# Patient Record
Sex: FEMALE | Race: OTHER | HISPANIC OR LATINO
[De-identification: names, ages, dates, MRNs, and addresses within clinical notes are randomized per-mention and may not be internally consistent; named-entity substitution may affect disease eponyms.]

---

## 2017-01-05 ENCOUNTER — FORM ENCOUNTER (OUTPATIENT)
Age: 32
End: 2017-01-05

## 2017-01-06 ENCOUNTER — OUTPATIENT (OUTPATIENT)
Dept: OUTPATIENT SERVICES | Facility: HOSPITAL | Age: 32
LOS: 1 days | End: 2017-01-06
Payer: MEDICAID

## 2017-01-06 DIAGNOSIS — Z98.89 OTHER SPECIFIED POSTPROCEDURAL STATES: Chronic | ICD-10-CM

## 2017-01-06 DIAGNOSIS — R10.31 RIGHT LOWER QUADRANT PAIN: ICD-10-CM

## 2017-01-06 PROCEDURE — 76770 US EXAM ABDO BACK WALL COMP: CPT | Mod: 26

## 2017-01-06 PROCEDURE — 76770 US EXAM ABDO BACK WALL COMP: CPT

## 2017-01-13 ENCOUNTER — OUTPATIENT (OUTPATIENT)
Dept: OUTPATIENT SERVICES | Facility: HOSPITAL | Age: 32
LOS: 1 days | End: 2017-01-13
Payer: MEDICAID

## 2017-01-13 ENCOUNTER — APPOINTMENT (OUTPATIENT)
Dept: FAMILY MEDICINE | Facility: HOSPITAL | Age: 32
End: 2017-01-13

## 2017-01-13 VITALS
DIASTOLIC BLOOD PRESSURE: 73 MMHG | HEART RATE: 87 BPM | OXYGEN SATURATION: 98 % | SYSTOLIC BLOOD PRESSURE: 102 MMHG | RESPIRATION RATE: 16 BRPM | WEIGHT: 210 LBS | BODY MASS INDEX: 38.41 KG/M2 | TEMPERATURE: 98.3 F

## 2017-01-13 DIAGNOSIS — E28.2 POLYCYSTIC OVARIAN SYNDROME: ICD-10-CM

## 2017-01-13 DIAGNOSIS — Z00.00 ENCOUNTER FOR GENERAL ADULT MEDICAL EXAMINATION WITHOUT ABNORMAL FINDINGS: ICD-10-CM

## 2017-01-13 DIAGNOSIS — Z98.89 OTHER SPECIFIED POSTPROCEDURAL STATES: Chronic | ICD-10-CM

## 2017-01-13 PROCEDURE — 80053 COMPREHEN METABOLIC PANEL: CPT

## 2017-01-13 PROCEDURE — 36415 COLL VENOUS BLD VENIPUNCTURE: CPT

## 2017-01-13 PROCEDURE — 85025 COMPLETE CBC W/AUTO DIFF WBC: CPT

## 2017-01-13 PROCEDURE — 83036 HEMOGLOBIN GLYCOSYLATED A1C: CPT

## 2017-01-13 PROCEDURE — 80061 LIPID PANEL: CPT

## 2017-01-13 PROCEDURE — G0463: CPT

## 2017-03-06 ENCOUNTER — EMERGENCY (EMERGENCY)
Facility: HOSPITAL | Age: 32
LOS: 1 days | Discharge: HOME | End: 2017-03-06
Admitting: EMERGENCY MEDICINE
Payer: MEDICAID

## 2017-03-06 DIAGNOSIS — Z98.89 OTHER SPECIFIED POSTPROCEDURAL STATES: Chronic | ICD-10-CM

## 2017-03-06 DIAGNOSIS — R51 HEADACHE: ICD-10-CM

## 2017-03-06 DIAGNOSIS — E11.9 TYPE 2 DIABETES MELLITUS WITHOUT COMPLICATIONS: ICD-10-CM

## 2017-03-06 DIAGNOSIS — Z79.84 LONG TERM (CURRENT) USE OF ORAL HYPOGLYCEMIC DRUGS: ICD-10-CM

## 2017-03-06 DIAGNOSIS — Z79.899 OTHER LONG TERM (CURRENT) DRUG THERAPY: ICD-10-CM

## 2017-03-06 PROCEDURE — 70450 CT HEAD/BRAIN W/O DYE: CPT | Mod: 26

## 2017-03-06 PROCEDURE — 99284 EMERGENCY DEPT VISIT MOD MDM: CPT

## 2017-03-07 PROCEDURE — 85027 COMPLETE CBC AUTOMATED: CPT

## 2017-03-07 PROCEDURE — 99284 EMERGENCY DEPT VISIT MOD MDM: CPT | Mod: 25

## 2017-03-07 PROCEDURE — 70450 CT HEAD/BRAIN W/O DYE: CPT

## 2017-03-07 PROCEDURE — 96361 HYDRATE IV INFUSION ADD-ON: CPT

## 2017-03-07 PROCEDURE — 80048 BASIC METABOLIC PNL TOTAL CA: CPT

## 2017-03-07 PROCEDURE — 81025 URINE PREGNANCY TEST: CPT

## 2017-03-07 PROCEDURE — 96374 THER/PROPH/DIAG INJ IV PUSH: CPT

## 2017-03-07 PROCEDURE — 96375 TX/PRO/DX INJ NEW DRUG ADDON: CPT

## 2017-03-13 ENCOUNTER — OUTPATIENT (OUTPATIENT)
Dept: OUTPATIENT SERVICES | Facility: HOSPITAL | Age: 32
LOS: 1 days | End: 2017-03-13
Payer: MEDICAID

## 2017-03-13 ENCOUNTER — APPOINTMENT (OUTPATIENT)
Dept: FAMILY MEDICINE | Facility: HOSPITAL | Age: 32
End: 2017-03-13

## 2017-03-13 VITALS
WEIGHT: 210 LBS | SYSTOLIC BLOOD PRESSURE: 102 MMHG | TEMPERATURE: 98.9 F | RESPIRATION RATE: 16 BRPM | BODY MASS INDEX: 38.41 KG/M2 | DIASTOLIC BLOOD PRESSURE: 69 MMHG | OXYGEN SATURATION: 99 % | HEART RATE: 72 BPM

## 2017-03-13 DIAGNOSIS — Z98.89 OTHER SPECIFIED POSTPROCEDURAL STATES: Chronic | ICD-10-CM

## 2017-03-13 DIAGNOSIS — R51 HEADACHE: ICD-10-CM

## 2017-03-13 PROCEDURE — G0463: CPT

## 2017-03-15 ENCOUNTER — EMERGENCY (EMERGENCY)
Facility: HOSPITAL | Age: 32
LOS: 1 days | Discharge: HOME | End: 2017-03-15
Admitting: EMERGENCY MEDICINE
Payer: MEDICAID

## 2017-03-15 DIAGNOSIS — R11.0 NAUSEA: ICD-10-CM

## 2017-03-15 DIAGNOSIS — E11.9 TYPE 2 DIABETES MELLITUS WITHOUT COMPLICATIONS: ICD-10-CM

## 2017-03-15 DIAGNOSIS — Z98.89 OTHER SPECIFIED POSTPROCEDURAL STATES: Chronic | ICD-10-CM

## 2017-03-15 DIAGNOSIS — R51 HEADACHE: ICD-10-CM

## 2017-03-15 DIAGNOSIS — R19.7 DIARRHEA, UNSPECIFIED: ICD-10-CM

## 2017-03-15 DIAGNOSIS — Z98.51 TUBAL LIGATION STATUS: ICD-10-CM

## 2017-03-15 DIAGNOSIS — Z79.84 LONG TERM (CURRENT) USE OF ORAL HYPOGLYCEMIC DRUGS: ICD-10-CM

## 2017-03-15 PROCEDURE — 99284 EMERGENCY DEPT VISIT MOD MDM: CPT

## 2017-03-16 PROCEDURE — 96361 HYDRATE IV INFUSION ADD-ON: CPT

## 2017-03-16 PROCEDURE — 99284 EMERGENCY DEPT VISIT MOD MDM: CPT | Mod: 25

## 2017-03-16 PROCEDURE — 96376 TX/PRO/DX INJ SAME DRUG ADON: CPT

## 2017-03-16 PROCEDURE — 82962 GLUCOSE BLOOD TEST: CPT

## 2017-03-16 PROCEDURE — 85027 COMPLETE CBC AUTOMATED: CPT

## 2017-03-16 PROCEDURE — G0463: CPT

## 2017-03-16 PROCEDURE — 96374 THER/PROPH/DIAG INJ IV PUSH: CPT

## 2017-03-16 PROCEDURE — 80048 BASIC METABOLIC PNL TOTAL CA: CPT

## 2017-03-16 PROCEDURE — 81025 URINE PREGNANCY TEST: CPT

## 2017-03-16 PROCEDURE — 96375 TX/PRO/DX INJ NEW DRUG ADDON: CPT

## 2017-03-23 ENCOUNTER — OUTPATIENT (OUTPATIENT)
Dept: OUTPATIENT SERVICES | Facility: HOSPITAL | Age: 32
LOS: 1 days | End: 2017-03-23
Payer: MEDICAID

## 2017-03-23 ENCOUNTER — APPOINTMENT (OUTPATIENT)
Dept: FAMILY MEDICINE | Facility: HOSPITAL | Age: 32
End: 2017-03-23

## 2017-03-23 VITALS
BODY MASS INDEX: 38.23 KG/M2 | SYSTOLIC BLOOD PRESSURE: 112 MMHG | HEART RATE: 76 BPM | TEMPERATURE: 99.2 F | RESPIRATION RATE: 14 BRPM | WEIGHT: 209 LBS | DIASTOLIC BLOOD PRESSURE: 78 MMHG | OXYGEN SATURATION: 98 %

## 2017-03-23 DIAGNOSIS — Z98.89 OTHER SPECIFIED POSTPROCEDURAL STATES: Chronic | ICD-10-CM

## 2017-03-23 DIAGNOSIS — E66.9 OBESITY, UNSPECIFIED: ICD-10-CM

## 2017-03-23 DIAGNOSIS — Z00.00 ENCOUNTER FOR GENERAL ADULT MEDICAL EXAMINATION WITHOUT ABNORMAL FINDINGS: ICD-10-CM

## 2017-03-23 PROCEDURE — 84439 ASSAY OF FREE THYROXINE: CPT

## 2017-03-23 PROCEDURE — 84443 ASSAY THYROID STIM HORMONE: CPT

## 2017-03-23 PROCEDURE — G0463: CPT

## 2017-03-31 ENCOUNTER — APPOINTMENT (OUTPATIENT)
Dept: MRI IMAGING | Facility: HOSPITAL | Age: 32
End: 2017-03-31

## 2017-03-31 ENCOUNTER — OUTPATIENT (OUTPATIENT)
Dept: OUTPATIENT SERVICES | Facility: HOSPITAL | Age: 32
LOS: 1 days | End: 2017-03-31
Payer: MEDICAID

## 2017-03-31 DIAGNOSIS — Z98.89 OTHER SPECIFIED POSTPROCEDURAL STATES: Chronic | ICD-10-CM

## 2017-03-31 DIAGNOSIS — R42 DIZZINESS AND GIDDINESS: ICD-10-CM

## 2017-03-31 DIAGNOSIS — R51 HEADACHE: ICD-10-CM

## 2017-03-31 PROCEDURE — 70551 MRI BRAIN STEM W/O DYE: CPT

## 2017-05-02 ENCOUNTER — RX RENEWAL (OUTPATIENT)
Age: 32
End: 2017-05-02

## 2017-07-21 ENCOUNTER — EMERGENCY (EMERGENCY)
Facility: HOSPITAL | Age: 32
LOS: 1 days | Discharge: HOME | End: 2017-07-21
Admitting: EMERGENCY MEDICINE
Payer: COMMERCIAL

## 2017-07-21 DIAGNOSIS — Z98.89 OTHER SPECIFIED POSTPROCEDURAL STATES: Chronic | ICD-10-CM

## 2017-07-21 DIAGNOSIS — R10.9 UNSPECIFIED ABDOMINAL PAIN: ICD-10-CM

## 2017-07-21 DIAGNOSIS — Z79.84 LONG TERM (CURRENT) USE OF ORAL HYPOGLYCEMIC DRUGS: ICD-10-CM

## 2017-07-21 DIAGNOSIS — Z98.890 OTHER SPECIFIED POSTPROCEDURAL STATES: ICD-10-CM

## 2017-07-21 DIAGNOSIS — Z98.51 TUBAL LIGATION STATUS: ICD-10-CM

## 2017-07-21 DIAGNOSIS — Z79.2 LONG TERM (CURRENT) USE OF ANTIBIOTICS: ICD-10-CM

## 2017-07-21 DIAGNOSIS — E11.9 TYPE 2 DIABETES MELLITUS WITHOUT COMPLICATIONS: ICD-10-CM

## 2017-07-21 DIAGNOSIS — Z90.49 ACQUIRED ABSENCE OF OTHER SPECIFIED PARTS OF DIGESTIVE TRACT: ICD-10-CM

## 2017-07-21 DIAGNOSIS — R10.11 RIGHT UPPER QUADRANT PAIN: ICD-10-CM

## 2017-07-21 DIAGNOSIS — Z79.899 OTHER LONG TERM (CURRENT) DRUG THERAPY: ICD-10-CM

## 2017-07-21 PROCEDURE — 76700 US EXAM ABDOM COMPLETE: CPT | Mod: 26

## 2017-07-21 PROCEDURE — 99285 EMERGENCY DEPT VISIT HI MDM: CPT

## 2017-07-22 PROCEDURE — 82150 ASSAY OF AMYLASE: CPT

## 2017-07-22 PROCEDURE — 81003 URINALYSIS AUTO W/O SCOPE: CPT

## 2017-07-22 PROCEDURE — 83690 ASSAY OF LIPASE: CPT

## 2017-07-22 PROCEDURE — 80048 BASIC METABOLIC PNL TOTAL CA: CPT

## 2017-07-22 PROCEDURE — 96361 HYDRATE IV INFUSION ADD-ON: CPT

## 2017-07-22 PROCEDURE — 87086 URINE CULTURE/COLONY COUNT: CPT

## 2017-07-22 PROCEDURE — 85027 COMPLETE CBC AUTOMATED: CPT

## 2017-07-22 PROCEDURE — 76700 US EXAM ABDOM COMPLETE: CPT

## 2017-07-22 PROCEDURE — 99284 EMERGENCY DEPT VISIT MOD MDM: CPT | Mod: 25

## 2017-07-22 PROCEDURE — 81025 URINE PREGNANCY TEST: CPT

## 2017-07-22 PROCEDURE — 80076 HEPATIC FUNCTION PANEL: CPT

## 2017-07-22 PROCEDURE — 96374 THER/PROPH/DIAG INJ IV PUSH: CPT

## 2017-10-11 ENCOUNTER — APPOINTMENT (OUTPATIENT)
Dept: FAMILY MEDICINE | Facility: HOSPITAL | Age: 32
End: 2017-10-11

## 2017-10-11 ENCOUNTER — OUTPATIENT (OUTPATIENT)
Dept: OUTPATIENT SERVICES | Facility: HOSPITAL | Age: 32
LOS: 1 days | End: 2017-10-11
Payer: MEDICAID

## 2017-10-11 VITALS
RESPIRATION RATE: 16 BRPM | BODY MASS INDEX: 38.78 KG/M2 | DIASTOLIC BLOOD PRESSURE: 71 MMHG | SYSTOLIC BLOOD PRESSURE: 103 MMHG | HEART RATE: 61 BPM | OXYGEN SATURATION: 99 % | WEIGHT: 212 LBS | TEMPERATURE: 98.7 F

## 2017-10-11 DIAGNOSIS — Z98.89 OTHER SPECIFIED POSTPROCEDURAL STATES: Chronic | ICD-10-CM

## 2017-10-11 DIAGNOSIS — R10.31 RIGHT LOWER QUADRANT PAIN: ICD-10-CM

## 2017-10-11 PROCEDURE — G0463: CPT

## 2017-10-11 RX ORDER — ACETAMINOPHEN, ASPIRIN, AND CAFFEINE 250; 250; 65 MG/1; MG/1; MG/1
250-250-65 TABLET, FILM COATED ORAL
Qty: 90 | Refills: 0 | Status: DISCONTINUED | COMMUNITY
Start: 2017-03-13 | End: 2017-10-11

## 2017-10-25 ENCOUNTER — APPOINTMENT (OUTPATIENT)
Dept: FAMILY MEDICINE | Facility: HOSPITAL | Age: 32
End: 2017-10-25

## 2017-10-25 ENCOUNTER — OUTPATIENT (OUTPATIENT)
Dept: OUTPATIENT SERVICES | Facility: HOSPITAL | Age: 32
LOS: 1 days | End: 2017-10-25
Payer: MEDICAID

## 2017-10-25 VITALS
TEMPERATURE: 98.5 F | SYSTOLIC BLOOD PRESSURE: 113 MMHG | DIASTOLIC BLOOD PRESSURE: 75 MMHG | HEIGHT: 62 IN | HEART RATE: 79 BPM | OXYGEN SATURATION: 99 % | WEIGHT: 211 LBS | BODY MASS INDEX: 38.83 KG/M2 | RESPIRATION RATE: 14 BRPM

## 2017-10-25 DIAGNOSIS — E55.9 VITAMIN D DEFICIENCY, UNSPECIFIED: ICD-10-CM

## 2017-10-25 DIAGNOSIS — Z98.89 OTHER SPECIFIED POSTPROCEDURAL STATES: Chronic | ICD-10-CM

## 2017-10-25 PROCEDURE — G0463: CPT

## 2017-10-25 RX ORDER — SIMVASTATIN 40 MG/1
40 TABLET, FILM COATED ORAL
Qty: 30 | Refills: 3 | Status: DISCONTINUED | COMMUNITY
Start: 2017-03-13 | End: 2017-10-25

## 2017-11-22 ENCOUNTER — OUTPATIENT (OUTPATIENT)
Dept: OUTPATIENT SERVICES | Facility: HOSPITAL | Age: 32
LOS: 1 days | End: 2017-11-22
Payer: COMMERCIAL

## 2017-11-22 VITALS
HEIGHT: 64 IN | DIASTOLIC BLOOD PRESSURE: 74 MMHG | SYSTOLIC BLOOD PRESSURE: 114 MMHG | HEART RATE: 72 BPM | WEIGHT: 205.91 LBS | TEMPERATURE: 98 F | RESPIRATION RATE: 16 BRPM

## 2017-11-22 DIAGNOSIS — Z98.89 OTHER SPECIFIED POSTPROCEDURAL STATES: Chronic | ICD-10-CM

## 2017-11-22 DIAGNOSIS — R10.2 PELVIC AND PERINEAL PAIN: ICD-10-CM

## 2017-11-22 DIAGNOSIS — Z01.818 ENCOUNTER FOR OTHER PREPROCEDURAL EXAMINATION: ICD-10-CM

## 2017-11-22 DIAGNOSIS — N80.9 ENDOMETRIOSIS, UNSPECIFIED: ICD-10-CM

## 2017-11-22 LAB
HCG SERPL-ACNC: <1 MIU/ML — SIGNIFICANT CHANGE UP
HCT VFR BLD CALC: 43 % — SIGNIFICANT CHANGE UP (ref 34.5–45)
HGB BLD-MCNC: 14.1 G/DL — SIGNIFICANT CHANGE UP (ref 11.5–15.5)
MCHC RBC-ENTMCNC: 29.9 PG — SIGNIFICANT CHANGE UP (ref 27–34)
MCHC RBC-ENTMCNC: 32.8 GM/DL — SIGNIFICANT CHANGE UP (ref 32–36)
MCV RBC AUTO: 91 FL — SIGNIFICANT CHANGE UP (ref 80–100)
PLATELET # BLD AUTO: 198 K/UL — SIGNIFICANT CHANGE UP (ref 150–400)
RBC # BLD: 4.73 M/UL — SIGNIFICANT CHANGE UP (ref 3.8–5.2)
RBC # FLD: 11.7 % — SIGNIFICANT CHANGE UP (ref 10.3–14.5)
WBC # BLD: 9 K/UL — SIGNIFICANT CHANGE UP (ref 3.8–10.5)
WBC # FLD AUTO: 9 K/UL — SIGNIFICANT CHANGE UP (ref 3.8–10.5)

## 2017-11-22 PROCEDURE — G0463: CPT

## 2017-11-22 PROCEDURE — 86900 BLOOD TYPING SEROLOGIC ABO: CPT

## 2017-11-22 PROCEDURE — 84702 CHORIONIC GONADOTROPIN TEST: CPT

## 2017-11-22 PROCEDURE — 86901 BLOOD TYPING SEROLOGIC RH(D): CPT

## 2017-11-22 PROCEDURE — 85027 COMPLETE CBC AUTOMATED: CPT

## 2017-11-22 PROCEDURE — 86850 RBC ANTIBODY SCREEN: CPT

## 2017-11-22 NOTE — H&P PST ADULT - ASSESSMENT
33 yo F w abdominal pain during menses  for diagnostic laparoscopy   lysis of adhesions   fulguration  of endometriosis

## 2017-11-22 NOTE — H&P PST ADULT - NSANTHOSAYNRD_GEN_A_CORE
No. ESTEBAN screening performed.  STOP BANG Legend: 0-2 = LOW Risk; 3-4 = INTERMEDIATE Risk; 5-8 = HIGH Risk

## 2017-11-22 NOTE — H&P PST ADULT - HISTORY OF PRESENT ILLNESS
31 yo F for diagnostic laparoscopy - lysis of adhesions- fulguration of endometriosis   Pt reports abdominal pain w her menses and heavy bleeding   LMP  11/4/17  G 4 P 3

## 2017-11-26 ENCOUNTER — TRANSCRIPTION ENCOUNTER (OUTPATIENT)
Age: 32
End: 2017-11-26

## 2017-11-27 ENCOUNTER — APPOINTMENT (OUTPATIENT)
Dept: FAMILY MEDICINE | Facility: HOSPITAL | Age: 32
End: 2017-11-27

## 2017-11-27 ENCOUNTER — RESULT CHARGE (OUTPATIENT)
Age: 32
End: 2017-11-27

## 2017-11-27 ENCOUNTER — OUTPATIENT (OUTPATIENT)
Dept: OUTPATIENT SERVICES | Facility: HOSPITAL | Age: 32
LOS: 1 days | End: 2017-11-27
Payer: MEDICAID

## 2017-11-27 VITALS
DIASTOLIC BLOOD PRESSURE: 79 MMHG | WEIGHT: 212 LBS | BODY MASS INDEX: 38.78 KG/M2 | HEART RATE: 81 BPM | TEMPERATURE: 99.2 F | OXYGEN SATURATION: 99 % | SYSTOLIC BLOOD PRESSURE: 119 MMHG | RESPIRATION RATE: 14 BRPM

## 2017-11-27 DIAGNOSIS — Z98.89 OTHER SPECIFIED POSTPROCEDURAL STATES: Chronic | ICD-10-CM

## 2017-11-27 LAB
HCG UR QL: NEGATIVE
QUALITY CONTROL: YES

## 2017-11-27 PROCEDURE — G0463: CPT

## 2017-11-29 DIAGNOSIS — Z00.00 ENCOUNTER FOR GENERAL ADULT MEDICAL EXAMINATION WITHOUT ABNORMAL FINDINGS: ICD-10-CM

## 2017-11-29 DIAGNOSIS — R10.9 UNSPECIFIED ABDOMINAL PAIN: ICD-10-CM

## 2017-11-29 DIAGNOSIS — R10.2 PELVIC AND PERINEAL PAIN: ICD-10-CM

## 2017-11-30 RX ORDER — OXYCODONE HYDROCHLORIDE 5 MG/1
10 TABLET ORAL ONCE
Qty: 0 | Refills: 0 | Status: DISCONTINUED | OUTPATIENT
Start: 2017-12-01 | End: 2017-12-06

## 2017-11-30 RX ORDER — SODIUM CHLORIDE 9 MG/ML
1000 INJECTION, SOLUTION INTRAVENOUS
Qty: 0 | Refills: 0 | Status: DISCONTINUED | OUTPATIENT
Start: 2017-12-01 | End: 2017-12-16

## 2017-11-30 RX ORDER — OXYCODONE HYDROCHLORIDE 5 MG/1
5 TABLET ORAL ONCE
Qty: 0 | Refills: 0 | Status: DISCONTINUED | OUTPATIENT
Start: 2017-12-01 | End: 2017-12-06

## 2017-11-30 RX ORDER — HYDROMORPHONE HYDROCHLORIDE 2 MG/ML
0.5 INJECTION INTRAMUSCULAR; INTRAVENOUS; SUBCUTANEOUS
Qty: 0 | Refills: 0 | Status: DISCONTINUED | OUTPATIENT
Start: 2017-12-01 | End: 2017-12-06

## 2017-11-30 RX ORDER — SODIUM CHLORIDE 9 MG/ML
1000 INJECTION, SOLUTION INTRAVENOUS
Qty: 0 | Refills: 0 | Status: DISCONTINUED | OUTPATIENT
Start: 2017-12-01 | End: 2017-12-01

## 2017-12-01 ENCOUNTER — TRANSCRIPTION ENCOUNTER (OUTPATIENT)
Age: 32
End: 2017-12-01

## 2017-12-01 ENCOUNTER — OUTPATIENT (OUTPATIENT)
Dept: OUTPATIENT SERVICES | Facility: HOSPITAL | Age: 32
LOS: 1 days | End: 2017-12-01
Payer: COMMERCIAL

## 2017-12-01 VITALS
RESPIRATION RATE: 14 BRPM | TEMPERATURE: 99 F | WEIGHT: 205.91 LBS | SYSTOLIC BLOOD PRESSURE: 114 MMHG | DIASTOLIC BLOOD PRESSURE: 67 MMHG | OXYGEN SATURATION: 98 % | HEART RATE: 76 BPM | HEIGHT: 64 IN

## 2017-12-01 VITALS
SYSTOLIC BLOOD PRESSURE: 119 MMHG | RESPIRATION RATE: 13 BRPM | HEART RATE: 73 BPM | TEMPERATURE: 98 F | DIASTOLIC BLOOD PRESSURE: 63 MMHG | OXYGEN SATURATION: 96 %

## 2017-12-01 DIAGNOSIS — Z01.818 ENCOUNTER FOR OTHER PREPROCEDURAL EXAMINATION: ICD-10-CM

## 2017-12-01 DIAGNOSIS — R10.2 PELVIC AND PERINEAL PAIN: ICD-10-CM

## 2017-12-01 DIAGNOSIS — Z98.89 OTHER SPECIFIED POSTPROCEDURAL STATES: Chronic | ICD-10-CM

## 2017-12-01 DIAGNOSIS — N80.9 ENDOMETRIOSIS, UNSPECIFIED: ICD-10-CM

## 2017-12-01 LAB — HCG UR QL: NEGATIVE — SIGNIFICANT CHANGE UP

## 2017-12-01 PROCEDURE — 58660 LAPAROSCOPY LYSIS: CPT

## 2017-12-01 PROCEDURE — 81025 URINE PREGNANCY TEST: CPT

## 2017-12-01 RX ORDER — ACETAMINOPHEN 500 MG
1000 TABLET ORAL ONCE
Qty: 0 | Refills: 0 | Status: COMPLETED | OUTPATIENT
Start: 2017-12-01 | End: 2017-12-01

## 2017-12-01 RX ADMIN — SODIUM CHLORIDE 75 MILLILITER(S): 9 INJECTION, SOLUTION INTRAVENOUS at 09:26

## 2017-12-01 RX ADMIN — HYDROMORPHONE HYDROCHLORIDE 0.5 MILLIGRAM(S): 2 INJECTION INTRAMUSCULAR; INTRAVENOUS; SUBCUTANEOUS at 13:52

## 2017-12-01 RX ADMIN — HYDROMORPHONE HYDROCHLORIDE 0.5 MILLIGRAM(S): 2 INJECTION INTRAMUSCULAR; INTRAVENOUS; SUBCUTANEOUS at 14:20

## 2017-12-01 RX ADMIN — SODIUM CHLORIDE 75 MILLILITER(S): 9 INJECTION, SOLUTION INTRAVENOUS at 13:52

## 2017-12-01 RX ADMIN — Medication 200 MILLIGRAM(S): at 14:33

## 2017-12-01 RX ADMIN — HYDROMORPHONE HYDROCHLORIDE 0.5 MILLIGRAM(S): 2 INJECTION INTRAMUSCULAR; INTRAVENOUS; SUBCUTANEOUS at 14:35

## 2017-12-01 RX ADMIN — HYDROMORPHONE HYDROCHLORIDE 0.5 MILLIGRAM(S): 2 INJECTION INTRAMUSCULAR; INTRAVENOUS; SUBCUTANEOUS at 14:05

## 2017-12-01 NOTE — ASU DISCHARGE PLAN (ADULT/PEDIATRIC). - NOTIFY
Unable to Urinate/Bleeding that does not stop/Fever greater than 101/GYN Fever>100.4/Excessive Diarrhea/Persistent Nausea and Vomiting/Pain not relieved by Medications

## 2017-12-01 NOTE — BRIEF OPERATIVE NOTE - PROCEDURE
<<-----Click on this checkbox to enter Procedure Laparoscopic lysis of adhesions  12/01/2017    Active  TSANTIAGOE

## 2017-12-01 NOTE — ASU DISCHARGE PLAN (ADULT/PEDIATRIC). - MEDICATION SUMMARY - MEDICATIONS TO TAKE
I will START or STAY ON the medications listed below when I get home from the hospital:    Motrin 600 mg oral tablet  -- 1 tab(s) by mouth every 8 hours x 3 days   -- Indication: For Pain med    Percocet 5/325 oral tablet  -- 1 tab(s) by mouth every 6 hours, As Needed MDD:4   -- Caution federal law prohibits the transfer of this drug to any person other  than the person for whom it was prescribed.  May cause drowsiness.  Alcohol may intensify this effect.  Use care when operating dangerous machinery.  This prescription cannot be refilled.  This product contains acetaminophen.  Do not use  with any other product containing acetaminophen to prevent possible liver damage.  Using more of this medication than prescribed may cause serious breathing problems.    -- Indication: For Pain med

## 2017-12-01 NOTE — ASU DISCHARGE PLAN (ADULT/PEDIATRIC). - ACTIVITY LEVEL
no tampons/nothing per vagina/no douching/no exercise/no heavy lifting/no tub baths/no intercourse/no sports/gym

## 2017-12-01 NOTE — BRIEF OPERATIVE NOTE - POST-OP DX
Pelvic adhesions  12/01/2017    Active  Marlena Mora  Pelvic pain in female  12/01/2017    Active  Marlena Mora

## 2017-12-01 NOTE — BRIEF OPERATIVE NOTE - OPERATION/FINDINGS
Normal sized anteverted uterus. Normal sized anteverted uterus. Normal appearing uterus, ovaries and liver. Tubes with evidence of ligation. Appendectomy site intact. Adhesions from omentum to anterior abdominal wall and anterior-left lateral uterus. Adhesions from bladder to uterine lower uterine segment. Bladder retrograde-filled, and noted to be intact.

## 2017-12-19 ENCOUNTER — OUTPATIENT (OUTPATIENT)
Dept: OUTPATIENT SERVICES | Facility: HOSPITAL | Age: 32
LOS: 1 days | End: 2017-12-19
Payer: MEDICAID

## 2017-12-19 ENCOUNTER — APPOINTMENT (OUTPATIENT)
Dept: FAMILY MEDICINE | Facility: HOSPITAL | Age: 32
End: 2017-12-19

## 2017-12-19 VITALS
HEART RATE: 82 BPM | WEIGHT: 205 LBS | BODY MASS INDEX: 37.5 KG/M2 | DIASTOLIC BLOOD PRESSURE: 71 MMHG | TEMPERATURE: 98.5 F | RESPIRATION RATE: 14 BRPM | OXYGEN SATURATION: 97 % | SYSTOLIC BLOOD PRESSURE: 109 MMHG

## 2017-12-19 DIAGNOSIS — Z00.00 ENCOUNTER FOR GENERAL ADULT MEDICAL EXAMINATION WITHOUT ABNORMAL FINDINGS: ICD-10-CM

## 2017-12-19 DIAGNOSIS — Z98.89 OTHER SPECIFIED POSTPROCEDURAL STATES: Chronic | ICD-10-CM

## 2017-12-19 PROCEDURE — G0463: CPT

## 2017-12-26 DIAGNOSIS — R10.9 UNSPECIFIED ABDOMINAL PAIN: ICD-10-CM

## 2018-01-17 ENCOUNTER — CHART COPY (OUTPATIENT)
Age: 33
End: 2018-01-17

## 2018-05-22 ENCOUNTER — EMERGENCY (EMERGENCY)
Facility: HOSPITAL | Age: 33
LOS: 1 days | Discharge: HOME | End: 2018-05-22
Admitting: EMERGENCY MEDICINE
Payer: MEDICAID

## 2018-05-22 DIAGNOSIS — Z98.89 OTHER SPECIFIED POSTPROCEDURAL STATES: Chronic | ICD-10-CM

## 2018-05-22 PROCEDURE — 76700 US EXAM ABDOM COMPLETE: CPT

## 2018-05-22 PROCEDURE — 82150 ASSAY OF AMYLASE: CPT

## 2018-05-22 PROCEDURE — 76700 US EXAM ABDOM COMPLETE: CPT | Mod: 26

## 2018-05-22 PROCEDURE — 99284 EMERGENCY DEPT VISIT MOD MDM: CPT

## 2018-05-22 PROCEDURE — 99284 EMERGENCY DEPT VISIT MOD MDM: CPT | Mod: 25

## 2018-05-22 PROCEDURE — 80076 HEPATIC FUNCTION PANEL: CPT

## 2018-05-22 PROCEDURE — 96374 THER/PROPH/DIAG INJ IV PUSH: CPT

## 2018-05-22 PROCEDURE — 81025 URINE PREGNANCY TEST: CPT

## 2018-05-22 PROCEDURE — 96361 HYDRATE IV INFUSION ADD-ON: CPT

## 2018-05-22 PROCEDURE — 83690 ASSAY OF LIPASE: CPT

## 2018-05-22 PROCEDURE — 85027 COMPLETE CBC AUTOMATED: CPT

## 2018-05-22 PROCEDURE — 80048 BASIC METABOLIC PNL TOTAL CA: CPT

## 2018-05-22 PROCEDURE — 81002 URINALYSIS NONAUTO W/O SCOPE: CPT

## 2018-05-29 ENCOUNTER — OUTPATIENT (OUTPATIENT)
Dept: OUTPATIENT SERVICES | Facility: HOSPITAL | Age: 33
LOS: 1 days | End: 2018-05-29
Payer: MEDICAID

## 2018-05-29 ENCOUNTER — APPOINTMENT (OUTPATIENT)
Age: 33
End: 2018-05-29

## 2018-05-29 DIAGNOSIS — Z98.89 OTHER SPECIFIED POSTPROCEDURAL STATES: Chronic | ICD-10-CM

## 2018-05-29 DIAGNOSIS — Z00.8 ENCOUNTER FOR OTHER GENERAL EXAMINATION: ICD-10-CM

## 2018-05-29 DIAGNOSIS — R07.81 PLEURODYNIA: ICD-10-CM

## 2018-05-29 PROCEDURE — 71101 X-RAY EXAM UNILAT RIBS/CHEST: CPT

## 2018-05-29 PROCEDURE — 71101 X-RAY EXAM UNILAT RIBS/CHEST: CPT | Mod: 26,RT

## 2018-06-11 ENCOUNTER — MEDICATION RENEWAL (OUTPATIENT)
Age: 33
End: 2018-06-11

## 2018-06-13 ENCOUNTER — APPOINTMENT (OUTPATIENT)
Dept: FAMILY MEDICINE | Facility: HOSPITAL | Age: 33
End: 2018-06-13

## 2018-06-13 ENCOUNTER — OUTPATIENT (OUTPATIENT)
Dept: OUTPATIENT SERVICES | Facility: HOSPITAL | Age: 33
LOS: 1 days | End: 2018-06-13
Payer: MEDICAID

## 2018-06-13 VITALS
DIASTOLIC BLOOD PRESSURE: 68 MMHG | HEART RATE: 76 BPM | HEIGHT: 62 IN | BODY MASS INDEX: 39.56 KG/M2 | WEIGHT: 215 LBS | SYSTOLIC BLOOD PRESSURE: 96 MMHG | OXYGEN SATURATION: 98 % | RESPIRATION RATE: 16 BRPM | TEMPERATURE: 98.1 F

## 2018-06-13 DIAGNOSIS — Z00.00 ENCOUNTER FOR GENERAL ADULT MEDICAL EXAMINATION WITHOUT ABNORMAL FINDINGS: ICD-10-CM

## 2018-06-13 DIAGNOSIS — Z98.89 OTHER SPECIFIED POSTPROCEDURAL STATES: Chronic | ICD-10-CM

## 2018-06-13 NOTE — CURRENT MEDS
[Cost] : cost [Lack of understanding] : lack of understanding [Takes medication as prescribed] : does not take

## 2018-06-13 NOTE — COUNSELING
[Weight management counseling provided] : Weight management [Healthy eating counseling provided] : healthy eating [Activity counseling provided] : activity [Target Wt Loss Goal ___] : Target weight loss goal [unfilled] lbs [Low Fat Diet] : Low fat diet [___ min/wk activity recommended] : [unfilled] min/wk activity recommended [Sleep ___ hours/day] : Sleep [unfilled] hours/day [Engage in a relaxing activity] : Engage in a relaxing activity [Patient Non-adherent to care plan] : Patient non-adherent to care plan [Financial issues] : Financial issues [Needs reinforcement, provided] : Patient needs reinforcement on understanding lifestyle changes and  the steps needed to achieve self management goals and reinforcement was provided [ - Behavioral Counseling for Obesity (Face-to-Face for 15 Minutes)] : Behavioral Counseling for Obesity (Face-to-Face for 15 Minutes)

## 2018-06-13 NOTE — HEALTH RISK ASSESSMENT
[No falls in past year] : Patient reported no falls in the past year [0] : 2) Feeling down, depressed, or hopeless: Not at all (0) [] : No [[id number]] : 0

## 2018-06-13 NOTE — PLAN
[FreeTextEntry1] : \par 34yo F w/hx of PCOS, prediabetes, and obesity here for prediabetes f/u.\par \par # Prediabetes\par - Last A1c in 10/2017 was 5.6\par - CBC, CMP, A1c, Lipid panel - understands labs should be drawn fasting\par - no ophtho and podiatry referral for now.\par \par # Vitamin D deficiency\par - s/p 10846 Vitamin D weekly for 6 wks\par - Start 1000 vitamin D daily\par \par #Abnormal lab work Hyperlipidemia, A1c\par - lab work discussed extensively\par - Implications of hyperlipidemia and diabetes discussed. Counseled about diet, exercise, portion control. Understanding assessed via teach back. All questions answered.\par \par # Health Maintenance\par - Last pap in 2015: no HPV testing, pap wnl. Needs pap soon\par - Tdap in 2014\par - RTC in 2-3 weeks for papsmear\par \par \par Discussed w/Dr. Ford \par

## 2018-06-13 NOTE — PHYSICAL EXAM
[No Acute Distress] : no acute distress [Well Nourished] : well nourished [Well Developed] : well developed [Well-Appearing] : well-appearing [Supple] : supple [No Lymphadenopathy] : no lymphadenopathy [No Respiratory Distress] : no respiratory distress  [Clear to Auscultation] : lungs were clear to auscultation bilaterally [No Accessory Muscle Use] : no accessory muscle use [Normal Rate] : normal rate  [Regular Rhythm] : with a regular rhythm [Normal S1, S2] : normal S1 and S2 [Soft] : abdomen soft [Non Tender] : non-tender [Non-distended] : non-distended [No HSM] : no HSM [Normal Bowel Sounds] : normal bowel sounds [Normal Posterior Cervical Nodes] : no posterior cervical lymphadenopathy [Normal Anterior Cervical Nodes] : no anterior cervical lymphadenopathy [No Joint Swelling] : no joint swelling [Normal Gait] : normal gait [Coordination Grossly Intact] : coordination grossly intact [No Focal Deficits] : no focal deficits [Normal Affect] : the affect was normal [Normal Mood] : the mood was normal [Normal Insight/Judgement] : insight and judgment were intact

## 2018-06-13 NOTE — REVIEW OF SYSTEMS
[Fever] : no fever [Chills] : no chills [Fatigue] : no fatigue [Hot Flashes] : no hot flashes [Night Sweats] : no night sweats [Chest Pain] : no chest pain [Palpitations] : no palpitations [Leg Claudication] : no leg claudication [Lower Ext Edema] : no lower extremity edema [Orthopnea] : no orthopnea [Paroysmal Nocturnal Dyspnea] : no paroysmal nocturnal dyspnea [Shortness Of Breath] : no shortness of breath [Wheezing] : no wheezing [Cough] : no cough [Dyspnea on Exertion] : no dyspnea on exertion [Nausea] : no nausea [Constipation] : no constipation [Diarrhea] : diarrhea [Vomiting] : no vomiting [Melena] : no melena [Dysuria] : no dysuria [Incontinence] : no incontinence [Nocturia] : no nocturia [Hematuria] : no hematuria [Frequency] : no frequency [Joint Pain] : no joint pain [Headache] : no headache [Dizziness] : no dizziness [Fainting] : no fainting [Confusion] : no confusion [Suicidal] : not suicidal [Insomnia] : no insomnia [Anxiety] : no anxiety [Depression] : no depression

## 2018-06-13 NOTE — HISTORY OF PRESENT ILLNESS
[FreeTextEntry1] : Prediabetes follow up [de-identified] : Pt declined use of . Implications of declining  on health care encounter extensively discussed. Understanding assessed via teach back method. All other questions answered. \par \par 34yo F w/hx of PCOS, prediabetes, and obesity here for prediabetes f/u.\par Per pt stopped taking all medication because she does not want her body to get used to medication. Extensive discussion on the implications of not adhering to treatment. Understands cardiac, renal, and other micro/macrovascular compromise 2/2 diabetes. Pt is agreeable to restarting metformin, healthy eating habits and exercising. States will  medication from pharmacy today. Eating habits in increasing walking discussed. Pt is motivated because she wants to be a good role model for her daughter.

## 2018-06-13 NOTE — END OF VISIT
[] : Resident [>50% of Time Spent on Counseling for ____] : Greater than 50% of the encounter time was spent on counseling for [unfilled] [Time Spent: ___ minutes] : I have spent [unfilled] minutes of face to face time with the patient

## 2018-06-15 DIAGNOSIS — E66.9 OBESITY, UNSPECIFIED: ICD-10-CM

## 2018-06-16 LAB
ALBUMIN SERPL ELPH-MCNC: 3.8 G/DL — SIGNIFICANT CHANGE UP (ref 3.3–5)
ALP SERPL-CCNC: 93 U/L — SIGNIFICANT CHANGE UP (ref 40–120)
ALT FLD-CCNC: 32 U/L DA — SIGNIFICANT CHANGE UP (ref 10–45)
ANION GAP SERPL CALC-SCNC: 9 MMOL/L — SIGNIFICANT CHANGE UP (ref 5–17)
AST SERPL-CCNC: 22 U/L — SIGNIFICANT CHANGE UP (ref 10–40)
BASOPHILS # BLD AUTO: 0.1 K/UL — SIGNIFICANT CHANGE UP (ref 0–0.2)
BASOPHILS NFR BLD AUTO: 1.1 % — SIGNIFICANT CHANGE UP (ref 0–2)
BILIRUB SERPL-MCNC: 0.4 MG/DL — SIGNIFICANT CHANGE UP (ref 0.2–1.2)
BUN SERPL-MCNC: 12 MG/DL — SIGNIFICANT CHANGE UP (ref 7–23)
CALCIUM SERPL-MCNC: 9 MG/DL — SIGNIFICANT CHANGE UP (ref 8.4–10.5)
CHLORIDE SERPL-SCNC: 106 MMOL/L — SIGNIFICANT CHANGE UP (ref 96–108)
CHOLEST SERPL-MCNC: 168 MG/DL — SIGNIFICANT CHANGE UP (ref 10–199)
CO2 SERPL-SCNC: 24 MMOL/L — SIGNIFICANT CHANGE UP (ref 22–31)
CREAT SERPL-MCNC: 0.58 MG/DL — SIGNIFICANT CHANGE UP (ref 0.5–1.3)
EOSINOPHIL # BLD AUTO: 0.5 K/UL — SIGNIFICANT CHANGE UP (ref 0–0.5)
EOSINOPHIL NFR BLD AUTO: 5.2 % — SIGNIFICANT CHANGE UP (ref 0–6)
GLUCOSE SERPL-MCNC: 107 MG/DL — HIGH (ref 70–99)
HBA1C BLD-MCNC: 5.8 % — HIGH (ref 4–5.6)
HCT VFR BLD CALC: 43.2 % — SIGNIFICANT CHANGE UP (ref 34.5–45)
HDLC SERPL-MCNC: 39 MG/DL — LOW (ref 40–125)
HGB BLD-MCNC: 14.6 G/DL — SIGNIFICANT CHANGE UP (ref 11.5–15.5)
LIPID PNL WITH DIRECT LDL SERPL: 109 MG/DL — SIGNIFICANT CHANGE UP
LYMPHOCYTES # BLD AUTO: 3.6 K/UL — HIGH (ref 1–3.3)
LYMPHOCYTES # BLD AUTO: 39.5 % — SIGNIFICANT CHANGE UP (ref 13–44)
MCHC RBC-ENTMCNC: 30.2 PG — SIGNIFICANT CHANGE UP (ref 27–34)
MCHC RBC-ENTMCNC: 33.8 GM/DL — SIGNIFICANT CHANGE UP (ref 32–36)
MCV RBC AUTO: 89.4 FL — SIGNIFICANT CHANGE UP (ref 80–100)
MONOCYTES # BLD AUTO: 0.6 K/UL — SIGNIFICANT CHANGE UP (ref 0–0.9)
MONOCYTES NFR BLD AUTO: 6.6 % — SIGNIFICANT CHANGE UP (ref 2–14)
NEUTROPHILS # BLD AUTO: 4.4 K/UL — SIGNIFICANT CHANGE UP (ref 1.8–7.4)
NEUTROPHILS NFR BLD AUTO: 47.6 % — SIGNIFICANT CHANGE UP (ref 43–77)
PLATELET # BLD AUTO: 225 K/UL — SIGNIFICANT CHANGE UP (ref 150–400)
POTASSIUM SERPL-MCNC: 3.7 MMOL/L — SIGNIFICANT CHANGE UP (ref 3.5–5.3)
POTASSIUM SERPL-SCNC: 3.7 MMOL/L — SIGNIFICANT CHANGE UP (ref 3.5–5.3)
PROT SERPL-MCNC: 7.9 G/DL — SIGNIFICANT CHANGE UP (ref 6–8.3)
RBC # BLD: 4.83 M/UL — SIGNIFICANT CHANGE UP (ref 3.8–5.2)
RBC # FLD: 12.3 % — SIGNIFICANT CHANGE UP (ref 10.3–14.5)
SODIUM SERPL-SCNC: 139 MMOL/L — SIGNIFICANT CHANGE UP (ref 135–145)
TOTAL CHOLESTEROL/HDL RATIO MEASUREMENT: 4.3 RATIO — SIGNIFICANT CHANGE UP (ref 3.3–7.1)
TRIGL SERPL-MCNC: 102 MG/DL — SIGNIFICANT CHANGE UP (ref 10–149)
WBC # BLD: 9.2 K/UL — SIGNIFICANT CHANGE UP (ref 3.8–10.5)
WBC # FLD AUTO: 9.2 K/UL — SIGNIFICANT CHANGE UP (ref 3.8–10.5)

## 2018-06-16 PROCEDURE — 80053 COMPREHEN METABOLIC PANEL: CPT

## 2018-06-16 PROCEDURE — 83036 HEMOGLOBIN GLYCOSYLATED A1C: CPT

## 2018-06-16 PROCEDURE — 85027 COMPLETE CBC AUTOMATED: CPT

## 2018-06-16 PROCEDURE — 80061 LIPID PANEL: CPT

## 2018-06-16 PROCEDURE — G0463: CPT

## 2019-02-08 ENCOUNTER — APPOINTMENT (OUTPATIENT)
Dept: ULTRASOUND IMAGING | Facility: HOSPITAL | Age: 34
End: 2019-02-08
Payer: COMMERCIAL

## 2019-02-08 ENCOUNTER — OUTPATIENT (OUTPATIENT)
Dept: OUTPATIENT SERVICES | Facility: HOSPITAL | Age: 34
LOS: 1 days | End: 2019-02-08
Payer: MEDICAID

## 2019-02-08 DIAGNOSIS — E04.2 NONTOXIC MULTINODULAR GOITER: ICD-10-CM

## 2019-02-08 DIAGNOSIS — Z98.89 OTHER SPECIFIED POSTPROCEDURAL STATES: Chronic | ICD-10-CM

## 2019-02-08 PROCEDURE — 76536 US EXAM OF HEAD AND NECK: CPT | Mod: 26

## 2019-02-08 PROCEDURE — 76536 US EXAM OF HEAD AND NECK: CPT

## 2019-02-13 ENCOUNTER — EMERGENCY (EMERGENCY)
Facility: HOSPITAL | Age: 34
LOS: 1 days | Discharge: HOME | End: 2019-02-13
Attending: EMERGENCY MEDICINE | Admitting: EMERGENCY MEDICINE
Payer: MEDICAID

## 2019-02-13 VITALS
HEIGHT: 62 IN | HEART RATE: 73 BPM | TEMPERATURE: 98 F | RESPIRATION RATE: 18 BRPM | WEIGHT: 210.1 LBS | DIASTOLIC BLOOD PRESSURE: 85 MMHG | SYSTOLIC BLOOD PRESSURE: 128 MMHG

## 2019-02-13 VITALS
OXYGEN SATURATION: 99 % | HEART RATE: 76 BPM | DIASTOLIC BLOOD PRESSURE: 89 MMHG | SYSTOLIC BLOOD PRESSURE: 122 MMHG | RESPIRATION RATE: 18 BRPM

## 2019-02-13 DIAGNOSIS — Z98.89 OTHER SPECIFIED POSTPROCEDURAL STATES: Chronic | ICD-10-CM

## 2019-02-13 DIAGNOSIS — R10.9 UNSPECIFIED ABDOMINAL PAIN: ICD-10-CM

## 2019-02-13 LAB
ALBUMIN SERPL ELPH-MCNC: 3.8 G/DL — SIGNIFICANT CHANGE UP (ref 3.3–5)
ALP SERPL-CCNC: 86 U/L — SIGNIFICANT CHANGE UP (ref 40–120)
ALT FLD-CCNC: 34 U/L DA — SIGNIFICANT CHANGE UP (ref 10–45)
ANION GAP SERPL CALC-SCNC: 9 MMOL/L — SIGNIFICANT CHANGE UP (ref 5–17)
APPEARANCE UR: CLEAR — SIGNIFICANT CHANGE UP
AST SERPL-CCNC: 24 U/L — SIGNIFICANT CHANGE UP (ref 10–40)
BASOPHILS # BLD AUTO: 0.1 K/UL — SIGNIFICANT CHANGE UP (ref 0–0.2)
BASOPHILS NFR BLD AUTO: 0.9 % — SIGNIFICANT CHANGE UP (ref 0–2)
BILIRUB SERPL-MCNC: 0.2 MG/DL — SIGNIFICANT CHANGE UP (ref 0.2–1.2)
BILIRUB UR-MCNC: NEGATIVE — SIGNIFICANT CHANGE UP
BUN SERPL-MCNC: 12 MG/DL — SIGNIFICANT CHANGE UP (ref 7–23)
CALCIUM SERPL-MCNC: 9.5 MG/DL — SIGNIFICANT CHANGE UP (ref 8.4–10.5)
CHLORIDE SERPL-SCNC: 104 MMOL/L — SIGNIFICANT CHANGE UP (ref 96–108)
CO2 SERPL-SCNC: 26 MMOL/L — SIGNIFICANT CHANGE UP (ref 22–31)
COLOR SPEC: YELLOW — SIGNIFICANT CHANGE UP
CREAT SERPL-MCNC: 0.61 MG/DL — SIGNIFICANT CHANGE UP (ref 0.5–1.3)
DIFF PNL FLD: NEGATIVE — SIGNIFICANT CHANGE UP
EOSINOPHIL # BLD AUTO: 0.5 K/UL — SIGNIFICANT CHANGE UP (ref 0–0.5)
EOSINOPHIL NFR BLD AUTO: 4.7 % — SIGNIFICANT CHANGE UP (ref 0–6)
GLUCOSE SERPL-MCNC: 107 MG/DL — HIGH (ref 70–99)
GLUCOSE UR QL: NEGATIVE — SIGNIFICANT CHANGE UP
HCT VFR BLD CALC: 41.7 % — SIGNIFICANT CHANGE UP (ref 34.5–45)
HGB BLD-MCNC: 14.2 G/DL — SIGNIFICANT CHANGE UP (ref 11.5–15.5)
KETONES UR-MCNC: NEGATIVE — SIGNIFICANT CHANGE UP
LEUKOCYTE ESTERASE UR-ACNC: NEGATIVE — SIGNIFICANT CHANGE UP
LYMPHOCYTES # BLD AUTO: 29 % — SIGNIFICANT CHANGE UP (ref 13–44)
LYMPHOCYTES # BLD AUTO: 3.3 K/UL — SIGNIFICANT CHANGE UP (ref 1–3.3)
MCHC RBC-ENTMCNC: 30.6 PG — SIGNIFICANT CHANGE UP (ref 27–34)
MCHC RBC-ENTMCNC: 34 GM/DL — SIGNIFICANT CHANGE UP (ref 32–36)
MCV RBC AUTO: 89.9 FL — SIGNIFICANT CHANGE UP (ref 80–100)
MONOCYTES # BLD AUTO: 0.8 K/UL — SIGNIFICANT CHANGE UP (ref 0–0.9)
MONOCYTES NFR BLD AUTO: 6.9 % — SIGNIFICANT CHANGE UP (ref 2–14)
NEUTROPHILS # BLD AUTO: 6.7 K/UL — SIGNIFICANT CHANGE UP (ref 1.8–7.4)
NEUTROPHILS NFR BLD AUTO: 58.4 % — SIGNIFICANT CHANGE UP (ref 43–77)
NITRITE UR-MCNC: NEGATIVE — SIGNIFICANT CHANGE UP
PH UR: 6 — SIGNIFICANT CHANGE UP (ref 5–8)
PLATELET # BLD AUTO: 217 K/UL — SIGNIFICANT CHANGE UP (ref 150–400)
POTASSIUM SERPL-MCNC: 3.7 MMOL/L — SIGNIFICANT CHANGE UP (ref 3.5–5.3)
POTASSIUM SERPL-SCNC: 3.7 MMOL/L — SIGNIFICANT CHANGE UP (ref 3.5–5.3)
PROT SERPL-MCNC: 7.7 G/DL — SIGNIFICANT CHANGE UP (ref 6–8.3)
PROT UR-MCNC: NEGATIVE — SIGNIFICANT CHANGE UP
RBC # BLD: 4.64 M/UL — SIGNIFICANT CHANGE UP (ref 3.8–5.2)
RBC # FLD: 12 % — SIGNIFICANT CHANGE UP (ref 10.3–14.5)
SODIUM SERPL-SCNC: 139 MMOL/L — SIGNIFICANT CHANGE UP (ref 135–145)
SP GR SPEC: 1.01 — SIGNIFICANT CHANGE UP (ref 1.01–1.02)
UROBILINOGEN FLD QL: NEGATIVE — SIGNIFICANT CHANGE UP
WBC # BLD: 11.4 K/UL — HIGH (ref 3.8–10.5)
WBC # FLD AUTO: 11.4 K/UL — HIGH (ref 3.8–10.5)

## 2019-02-13 PROCEDURE — 81025 URINE PREGNANCY TEST: CPT

## 2019-02-13 PROCEDURE — 74177 CT ABD & PELVIS W/CONTRAST: CPT | Mod: 26

## 2019-02-13 PROCEDURE — 76830 TRANSVAGINAL US NON-OB: CPT | Mod: 26

## 2019-02-13 PROCEDURE — 85027 COMPLETE CBC AUTOMATED: CPT

## 2019-02-13 PROCEDURE — 96375 TX/PRO/DX INJ NEW DRUG ADDON: CPT

## 2019-02-13 PROCEDURE — 76830 TRANSVAGINAL US NON-OB: CPT

## 2019-02-13 PROCEDURE — 36415 COLL VENOUS BLD VENIPUNCTURE: CPT

## 2019-02-13 PROCEDURE — 99284 EMERGENCY DEPT VISIT MOD MDM: CPT | Mod: 25

## 2019-02-13 PROCEDURE — 96374 THER/PROPH/DIAG INJ IV PUSH: CPT | Mod: XU

## 2019-02-13 PROCEDURE — 99285 EMERGENCY DEPT VISIT HI MDM: CPT

## 2019-02-13 PROCEDURE — 80053 COMPREHEN METABOLIC PANEL: CPT

## 2019-02-13 PROCEDURE — 74177 CT ABD & PELVIS W/CONTRAST: CPT

## 2019-02-13 RX ORDER — MORPHINE SULFATE 50 MG/1
4 CAPSULE, EXTENDED RELEASE ORAL ONCE
Qty: 0 | Refills: 0 | Status: DISCONTINUED | OUTPATIENT
Start: 2019-02-13 | End: 2019-02-13

## 2019-02-13 RX ORDER — SODIUM CHLORIDE 9 MG/ML
1000 INJECTION INTRAMUSCULAR; INTRAVENOUS; SUBCUTANEOUS ONCE
Qty: 0 | Refills: 0 | Status: COMPLETED | OUTPATIENT
Start: 2019-02-13 | End: 2019-02-13

## 2019-02-13 RX ORDER — SODIUM CHLORIDE 9 MG/ML
3 INJECTION INTRAMUSCULAR; INTRAVENOUS; SUBCUTANEOUS ONCE
Qty: 0 | Refills: 0 | Status: COMPLETED | OUTPATIENT
Start: 2019-02-13 | End: 2019-02-13

## 2019-02-13 RX ORDER — ONDANSETRON 8 MG/1
4 TABLET, FILM COATED ORAL ONCE
Qty: 0 | Refills: 0 | Status: COMPLETED | OUTPATIENT
Start: 2019-02-13 | End: 2019-02-13

## 2019-02-13 RX ORDER — TRAMADOL HYDROCHLORIDE 50 MG/1
1 TABLET ORAL
Qty: 20 | Refills: 0
Start: 2019-02-13 | End: 2019-02-17

## 2019-02-13 RX ORDER — KETOROLAC TROMETHAMINE 30 MG/ML
30 SYRINGE (ML) INJECTION ONCE
Qty: 0 | Refills: 0 | Status: DISCONTINUED | OUTPATIENT
Start: 2019-02-13 | End: 2019-02-13

## 2019-02-13 RX ORDER — IBUPROFEN 200 MG
1 TABLET ORAL
Qty: 20 | Refills: 0
Start: 2019-02-13 | End: 2019-02-17

## 2019-02-13 RX ADMIN — ONDANSETRON 4 MILLIGRAM(S): 8 TABLET, FILM COATED ORAL at 12:47

## 2019-02-13 RX ADMIN — SODIUM CHLORIDE 3 MILLILITER(S): 9 INJECTION INTRAMUSCULAR; INTRAVENOUS; SUBCUTANEOUS at 12:47

## 2019-02-13 RX ADMIN — SODIUM CHLORIDE 1000 MILLILITER(S): 9 INJECTION INTRAMUSCULAR; INTRAVENOUS; SUBCUTANEOUS at 12:48

## 2019-02-13 RX ADMIN — Medication 30 MILLIGRAM(S): at 13:55

## 2019-02-13 RX ADMIN — MORPHINE SULFATE 4 MILLIGRAM(S): 50 CAPSULE, EXTENDED RELEASE ORAL at 15:15

## 2019-02-13 RX ADMIN — Medication 30 MILLIGRAM(S): at 13:41

## 2019-02-13 RX ADMIN — MORPHINE SULFATE 4 MILLIGRAM(S): 50 CAPSULE, EXTENDED RELEASE ORAL at 15:30

## 2019-02-13 NOTE — ED PROVIDER NOTE - NSFOLLOWUPINSTRUCTIONS_ED_ALL_ED_FT
Follow up with your primary care doctor or OBGYN. Take Ibuprofen 600mg every 6 hours as needed for pain. Take with food.

## 2019-02-13 NOTE — ED ADULT NURSE NOTE - OBJECTIVE STATEMENT
Pt reports lower right quadrant abdominal pain that began last night. Reports pain radiates to right flank area. Pt also complains of nausea. Denies chest pain or shortness of breath.

## 2019-02-13 NOTE — ED ADULT NURSE NOTE - NSIMPLEMENTINTERV_GEN_ALL_ED
Implemented All Universal Safety Interventions:  [place] to call system. Call bell, personal items and telephone within reach. Instruct patient to call for assistance. Room bathroom lighting operational. Non-slip footwear when patient is off stretcher. Physically safe environment: no spills, clutter or unnecessary equipment. Stretcher in lowest position, wheels locked, appropriate side rails in place.

## 2019-02-13 NOTE — ED PROVIDER NOTE - OBJECTIVE STATEMENT
Pt c/o abd pain that started last night. She has h/o endometriosis. LMP irregular but had some spotting 1 week ago and last normal menstrual period Dec 28. Patient with nausea, no vomiting. No fever. No dysuria.  Took Tylenol at home without relief. Also has h/o cysts on the ovaries. H/O tubal ligation.

## 2019-02-13 NOTE — ED PROVIDER NOTE - CLINICAL SUMMARY MEDICAL DECISION MAKING FREE TEXT BOX
Patient with Lower abdominal pain. During history taking, she did not endorse removal of her appendix. nonetheless, pt with rlq tenderness on exam. H/O ovarian cysts. Likely the reason for her pain although she has endometriosis as well. Will assess via U/S and CT. Pain control and hydration. Reassess.

## 2019-02-28 ENCOUNTER — TRANSCRIPTION ENCOUNTER (OUTPATIENT)
Age: 34
End: 2019-02-28

## 2019-03-24 ENCOUNTER — TRANSCRIPTION ENCOUNTER (OUTPATIENT)
Age: 34
End: 2019-03-24

## 2019-05-03 ENCOUNTER — EMERGENCY (EMERGENCY)
Facility: HOSPITAL | Age: 34
LOS: 1 days | Discharge: HOME | End: 2019-05-03
Attending: EMERGENCY MEDICINE | Admitting: EMERGENCY MEDICINE
Payer: MEDICAID

## 2019-05-03 VITALS
OXYGEN SATURATION: 100 % | RESPIRATION RATE: 20 BRPM | DIASTOLIC BLOOD PRESSURE: 87 MMHG | HEART RATE: 95 BPM | SYSTOLIC BLOOD PRESSURE: 127 MMHG | TEMPERATURE: 99 F | WEIGHT: 199.96 LBS

## 2019-05-03 VITALS
OXYGEN SATURATION: 98 % | SYSTOLIC BLOOD PRESSURE: 108 MMHG | HEART RATE: 65 BPM | RESPIRATION RATE: 16 BRPM | DIASTOLIC BLOOD PRESSURE: 67 MMHG | TEMPERATURE: 98 F

## 2019-05-03 DIAGNOSIS — Z98.89 OTHER SPECIFIED POSTPROCEDURAL STATES: Chronic | ICD-10-CM

## 2019-05-03 LAB
ALBUMIN SERPL ELPH-MCNC: 3.8 G/DL — SIGNIFICANT CHANGE UP (ref 3.3–5)
ALP SERPL-CCNC: 83 U/L — SIGNIFICANT CHANGE UP (ref 40–120)
ALT FLD-CCNC: 35 U/L DA — SIGNIFICANT CHANGE UP (ref 10–45)
ANION GAP SERPL CALC-SCNC: 10 MMOL/L — SIGNIFICANT CHANGE UP (ref 5–17)
APPEARANCE UR: CLEAR — SIGNIFICANT CHANGE UP
AST SERPL-CCNC: 21 U/L — SIGNIFICANT CHANGE UP (ref 10–40)
BACTERIA # UR AUTO: NEGATIVE /HPF — SIGNIFICANT CHANGE UP
BASOPHILS # BLD AUTO: 0.1 K/UL — SIGNIFICANT CHANGE UP (ref 0–0.2)
BASOPHILS NFR BLD AUTO: 1.1 % — SIGNIFICANT CHANGE UP (ref 0–2)
BILIRUB SERPL-MCNC: 0.3 MG/DL — SIGNIFICANT CHANGE UP (ref 0.2–1.2)
BILIRUB UR-MCNC: NEGATIVE — SIGNIFICANT CHANGE UP
BUN SERPL-MCNC: 11 MG/DL — SIGNIFICANT CHANGE UP (ref 7–23)
CALCIUM SERPL-MCNC: 9.2 MG/DL — SIGNIFICANT CHANGE UP (ref 8.4–10.5)
CHLORIDE SERPL-SCNC: 103 MMOL/L — SIGNIFICANT CHANGE UP (ref 96–108)
CO2 SERPL-SCNC: 27 MMOL/L — SIGNIFICANT CHANGE UP (ref 22–31)
COLOR SPEC: YELLOW — SIGNIFICANT CHANGE UP
CREAT SERPL-MCNC: 0.56 MG/DL — SIGNIFICANT CHANGE UP (ref 0.5–1.3)
DIFF PNL FLD: ABNORMAL
EOSINOPHIL # BLD AUTO: 0.4 K/UL — SIGNIFICANT CHANGE UP (ref 0–0.5)
EOSINOPHIL NFR BLD AUTO: 5.8 % — SIGNIFICANT CHANGE UP (ref 0–6)
EPI CELLS # UR: SIGNIFICANT CHANGE UP
GLUCOSE SERPL-MCNC: 112 MG/DL — HIGH (ref 70–99)
GLUCOSE UR QL: NEGATIVE — SIGNIFICANT CHANGE UP
HCT VFR BLD CALC: 41.7 % — SIGNIFICANT CHANGE UP (ref 34.5–45)
HGB BLD-MCNC: 13.9 G/DL — SIGNIFICANT CHANGE UP (ref 11.5–15.5)
KETONES UR-MCNC: NEGATIVE — SIGNIFICANT CHANGE UP
LEUKOCYTE ESTERASE UR-ACNC: NEGATIVE — SIGNIFICANT CHANGE UP
LIDOCAIN IGE QN: 121 U/L — SIGNIFICANT CHANGE UP (ref 73–393)
LYMPHOCYTES # BLD AUTO: 2.3 K/UL — SIGNIFICANT CHANGE UP (ref 1–3.3)
LYMPHOCYTES # BLD AUTO: 32.2 % — SIGNIFICANT CHANGE UP (ref 13–44)
MCHC RBC-ENTMCNC: 30.2 PG — SIGNIFICANT CHANGE UP (ref 27–34)
MCHC RBC-ENTMCNC: 33.3 GM/DL — SIGNIFICANT CHANGE UP (ref 32–36)
MCV RBC AUTO: 90.9 FL — SIGNIFICANT CHANGE UP (ref 80–100)
MONOCYTES # BLD AUTO: 0.5 K/UL — SIGNIFICANT CHANGE UP (ref 0–0.9)
MONOCYTES NFR BLD AUTO: 7.3 % — SIGNIFICANT CHANGE UP (ref 1–9)
NEUTROPHILS # BLD AUTO: 3.9 K/UL — SIGNIFICANT CHANGE UP (ref 1.8–7.4)
NEUTROPHILS NFR BLD AUTO: 53.5 % — SIGNIFICANT CHANGE UP (ref 43–77)
NITRITE UR-MCNC: NEGATIVE — SIGNIFICANT CHANGE UP
PH UR: 6 — SIGNIFICANT CHANGE UP (ref 5–8)
PLATELET # BLD AUTO: 204 K/UL — SIGNIFICANT CHANGE UP (ref 150–400)
POTASSIUM SERPL-MCNC: 3.7 MMOL/L — SIGNIFICANT CHANGE UP (ref 3.5–5.3)
POTASSIUM SERPL-SCNC: 3.7 MMOL/L — SIGNIFICANT CHANGE UP (ref 3.5–5.3)
PROT SERPL-MCNC: 7.8 G/DL — SIGNIFICANT CHANGE UP (ref 6–8.3)
PROT UR-MCNC: NEGATIVE — SIGNIFICANT CHANGE UP
RBC # BLD: 4.59 M/UL — SIGNIFICANT CHANGE UP (ref 3.8–5.2)
RBC # FLD: 12.2 % — SIGNIFICANT CHANGE UP (ref 10.3–14.5)
RBC CASTS # UR COMP ASSIST: ABNORMAL /HPF (ref 0–4)
SODIUM SERPL-SCNC: 140 MMOL/L — SIGNIFICANT CHANGE UP (ref 135–145)
SP GR SPEC: 1 — LOW (ref 1.01–1.02)
UROBILINOGEN FLD QL: NEGATIVE — SIGNIFICANT CHANGE UP
WBC # BLD: 7.3 K/UL — SIGNIFICANT CHANGE UP (ref 3.8–10.5)
WBC # FLD AUTO: 7.3 K/UL — SIGNIFICANT CHANGE UP (ref 3.8–10.5)
WBC UR QL: NEGATIVE /HPF — SIGNIFICANT CHANGE UP (ref 0–5)

## 2019-05-03 PROCEDURE — 85027 COMPLETE CBC AUTOMATED: CPT

## 2019-05-03 PROCEDURE — 96375 TX/PRO/DX INJ NEW DRUG ADDON: CPT

## 2019-05-03 PROCEDURE — 81025 URINE PREGNANCY TEST: CPT

## 2019-05-03 PROCEDURE — 81001 URINALYSIS AUTO W/SCOPE: CPT

## 2019-05-03 PROCEDURE — 74177 CT ABD & PELVIS W/CONTRAST: CPT | Mod: 26

## 2019-05-03 PROCEDURE — 87086 URINE CULTURE/COLONY COUNT: CPT

## 2019-05-03 PROCEDURE — 80053 COMPREHEN METABOLIC PANEL: CPT

## 2019-05-03 PROCEDURE — 83690 ASSAY OF LIPASE: CPT

## 2019-05-03 PROCEDURE — 36415 COLL VENOUS BLD VENIPUNCTURE: CPT

## 2019-05-03 PROCEDURE — 99284 EMERGENCY DEPT VISIT MOD MDM: CPT | Mod: 25

## 2019-05-03 PROCEDURE — 99285 EMERGENCY DEPT VISIT HI MDM: CPT

## 2019-05-03 PROCEDURE — 96365 THER/PROPH/DIAG IV INF INIT: CPT | Mod: XU

## 2019-05-03 PROCEDURE — 74177 CT ABD & PELVIS W/CONTRAST: CPT

## 2019-05-03 RX ORDER — ONDANSETRON 8 MG/1
1 TABLET, FILM COATED ORAL
Qty: 9 | Refills: 0
Start: 2019-05-03 | End: 2019-05-05

## 2019-05-03 RX ORDER — MORPHINE SULFATE 50 MG/1
4 CAPSULE, EXTENDED RELEASE ORAL ONCE
Qty: 0 | Refills: 0 | Status: DISCONTINUED | OUTPATIENT
Start: 2019-05-03 | End: 2019-05-03

## 2019-05-03 RX ORDER — SODIUM CHLORIDE 9 MG/ML
1000 INJECTION INTRAMUSCULAR; INTRAVENOUS; SUBCUTANEOUS ONCE
Qty: 0 | Refills: 0 | Status: COMPLETED | OUTPATIENT
Start: 2019-05-03 | End: 2019-05-03

## 2019-05-03 RX ORDER — KETOROLAC TROMETHAMINE 30 MG/ML
15 SYRINGE (ML) INJECTION ONCE
Qty: 0 | Refills: 0 | Status: DISCONTINUED | OUTPATIENT
Start: 2019-05-03 | End: 2019-05-03

## 2019-05-03 RX ORDER — FAMOTIDINE 10 MG/ML
20 INJECTION INTRAVENOUS ONCE
Qty: 0 | Refills: 0 | Status: DISCONTINUED | OUTPATIENT
Start: 2019-05-03 | End: 2019-05-03

## 2019-05-03 RX ORDER — ONDANSETRON 8 MG/1
4 TABLET, FILM COATED ORAL ONCE
Qty: 0 | Refills: 0 | Status: COMPLETED | OUTPATIENT
Start: 2019-05-03 | End: 2019-05-03

## 2019-05-03 RX ORDER — FAMOTIDINE 10 MG/ML
20 INJECTION INTRAVENOUS ONCE
Qty: 0 | Refills: 0 | Status: COMPLETED | OUTPATIENT
Start: 2019-05-03 | End: 2019-05-03

## 2019-05-03 RX ADMIN — MORPHINE SULFATE 4 MILLIGRAM(S): 50 CAPSULE, EXTENDED RELEASE ORAL at 15:02

## 2019-05-03 RX ADMIN — Medication 30 MILLILITER(S): at 15:02

## 2019-05-03 RX ADMIN — SODIUM CHLORIDE 1000 MILLILITER(S): 9 INJECTION INTRAMUSCULAR; INTRAVENOUS; SUBCUTANEOUS at 15:02

## 2019-05-03 RX ADMIN — MORPHINE SULFATE 4 MILLIGRAM(S): 50 CAPSULE, EXTENDED RELEASE ORAL at 18:18

## 2019-05-03 RX ADMIN — Medication 15 MILLIGRAM(S): at 17:58

## 2019-05-03 RX ADMIN — SODIUM CHLORIDE 1000 MILLILITER(S): 9 INJECTION INTRAMUSCULAR; INTRAVENOUS; SUBCUTANEOUS at 16:02

## 2019-05-03 RX ADMIN — ONDANSETRON 4 MILLIGRAM(S): 8 TABLET, FILM COATED ORAL at 15:02

## 2019-05-03 RX ADMIN — FAMOTIDINE 20 MILLIGRAM(S): 10 INJECTION INTRAVENOUS at 15:32

## 2019-05-03 RX ADMIN — FAMOTIDINE 100 MILLIGRAM(S): 10 INJECTION INTRAVENOUS at 15:02

## 2019-05-03 NOTE — ED ADULT NURSE NOTE - NSIMPLEMENTINTERV_GEN_ALL_ED
Implemented All Universal Safety Interventions:  [place] to call system. Call bell, personal items and telephone within reach. Instruct patient to call for assistance. Room bathroom lighting operational. Non-slip footwear when patient is off stretcher. Physically safe environment: no spills, clutter or unnecessary equipment. Stretcher in lowest position, wheels locked, appropriate side rails in place.

## 2019-05-03 NOTE — ED ADULT TRIAGE NOTE - CHIEF COMPLAINT QUOTE
pt presents to ED with c/o right upper quadrant pain that began 1 week ago. The pt is also c/o associated nausea

## 2019-05-03 NOTE — ED ADULT NURSE NOTE - OBJECTIVE STATEMENT
Pt came to ED with c/o RUQ abdominal pain for the past week. Pain is 8/10 and intermittent. Pain is noted to be tender upon palpation. C/o nausea without any episodes of vomiting. Pt denies any fever, cp, sob, chills, changes in bowel habits Pt came to ED with c/o RUQ abdominal pain for the past week. Pain is 8/10 and intermittent. Pain is noted to be tender upon palpation. C/o nausea without any episodes of vomiting. Pt denies any fever, cp, sob, chills, changes in bowel habits or urinary changes.

## 2019-05-03 NOTE — ED PROVIDER NOTE - CLINICAL SUMMARY MEDICAL DECISION MAKING FREE TEXT BOX
ruq pain, recent normal ruq sono per pt  -cbc, cmp, lipase, ua/ucx, pain control, antiemetics, ivf; reassess

## 2019-05-03 NOTE — ED PROVIDER NOTE - OBJECTIVE STATEMENT
33 y/o M 33 y/o M no PMH c/o intermittent ruq pain and nausea x 1 week. Pt states PO intake significantly worsens pain, at times pain radiates to right mid back. States she had ruq sono yesterday and was told it was unremarkable. Denies fever, chills, CP, SOB, cough, vomiting, diarrhea, dysuria, hematuria, frequency, abnormal vaginal discharge.

## 2019-05-03 NOTE — ED PROVIDER NOTE - ATTENDING CONTRIBUTION TO CARE
Pt seen and examined, chart reviewed history taken. Pt with normal abdominal sono yesterday per pt and presents with persistent upper abdominal pain after eating with nausea. Pt is awake and alert in nad, lungs cba, cor s1 and s2, abdomen epigastric tenderness with guarding. Pt states her pain is severe. Pt's abdominal ct is non acute. I agree with acp's plan and iagnosis.

## 2019-05-04 LAB
CULTURE RESULTS: SIGNIFICANT CHANGE UP
SPECIMEN SOURCE: SIGNIFICANT CHANGE UP

## 2019-05-06 ENCOUNTER — APPOINTMENT (OUTPATIENT)
Age: 34
End: 2019-05-06

## 2019-05-06 ENCOUNTER — OUTPATIENT (OUTPATIENT)
Dept: OUTPATIENT SERVICES | Facility: HOSPITAL | Age: 34
LOS: 1 days | End: 2019-05-06
Payer: MEDICAID

## 2019-05-06 VITALS
OXYGEN SATURATION: 99 % | DIASTOLIC BLOOD PRESSURE: 67 MMHG | HEART RATE: 71 BPM | SYSTOLIC BLOOD PRESSURE: 105 MMHG | BODY MASS INDEX: 39.32 KG/M2 | TEMPERATURE: 98.4 F | RESPIRATION RATE: 16 BRPM | WEIGHT: 215 LBS

## 2019-05-06 DIAGNOSIS — Z98.89 OTHER SPECIFIED POSTPROCEDURAL STATES: Chronic | ICD-10-CM

## 2019-05-06 DIAGNOSIS — Z00.00 ENCOUNTER FOR GENERAL ADULT MEDICAL EXAMINATION WITHOUT ABNORMAL FINDINGS: ICD-10-CM

## 2019-05-06 PROCEDURE — G0463: CPT

## 2019-05-06 RX ORDER — ONDANSETRON 4 MG/1
4 TABLET, ORALLY DISINTEGRATING ORAL
Qty: 10 | Refills: 0 | Status: DISCONTINUED | COMMUNITY
Start: 2019-03-24

## 2019-05-06 RX ORDER — NABUMETONE 500 MG/1
500 TABLET, FILM COATED ORAL
Qty: 30 | Refills: 0 | Status: DISCONTINUED | COMMUNITY
Start: 2019-01-25

## 2019-05-06 RX ORDER — NYSTATIN AND TRIAMCINOLONE ACETONIDE 100000; 1 [USP'U]/G; MG/G
100000-0.1 OINTMENT TOPICAL
Qty: 15 | Refills: 0 | Status: DISCONTINUED | COMMUNITY
Start: 2019-03-13

## 2019-05-06 RX ORDER — RABEPRAZOLE SODIUM 20 MG/1
20 TABLET, DELAYED RELEASE ORAL
Qty: 30 | Refills: 0 | Status: DISCONTINUED | COMMUNITY
Start: 2019-01-25

## 2019-05-06 RX ORDER — MECLIZINE HYDROCHLORIDE 25 MG/1
25 TABLET ORAL 3 TIMES DAILY
Qty: 90 | Refills: 0 | Status: DISCONTINUED | COMMUNITY
Start: 2017-03-23 | End: 2019-05-06

## 2019-05-06 RX ORDER — DEXAMETHASONE 1 MG/1
1 TABLET ORAL
Qty: 1 | Refills: 0 | Status: DISCONTINUED | COMMUNITY
Start: 2019-01-31

## 2019-05-06 RX ORDER — FLUCONAZOLE 150 MG/1
150 TABLET ORAL
Qty: 1 | Refills: 0 | Status: DISCONTINUED | COMMUNITY
Start: 2019-03-18

## 2019-05-06 RX ORDER — ERGOCALCIFEROL 1.25 MG/1
1.25 MG CAPSULE, LIQUID FILLED ORAL
Qty: 12 | Refills: 0 | Status: DISCONTINUED | COMMUNITY
Start: 2019-01-29

## 2019-05-06 RX ORDER — METHOCARBAMOL 750 MG/1
750 TABLET, FILM COATED ORAL
Qty: 20 | Refills: 0 | Status: DISCONTINUED | COMMUNITY
Start: 2019-01-25

## 2019-05-06 RX ORDER — CHOLECALCIFEROL (VITAMIN D3) 1250 MCG
1.25 MG CAPSULE ORAL
Qty: 6 | Refills: 0 | Status: DISCONTINUED | COMMUNITY
Start: 2017-10-25 | End: 2019-05-06

## 2019-05-06 RX ORDER — CLINDAMYCIN HYDROCHLORIDE 300 MG/1
300 CAPSULE ORAL
Qty: 14 | Refills: 0 | Status: DISCONTINUED | COMMUNITY
Start: 2019-02-26

## 2019-05-06 NOTE — PLAN
[FreeTextEntry1] : #RUQ abdominal pain \par - gallbladder dysmotility vs functional abdominal pain vs GERD \par - recent ED visit w/ hepatic steatosis and s/p appendectomy otherwise WNL \par - no infectious signs or symptoms \par - trial of PPI \par - reglen for nausea as zofran did not work \par - pt reports she has GI appt soon, perhaps CCK scan will be done \par \par RTC in 1 month for f/u abdominal pain. If fever, go to ED. \par \par d/w Dr. Ford

## 2019-05-06 NOTE — PHYSICAL EXAM
[No Acute Distress] : no acute distress [Well Developed] : well developed [Well Nourished] : well nourished [Well-Appearing] : well-appearing [Normal Sclera/Conjunctiva] : normal sclera/conjunctiva [EOMI] : extraocular movements intact [Normal Outer Ear/Nose] : the outer ears and nose were normal in appearance [Normal Oropharynx] : the oropharynx was normal [No JVD] : no jugular venous distention [No Respiratory Distress] : no respiratory distress  [No Accessory Muscle Use] : no accessory muscle use [Normal Rate] : normal rate  [Clear to Auscultation] : lungs were clear to auscultation bilaterally [Regular Rhythm] : with a regular rhythm [No Murmur] : no murmur heard [Normal S1, S2] : normal S1 and S2 [No Carotid Bruits] : no carotid bruits [No Abdominal Bruit] : a ~M bruit was not heard ~T in the abdomen [No Varicosities] : no varicosities [Pedal Pulses Present] : the pedal pulses are present [No Edema] : there was no peripheral edema [No Extremity Clubbing/Cyanosis] : no extremity clubbing/cyanosis [Soft] : abdomen soft [No Palpable Aorta] : no palpable aorta [Non-distended] : non-distended [No HSM] : no HSM [No Masses] : no abdominal mass palpated [Normal Bowel Sounds] : normal bowel sounds [No Hernias] : no hernias [No CVA Tenderness] : no CVA  tenderness [No Joint Swelling] : no joint swelling [No Spinal Tenderness] : no spinal tenderness [No Rash] : no rash [Grossly Normal Strength/Tone] : grossly normal strength/tone [Normal Gait] : normal gait [Coordination Grossly Intact] : coordination grossly intact [No Focal Deficits] : no focal deficits [Deep Tendon Reflexes (DTR)] : deep tendon reflexes were 2+ and symmetric [Normal Affect] : the affect was normal [Normal Insight/Judgement] : insight and judgment were intact [de-identified] : moderate to palpation in RUQ

## 2019-05-06 NOTE — REVIEW OF SYSTEMS
[Nausea] : nausea [Abdominal Pain] : abdominal pain [Negative] : Heme/Lymph [Constipation] : no constipation [Diarrhea] : diarrhea [Vomiting] : no vomiting [Heartburn] : no heartburn [Melena] : no melena

## 2019-05-06 NOTE — ASSESSMENT
[FreeTextEntry1] : Pt is a 34F w/ PMHx of PCOS, prediabetes, s/p appendectomy presenting with RUQ abdominal pain x 2 weeks s/p ED discharge.

## 2019-05-06 NOTE — HISTORY OF PRESENT ILLNESS
[de-identified] : Pt is a 34F w/ PMHx of PCOS, prediabetes, s/p appendectomy presenting with RUQ abdominal pain x 2 weeks. Pain is constant, but worsens after eating. At max is 8/10. Recently, pain has gotten worse, so pt went to ED 3 days ago. At that time, her labs and CT abdomen were normal. Pt's pain has persisted, but has not worsened. Pt reports feeling of something moving when she bends. Pt also reports sour taste in her mouth as well as nausea. Pt denies fever, chills, vomiting, constipation, diarrhea, melena. No changes in menses.

## 2019-05-07 DIAGNOSIS — R10.9 UNSPECIFIED ABDOMINAL PAIN: ICD-10-CM

## 2019-05-16 ENCOUNTER — OUTPATIENT (OUTPATIENT)
Dept: OUTPATIENT SERVICES | Facility: HOSPITAL | Age: 34
LOS: 1 days | End: 2019-05-16
Payer: MEDICAID

## 2019-05-16 DIAGNOSIS — Z98.89 OTHER SPECIFIED POSTPROCEDURAL STATES: Chronic | ICD-10-CM

## 2019-05-16 DIAGNOSIS — R10.11 RIGHT UPPER QUADRANT PAIN: ICD-10-CM

## 2019-05-16 PROCEDURE — 78227 HEPATOBIL SYST IMAGE W/DRUG: CPT

## 2019-05-16 PROCEDURE — 78227 HEPATOBIL SYST IMAGE W/DRUG: CPT | Mod: 26

## 2019-05-16 PROCEDURE — 78226 HEPATOBILIARY SYSTEM IMAGING: CPT

## 2019-05-16 PROCEDURE — A9537: CPT

## 2019-05-16 RX ORDER — SINCALIDE 5 UG/5ML
1.8 INJECTION, POWDER, LYOPHILIZED, FOR SOLUTION INTRAVENOUS ONCE
Refills: 0 | Status: DISCONTINUED | OUTPATIENT
Start: 2019-05-16 | End: 2019-05-31

## 2019-05-18 ENCOUNTER — EMERGENCY (EMERGENCY)
Facility: HOSPITAL | Age: 34
LOS: 1 days | Discharge: HOME | End: 2019-05-18
Attending: EMERGENCY MEDICINE
Payer: MEDICAID

## 2019-05-18 VITALS
HEART RATE: 89 BPM | TEMPERATURE: 98 F | WEIGHT: 199.96 LBS | HEIGHT: 62 IN | OXYGEN SATURATION: 97 % | DIASTOLIC BLOOD PRESSURE: 78 MMHG | SYSTOLIC BLOOD PRESSURE: 116 MMHG | RESPIRATION RATE: 18 BRPM

## 2019-05-18 DIAGNOSIS — Z98.89 OTHER SPECIFIED POSTPROCEDURAL STATES: Chronic | ICD-10-CM

## 2019-05-18 LAB
ALBUMIN SERPL ELPH-MCNC: 4.2 G/DL — SIGNIFICANT CHANGE UP (ref 3.3–5)
ALP SERPL-CCNC: 85 U/L — SIGNIFICANT CHANGE UP (ref 40–120)
ALT FLD-CCNC: 21 U/L — SIGNIFICANT CHANGE UP (ref 10–45)
ANION GAP SERPL CALC-SCNC: 13 MMOL/L — SIGNIFICANT CHANGE UP (ref 5–17)
APPEARANCE UR: ABNORMAL
AST SERPL-CCNC: 14 U/L — SIGNIFICANT CHANGE UP (ref 10–40)
BASOPHILS # BLD AUTO: 0.1 K/UL — SIGNIFICANT CHANGE UP (ref 0–0.2)
BASOPHILS NFR BLD AUTO: 0.5 % — SIGNIFICANT CHANGE UP (ref 0–2)
BILIRUB SERPL-MCNC: 0.1 MG/DL — LOW (ref 0.2–1.2)
BILIRUB UR-MCNC: NEGATIVE — SIGNIFICANT CHANGE UP
BUN SERPL-MCNC: 12 MG/DL — SIGNIFICANT CHANGE UP (ref 7–23)
CALCIUM SERPL-MCNC: 9.6 MG/DL — SIGNIFICANT CHANGE UP (ref 8.4–10.5)
CHLORIDE SERPL-SCNC: 105 MMOL/L — SIGNIFICANT CHANGE UP (ref 96–108)
CO2 SERPL-SCNC: 23 MMOL/L — SIGNIFICANT CHANGE UP (ref 22–31)
COLOR SPEC: SIGNIFICANT CHANGE UP
CREAT SERPL-MCNC: 0.49 MG/DL — LOW (ref 0.5–1.3)
DIFF PNL FLD: ABNORMAL
EOSINOPHIL # BLD AUTO: 0.7 K/UL — HIGH (ref 0–0.5)
EOSINOPHIL NFR BLD AUTO: 6 % — SIGNIFICANT CHANGE UP (ref 0–6)
GLUCOSE SERPL-MCNC: 112 MG/DL — HIGH (ref 70–99)
GLUCOSE UR QL: NEGATIVE — SIGNIFICANT CHANGE UP
HCG UR QL: NEGATIVE — SIGNIFICANT CHANGE UP
HCT VFR BLD CALC: 39.6 % — SIGNIFICANT CHANGE UP (ref 34.5–45)
HGB BLD-MCNC: 13.8 G/DL — SIGNIFICANT CHANGE UP (ref 11.5–15.5)
KETONES UR-MCNC: NEGATIVE — SIGNIFICANT CHANGE UP
LEUKOCYTE ESTERASE UR-ACNC: ABNORMAL
LIDOCAIN IGE QN: 25 U/L — SIGNIFICANT CHANGE UP (ref 7–60)
LYMPHOCYTES # BLD AUTO: 3.5 K/UL — HIGH (ref 1–3.3)
LYMPHOCYTES # BLD AUTO: 31.1 % — SIGNIFICANT CHANGE UP (ref 13–44)
MCHC RBC-ENTMCNC: 31.3 PG — SIGNIFICANT CHANGE UP (ref 27–34)
MCHC RBC-ENTMCNC: 34.8 GM/DL — SIGNIFICANT CHANGE UP (ref 32–36)
MCV RBC AUTO: 89.9 FL — SIGNIFICANT CHANGE UP (ref 80–100)
MONOCYTES # BLD AUTO: 0.8 K/UL — SIGNIFICANT CHANGE UP (ref 0–0.9)
MONOCYTES NFR BLD AUTO: 7.1 % — SIGNIFICANT CHANGE UP (ref 2–14)
NEUTROPHILS # BLD AUTO: 6.2 K/UL — SIGNIFICANT CHANGE UP (ref 1.8–7.4)
NEUTROPHILS NFR BLD AUTO: 55.3 % — SIGNIFICANT CHANGE UP (ref 43–77)
NITRITE UR-MCNC: NEGATIVE — SIGNIFICANT CHANGE UP
PH UR: 7.5 — SIGNIFICANT CHANGE UP (ref 5–8)
PLATELET # BLD AUTO: 199 K/UL — SIGNIFICANT CHANGE UP (ref 150–400)
POTASSIUM SERPL-MCNC: 3.6 MMOL/L — SIGNIFICANT CHANGE UP (ref 3.5–5.3)
POTASSIUM SERPL-SCNC: 3.6 MMOL/L — SIGNIFICANT CHANGE UP (ref 3.5–5.3)
PROT SERPL-MCNC: 7.3 G/DL — SIGNIFICANT CHANGE UP (ref 6–8.3)
PROT UR-MCNC: ABNORMAL
RBC # BLD: 4.41 M/UL — SIGNIFICANT CHANGE UP (ref 3.8–5.2)
RBC # FLD: 12 % — SIGNIFICANT CHANGE UP (ref 10.3–14.5)
SODIUM SERPL-SCNC: 141 MMOL/L — SIGNIFICANT CHANGE UP (ref 135–145)
SP GR SPEC: 1.01 — SIGNIFICANT CHANGE UP (ref 1.01–1.02)
UROBILINOGEN FLD QL: NEGATIVE — SIGNIFICANT CHANGE UP
WBC # BLD: 11.3 K/UL — HIGH (ref 3.8–10.5)
WBC # FLD AUTO: 11.3 K/UL — HIGH (ref 3.8–10.5)

## 2019-05-18 PROCEDURE — 99284 EMERGENCY DEPT VISIT MOD MDM: CPT | Mod: 25

## 2019-05-18 PROCEDURE — 96375 TX/PRO/DX INJ NEW DRUG ADDON: CPT

## 2019-05-18 PROCEDURE — 96361 HYDRATE IV INFUSION ADD-ON: CPT

## 2019-05-18 PROCEDURE — 76705 ECHO EXAM OF ABDOMEN: CPT | Mod: 26,RT

## 2019-05-18 PROCEDURE — 76705 ECHO EXAM OF ABDOMEN: CPT

## 2019-05-18 PROCEDURE — 87086 URINE CULTURE/COLONY COUNT: CPT

## 2019-05-18 PROCEDURE — 83690 ASSAY OF LIPASE: CPT

## 2019-05-18 PROCEDURE — 80053 COMPREHEN METABOLIC PANEL: CPT

## 2019-05-18 PROCEDURE — 85027 COMPLETE CBC AUTOMATED: CPT

## 2019-05-18 PROCEDURE — 81025 URINE PREGNANCY TEST: CPT

## 2019-05-18 PROCEDURE — 81001 URINALYSIS AUTO W/SCOPE: CPT

## 2019-05-18 PROCEDURE — 96374 THER/PROPH/DIAG INJ IV PUSH: CPT

## 2019-05-18 PROCEDURE — 99284 EMERGENCY DEPT VISIT MOD MDM: CPT

## 2019-05-18 RX ORDER — KETOROLAC TROMETHAMINE 30 MG/ML
15 SYRINGE (ML) INJECTION ONCE
Refills: 0 | Status: DISCONTINUED | OUTPATIENT
Start: 2019-05-18 | End: 2019-05-18

## 2019-05-18 RX ORDER — HYDROMORPHONE HYDROCHLORIDE 2 MG/ML
0.5 INJECTION INTRAMUSCULAR; INTRAVENOUS; SUBCUTANEOUS ONCE
Refills: 0 | Status: DISCONTINUED | OUTPATIENT
Start: 2019-05-18 | End: 2019-05-18

## 2019-05-18 RX ORDER — LIDOCAINE 4 G/100G
20 CREAM TOPICAL ONCE
Refills: 0 | Status: COMPLETED | OUTPATIENT
Start: 2019-05-18 | End: 2019-05-18

## 2019-05-18 RX ORDER — SODIUM CHLORIDE 9 MG/ML
1000 INJECTION, SOLUTION INTRAVENOUS ONCE
Refills: 0 | Status: COMPLETED | OUTPATIENT
Start: 2019-05-18 | End: 2019-05-18

## 2019-05-18 RX ORDER — FAMOTIDINE 10 MG/ML
20 INJECTION INTRAVENOUS DAILY
Refills: 0 | Status: DISCONTINUED | OUTPATIENT
Start: 2019-05-18 | End: 2019-05-22

## 2019-05-18 RX ORDER — METOCLOPRAMIDE HCL 10 MG
10 TABLET ORAL ONCE
Refills: 0 | Status: COMPLETED | OUTPATIENT
Start: 2019-05-18 | End: 2019-05-18

## 2019-05-18 RX ADMIN — Medication 30 MILLILITER(S): at 20:30

## 2019-05-18 RX ADMIN — LIDOCAINE 15 MILLILITER(S): 4 CREAM TOPICAL at 20:29

## 2019-05-18 RX ADMIN — FAMOTIDINE 20 MILLIGRAM(S): 10 INJECTION INTRAVENOUS at 20:30

## 2019-05-18 RX ADMIN — Medication 10 MILLIGRAM(S): at 20:53

## 2019-05-18 RX ADMIN — SODIUM CHLORIDE 1000 MILLILITER(S): 9 INJECTION, SOLUTION INTRAVENOUS at 22:18

## 2019-05-18 RX ADMIN — SODIUM CHLORIDE 1000 MILLILITER(S): 9 INJECTION, SOLUTION INTRAVENOUS at 23:20

## 2019-05-18 RX ADMIN — HYDROMORPHONE HYDROCHLORIDE 0.5 MILLIGRAM(S): 2 INJECTION INTRAMUSCULAR; INTRAVENOUS; SUBCUTANEOUS at 23:20

## 2019-05-18 NOTE — ED PROVIDER NOTE - NS ED ROS FT
CONSTITUTIONAL: No fevers, no chills  Eyes: no visual changes  Mouth/Throat: no sore throat  Cardiovascular: No Chest pain  Respiratory: No SOB  Gastrointestinal refer to HPI  Genitourinary: no dysuria, no hematuria  SKIN: no rashes.  NEURO: no headache

## 2019-05-18 NOTE — ED PROVIDER NOTE - NSFOLLOWUPINSTRUCTIONS_ED_ALL_ED_FT
(1) Follow up with your primary care physician and a Gastroenterologist either at the number below or your own as discussed  (2) Immediately seek care at your nearest emergency room if your worsen, persist, or do not resolve   (3) Continue to take the prescribed medication as instructed, in addition to :  -Macrobid twice a day for 5 days

## 2019-05-18 NOTE — ED PROVIDER NOTE - NSFOLLOWUPCLINICS_GEN_ALL_ED_FT
[facility]  Gastroenterology  [address] [place] [id number]  Phone: [phone]  Fax:   Follow Up Time: Routine

## 2019-05-18 NOTE — ED PROVIDER NOTE - ATTENDING CONTRIBUTION TO CARE
33yo F no pmhx pw cc of epigastric pain under the care of a gi doctor with hida scan, ct nl on record in the past few weeks now with ruq tend noted, no r/g, gi cocktail, labs, egd last yr with adarsh.

## 2019-05-18 NOTE — ED ADULT NURSE NOTE - OBJECTIVE STATEMENT
1940 pt 34y aox4 Mongolian spkg, presented w/ abd pain x 4wks, seen by dr munoz since last wk and tests were completed w normal results per her doctor for the past 2wks pt states inc pain, given zofran for her nauseaousness w/o relief/pending eval/gcruz

## 2019-05-18 NOTE — ED PROVIDER NOTE - OBJECTIVE STATEMENT
35yo F no pmhx pw cc of epigastric pain     Epigastric pain started 4 weeks ago, comes and goes. When she eats or drinks something it comes, lasts .5 hrs and can come back after dissipating, sometimes radiates to the back. Feels like "intense stabbing pain" Pain worse at night associated with large amount of burping. No hx of gallstones.   Had ultrasound of RUQ last week and showed nothing.   Sometimes has spit up no vomit, Stools not dark no diarrhea.   Surgeries: Hx of appendeicitis, surgery for endometriosis   Meds: Zofran (took last at 12:00pm), no other meds 35yo F no pmhx pw cc of epigastric pain     Epigastric pain started 4 weeks ago, comes and goes. When she eats or drinks something it comes, lasts .5 hrs and can come back after dissipating, sometimes radiates to the back. Feels like "intense stabbing pain" Pain worse at night associated with large amount of burping. No hx of gallstones.   Had endoscopy last year found to have h.pylori and ulcers, given abx and sx improved at that time. States this pain is different.  Had ultrasound of RUQ last week and showed nothing.   Sometimes has spit up no vomit, Stools not dark no diarrhea.   Surgeries: Hx of appendeicitis, surgery for endometriosis   Meds: Zofran (took last at 12:00pm), no other meds, Rabeprazole 20mg  GI physician: Dr. Effie Henning 33yo F no pmhx pw cc of epigastric pain     Epigastric pain started 4 weeks ago, comes and goes. When she eats or drinks something it comes, lasts .5 hrs and can come back after dissipating, sometimes radiates to the back. Feels like "intense stabbing pain" Pain worse at night associated with large amount of burping. No hx of gallstones.   Had endoscopy last year found to have h.pylori and ulcers, given abx and sx improved at that time. States this pain is different.  Had ultrasound of RUQ last week and showed nothing.   Sometimes has spit up no vomit, Stools not dark no diarrhea.   Surgeries: Hx of appendeicitis, surgery for endometriosis   Meds: Zofran (took last at 12:00pm), no other meds, Rabeprazole 20mg  GI physician: Dr. Jason Henning

## 2019-05-18 NOTE — ED PROVIDER NOTE - PHYSICAL EXAMINATION
General: well appearing, interactive, well nourished, NAD  HEENT: pupils equal and reactive, normal mucosa, normal oropharynx, no lesions on the lips or on oral mucosa, normal external ears bilaterally   Resp: lungs clear to auscultation bilaterally, symmetric chest wall rise  Abd: soft, TTP RUQ, nondistended, normoactive bowel sounds  : no CVA tenderness  Neuro: Moving all extremities  Skin:  normal color for race

## 2019-05-18 NOTE — ED ADULT NURSE NOTE - NSIMPLEMENTINTERV_GEN_ALL_ED
Implemented All Universal Safety Interventions:  [place] to call system. Call bell, personal items and telephone within reach. Instruct patient to call for assistance. Room bathroom lighting operational. Non-slip footwear when patient is off stretcher. Physically safe environment: no spills, clutter or unnecessary equipment. Stretcher in lowest position, wheels locked, appropriate side rails in place.

## 2019-05-19 VITALS
TEMPERATURE: 98 F | HEART RATE: 72 BPM | DIASTOLIC BLOOD PRESSURE: 80 MMHG | SYSTOLIC BLOOD PRESSURE: 119 MMHG | OXYGEN SATURATION: 98 % | RESPIRATION RATE: 16 BRPM

## 2019-05-19 LAB
CULTURE RESULTS: SIGNIFICANT CHANGE UP
SPECIMEN SOURCE: SIGNIFICANT CHANGE UP

## 2019-05-19 RX ORDER — NITROFURANTOIN MACROCRYSTAL 50 MG
1 CAPSULE ORAL
Qty: 10 | Refills: 0
Start: 2019-05-19 | End: 2019-05-23

## 2019-05-20 NOTE — ED POST DISCHARGE NOTE - DETAILS
spoke to pt and informed to follow up with PMD and GI as scheduled and repeat urine cx. pt understands. Prefers to finish the abx.

## 2019-06-13 ENCOUNTER — APPOINTMENT (OUTPATIENT)
Dept: FAMILY MEDICINE | Facility: HOSPITAL | Age: 34
End: 2019-06-13

## 2019-06-19 ENCOUNTER — RX RENEWAL (OUTPATIENT)
Age: 34
End: 2019-06-19

## 2019-06-21 ENCOUNTER — RX RENEWAL (OUTPATIENT)
Age: 34
End: 2019-06-21

## 2019-07-11 ENCOUNTER — OUTPATIENT (OUTPATIENT)
Dept: OUTPATIENT SERVICES | Facility: HOSPITAL | Age: 34
LOS: 1 days | End: 2019-07-11
Payer: COMMERCIAL

## 2019-07-11 ENCOUNTER — APPOINTMENT (OUTPATIENT)
Dept: FAMILY MEDICINE | Facility: HOSPITAL | Age: 34
End: 2019-07-11

## 2019-07-11 ENCOUNTER — RESULT CHARGE (OUTPATIENT)
Age: 34
End: 2019-07-11

## 2019-07-11 ENCOUNTER — EMERGENCY (EMERGENCY)
Facility: HOSPITAL | Age: 34
LOS: 1 days | Discharge: HOME | End: 2019-07-11
Attending: EMERGENCY MEDICINE | Admitting: EMERGENCY MEDICINE
Payer: COMMERCIAL

## 2019-07-11 VITALS
RESPIRATION RATE: 17 BRPM | DIASTOLIC BLOOD PRESSURE: 67 MMHG | HEART RATE: 71 BPM | TEMPERATURE: 99 F | WEIGHT: 199.96 LBS | HEIGHT: 62 IN | OXYGEN SATURATION: 97 % | SYSTOLIC BLOOD PRESSURE: 98 MMHG

## 2019-07-11 VITALS
OXYGEN SATURATION: 100 % | HEART RATE: 69 BPM | SYSTOLIC BLOOD PRESSURE: 106 MMHG | DIASTOLIC BLOOD PRESSURE: 69 MMHG | RESPIRATION RATE: 16 BRPM | TEMPERATURE: 98 F

## 2019-07-11 DIAGNOSIS — Z00.00 ENCOUNTER FOR GENERAL ADULT MEDICAL EXAMINATION WITHOUT ABNORMAL FINDINGS: ICD-10-CM

## 2019-07-11 DIAGNOSIS — Z98.89 OTHER SPECIFIED POSTPROCEDURAL STATES: Chronic | ICD-10-CM

## 2019-07-11 LAB
ALBUMIN SERPL ELPH-MCNC: 3.7 G/DL — SIGNIFICANT CHANGE UP (ref 3.3–5)
ALP SERPL-CCNC: 91 U/L — SIGNIFICANT CHANGE UP (ref 40–120)
ALT FLD-CCNC: 33 U/L DA — SIGNIFICANT CHANGE UP (ref 10–45)
ANION GAP SERPL CALC-SCNC: 9 MMOL/L — SIGNIFICANT CHANGE UP (ref 5–17)
APPEARANCE UR: CLEAR — SIGNIFICANT CHANGE UP
AST SERPL-CCNC: 41 U/L — HIGH (ref 10–40)
BACTERIA # UR AUTO: NEGATIVE /HPF — SIGNIFICANT CHANGE UP
BASOPHILS # BLD AUTO: 0.04 K/UL — SIGNIFICANT CHANGE UP (ref 0–0.2)
BASOPHILS NFR BLD AUTO: 0.4 % — SIGNIFICANT CHANGE UP (ref 0–2)
BILIRUB SERPL-MCNC: 0.3 MG/DL — SIGNIFICANT CHANGE UP (ref 0.2–1.2)
BILIRUB UR QL STRIP: NEGATIVE
BILIRUB UR-MCNC: NEGATIVE — SIGNIFICANT CHANGE UP
BUN SERPL-MCNC: 9 MG/DL — SIGNIFICANT CHANGE UP (ref 7–23)
CALCIUM SERPL-MCNC: 9 MG/DL — SIGNIFICANT CHANGE UP (ref 8.4–10.5)
CHLORIDE SERPL-SCNC: 106 MMOL/L — SIGNIFICANT CHANGE UP (ref 96–108)
CLARITY UR: CLEAR
CO2 SERPL-SCNC: 24 MMOL/L — SIGNIFICANT CHANGE UP (ref 22–31)
COLLECTION METHOD: NORMAL
COLOR SPEC: YELLOW — SIGNIFICANT CHANGE UP
CREAT SERPL-MCNC: 0.48 MG/DL — LOW (ref 0.5–1.3)
DIFF PNL FLD: NEGATIVE — SIGNIFICANT CHANGE UP
EOSINOPHIL # BLD AUTO: 0.67 K/UL — HIGH (ref 0–0.5)
EOSINOPHIL NFR BLD AUTO: 6 % — SIGNIFICANT CHANGE UP (ref 0–6)
EPI CELLS # UR: SIGNIFICANT CHANGE UP
GLUCOSE SERPL-MCNC: 94 MG/DL — SIGNIFICANT CHANGE UP (ref 70–99)
GLUCOSE UR QL: NEGATIVE — SIGNIFICANT CHANGE UP
GLUCOSE UR-MCNC: NEGATIVE
HCG UR QL: 0.2 EU/DL
HCG UR QL: NEGATIVE
HCT VFR BLD CALC: 39.3 % — SIGNIFICANT CHANGE UP (ref 34.5–45)
HGB BLD-MCNC: 13 G/DL — SIGNIFICANT CHANGE UP (ref 11.5–15.5)
HGB UR QL STRIP.AUTO: NORMAL
IMM GRANULOCYTES NFR BLD AUTO: 0.4 % — SIGNIFICANT CHANGE UP (ref 0–1.5)
KETONES UR-MCNC: NEGATIVE
KETONES UR-MCNC: NEGATIVE — SIGNIFICANT CHANGE UP
LEUKOCYTE ESTERASE UR QL STRIP: NORMAL
LEUKOCYTE ESTERASE UR-ACNC: ABNORMAL
LIDOCAIN IGE QN: 110 U/L — SIGNIFICANT CHANGE UP (ref 73–393)
LYMPHOCYTES # BLD AUTO: 27 % — SIGNIFICANT CHANGE UP (ref 13–44)
LYMPHOCYTES # BLD AUTO: 3.04 K/UL — SIGNIFICANT CHANGE UP (ref 1–3.3)
MCHC RBC-ENTMCNC: 29.7 PG — SIGNIFICANT CHANGE UP (ref 27–34)
MCHC RBC-ENTMCNC: 33.1 GM/DL — SIGNIFICANT CHANGE UP (ref 32–36)
MCV RBC AUTO: 89.7 FL — SIGNIFICANT CHANGE UP (ref 80–100)
MONOCYTES # BLD AUTO: 0.75 K/UL — SIGNIFICANT CHANGE UP (ref 0–0.9)
MONOCYTES NFR BLD AUTO: 6.7 % — SIGNIFICANT CHANGE UP (ref 2–14)
NEUTROPHILS # BLD AUTO: 6.69 K/UL — SIGNIFICANT CHANGE UP (ref 1.8–7.4)
NEUTROPHILS NFR BLD AUTO: 59.5 % — SIGNIFICANT CHANGE UP (ref 43–77)
NITRITE UR QL STRIP: NEGATIVE
NITRITE UR-MCNC: NEGATIVE — SIGNIFICANT CHANGE UP
NRBC # BLD: 0 /100 WBCS — SIGNIFICANT CHANGE UP (ref 0–0)
PH UR STRIP: 0.2
PH UR: 6 — SIGNIFICANT CHANGE UP (ref 5–8)
PLATELET # BLD AUTO: 227 K/UL — SIGNIFICANT CHANGE UP (ref 150–400)
POTASSIUM SERPL-MCNC: 4.9 MMOL/L — SIGNIFICANT CHANGE UP (ref 3.5–5.3)
POTASSIUM SERPL-SCNC: 4.9 MMOL/L — SIGNIFICANT CHANGE UP (ref 3.5–5.3)
PROT SERPL-MCNC: 7.7 G/DL — SIGNIFICANT CHANGE UP (ref 6–8.3)
PROT UR STRIP-MCNC: NEGATIVE
PROT UR-MCNC: NEGATIVE — SIGNIFICANT CHANGE UP
QUALITY CONTROL: YES
RBC # BLD: 4.38 M/UL — SIGNIFICANT CHANGE UP (ref 3.8–5.2)
RBC # FLD: 12.8 % — SIGNIFICANT CHANGE UP (ref 10.3–14.5)
RBC CASTS # UR COMP ASSIST: NEGATIVE /HPF — SIGNIFICANT CHANGE UP (ref 0–4)
SODIUM SERPL-SCNC: 139 MMOL/L — SIGNIFICANT CHANGE UP (ref 135–145)
SP GR SPEC: 1.01 — SIGNIFICANT CHANGE UP (ref 1.01–1.02)
SP GR UR STRIP: 1.01
UROBILINOGEN FLD QL: NEGATIVE — SIGNIFICANT CHANGE UP
WBC # BLD: 11.24 K/UL — HIGH (ref 3.8–10.5)
WBC # FLD AUTO: 11.24 K/UL — HIGH (ref 3.8–10.5)
WBC UR QL: ABNORMAL /HPF (ref 0–5)

## 2019-07-11 PROCEDURE — 99284 EMERGENCY DEPT VISIT MOD MDM: CPT

## 2019-07-11 PROCEDURE — 87086 URINE CULTURE/COLONY COUNT: CPT

## 2019-07-11 PROCEDURE — 85027 COMPLETE CBC AUTOMATED: CPT

## 2019-07-11 PROCEDURE — 99284 EMERGENCY DEPT VISIT MOD MDM: CPT | Mod: 25

## 2019-07-11 PROCEDURE — 83690 ASSAY OF LIPASE: CPT

## 2019-07-11 PROCEDURE — 96361 HYDRATE IV INFUSION ADD-ON: CPT

## 2019-07-11 PROCEDURE — 74176 CT ABD & PELVIS W/O CONTRAST: CPT

## 2019-07-11 PROCEDURE — 36415 COLL VENOUS BLD VENIPUNCTURE: CPT

## 2019-07-11 PROCEDURE — 96365 THER/PROPH/DIAG IV INF INIT: CPT

## 2019-07-11 PROCEDURE — 74176 CT ABD & PELVIS W/O CONTRAST: CPT | Mod: 26

## 2019-07-11 PROCEDURE — 81001 URINALYSIS AUTO W/SCOPE: CPT

## 2019-07-11 PROCEDURE — 96375 TX/PRO/DX INJ NEW DRUG ADDON: CPT

## 2019-07-11 PROCEDURE — 80053 COMPREHEN METABOLIC PANEL: CPT

## 2019-07-11 PROCEDURE — G0463: CPT

## 2019-07-11 RX ORDER — KETOROLAC TROMETHAMINE 30 MG/ML
30 SYRINGE (ML) INJECTION ONCE
Refills: 0 | Status: DISCONTINUED | OUTPATIENT
Start: 2019-07-11 | End: 2019-07-11

## 2019-07-11 RX ORDER — CEFTRIAXONE 500 MG/1
1000 INJECTION, POWDER, FOR SOLUTION INTRAMUSCULAR; INTRAVENOUS ONCE
Refills: 0 | Status: COMPLETED | OUTPATIENT
Start: 2019-07-11 | End: 2019-07-11

## 2019-07-11 RX ORDER — CEPHALEXIN 500 MG
1 CAPSULE ORAL
Qty: 14 | Refills: 0
Start: 2019-07-11 | End: 2019-07-17

## 2019-07-11 RX ORDER — SODIUM CHLORIDE 9 MG/ML
1000 INJECTION INTRAMUSCULAR; INTRAVENOUS; SUBCUTANEOUS ONCE
Refills: 0 | Status: COMPLETED | OUTPATIENT
Start: 2019-07-11 | End: 2019-07-11

## 2019-07-11 RX ADMIN — SODIUM CHLORIDE 1000 MILLILITER(S): 9 INJECTION INTRAMUSCULAR; INTRAVENOUS; SUBCUTANEOUS at 19:40

## 2019-07-11 RX ADMIN — Medication 30 MILLIGRAM(S): at 20:30

## 2019-07-11 RX ADMIN — CEFTRIAXONE 1000 MILLIGRAM(S): 500 INJECTION, POWDER, FOR SOLUTION INTRAMUSCULAR; INTRAVENOUS at 20:00

## 2019-07-11 RX ADMIN — SODIUM CHLORIDE 1000 MILLILITER(S): 9 INJECTION INTRAMUSCULAR; INTRAVENOUS; SUBCUTANEOUS at 18:20

## 2019-07-11 RX ADMIN — Medication 30 MILLIGRAM(S): at 19:30

## 2019-07-11 RX ADMIN — CEFTRIAXONE 100 MILLIGRAM(S): 500 INJECTION, POWDER, FOR SOLUTION INTRAMUSCULAR; INTRAVENOUS at 19:30

## 2019-07-11 NOTE — HISTORY OF PRESENT ILLNESS
[FreeTextEntry1] : [id number] [de-identified] : 34F here with complaints of RLQ abdominal pain that started in May of 2019. States that had colonoscopy/endoscopy in June and was told she has inflamed intestines. Since 2nd week of June, she's had brownish/red discharge when urinating; discharge stopped last week. No associated sx. States pain is 8-10/10. States naproxene helps but tries to abstain from medical intervention. \par Denies fever/chills, nausea/vomiting/diarrhea/constipation. Tolerates PO intake. Has hx of endometriosis. States s/p exlap.\par s/p appendectomy. \par \par \par \par Pmhx:

## 2019-07-11 NOTE — ED ADULT NURSE NOTE - OBJECTIVE STATEMENT
Pt sent from [facility] practice c/o right sided abdominal and flank pain.  Oni ID# [id number] used for assessment. Pt states shes had this pain for months, but it has worsened in the past week. Pt states she had blood in her urine that started in May and stopped the first week of July. Pt denies any urinary symptoms. Pt states she has a hx of endometrioses. Pt rates pain 10/10 and describes the pain in her back as a "burning sensation." Pt states this pain is different than her usual endometrioses sx. Pt reports nausea, but denies any vomiting, fever, chill, and diarrhea. Pt states she had a BM this AM and no blood was noted.

## 2019-07-11 NOTE — PHYSICAL EXAM
[Well Nourished] : well nourished [No Respiratory Distress] : no respiratory distress  [Well Developed] : well developed [Soft] : abdomen soft [Non-distended] : non-distended [Normal Bowel Sounds] : normal bowel sounds [de-identified] : Looks uncomfortable 2/2 pain [de-identified] : Obese abdomen, Extreme tenderness on palpation of RLQ, +guarding, + rebound tenderness

## 2019-07-11 NOTE — ED PROVIDER NOTE - OBJECTIVE STATEMENT
34 yr old [place] female with hx of endometriosis, appendectomy, tubal ligation presents with right lower abdominal pain radiating to right flank for the last week, worsening today. Pt reports she had hematuria, and treated for UTI in end of May. subjective fever. positive urinary frequency. Denies dysuria, chills, vomiting, chest pain, sob or any other symptoms.   phone # [phone]

## 2019-07-11 NOTE — ED PROVIDER NOTE - CLINICAL SUMMARY MEDICAL DECISION MAKING FREE TEXT BOX
34 yr old [place] female with hx of endometriosis, appendectomy, tubal ligation presents with right lower abdominal pain radiating to right flank for the last week, worsening today. Pt reports she had hematuria, and treated for UTI in end of May. subjective fever. positive urinary frequency. Denies dysuria, chills, vomiting, chest pain, sob or any other symptoms.   phone # [phone]

## 2019-07-11 NOTE — PLAN
[FreeTextEntry1] : 34F as above.\par \par #Severe RLQ abdominal s/p appendectomy- could be 2/2 adhesions, ectopic, ovarian cyst, endometriosis, renal calculi\par - Tx to ED for Stat CT Abd/pelvis\par - Will bring colonoscopy/endoscopy report to clinic upon return\par - POCT preg negative\par - POCT UA\par \par \par Discussed w/Dr. Coffey

## 2019-07-11 NOTE — ED ADULT TRIAGE NOTE - CHIEF COMPLAINT QUOTE
Pt sent here from , c/o right sided abdominal pain going to back started May, had colonoscopy/endoscopy done last June and was told she has inflamed intestines

## 2019-07-11 NOTE — ED PROVIDER NOTE - PROGRESS NOTE DETAILS
MEGAN Beasley: Patient s/o to me to f/u abd CT results, no acute findngs, labs and UA reviewed. Will start keflex for UTI, patient instructed to f/u with FP clinic

## 2019-07-11 NOTE — REVIEW OF SYSTEMS
[Abdominal Pain] : abdominal pain [Fever] : no fever [Chills] : no chills [Chest Pain] : no chest pain [Palpitations] : no palpitations [Shortness Of Breath] : no shortness of breath [Nausea] : no nausea [Constipation] : no constipation [Diarrhea] : diarrhea [Dysuria] : no dysuria [Vomiting] : no vomiting

## 2019-07-11 NOTE — ED PROVIDER NOTE - NSFOLLOWUPINSTRUCTIONS_ED_ALL_ED_FT
Follow up your test results with [facility]    Please fill the prescription for the antibiotics and take as directed.  Please finish the entire course of medication as prescribed.  If you have any belly pain after the antibiotics, yogurt has been shown to help with this.  Do not use any alcohol or grapefruit juice with any antibiotics.    Stay hydrated    Return to the ER if your symptoms worsen, high fevers, severe pain or for any other medical emergencies

## 2019-07-11 NOTE — ED PROVIDER NOTE - ATTENDING CONTRIBUTION TO CARE
Deondre with MEGAN Donovan and Resident Montgomery from [facility]. 34 yr old [place] female with hx of endometriosis, appendectomy, tubal ligation presents with right lower abdominal pain radiating to right flank for the last week, worsening today. Pt reports she had hematuria, and treated for UTI in end of May. subjective fever. positive urinary frequency. Denies dysuria, chills, vomiting, chest pain, sob or any other symptoms. Pt tender in the right flank with cva percussion and with deep right lateral abd palpation and suprapubis. Plan for labs and urine.  clinic would like imaging. Will perform ct renal stone hunt study.     I performed a face to face bedside interview with patient regarding history of present illness, review of symptoms and past medical history. I completed an independent physical exam.  I have discussed the patient's plan of care with Physician Assistant (PA). I agree with note as stated above, having amended the EMR as needed to reflect my findings.   This includes History of Present Illness, HIV, Past Medical/Surgical/Family/Social History, Allergies and Home Medications, Review of Systems, Physical Exam, and any Progress Notes during the time I functioned as the attending physician for this patient.

## 2019-07-12 LAB
CULTURE RESULTS: SIGNIFICANT CHANGE UP
SPECIMEN SOURCE: SIGNIFICANT CHANGE UP

## 2019-07-18 DIAGNOSIS — R10.31 RIGHT LOWER QUADRANT PAIN: ICD-10-CM

## 2019-09-11 ENCOUNTER — APPOINTMENT (OUTPATIENT)
Dept: FAMILY MEDICINE | Facility: HOSPITAL | Age: 34
End: 2019-09-11

## 2020-01-27 ENCOUNTER — EMERGENCY (EMERGENCY)
Facility: HOSPITAL | Age: 35
LOS: 1 days | Discharge: HOME | End: 2020-01-27
Attending: EMERGENCY MEDICINE | Admitting: EMERGENCY MEDICINE
Payer: COMMERCIAL

## 2020-01-27 VITALS
OXYGEN SATURATION: 99 % | TEMPERATURE: 98 F | SYSTOLIC BLOOD PRESSURE: 112 MMHG | DIASTOLIC BLOOD PRESSURE: 74 MMHG | HEIGHT: 62 IN | HEART RATE: 76 BPM | RESPIRATION RATE: 15 BRPM | WEIGHT: 199.96 LBS

## 2020-01-27 VITALS
HEART RATE: 81 BPM | SYSTOLIC BLOOD PRESSURE: 119 MMHG | DIASTOLIC BLOOD PRESSURE: 80 MMHG | OXYGEN SATURATION: 98 % | RESPIRATION RATE: 16 BRPM

## 2020-01-27 DIAGNOSIS — Z98.89 OTHER SPECIFIED POSTPROCEDURAL STATES: Chronic | ICD-10-CM

## 2020-01-27 LAB
ALBUMIN SERPL ELPH-MCNC: 4 G/DL — SIGNIFICANT CHANGE UP (ref 3.3–5)
ALP SERPL-CCNC: 85 U/L — SIGNIFICANT CHANGE UP (ref 40–120)
ALT FLD-CCNC: 31 U/L — SIGNIFICANT CHANGE UP (ref 10–45)
ANION GAP SERPL CALC-SCNC: 9 MMOL/L — SIGNIFICANT CHANGE UP (ref 5–17)
APPEARANCE UR: CLEAR — SIGNIFICANT CHANGE UP
AST SERPL-CCNC: 23 U/L — SIGNIFICANT CHANGE UP (ref 10–40)
BASOPHILS # BLD AUTO: 0.05 K/UL — SIGNIFICANT CHANGE UP (ref 0–0.2)
BASOPHILS NFR BLD AUTO: 0.4 % — SIGNIFICANT CHANGE UP (ref 0–2)
BILIRUB SERPL-MCNC: 0.2 MG/DL — SIGNIFICANT CHANGE UP (ref 0.2–1.2)
BILIRUB UR-MCNC: NEGATIVE — SIGNIFICANT CHANGE UP
BUN SERPL-MCNC: 8 MG/DL — SIGNIFICANT CHANGE UP (ref 7–23)
CALCIUM SERPL-MCNC: 8.9 MG/DL — SIGNIFICANT CHANGE UP (ref 8.4–10.5)
CHLORIDE SERPL-SCNC: 103 MMOL/L — SIGNIFICANT CHANGE UP (ref 96–108)
CO2 SERPL-SCNC: 27 MMOL/L — SIGNIFICANT CHANGE UP (ref 22–31)
COLOR SPEC: YELLOW — SIGNIFICANT CHANGE UP
CREAT SERPL-MCNC: 0.6 MG/DL — SIGNIFICANT CHANGE UP (ref 0.5–1.3)
DIFF PNL FLD: NEGATIVE — SIGNIFICANT CHANGE UP
EOSINOPHIL # BLD AUTO: 0.41 K/UL — SIGNIFICANT CHANGE UP (ref 0–0.5)
EOSINOPHIL NFR BLD AUTO: 3.2 % — SIGNIFICANT CHANGE UP (ref 0–6)
GLUCOSE SERPL-MCNC: 111 MG/DL — HIGH (ref 70–99)
GLUCOSE UR QL: NEGATIVE — SIGNIFICANT CHANGE UP
HCT VFR BLD CALC: 40.1 % — SIGNIFICANT CHANGE UP (ref 34.5–45)
HGB BLD-MCNC: 13.4 G/DL — SIGNIFICANT CHANGE UP (ref 11.5–15.5)
IMM GRANULOCYTES NFR BLD AUTO: 0.4 % — SIGNIFICANT CHANGE UP (ref 0–1.5)
KETONES UR-MCNC: NEGATIVE — SIGNIFICANT CHANGE UP
LEUKOCYTE ESTERASE UR-ACNC: NEGATIVE — SIGNIFICANT CHANGE UP
LIDOCAIN IGE QN: 157 U/L — SIGNIFICANT CHANGE UP (ref 73–393)
LYMPHOCYTES # BLD AUTO: 23.9 % — SIGNIFICANT CHANGE UP (ref 13–44)
LYMPHOCYTES # BLD AUTO: 3.03 K/UL — SIGNIFICANT CHANGE UP (ref 1–3.3)
MCHC RBC-ENTMCNC: 30.7 PG — SIGNIFICANT CHANGE UP (ref 27–34)
MCHC RBC-ENTMCNC: 33.4 GM/DL — SIGNIFICANT CHANGE UP (ref 32–36)
MCV RBC AUTO: 91.8 FL — SIGNIFICANT CHANGE UP (ref 80–100)
MONOCYTES # BLD AUTO: 0.82 K/UL — SIGNIFICANT CHANGE UP (ref 0–0.9)
MONOCYTES NFR BLD AUTO: 6.5 % — SIGNIFICANT CHANGE UP (ref 2–14)
NEUTROPHILS # BLD AUTO: 8.33 K/UL — HIGH (ref 1.8–7.4)
NEUTROPHILS NFR BLD AUTO: 65.6 % — SIGNIFICANT CHANGE UP (ref 43–77)
NITRITE UR-MCNC: NEGATIVE — SIGNIFICANT CHANGE UP
NRBC # BLD: 0 /100 WBCS — SIGNIFICANT CHANGE UP (ref 0–0)
PH UR: 6 — SIGNIFICANT CHANGE UP (ref 5–8)
PLATELET # BLD AUTO: 194 K/UL — SIGNIFICANT CHANGE UP (ref 150–400)
POTASSIUM SERPL-MCNC: 3.7 MMOL/L — SIGNIFICANT CHANGE UP (ref 3.5–5.3)
POTASSIUM SERPL-SCNC: 3.7 MMOL/L — SIGNIFICANT CHANGE UP (ref 3.5–5.3)
PROT SERPL-MCNC: 7.8 G/DL — SIGNIFICANT CHANGE UP (ref 6–8.3)
PROT UR-MCNC: NEGATIVE — SIGNIFICANT CHANGE UP
RBC # BLD: 4.37 M/UL — SIGNIFICANT CHANGE UP (ref 3.8–5.2)
RBC # FLD: 12.9 % — SIGNIFICANT CHANGE UP (ref 10.3–14.5)
SODIUM SERPL-SCNC: 139 MMOL/L — SIGNIFICANT CHANGE UP (ref 135–145)
SP GR SPEC: 1 — LOW (ref 1.01–1.02)
UROBILINOGEN FLD QL: NEGATIVE — SIGNIFICANT CHANGE UP
WBC # BLD: 12.69 K/UL — HIGH (ref 3.8–10.5)
WBC # FLD AUTO: 12.69 K/UL — HIGH (ref 3.8–10.5)

## 2020-01-27 PROCEDURE — 83690 ASSAY OF LIPASE: CPT

## 2020-01-27 PROCEDURE — 99285 EMERGENCY DEPT VISIT HI MDM: CPT

## 2020-01-27 PROCEDURE — 96361 HYDRATE IV INFUSION ADD-ON: CPT

## 2020-01-27 PROCEDURE — 99284 EMERGENCY DEPT VISIT MOD MDM: CPT | Mod: 25

## 2020-01-27 PROCEDURE — 76830 TRANSVAGINAL US NON-OB: CPT

## 2020-01-27 PROCEDURE — 85027 COMPLETE CBC AUTOMATED: CPT

## 2020-01-27 PROCEDURE — 36415 COLL VENOUS BLD VENIPUNCTURE: CPT

## 2020-01-27 PROCEDURE — 80053 COMPREHEN METABOLIC PANEL: CPT

## 2020-01-27 PROCEDURE — 96375 TX/PRO/DX INJ NEW DRUG ADDON: CPT

## 2020-01-27 PROCEDURE — 87086 URINE CULTURE/COLONY COUNT: CPT

## 2020-01-27 PROCEDURE — 96374 THER/PROPH/DIAG INJ IV PUSH: CPT

## 2020-01-27 PROCEDURE — 76830 TRANSVAGINAL US NON-OB: CPT | Mod: 26

## 2020-01-27 RX ORDER — MORPHINE SULFATE 50 MG/1
4 CAPSULE, EXTENDED RELEASE ORAL ONCE
Refills: 0 | Status: DISCONTINUED | OUTPATIENT
Start: 2020-01-27 | End: 2020-01-27

## 2020-01-27 RX ORDER — SODIUM CHLORIDE 9 MG/ML
1000 INJECTION INTRAMUSCULAR; INTRAVENOUS; SUBCUTANEOUS ONCE
Refills: 0 | Status: COMPLETED | OUTPATIENT
Start: 2020-01-27 | End: 2020-01-27

## 2020-01-27 RX ORDER — ONDANSETRON 8 MG/1
4 TABLET, FILM COATED ORAL ONCE
Refills: 0 | Status: COMPLETED | OUTPATIENT
Start: 2020-01-27 | End: 2020-01-27

## 2020-01-27 RX ORDER — METOCLOPRAMIDE HCL 10 MG
10 TABLET ORAL ONCE
Refills: 0 | Status: COMPLETED | OUTPATIENT
Start: 2020-01-27 | End: 2020-01-27

## 2020-01-27 RX ADMIN — SODIUM CHLORIDE 1000 MILLILITER(S): 9 INJECTION INTRAMUSCULAR; INTRAVENOUS; SUBCUTANEOUS at 16:55

## 2020-01-27 RX ADMIN — MORPHINE SULFATE 4 MILLIGRAM(S): 50 CAPSULE, EXTENDED RELEASE ORAL at 19:26

## 2020-01-27 RX ADMIN — SODIUM CHLORIDE 1000 MILLILITER(S): 9 INJECTION INTRAMUSCULAR; INTRAVENOUS; SUBCUTANEOUS at 20:00

## 2020-01-27 RX ADMIN — ONDANSETRON 4 MILLIGRAM(S): 8 TABLET, FILM COATED ORAL at 17:51

## 2020-01-27 RX ADMIN — MORPHINE SULFATE 4 MILLIGRAM(S): 50 CAPSULE, EXTENDED RELEASE ORAL at 20:00

## 2020-01-27 RX ADMIN — Medication 10 MILLIGRAM(S): at 19:46

## 2020-01-27 NOTE — ED ADULT NURSE NOTE - OBJECTIVE STATEMENT
Pt c/o left lower quad pain starting this AM that comes and goes and is worse when walking or sitting. Pt denies vomiting, diarrhea or fever. Pt states she has nausea, and burning with urination starting yesterday. Pt states hx of endometriosis. Pt states had normal BM this AM.

## 2020-01-27 NOTE — ED PROVIDER NOTE - PROGRESS NOTE DETAILS
Sidcarol: pts pain resolved. no n/v. Discussed with patient need to return to ED if symptoms don't continue to improve or recur or develops any new or worsening symptoms that are of concern. Pt will fu with [facility] this week

## 2020-01-27 NOTE — ED PROVIDER NOTE - PATIENT PORTAL LINK FT
You can access the FollowMyHealth Patient Portal offered by [facility] by registering at the following website: [email or web address]. By joining [employer]’s FollowMyHealth portal, you will also be able to view your health information using other applications (apps) compatible with our system.

## 2020-01-27 NOTE — ED PROVIDER NOTE - OBJECTIVE STATEMENT
34 yr old female with hx of endometriosis, PCOS presents with left lower abdominal pain since this morning. + nausea, + slight dysuria. Pt denies any fever, chills, vaginal bleeding, v/d, chest pain, sob or any other symptoms. LMP- jan 4th

## 2020-01-27 NOTE — ED PROVIDER NOTE - ATTENDING CONTRIBUTION TO CARE
Dr. Schmidt: I performed a face to face bedside interview with patient regarding history of present illness, review of symptoms and past medical history. I completed an independent physical exam.  I have discussed patient's plan of care with PA.   I agree with note as stated above, having amended the EMR as needed to reflect my findings.   This includes HISTORY OF PRESENT ILLNESS, HIV, PAST MEDICAL/SURGICAL/FAMILY/SOCIAL HISTORY, ALLERGIES AND HOME MEDICATIONS, REVIEW OF SYSTEMS, PHYSICAL EXAM, and any PROGRESS NOTES during the time I functioned as the attending physician for this patient.    see mdm

## 2020-01-27 NOTE — ED PROVIDER NOTE - CLINICAL SUMMARY MEDICAL DECISION MAKING FREE TEXT BOX
Dr. Schmidt: 34F h/o endometriosis, PCOS p/w intermittent LLQ pain since this AM, now persistent with dysuria and nausea, no vomiting, no fevers or chills, no vag bleeding or DC. Sexually active with one partner, no h/o STIs. On exam pt is well appearing, nad, rrr, ctab, abdo soft with LLQ TTP, no CVAT. Pelvic exam by MEGAN Elise, +left adnexal ttp. UA neg, will get TVUS r/o torsion.

## 2020-01-28 LAB
CULTURE RESULTS: SIGNIFICANT CHANGE UP
SPECIMEN SOURCE: SIGNIFICANT CHANGE UP

## 2020-02-26 ENCOUNTER — APPOINTMENT (OUTPATIENT)
Dept: ORTHOPEDIC SURGERY | Facility: CLINIC | Age: 35
End: 2020-02-26

## 2020-04-22 ENCOUNTER — EMERGENCY (EMERGENCY)
Facility: HOSPITAL | Age: 35
LOS: 1 days | Discharge: HOME | End: 2020-04-22
Attending: EMERGENCY MEDICINE | Admitting: EMERGENCY MEDICINE
Payer: MEDICAID

## 2020-04-22 VITALS
RESPIRATION RATE: 18 BRPM | SYSTOLIC BLOOD PRESSURE: 137 MMHG | HEIGHT: 62 IN | OXYGEN SATURATION: 99 % | DIASTOLIC BLOOD PRESSURE: 89 MMHG | HEART RATE: 77 BPM | WEIGHT: 195.11 LBS | TEMPERATURE: 98 F

## 2020-04-22 DIAGNOSIS — Z98.89 OTHER SPECIFIED POSTPROCEDURAL STATES: Chronic | ICD-10-CM

## 2020-04-22 DIAGNOSIS — R06.02 SHORTNESS OF BREATH: ICD-10-CM

## 2020-04-22 PROCEDURE — 99284 EMERGENCY DEPT VISIT MOD MDM: CPT

## 2020-04-22 PROCEDURE — 99283 EMERGENCY DEPT VISIT LOW MDM: CPT

## 2020-04-22 PROCEDURE — 87635 SARS-COV-2 COVID-19 AMP PRB: CPT

## 2020-04-22 RX ORDER — ALBUTEROL 90 UG/1
2 AEROSOL, METERED ORAL
Qty: 1 | Refills: 0
Start: 2020-04-22 | End: 2020-05-01

## 2020-04-22 NOTE — ED ADULT NURSE NOTE - OBJECTIVE STATEMENT
Pt state's, "I have pain in my throat, Headache, body aches, symptoms started yesterday." Pt state's " I work at [employer] and my coworker was sick." Pt c/o symptoms started yesterday.

## 2020-04-22 NOTE — ED PROVIDER NOTE - NSFOLLOWUPINSTRUCTIONS_ED_ALL_ED_FT
Descansa, gustavo muchos líquidos  [place] Tylenol 650mg cada 4-6 horas según sea necesario para el dolor o la fiebre superior a 99.9; evite el uso de ANUSHA (ibuprofeno, Advil, Aleve, Motrin, etc.).  Use inhalaciones 2 inhalaciones cada 4-6 horas según sea necesario.  Auto cuarentena por 14 david:  -No vayas al trabajo, a la escuela ni a las áreas públicas. Em usar el transporte público.  -En la medida de lo posible, separarse de otras personas en delgado hogar  -Use la máscara suministrada siempre que esté cerca de otras personas.  -Si tiene aura iron médica no urgente, reprograme  Lávese las truong deniz al menos 20 segundos con agua jabonosa tibia. Cúbrase la boca y la nariz con un pañuelo cuando tosa o estornude. Tire los pañuelos usados ??en un bote de basura forrado e inmediatamente lávese las truong.  Evite tocarse los ojos, la nariz y la boca con las truong.  Evite compartir artículos personales del hogar (platos, vasos, tazas, utensilios, toallas o ropa de cama)  Limpiar y desinfectar superficies.  Si desarrolla síntomas que empeoran, elisabet falta de aire severa, llame al [phone]. Le ayudarán a determinar nidia próximos pasos.

## 2020-04-22 NOTE — ED PROVIDER NOTE - ATTENDING CONTRIBUTION TO CARE
Eval with MEGAN Madsen. Viral/Flu like illness. Well appearing. Lungs clear on examination. Has recent travel/sick contact with confirmed positive covid case.   RVP and COVID testing sent. Given viral illness instructions and strict return precautions-Worsening, continued or ANY new concerning symptoms return to the emergency department. Recommended home quarantine for 14 days. All questions answered. Stable for d/c. Work note provided.  I performed a face to face bedside interview with patient regarding history of present illness, review of symptoms and past medical history. I completed an independent physical exam.  I have discussed the patient's plan of care with Physician Assistant (PA). I agree with note as stated above, having amended the EMR as needed to reflect my findings.   This includes History of Present Illness, HIV, Past Medical/Surgical/Family/Social History, Allergies and Home Medications, Review of Systems, Physical Exam, and any Progress Notes during the time I functioned as the attending physician for this patient.

## 2020-04-22 NOTE — ED PROVIDER NOTE - CLINICAL SUMMARY MEDICAL DECISION MAKING FREE TEXT BOX
30-Jan-2019 02:35
Viral/Flu like illness. Well appearing. Lungs clear on examination. Has recent travel/sick contact with confirmed positive covid case.   RVP and COVID testing sent. Given viral illness instructions and strict return precautions-Worsening, continued or ANY new concerning symptoms return to the emergency department. Recommended home quarantine for 14 days. All questions answered. Stable for d/c. Work note provided.

## 2020-04-22 NOTE — ED PROVIDER NOTE - PATIENT PORTAL LINK FT
You can access the FollowMyHealth Patient Portal offered by [facility] by registering at the following website: [email or web address]. By joining [employer]’s FollowMyHealth portal, you will also be able to view your health information using other applications (apps) compatible with our system.

## 2020-04-23 LAB — SARS-COV-2 RNA SPEC QL NAA+PROBE: SIGNIFICANT CHANGE UP

## 2020-07-15 ENCOUNTER — RESULT CHARGE (OUTPATIENT)
Age: 35
End: 2020-07-15

## 2020-07-15 ENCOUNTER — APPOINTMENT (OUTPATIENT)
Dept: FAMILY MEDICINE | Facility: HOSPITAL | Age: 35
End: 2020-07-15

## 2020-07-15 ENCOUNTER — OUTPATIENT (OUTPATIENT)
Dept: OUTPATIENT SERVICES | Facility: HOSPITAL | Age: 35
LOS: 1 days | End: 2020-07-15
Payer: COMMERCIAL

## 2020-07-15 VITALS
HEART RATE: 76 BPM | OXYGEN SATURATION: 96 % | BODY MASS INDEX: 37.86 KG/M2 | SYSTOLIC BLOOD PRESSURE: 115 MMHG | WEIGHT: 207 LBS | DIASTOLIC BLOOD PRESSURE: 73 MMHG | RESPIRATION RATE: 16 BRPM | TEMPERATURE: 98 F

## 2020-07-15 DIAGNOSIS — Z98.89 OTHER SPECIFIED POSTPROCEDURAL STATES: Chronic | ICD-10-CM

## 2020-07-15 DIAGNOSIS — Z87.898 PERSONAL HISTORY OF OTHER SPECIFIED CONDITIONS: ICD-10-CM

## 2020-07-15 DIAGNOSIS — S93.602A UNSPECIFIED SPRAIN OF LEFT FOOT, INITIAL ENCOUNTER: ICD-10-CM

## 2020-07-15 DIAGNOSIS — F32.2 MAJOR DEPRESSIVE DISORDER, SINGLE EPISODE, SEVERE W/OUT PSYCHOTIC FEATURES: ICD-10-CM

## 2020-07-15 DIAGNOSIS — Z00.00 ENCOUNTER FOR GENERAL ADULT MEDICAL EXAMINATION WITHOUT ABNORMAL FINDINGS: ICD-10-CM

## 2020-07-15 DIAGNOSIS — R10.31 RIGHT LOWER QUADRANT PAIN: ICD-10-CM

## 2020-07-15 DIAGNOSIS — Z00.00 ENCOUNTER FOR GENERAL ADULT MEDICAL EXAMINATION W/OUT ABNORMAL FINDINGS: ICD-10-CM

## 2020-07-15 DIAGNOSIS — M54.9 DORSALGIA, UNSPECIFIED: ICD-10-CM

## 2020-07-15 LAB — HBA1C MFR BLD HPLC: 5.9

## 2020-07-15 RX ORDER — METOCLOPRAMIDE 10 MG/1
10 TABLET ORAL EVERY 6 HOURS
Qty: 120 | Refills: 0 | Status: COMPLETED | COMMUNITY
Start: 2019-05-06 | End: 2020-07-15

## 2020-07-15 RX ORDER — OMEPRAZOLE 20 MG/1
20 CAPSULE, DELAYED RELEASE ORAL
Qty: 30 | Refills: 0 | Status: COMPLETED | COMMUNITY
Start: 2019-05-06 | End: 2020-07-15

## 2020-07-15 RX ORDER — IBUPROFEN 600 MG/1
600 TABLET, FILM COATED ORAL
Qty: 20 | Refills: 0 | Status: COMPLETED | COMMUNITY
Start: 2019-02-13 | End: 2020-07-15

## 2020-07-16 DIAGNOSIS — M77.11 LATERAL EPICONDYLITIS, RIGHT ELBOW: ICD-10-CM

## 2020-07-16 PROCEDURE — 82306 VITAMIN D 25 HYDROXY: CPT

## 2020-07-16 PROCEDURE — 80053 COMPREHEN METABOLIC PANEL: CPT

## 2020-07-16 PROCEDURE — 83036 HEMOGLOBIN GLYCOSYLATED A1C: CPT

## 2020-07-16 PROCEDURE — 80061 LIPID PANEL: CPT

## 2020-07-16 PROCEDURE — G0463: CPT

## 2020-07-16 PROCEDURE — 84443 ASSAY THYROID STIM HORMONE: CPT

## 2020-07-16 NOTE — REVIEW OF SYSTEMS
[Joint Pain] : joint pain [Negative] : Heme/Lymph [Joint Stiffness] : no joint stiffness [Muscle Weakness] : no muscle weakness [Joint Swelling] : no joint swelling [Muscle Pain] : no muscle pain [Back Pain] : no back pain [FreeTextEntry9] : pain to R elbow

## 2020-07-16 NOTE — HISTORY OF PRESENT ILLNESS
[de-identified] : 36 y/o female with PMHx of , s/p Bilateral tubal ligation in 2011, PCOS, Prediabetes, Endometriosis s/p Ex-Lap x 2, presents today for follow up. Patient complaints of pain to R elbow for 1 week now, pain is ~ 8/10 on occasions, aggravated by moving elbow, alleviated with use of Motrin. Patient denies recent trauma or strenuous work, fever, chills, headaches, dizziness, chest pain, shortness of breath, cough, palpitations, nausea, vomiting, diarrhea, abdominal pain, leg pain, leg edema, or weakness.\par  [FreeTextEntry1] : R elbow pain

## 2020-07-16 NOTE — ASSESSMENT
[FreeTextEntry1] : 36 y/o female with PMHx of , s/p Bilateral tubal ligation in , PCOS, Prediabetes, Endometriosis s/p Ex-Lap x 2, presents today for follow up. \par \par Lateral Epicondylitis \par -Apply cold compress\par -Continue to take Motrin q8hrs with food\par -Recommend elbow brace \par \par PCOS\par -Continue metformin 500mg po q12hrs \par -Patient has Gynecologist Dr. Choudhary in [facility]\par \par Endometriosis\par -S/P Ex lap x 2\par -On Lupron for 3 months now \par -f/u with gynecologist \par \par Prediabetes \par -A1C: 5.9\par -Continue metformin 500mg po q12hrs \par \par HCM\par -Patient had PAP smear done 2020 with her gynecologist - will sign release form \par -Immunization: Tdap up to date \par -CMP, Lipid panel ordered\par \par ~ f/u in 1 month for CPE\par ~ f/u labs \par \par Above discussed with Dr. Pabon \par \par

## 2020-07-16 NOTE — PHYSICAL EXAM
[No Acute Distress] : no acute distress [Well Nourished] : well nourished [Well Developed] : well developed [Well-Appearing] : well-appearing [Normal Sclera/Conjunctiva] : normal sclera/conjunctiva [Normal Outer Ear/Nose] : the outer ears and nose were normal in appearance [No JVD] : no jugular venous distention [No Respiratory Distress] : no respiratory distress  [No Accessory Muscle Use] : no accessory muscle use [Normal Rate] : normal rate  [Regular Rhythm] : with a regular rhythm [Clear to Auscultation] : lungs were clear to auscultation bilaterally [No Murmur] : no murmur heard [Normal S1, S2] : normal S1 and S2 [Soft] : abdomen soft [Non Tender] : non-tender [Non-distended] : non-distended [No HSM] : no HSM [No Masses] : no abdominal mass palpated [Normal Bowel Sounds] : normal bowel sounds [No Rash] : no rash [Normal Gait] : normal gait [No Focal Deficits] : no focal deficits [Coordination Grossly Intact] : coordination grossly intact [Speech Grossly Normal] : speech grossly normal [Memory Grossly Normal] : memory grossly normal [Normal Mood] : the mood was normal [Normal Affect] : the affect was normal [Alert and Oriented x3] : oriented to person, place, and time [Normal Insight/Judgement] : insight and judgment were intact [de-identified] : pain to Lateral epicondyle on active and passive movement, no edema or erythema

## 2020-07-17 LAB
25(OH)D3 SERPL-MCNC: 20.8 NG/ML
ALBUMIN SERPL ELPH-MCNC: 4.8 G/DL
ALP BLD-CCNC: 87 U/L
ALT SERPL-CCNC: 27 U/L
ANION GAP SERPL CALC-SCNC: 13 MMOL/L
AST SERPL-CCNC: 24 U/L
BASOPHILS # BLD AUTO: 0.04 K/UL
BASOPHILS NFR BLD AUTO: 0.4 %
BILIRUB SERPL-MCNC: <0.2 MG/DL
BUN SERPL-MCNC: 16 MG/DL
CALCIUM SERPL-MCNC: 9.4 MG/DL
CHLORIDE SERPL-SCNC: 104 MMOL/L
CHOLEST SERPL-MCNC: 216 MG/DL
CHOLEST/HDLC SERPL: 5.1 RATIO
CO2 SERPL-SCNC: 23 MMOL/L
CREAT SERPL-MCNC: 0.61 MG/DL
EOSINOPHIL # BLD AUTO: 0.29 K/UL
EOSINOPHIL NFR BLD AUTO: 3 %
GLUCOSE SERPL-MCNC: 109 MG/DL
HBA1C MFR BLD HPLC: 5.9
HCT VFR BLD CALC: 41.2 %
HDLC SERPL-MCNC: 43 MG/DL
HGB BLD-MCNC: 13.3 G/DL
IMM GRANULOCYTES NFR BLD AUTO: 0.3 %
LDLC SERPL CALC-MCNC: 120 MG/DL
LYMPHOCYTES # BLD AUTO: 3.09 K/UL
LYMPHOCYTES NFR BLD AUTO: 32.4 %
MAN DIFF?: NORMAL
MCHC RBC-ENTMCNC: 30.3 PG
MCHC RBC-ENTMCNC: 32.3 GM/DL
MCV RBC AUTO: 93.8 FL
MONOCYTES # BLD AUTO: 0.75 K/UL
MONOCYTES NFR BLD AUTO: 7.9 %
NEUTROPHILS # BLD AUTO: 5.35 K/UL
NEUTROPHILS NFR BLD AUTO: 56 %
PLATELET # BLD AUTO: 211 K/UL
POTASSIUM SERPL-SCNC: 4.1 MMOL/L
PROT SERPL-MCNC: 7.1 G/DL
RBC # BLD: 4.39 M/UL
RBC # FLD: 13 %
SODIUM SERPL-SCNC: 141 MMOL/L
TRIGL SERPL-MCNC: 268 MG/DL
TSH SERPL-ACNC: 0.61 UIU/ML
WBC # FLD AUTO: 9.55 K/UL

## 2020-08-19 ENCOUNTER — OUTPATIENT (OUTPATIENT)
Dept: OUTPATIENT SERVICES | Facility: HOSPITAL | Age: 35
LOS: 1 days | End: 2020-08-19
Payer: MEDICAID

## 2020-08-19 ENCOUNTER — APPOINTMENT (OUTPATIENT)
Dept: FAMILY MEDICINE | Facility: HOSPITAL | Age: 35
End: 2020-08-19

## 2020-08-19 VITALS
SYSTOLIC BLOOD PRESSURE: 103 MMHG | DIASTOLIC BLOOD PRESSURE: 72 MMHG | HEART RATE: 93 BPM | BODY MASS INDEX: 38.23 KG/M2 | TEMPERATURE: 97.8 F | OXYGEN SATURATION: 95 % | WEIGHT: 209 LBS | RESPIRATION RATE: 14 BRPM

## 2020-08-19 DIAGNOSIS — Z98.89 OTHER SPECIFIED POSTPROCEDURAL STATES: Chronic | ICD-10-CM

## 2020-08-19 DIAGNOSIS — Z87.898 PERSONAL HISTORY OF OTHER SPECIFIED CONDITIONS: ICD-10-CM

## 2020-08-19 DIAGNOSIS — R23.8 OTHER SKIN CHANGES: ICD-10-CM

## 2020-08-19 DIAGNOSIS — R10.2 PELVIC AND PERINEAL PAIN: ICD-10-CM

## 2020-08-19 DIAGNOSIS — R30.0 DYSURIA: ICD-10-CM

## 2020-08-19 DIAGNOSIS — Z87.39 PERSONAL HISTORY OF OTHER DISEASES OF THE MUSCULOSKELETAL SYSTEM AND CONNECTIVE TISSUE: ICD-10-CM

## 2020-08-19 DIAGNOSIS — Z78.9 OTHER SPECIFIED HEALTH STATUS: ICD-10-CM

## 2020-08-19 DIAGNOSIS — Z87.440 PERSONAL HISTORY OF URINARY (TRACT) INFECTIONS: ICD-10-CM

## 2020-08-19 DIAGNOSIS — Z00.00 ENCOUNTER FOR GENERAL ADULT MEDICAL EXAMINATION WITHOUT ABNORMAL FINDINGS: ICD-10-CM

## 2020-08-19 DIAGNOSIS — Z23 ENCOUNTER FOR IMMUNIZATION: ICD-10-CM

## 2020-08-19 PROCEDURE — G0463: CPT

## 2020-08-19 NOTE — PHYSICAL EXAM
[Well Nourished] : well nourished [No Acute Distress] : no acute distress [Well Developed] : well developed [PERRL] : pupils equal round and reactive to light [No Respiratory Distress] : no respiratory distress  [No Accessory Muscle Use] : no accessory muscle use [Clear to Auscultation] : lungs were clear to auscultation bilaterally [Normal Rate] : normal rate  [Regular Rhythm] : with a regular rhythm [Normal S1, S2] : normal S1 and S2 [No Murmur] : no murmur heard [Soft] : abdomen soft [Non Tender] : non-tender [Non-distended] : non-distended [No Masses] : no abdominal mass palpated [Normal Bowel Sounds] : normal bowel sounds [Coordination Grossly Intact] : coordination grossly intact [Normal Affect] : the affect was normal [Normal Gait] : normal gait [Normal Insight/Judgement] : insight and judgment were intact

## 2020-08-20 DIAGNOSIS — Z78.9 OTHER SPECIFIED HEALTH STATUS: ICD-10-CM

## 2020-08-20 NOTE — HISTORY OF PRESENT ILLNESS
[Patient Declined  Services] : - None: Patient declined  services [FreeTextEntry8] : 35F w/ hx of PCOS, endometriosis and prediabetes who is coming in with abdominal upset due to metformin. Pt states that for the past two weeks she gets bloating, and acid reflux every time she takes the metformin. +nausea, denies vomiting or any other symptoms. \par  [FreeTextEntry3] : preferred speaking english

## 2020-09-08 ENCOUNTER — OUTPATIENT (OUTPATIENT)
Dept: OUTPATIENT SERVICES | Facility: HOSPITAL | Age: 35
LOS: 1 days | End: 2020-09-08
Payer: MEDICAID

## 2020-09-08 ENCOUNTER — APPOINTMENT (OUTPATIENT)
Dept: FAMILY MEDICINE | Facility: HOSPITAL | Age: 35
End: 2020-09-08

## 2020-09-08 VITALS
HEART RATE: 86 BPM | HEIGHT: 62 IN | OXYGEN SATURATION: 99 % | RESPIRATION RATE: 14 BRPM | TEMPERATURE: 98.4 F | BODY MASS INDEX: 38.83 KG/M2 | SYSTOLIC BLOOD PRESSURE: 99 MMHG | WEIGHT: 211 LBS | DIASTOLIC BLOOD PRESSURE: 65 MMHG

## 2020-09-08 DIAGNOSIS — Z98.89 OTHER SPECIFIED POSTPROCEDURAL STATES: Chronic | ICD-10-CM

## 2020-09-08 DIAGNOSIS — Z00.00 ENCOUNTER FOR GENERAL ADULT MEDICAL EXAMINATION WITHOUT ABNORMAL FINDINGS: ICD-10-CM

## 2020-09-08 PROCEDURE — G0008: CPT

## 2020-09-08 PROCEDURE — G0463: CPT

## 2020-09-09 DIAGNOSIS — Z23 ENCOUNTER FOR IMMUNIZATION: ICD-10-CM

## 2020-09-14 NOTE — PHYSICAL EXAM
[No Acute Distress] : no acute distress [Well-Appearing] : well-appearing [No Respiratory Distress] : no respiratory distress  [Clear to Auscultation] : lungs were clear to auscultation bilaterally [Normal Rate] : normal rate  [Regular Rhythm] : with a regular rhythm [Normal S1, S2] : normal S1 and S2 [Soft] : abdomen soft [Non Tender] : non-tender [Non-distended] : non-distended [Normal Bowel Sounds] : normal bowel sounds

## 2020-09-14 NOTE — HISTORY OF PRESENT ILLNESS
[FreeTextEntry8] : 34 y/o F PMHx PreDM presenting for flu shot. Pt feeling well overall. Denies fever, chills, CP, SOB, cough, n/v/d, abd pain.

## 2020-09-14 NOTE — REVIEW OF SYSTEMS
[Fever] : no fever [Chills] : no chills [Chest Pain] : no chest pain [Cough] : no cough [Abdominal Pain] : no abdominal pain [Nausea] : no nausea [Vomiting] : no vomiting

## 2020-10-12 ENCOUNTER — RESULT CHARGE (OUTPATIENT)
Age: 35
End: 2020-10-12

## 2020-10-12 ENCOUNTER — OUTPATIENT (OUTPATIENT)
Dept: OUTPATIENT SERVICES | Facility: HOSPITAL | Age: 35
LOS: 1 days | End: 2020-10-12
Payer: MEDICAID

## 2020-10-12 ENCOUNTER — APPOINTMENT (OUTPATIENT)
Dept: FAMILY MEDICINE | Facility: HOSPITAL | Age: 35
End: 2020-10-12

## 2020-10-12 VITALS
DIASTOLIC BLOOD PRESSURE: 75 MMHG | SYSTOLIC BLOOD PRESSURE: 109 MMHG | TEMPERATURE: 98.6 F | OXYGEN SATURATION: 95 % | HEART RATE: 72 BPM | RESPIRATION RATE: 14 BRPM

## 2020-10-12 DIAGNOSIS — Z87.898 PERSONAL HISTORY OF OTHER SPECIFIED CONDITIONS: ICD-10-CM

## 2020-10-12 DIAGNOSIS — Z98.89 OTHER SPECIFIED POSTPROCEDURAL STATES: Chronic | ICD-10-CM

## 2020-10-12 DIAGNOSIS — Z12.4 ENCOUNTER FOR SCREENING FOR MALIGNANT NEOPLASM OF CERVIX: ICD-10-CM

## 2020-10-12 DIAGNOSIS — A60.00 HERPESVIRAL INFECTION OF UROGENITAL SYSTEM, UNSPECIFIED: ICD-10-CM

## 2020-10-12 DIAGNOSIS — N89.8 OTHER SPECIFIED NONINFLAMMATORY DISORDERS OF VAGINA: ICD-10-CM

## 2020-10-12 DIAGNOSIS — R30.0 DYSURIA: ICD-10-CM

## 2020-10-12 DIAGNOSIS — Z00.00 ENCOUNTER FOR GENERAL ADULT MEDICAL EXAMINATION WITHOUT ABNORMAL FINDINGS: ICD-10-CM

## 2020-10-12 DIAGNOSIS — R87.615 UNSATISFACTORY CYTOLOGIC SMEAR OF CERVIX: ICD-10-CM

## 2020-10-12 PROCEDURE — 87800 DETECT AGNT MULT DNA DIREC: CPT

## 2020-10-12 PROCEDURE — G0463: CPT

## 2020-10-12 PROCEDURE — 87086 URINE CULTURE/COLONY COUNT: CPT

## 2020-10-12 PROCEDURE — 87624 HPV HI-RISK TYP POOLED RSLT: CPT

## 2020-10-13 DIAGNOSIS — A60.00 HERPESVIRAL INFECTION OF UROGENITAL SYSTEM, UNSPECIFIED: ICD-10-CM

## 2020-10-13 NOTE — HISTORY OF PRESENT ILLNESS
[de-identified] : 36 y/o F PMHx of PCOS, endometriosis, preDM, presenting for pap today. C/o dysuria and vaginal itch with discharge for past week. Pt was seen by gyn recently and treated for vaginal cadidiasis with 1 dose of diflucan. Symptoms have not resolved. Pt also with dysuria and increased frequency. LMP 9/28/20 reported as normal. \par Pt also w/ c/o monthly lesions that show up in the genital region around the time of her period. Feels burning sensation then lesion seen on vulva and inner thigh. This has been occurring almost monthly since she was diagnosed in this office in 2015. 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

## 2020-10-13 NOTE — PHYSICAL EXAM
[No Acute Distress] : no acute distress [Well-Appearing] : well-appearing [No Respiratory Distress] : no respiratory distress  [Clear to Auscultation] : lungs were clear to auscultation bilaterally [Normal Rate] : normal rate  [Regular Rhythm] : with a regular rhythm [Normal S1, S2] : normal S1 and S2 [No Murmur] : no murmur heard [Soft] : abdomen soft [Non Tender] : non-tender [Non-distended] : non-distended [No Masses] : no abdominal mass palpated [No HSM] : no HSM [Normal Bowel Sounds] : normal bowel sounds [Cervix] : normal cervix [Uterus] : uterus was normal size, without masses or tenderness [Uterine Adnexae] : normal adnexa [Discharge] : ~M discharge [Moderate] : moderate [Foul Smelling] : foul smelling [Clear] : clear [Thick] : thick [No CVA Tenderness] : no CVA  tenderness [de-identified] : obese habitus [FreeTextEntry1] : no CMT

## 2020-10-13 NOTE — REVIEW OF SYSTEMS
[Dysuria] : dysuria [Frequency] : frequency [Vaginal Discharge] : vaginal discharge [Fever] : no fever [Chills] : no chills [Chest Pain] : no chest pain [Shortness Of Breath] : no shortness of breath [Abdominal Pain] : no abdominal pain [Nausea] : no nausea [Vomiting] : no vomiting

## 2020-10-13 NOTE — PLAN
[FreeTextEntry1] : #Cervical cancer screening\par -Pap with hr HPV done today\par \par #Vaginal discharge/itch\par -BV panel performed; tx pending results\par -Pt requesting GC/chlamydia today\par \par #Dysuria\par -UA negative\par -Will empirically treat due to clinical presentation with Macrobid 100mg BID x5 days\par -Urine Cx sent\par \par #HSV recurrent genital infection\par -Current lesions noted during VE, present for a few days as per pt\par -Will treat as episodic infection for now \par -Valacyclovir 500mg BID x3 days\par -Discussed options of episodic tx vs suppressant therapy, will treat current flare then reassess at f/u visit\par -Pt instructed to abstain from sex with partner while having active flare\par \par RTC in 2 wks for f/u of symptoms

## 2020-10-15 LAB
BACTERIA UR CULT: NORMAL
BILIRUB UR QL STRIP: NEGATIVE
C TRACH RRNA SPEC QL NAA+PROBE: NOT DETECTED
CANDIDA VAG CYTO: NOT DETECTED
CLARITY UR: NORMAL
G VAGINALIS+PREV SP MTYP VAG QL MICRO: NOT DETECTED
GLUCOSE UR-MCNC: NEGATIVE
HCG UR QL: 0.2 EU/DL
HGB UR QL STRIP.AUTO: NEGATIVE
HPV HIGH+LOW RISK DNA PNL CVX: NOT DETECTED
KETONES UR-MCNC: NEGATIVE
LEUKOCYTE ESTERASE UR QL STRIP: NEGATIVE
N GONORRHOEA RRNA SPEC QL NAA+PROBE: NOT DETECTED
NITRITE UR QL STRIP: NEGATIVE
PH UR STRIP: 7
PROT UR STRIP-MCNC: NEGATIVE
SOURCE AMPLIFICATION: NORMAL
SP GR UR STRIP: 1.01
T VAGINALIS VAG QL WET PREP: NOT DETECTED

## 2020-12-01 ENCOUNTER — APPOINTMENT (OUTPATIENT)
Dept: FAMILY MEDICINE | Facility: HOSPITAL | Age: 35
End: 2020-12-01

## 2020-12-01 ENCOUNTER — OUTPATIENT (OUTPATIENT)
Dept: OUTPATIENT SERVICES | Facility: HOSPITAL | Age: 35
LOS: 1 days | End: 2020-12-01
Payer: MEDICAID

## 2020-12-01 ENCOUNTER — NON-APPOINTMENT (OUTPATIENT)
Age: 35
End: 2020-12-01

## 2020-12-01 DIAGNOSIS — Z98.89 OTHER SPECIFIED POSTPROCEDURAL STATES: Chronic | ICD-10-CM

## 2020-12-01 DIAGNOSIS — Z20.828 CONTACT WITH AND (SUSPECTED) EXPOSURE TO OTHER VIRAL COMMUNICABLE DISEASES: ICD-10-CM

## 2020-12-01 DIAGNOSIS — Z00.00 ENCOUNTER FOR GENERAL ADULT MEDICAL EXAMINATION WITHOUT ABNORMAL FINDINGS: ICD-10-CM

## 2020-12-01 PROCEDURE — G0463: CPT

## 2020-12-01 PROCEDURE — U0003: CPT

## 2020-12-01 NOTE — HISTORY OF PRESENT ILLNESS
[Other Location: e.g. School (Enter Location, City,State)___] : at [unfilled], at the time of the visit. [Medical Office: ([facility] [place])___] : at the medical office located in  [Verbal consent obtained from patient] : the patient, [unfilled] [FreeTextEntry1] : 36 y/o F PMHx of PCOS, endometriosis, preDM presents to clinic for possible COVID-19 exposure. Pt states one of her co-worker tested positive for COVID-19. Patient currently complaints of congestion, sore throat and loss of appetite x 1 day. Patient denies fever, chills, headache, dizziness, change in vision, blurry vision, anosmia, ageusia, chest pain, palpitations, shortness of breath, abdominal pain, nausea, vomiting, diarrhea, constipation, dysuria, urinary frequency, urgency, hematuria, numbness, weakness or tingling. Offers no other complaints.

## 2020-12-03 ENCOUNTER — OUTPATIENT (OUTPATIENT)
Dept: OUTPATIENT SERVICES | Facility: HOSPITAL | Age: 35
LOS: 1 days | End: 2020-12-03
Payer: MEDICAID

## 2020-12-03 ENCOUNTER — APPOINTMENT (OUTPATIENT)
Dept: RADIOLOGY | Facility: HOSPITAL | Age: 35
End: 2020-12-03
Payer: MEDICAID

## 2020-12-03 DIAGNOSIS — Z98.89 OTHER SPECIFIED POSTPROCEDURAL STATES: Chronic | ICD-10-CM

## 2020-12-03 DIAGNOSIS — M25.521 PAIN IN RIGHT ELBOW: ICD-10-CM

## 2020-12-03 DIAGNOSIS — M77.11 LATERAL EPICONDYLITIS, RIGHT ELBOW: ICD-10-CM

## 2020-12-03 DIAGNOSIS — M25.421 EFFUSION, RIGHT ELBOW: ICD-10-CM

## 2020-12-03 PROCEDURE — 73080 X-RAY EXAM OF ELBOW: CPT | Mod: 26,RT

## 2020-12-03 PROCEDURE — 73080 X-RAY EXAM OF ELBOW: CPT

## 2020-12-04 ENCOUNTER — NON-APPOINTMENT (OUTPATIENT)
Age: 35
End: 2020-12-04

## 2020-12-04 LAB — SARS-COV-2 N GENE NPH QL NAA+PROBE: NOT DETECTED

## 2020-12-07 DIAGNOSIS — Z20.828 CONTACT WITH AND (SUSPECTED) EXPOSURE TO OTHER VIRAL COMMUNICABLE DISEASES: ICD-10-CM

## 2020-12-14 ENCOUNTER — APPOINTMENT (OUTPATIENT)
Dept: FAMILY MEDICINE | Facility: HOSPITAL | Age: 35
End: 2020-12-14

## 2021-01-15 ENCOUNTER — APPOINTMENT (OUTPATIENT)
Dept: FAMILY MEDICINE | Facility: HOSPITAL | Age: 36
End: 2021-01-15

## 2021-02-06 LAB — CYTOLOGY CVX/VAG DOC THIN PREP: ABNORMAL

## 2021-02-22 ENCOUNTER — APPOINTMENT (OUTPATIENT)
Dept: RADIOLOGY | Facility: HOSPITAL | Age: 36
End: 2021-02-22
Payer: MEDICAID

## 2021-02-22 ENCOUNTER — OUTPATIENT (OUTPATIENT)
Dept: OUTPATIENT SERVICES | Facility: HOSPITAL | Age: 36
LOS: 1 days | End: 2021-02-22
Payer: COMMERCIAL

## 2021-02-22 DIAGNOSIS — Z98.89 OTHER SPECIFIED POSTPROCEDURAL STATES: Chronic | ICD-10-CM

## 2021-02-22 DIAGNOSIS — M54.16 RADICULOPATHY, LUMBAR REGION: ICD-10-CM

## 2021-02-22 DIAGNOSIS — M54.5 LOW BACK PAIN: ICD-10-CM

## 2021-02-22 PROCEDURE — 72100 X-RAY EXAM L-S SPINE 2/3 VWS: CPT | Mod: 26

## 2021-02-22 PROCEDURE — 72100 X-RAY EXAM L-S SPINE 2/3 VWS: CPT

## 2021-03-05 ENCOUNTER — APPOINTMENT (OUTPATIENT)
Dept: MRI IMAGING | Facility: HOSPITAL | Age: 36
End: 2021-03-05
Payer: MEDICAID

## 2021-03-05 ENCOUNTER — OUTPATIENT (OUTPATIENT)
Dept: OUTPATIENT SERVICES | Facility: HOSPITAL | Age: 36
LOS: 1 days | End: 2021-03-05
Payer: COMMERCIAL

## 2021-03-05 DIAGNOSIS — Z98.89 OTHER SPECIFIED POSTPROCEDURAL STATES: Chronic | ICD-10-CM

## 2021-03-05 DIAGNOSIS — M25.521 PAIN IN RIGHT ELBOW: ICD-10-CM

## 2021-03-05 PROCEDURE — 73221 MRI JOINT UPR EXTREM W/O DYE: CPT | Mod: 26,RT

## 2021-03-05 PROCEDURE — 73221 MRI JOINT UPR EXTREM W/O DYE: CPT

## 2021-03-07 ENCOUNTER — EMERGENCY (EMERGENCY)
Facility: HOSPITAL | Age: 36
LOS: 1 days | Discharge: HOME | End: 2021-03-07
Attending: INTERNAL MEDICINE | Admitting: INTERNAL MEDICINE
Payer: COMMERCIAL

## 2021-03-07 VITALS
TEMPERATURE: 98 F | HEIGHT: 62 IN | DIASTOLIC BLOOD PRESSURE: 84 MMHG | OXYGEN SATURATION: 99 % | HEART RATE: 76 BPM | WEIGHT: 210.1 LBS | SYSTOLIC BLOOD PRESSURE: 128 MMHG | RESPIRATION RATE: 17 BRPM

## 2021-03-07 VITALS
OXYGEN SATURATION: 98 % | DIASTOLIC BLOOD PRESSURE: 78 MMHG | HEART RATE: 61 BPM | RESPIRATION RATE: 16 BRPM | SYSTOLIC BLOOD PRESSURE: 120 MMHG

## 2021-03-07 DIAGNOSIS — Z98.89 OTHER SPECIFIED POSTPROCEDURAL STATES: Chronic | ICD-10-CM

## 2021-03-07 LAB
APPEARANCE UR: CLEAR — SIGNIFICANT CHANGE UP
BILIRUB UR-MCNC: NEGATIVE — SIGNIFICANT CHANGE UP
COLOR SPEC: YELLOW — SIGNIFICANT CHANGE UP
DIFF PNL FLD: NEGATIVE — SIGNIFICANT CHANGE UP
GLUCOSE UR QL: NEGATIVE — SIGNIFICANT CHANGE UP
KETONES UR-MCNC: NEGATIVE — SIGNIFICANT CHANGE UP
LEUKOCYTE ESTERASE UR-ACNC: NEGATIVE — SIGNIFICANT CHANGE UP
NITRITE UR-MCNC: NEGATIVE — SIGNIFICANT CHANGE UP
PH UR: 6.5 — SIGNIFICANT CHANGE UP (ref 5–8)
PROT UR-MCNC: NEGATIVE — SIGNIFICANT CHANGE UP
SP GR SPEC: 1 — LOW (ref 1.01–1.02)
UROBILINOGEN FLD QL: NEGATIVE — SIGNIFICANT CHANGE UP

## 2021-03-07 PROCEDURE — 99284 EMERGENCY DEPT VISIT MOD MDM: CPT

## 2021-03-07 PROCEDURE — 99283 EMERGENCY DEPT VISIT LOW MDM: CPT | Mod: 25

## 2021-03-07 PROCEDURE — 96372 THER/PROPH/DIAG INJ SC/IM: CPT

## 2021-03-07 RX ORDER — LIDOCAINE 4 G/100G
1 CREAM TOPICAL ONCE
Refills: 0 | Status: COMPLETED | OUTPATIENT
Start: 2021-03-07 | End: 2021-03-07

## 2021-03-07 RX ORDER — LIDOCAINE 4 G/100G
1 CREAM TOPICAL
Qty: 7 | Refills: 0
Start: 2021-03-07 | End: 2021-03-13

## 2021-03-07 RX ORDER — METHOCARBAMOL 500 MG/1
1 TABLET, FILM COATED ORAL
Qty: 21 | Refills: 0
Start: 2021-03-07 | End: 2021-03-13

## 2021-03-07 RX ORDER — METHOCARBAMOL 500 MG/1
500 TABLET, FILM COATED ORAL ONCE
Refills: 0 | Status: COMPLETED | OUTPATIENT
Start: 2021-03-07 | End: 2021-03-07

## 2021-03-07 RX ORDER — IBUPROFEN 200 MG
1 TABLET ORAL
Qty: 28 | Refills: 0
Start: 2021-03-07 | End: 2021-03-13

## 2021-03-07 RX ORDER — IBUPROFEN 200 MG
1 TABLET ORAL
Qty: 0 | Refills: 0 | DISCHARGE

## 2021-03-07 RX ORDER — KETOROLAC TROMETHAMINE 30 MG/ML
30 SYRINGE (ML) INJECTION ONCE
Refills: 0 | Status: DISCONTINUED | OUTPATIENT
Start: 2021-03-07 | End: 2021-03-07

## 2021-03-07 RX ADMIN — Medication 30 MILLIGRAM(S): at 13:58

## 2021-03-07 RX ADMIN — LIDOCAINE 1 PATCH: 4 CREAM TOPICAL at 13:58

## 2021-03-07 RX ADMIN — METHOCARBAMOL 500 MILLIGRAM(S): 500 TABLET, FILM COATED ORAL at 13:58

## 2021-03-07 NOTE — ED PROVIDER NOTE - ATTENDING CONTRIBUTION TO CARE
36 y/o F with PMH of PreDM presents for right sided lower back pain x slip and fall 2 weeks ago in the snow. Pt had an XR at [address] [place] [address] which showed no fx, she was given Voltaren with minimal relief in pain. She denies urinary symptoms, f/c, paresthesias of the legs, n/v or all other complaints. PE as noted above. Will check UA.  pain is most likely msk pain. will treat with muscle relaxer and toradol. reassess    2390pm- UA results reviewed.  pt reports her pain is improved. she is stable for dc  Dr. Cohen:  I have reviewed and discussed with the PA/ resident the case specifics, including the history, physical assessment, evaluation, conclusion, laboratory results, and medical plan. I agree with the contents, and conclusions. I have personally examined, and interviewed the patient.

## 2021-03-07 NOTE — ED PROVIDER NOTE - NSFOLLOWUPINSTRUCTIONS_ED_ALL_ED_FT
Follow up with your primary care physician within 2-3 days. Take the copy of your test results you were given in the emergency room for your doctor to review.     Take the prescribed medication as directed     Stay hydrated    Return to the ED for worsening pain, new numbness or tingling in legs, problems moving bowel or bladder or any concerns  *******************    Back Pain    Back pain is very common in adults. The cause of back pain is rarely dangerous and the pain often gets better over time. The cause of your back pain may not be known and may include strain of muscles or ligaments, degeneration of the spinal disks, or arthritis. Occasionally the pain may radiate down your leg(s). Over-the-counter medicines to reduce pain and inflammation are often the most helpful. Stretching and remaining active frequently helps the healing process.     SEEK IMMEDIATE MEDICAL CARE IF YOU HAVE ANY OF THE FOLLOWING SYMPTOMS: bowel or bladder control problems, unusual weakness or numbness in your arms or legs, nausea or vomiting, abdominal pain, fever, dizziness/lightheadedness.

## 2021-03-07 NOTE — ED PROVIDER NOTE - CLINICAL SUMMARY MEDICAL DECISION MAKING FREE TEXT BOX
36 y/o F with PMH of PreDM presents for right sided lower back pain x slip and fall 2 weeks ago in the snow. Pt had an XR at [address] [place] [address] which showed no fx, she was given Voltaren with minimal relief in pain. She denies urinary symptoms, f/c, paresthesias of the legs, n/v or all other complaints. PE as noted above. Will check UA.  pain is most likely msk pain. will treat with muscle relaxer and toradol. reassess 36 y/o F with PMH of PreDM presents for right sided lower back pain x slip and fall 2 weeks ago in the snow. Pt had an XR at [address] [place] [address] which showed no fx, she was given Voltaren with minimal relief in pain. She denies urinary symptoms, f/c, paresthesias of the legs, n/v or all other complaints. PE as noted above. Will check UA.  pain is most likely msk pain. will treat with muscle relaxer and toradol. reassess    2390pm- UA results reviewed.  pt reports her pain is improved. she is stable for dc

## 2021-03-07 NOTE — ED ADULT TRIAGE NOTE - CHIEF COMPLAINT QUOTE
Pt fell down 2 weeks ago after slipping on ice, now with back getting progressively worse and since today the pain is radiating to her R flank.

## 2021-03-07 NOTE — ED PROVIDER NOTE - PATIENT PORTAL LINK FT
You can access the FollowMyHealth Patient Portal offered by [facility] by registering at the following website: [email or web address]. By joining [email or web address]’s [email or web address] portal, you will also be able to view your health information using other applications (apps) compatible with our system.

## 2021-03-07 NOTE — ED PROVIDER NOTE - PHYSICAL EXAMINATION
msk: no midline spinal TTP. +right para-lumbar muscle TTP. No bruising or rash noted. +SLR on right   2+ pedal pulses  no neurovas compromise on exam     pt ambulatory in the ED

## 2021-03-07 NOTE — ED PROVIDER NOTE - OBJECTIVE STATEMENT
# [id number]  36 y/o F with PMH of PreDM presents for right sided lower back pain x slip and fall 2 weeks ago in the snow. Pt had an XR at [address] [place] [address] which showed no fx, she was given Voltaren with minimal relief in pain. She denies urinary symptoms, f/c, paresthesias of the legs, n/v or all other complaints

## 2021-03-08 ENCOUNTER — NON-APPOINTMENT (OUTPATIENT)
Age: 36
End: 2021-03-08

## 2021-03-15 ENCOUNTER — EMERGENCY (EMERGENCY)
Facility: HOSPITAL | Age: 36
LOS: 1 days | Discharge: HOME | End: 2021-03-15
Attending: EMERGENCY MEDICINE | Admitting: EMERGENCY MEDICINE
Payer: COMMERCIAL

## 2021-03-15 VITALS
WEIGHT: 215.17 LBS | HEART RATE: 81 BPM | DIASTOLIC BLOOD PRESSURE: 89 MMHG | HEIGHT: 62 IN | RESPIRATION RATE: 18 BRPM | OXYGEN SATURATION: 99 % | SYSTOLIC BLOOD PRESSURE: 129 MMHG | TEMPERATURE: 97 F

## 2021-03-15 DIAGNOSIS — Z98.89 OTHER SPECIFIED POSTPROCEDURAL STATES: Chronic | ICD-10-CM

## 2021-03-15 LAB
APPEARANCE UR: CLEAR — SIGNIFICANT CHANGE UP
BACTERIA # UR AUTO: NEGATIVE /HPF — SIGNIFICANT CHANGE UP
BILIRUB UR-MCNC: NEGATIVE — SIGNIFICANT CHANGE UP
COLOR SPEC: YELLOW — SIGNIFICANT CHANGE UP
DIFF PNL FLD: ABNORMAL
EPI CELLS # UR: SIGNIFICANT CHANGE UP
GLUCOSE UR QL: NEGATIVE — SIGNIFICANT CHANGE UP
KETONES UR-MCNC: NEGATIVE — SIGNIFICANT CHANGE UP
LEUKOCYTE ESTERASE UR-ACNC: NEGATIVE — SIGNIFICANT CHANGE UP
NITRITE UR-MCNC: NEGATIVE — SIGNIFICANT CHANGE UP
PH UR: 6 — SIGNIFICANT CHANGE UP (ref 5–8)
PROT UR-MCNC: 15
RBC CASTS # UR COMP ASSIST: SIGNIFICANT CHANGE UP /HPF (ref 0–4)
SP GR SPEC: 1.01 — SIGNIFICANT CHANGE UP (ref 1.01–1.02)
UROBILINOGEN FLD QL: NEGATIVE — SIGNIFICANT CHANGE UP
WBC UR QL: NEGATIVE /HPF — SIGNIFICANT CHANGE UP (ref 0–5)

## 2021-03-15 PROCEDURE — 76830 TRANSVAGINAL US NON-OB: CPT

## 2021-03-15 PROCEDURE — 99284 EMERGENCY DEPT VISIT MOD MDM: CPT | Mod: 25

## 2021-03-15 PROCEDURE — 96372 THER/PROPH/DIAG INJ SC/IM: CPT

## 2021-03-15 PROCEDURE — 81001 URINALYSIS AUTO W/SCOPE: CPT

## 2021-03-15 PROCEDURE — 76830 TRANSVAGINAL US NON-OB: CPT | Mod: 26

## 2021-03-15 PROCEDURE — 99285 EMERGENCY DEPT VISIT HI MDM: CPT

## 2021-03-15 RX ORDER — OXYCODONE HYDROCHLORIDE 5 MG/1
1 TABLET ORAL
Qty: 16 | Refills: 0
Start: 2021-03-15

## 2021-03-15 RX ORDER — MORPHINE SULFATE 50 MG/1
4 CAPSULE, EXTENDED RELEASE ORAL ONCE
Refills: 0 | Status: DISCONTINUED | OUTPATIENT
Start: 2021-03-15 | End: 2021-03-15

## 2021-03-15 RX ORDER — IBUPROFEN 200 MG
1 TABLET ORAL
Qty: 30 | Refills: 0
Start: 2021-03-15

## 2021-03-15 RX ORDER — OXYCODONE AND ACETAMINOPHEN 5; 325 MG/1; MG/1
2 TABLET ORAL ONCE
Refills: 0 | Status: DISCONTINUED | OUTPATIENT
Start: 2021-03-15 | End: 2021-03-15

## 2021-03-15 RX ORDER — IBUPROFEN 200 MG
600 TABLET ORAL ONCE
Refills: 0 | Status: COMPLETED | OUTPATIENT
Start: 2021-03-15 | End: 2021-03-15

## 2021-03-15 RX ADMIN — OXYCODONE AND ACETAMINOPHEN 2 TABLET(S): 5; 325 TABLET ORAL at 22:25

## 2021-03-15 RX ADMIN — MORPHINE SULFATE 4 MILLIGRAM(S): 50 CAPSULE, EXTENDED RELEASE ORAL at 21:00

## 2021-03-15 RX ADMIN — Medication 600 MILLIGRAM(S): at 20:31

## 2021-03-15 RX ADMIN — MORPHINE SULFATE 4 MILLIGRAM(S): 50 CAPSULE, EXTENDED RELEASE ORAL at 20:45

## 2021-03-15 RX ADMIN — Medication 600 MILLIGRAM(S): at 20:30

## 2021-03-15 NOTE — ED PROVIDER NOTE - CARE PROVIDER_API CALL
Garcia Hendrickson)  Obstetrics and Gynecology  [address] [place] [id number]  Phone: [phone]  Fax: [phone]  Follow Up Time: 1-3 Days

## 2021-03-15 NOTE — ED PROVIDER NOTE - ATTENDING CONTRIBUTION TO CARE
pt c/o dysuria and urinary frequency since mornign today with suprapubic pain. no n/v/d. no vag dc. no fever/chills. no flank pain    exam:   General: uncomfortable appearing, in pain  HEENT: eyes perrl,   cor: RRR, s1s2, 2+rad pulses.   lungs: ctabl, no resp distress.   abd: soft, nondistended. mild suprapubic ttp. no rebound/guarding. no cvat  neuro: a&ox3, cn2-12 intact, APODACA, 5/5 strength c nl sensation all extremities, nl coordination.   MSK: no extremity swelling.  Skin: normal, no rash    AP: suprapubic pain with dysuria/frequency 1 d. most likely lower uti.  no cvat/fever to suggest upper uti/sepsis.  less likely pid, ovarian pathology.  po analgesics. reassess

## 2021-03-15 NOTE — ED ADULT NURSE NOTE - OBJECTIVE STATEMENT
Patient presents to ED with complaints of lower abdominal pain since this morning with painful urination and burning urination. Patient denies fevers, chills, flank pain, nausea, vomiting, or other complaints.

## 2021-03-15 NOTE — ED PROVIDER NOTE - CLINICAL SUMMARY MEDICAL DECISION MAKING FREE TEXT BOX
36 yo female, psh appendectomy comes to the ED co lower abd pain since this morning. Denies any fevers, chills, n/v , diarrhea.  Admits to burning, urgency and frequency on urination since this morning. Denies blood in the urine or vaginal discharge. 36 yo female, psh appendectomy comes to the ED co lower abd pain since this morning. Denies any fevers, chills, n/v , diarrhea.  Admits to burning, urgency and frequency on urination since this morning. Denies blood in the urine or vaginal discharge.    update dr Mederos:  ua not c/w uti.  sono obtained r/o ovarian pathology.  2.4 cm L cysts found, likely explaining pain.  no torsion. results d/w pt. told to f/u c GYN

## 2021-03-15 NOTE — ED PROVIDER NOTE - OBJECTIVE STATEMENT
36 yo female, psh appendectomy comes to the ED co lower abd pain since this morning. Denies any fevers, chills, n/v , diarrhea.  Admits to burning, urgency and frequency on urination since this morning. Denies blood in the urine or vaginal discharge.

## 2021-03-15 NOTE — ED PROVIDER NOTE - NSFOLLOWUPINSTRUCTIONS_ED_ALL_ED_FT
- take ibuprofen for pain as needed  - take percocet in addition to ibuprofen if needed for worse pain  - follow up with a gynecologist this week regarding left ovarian cyst      - analilia ibuprofeno para el dolor según sea necesario  - tome percocet además de ibuprofeno si es necesario para el dolor peor  - seguimiento con un ginecólogo esta semana con respecto al quiste de ovario claudia      Quiste ovárico    LO QUE NECESITA SABER:    Un quiste ovárico es un saco lleno de líquido que crece dentro de un ovario o sobre guerline. Usted tiene 2 ovarios, 1 a cada lado del útero. Son pequeños y tienen aura forma similar a aura jay. Los quistes ováricos son comunes en las mujeres que tienen ciclos menstruales regulares. Augustus delgado ciclo menstrual, los óvulos son liberados de los ovarios. El quiste suele contener líquido camilo, en ocasiones, puede contener karma o tejido. La mayoría de los quistes ováricos no son de gran cuidado y desaparecen en varios meses sin necesidad de tratamiento. Sin embargo, algunos quistes pueden crecer grandes y causar dolor o romperse.     Sistema reproductor femenino         INSTRUCCIONES SOBRE [facility]ITAL[facility]IA:    Llame al número de emergencias local (911 en los [place]) si:  •Usted tiene un intenso dolor con fiebre y vómitos.      •Usted tiene dolor abdominal severo y repentino.      •Usted se siente demasiado débil, mareada o que se va a desmayar elisabet para ponerse de pie.      •Tiene la respiración muy acelerada.      Llame a delgado médico o ginecólogo si:  •Shelley períodos son antes o después de tiempo o más dolorosos que de costumbre.      •Usted tiene preguntas o inquietudes acerca de delgado condición o cuidado.      Medicamentos:Es posible que usted necesite alguno de los siguientes:   •Píldoras anticonceptivaspodrían ayudar a controlar el ciclo menstrual, prevenir los quistes o hacer que los quistes se reduzcan de tamaño.      •Acetaminofénalivia el dolor y baja la fiebre. Está disponible sin receta médica. Pregunte la cantidad y la frecuencia con que debe tomarlos. Siga las indicaciones. Jose Rafael las etiquetas de todos los demás medicamentos que esté usando para saber si también contienen acetaminofén, o pregunte a delgado médico o farmacéutico. El acetaminofén puede causar daño en el hígado cuando no se mckay de forma correcta. No use más de 4 gramos (4000 miligramos) en total de acetaminofeno en un día.      •Los ANUSHA,elisabet el ibuprofeno, ayudan a disminuir la inflamación, el dolor y la fiebre. Guerline medicamento está disponible con o sin aura receta médica. Los ANUSHA pueden causar sangrado estomacal o problemas renales en ciertas personas. Si usted mckay un medicamento anticoagulante, siempre pregúntele a delgado médico si los ANUSHA son seguros para usted. Siempre jose rafael la etiqueta de guerline medicamento y siga las instrucciones.      •Puede administrarsepodrían administrarse. Pregunte al médico cómo debe analilia guerline medicamento de forma rouse. Algunos medicamentos recetados para el dolor contienen acetaminofén. No tome otros medicamentos que contengan acetaminofén sin consultarlo con delgado médico. Demasiado acetaminofeno puede causar daño al hígado. Los medicamentos recetados para el dolor podrían causar estreñimiento. Pregunte a delgado médico elisabet prevenir o tratar estreñimiento.      •Caberfae shelley medicamentos elisabet se le haya indicado.Consulte con delgado médico si usted cassia que delgado medicamento no le está ayudando o si presenta efectos secundarios. Infórmele si es alérgico a cualquier medicamento. Mantenga aura lista actualizada de los medicamentos, las vitaminas y los productos herbales que mckay. Incluya los siguientes datos de los medicamentos: cantidad, frecuencia y motivo de administración. Traiga con usted la lista o los envases de las píldoras a shelley citas de seguimiento. Lleve la lista de los medicamentos con usted en shandra de aura emergencia.      Controle los quistes ováricos:Puede controlar un quiste actual y ayudar a los médicos a encontrar quistes futuros en forma temprana.  •Aplique calor para reducir dolor y los calambres causados por el quisteSiéntese en la luisito del baño en agua tibia o coloque un colchón térmico (a temperatura baja) sobre el abdomen. Merly esto por 15 a 20 minutos cada hora para sentirse más cómoda.      •Hágase exámenes pélvicos o pruebas de Papanicolaou en forma regular.[place] ayudará a los médicos a encontrar cualquier quiste ovárico nuevo. Informe a delgado médico si nota cualquier cambio inusual en delgado ciclo menstrual.      Acuda a shelley consultas de control con delgado médico o ginecólogo según le indicaron:Anote shelley preguntas para que se acuerde de hacerlas augustus shelley visitas.

## 2021-03-15 NOTE — ED PROVIDER NOTE - PATIENT PORTAL LINK FT
You can access the FollowMyHealth Patient Portal offered by [facility] by registering at the following website: [email or web address]. By joining [employer]’s FollowMyHealth portal, you will also be able to view your health information using other applications (apps) compatible with our system.

## 2021-03-15 NOTE — ED ADULT TRIAGE NOTE - CHIEF COMPLAINT QUOTE
I have lower abdominal pain and pain on urination started today. I have no nausea, vomiting or constipation or fever. I take Medroxyprogesterone for my period"

## 2021-03-17 ENCOUNTER — NON-APPOINTMENT (OUTPATIENT)
Age: 36
End: 2021-03-17

## 2021-03-18 ENCOUNTER — OUTPATIENT (OUTPATIENT)
Dept: OUTPATIENT SERVICES | Facility: HOSPITAL | Age: 36
LOS: 1 days | End: 2021-03-18
Payer: COMMERCIAL

## 2021-03-18 ENCOUNTER — TRANSCRIPTION ENCOUNTER (OUTPATIENT)
Age: 36
End: 2021-03-18

## 2021-03-18 VITALS
SYSTOLIC BLOOD PRESSURE: 100 MMHG | HEART RATE: 74 BPM | OXYGEN SATURATION: 98 % | DIASTOLIC BLOOD PRESSURE: 69 MMHG | WEIGHT: 210.1 LBS | RESPIRATION RATE: 14 BRPM | TEMPERATURE: 98 F | HEIGHT: 63 IN

## 2021-03-18 DIAGNOSIS — Z01.818 ENCOUNTER FOR OTHER PREPROCEDURAL EXAMINATION: ICD-10-CM

## 2021-03-18 DIAGNOSIS — N83.291 OTHER OVARIAN CYST, RIGHT SIDE: ICD-10-CM

## 2021-03-18 DIAGNOSIS — R10.2 PELVIC AND PERINEAL PAIN: ICD-10-CM

## 2021-03-18 DIAGNOSIS — Z98.89 OTHER SPECIFIED POSTPROCEDURAL STATES: Chronic | ICD-10-CM

## 2021-03-18 DIAGNOSIS — N83.53 TORSION OF OVARY, OVARIAN PEDICLE AND FALLOPIAN TUBE: ICD-10-CM

## 2021-03-18 DIAGNOSIS — Z41.9 ENCOUNTER FOR PROCEDURE FOR PURPOSES OTHER THAN REMEDYING HEALTH STATE, UNSPECIFIED: Chronic | ICD-10-CM

## 2021-03-18 DIAGNOSIS — Z98.890 OTHER SPECIFIED POSTPROCEDURAL STATES: Chronic | ICD-10-CM

## 2021-03-18 LAB
ANION GAP SERPL CALC-SCNC: 7 MMOL/L — SIGNIFICANT CHANGE UP (ref 5–17)
BUN SERPL-MCNC: 13 MG/DL — SIGNIFICANT CHANGE UP (ref 7–23)
CALCIUM SERPL-MCNC: 9.2 MG/DL — SIGNIFICANT CHANGE UP (ref 8.5–10.1)
CHLORIDE SERPL-SCNC: 107 MMOL/L — SIGNIFICANT CHANGE UP (ref 96–108)
CO2 SERPL-SCNC: 27 MMOL/L — SIGNIFICANT CHANGE UP (ref 22–31)
CREAT SERPL-MCNC: 0.75 MG/DL — SIGNIFICANT CHANGE UP (ref 0.5–1.3)
GLUCOSE SERPL-MCNC: 95 MG/DL — SIGNIFICANT CHANGE UP (ref 70–99)
HCG SERPL-ACNC: <1 MIU/ML — SIGNIFICANT CHANGE UP
HCT VFR BLD CALC: 44.6 % — SIGNIFICANT CHANGE UP (ref 34.5–45)
HGB BLD-MCNC: 14.7 G/DL — SIGNIFICANT CHANGE UP (ref 11.5–15.5)
MCHC RBC-ENTMCNC: 29.8 PG — SIGNIFICANT CHANGE UP (ref 27–34)
MCHC RBC-ENTMCNC: 33 GM/DL — SIGNIFICANT CHANGE UP (ref 32–36)
MCV RBC AUTO: 90.5 FL — SIGNIFICANT CHANGE UP (ref 80–100)
NRBC # BLD: 0 /100 WBCS — SIGNIFICANT CHANGE UP (ref 0–0)
PLATELET # BLD AUTO: 265 K/UL — SIGNIFICANT CHANGE UP (ref 150–400)
POTASSIUM SERPL-MCNC: 4 MMOL/L — SIGNIFICANT CHANGE UP (ref 3.5–5.3)
POTASSIUM SERPL-SCNC: 4 MMOL/L — SIGNIFICANT CHANGE UP (ref 3.5–5.3)
RBC # BLD: 4.93 M/UL — SIGNIFICANT CHANGE UP (ref 3.8–5.2)
RBC # FLD: 12.4 % — SIGNIFICANT CHANGE UP (ref 10.3–14.5)
SARS-COV-2 RNA SPEC QL NAA+PROBE: SIGNIFICANT CHANGE UP
SODIUM SERPL-SCNC: 141 MMOL/L — SIGNIFICANT CHANGE UP (ref 135–145)
WBC # BLD: 14.62 K/UL — HIGH (ref 3.8–10.5)
WBC # FLD AUTO: 14.62 K/UL — HIGH (ref 3.8–10.5)

## 2021-03-18 PROCEDURE — 80048 BASIC METABOLIC PNL TOTAL CA: CPT

## 2021-03-18 PROCEDURE — 86850 RBC ANTIBODY SCREEN: CPT

## 2021-03-18 PROCEDURE — G0463: CPT

## 2021-03-18 PROCEDURE — 85027 COMPLETE CBC AUTOMATED: CPT

## 2021-03-18 PROCEDURE — U0003: CPT

## 2021-03-18 PROCEDURE — U0005: CPT

## 2021-03-18 PROCEDURE — 86900 BLOOD TYPING SEROLOGIC ABO: CPT

## 2021-03-18 PROCEDURE — 83036 HEMOGLOBIN GLYCOSYLATED A1C: CPT

## 2021-03-18 PROCEDURE — 84702 CHORIONIC GONADOTROPIN TEST: CPT

## 2021-03-18 PROCEDURE — 36415 COLL VENOUS BLD VENIPUNCTURE: CPT

## 2021-03-18 PROCEDURE — 86901 BLOOD TYPING SEROLOGIC RH(D): CPT

## 2021-03-18 NOTE — ASU PATIENT PROFILE, ADULT - PSH
Elective surgery  Diagnostic Laparoscopy with FABRICIO and Cystoscopy   S/P appendectomy    S/P   2013- tubal ligation also  S/P hemorrhoidectomy

## 2021-03-18 NOTE — H&P PST ADULT - NSICDXPASTMEDICALHX_GEN_ALL_CORE_FT
PAST MEDICAL HISTORY:  Asthma     Endometriosis     Obesity (BMI 30-39.9)     Other ovarian cyst, right side     PCOS (polycystic ovarian syndrome)     Pelvic and perineal pain     Torsion of ovary, ovarian pedicle and fallopian tube      PAST MEDICAL HISTORY:  Asthma Albuterol Inhaler as needed- last use was over a year ago    Endometriosis     History of prediabetes     Hyperlipidemia No medications at this time    Obesity (BMI 30-39.9)     Other ovarian cyst, right side     PCOS (polycystic ovarian syndrome)     Pelvic and perineal pain     Torsion of ovary, ovarian pedicle and fallopian tube

## 2021-03-18 NOTE — H&P PST ADULT - NSICDXPASTSURGICALHX_GEN_ALL_CORE_FT
PAST SURGICAL HISTORY:  S/P appendectomy     S/P  , - tubal ligation also    S/P hemorrhoidectomy      PAST SURGICAL HISTORY:  Elective surgery Diagnostic Laparoscopy with FABRICIO and Cystoscopy     S/P appendectomy     S/P  , - tubal ligation also    S/P hemorrhoidectomy

## 2021-03-18 NOTE — H&P PST ADULT - GASTROINTESTINAL COMMENTS
Notes abdominal pain secondary to ovarian cyst Lower abdomen tender on palpation secondary to ovarian cyst Notes abdominal pain secondary to ovarian cyst - pt uncomfortable today in PST secondary to lower abdominal pain

## 2021-03-18 NOTE — ASU PATIENT PROFILE, ADULT - PMH
Asthma  Albuterol Inhaler as needed- last use was over a year ago  Endometriosis    History of prediabetes    Hyperlipidemia  No medications at this time  Obesity (BMI 30-39.9)    Other ovarian cyst, right side    PCOS (polycystic ovarian syndrome)    Pelvic and perineal pain    Torsion of ovary, ovarian pedicle and fallopian tube

## 2021-03-18 NOTE — H&P PST ADULT - GENERAL COMMENTS
Denies any prior H/O covid - denies any travel in the last 14 days - denies recent exposure to anyone with known or suspected covid - denies current covid symptoms - denies covid antibodies

## 2021-03-18 NOTE — H&P PST ADULT - ASSESSMENT
35 year old Bengali speaking  female;  Torsion of Ovary, ovarian pedicle and fallopian tube, Other ovarian cyst, Pelvic and perineal pain; Scheduled for Diagnostic Laparoscopy- Poss LEFT Ovarian Cystectomy - Poss Lysis of adhesions with Dr Hosea Lema on 3/19/2021.     OF NOTE [employer] Interperter Used : Tiffanie ID # [id number]

## 2021-03-18 NOTE — H&P PST ADULT - HISTORY OF PRESENT ILLNESS
35 year old Croatian speaking  female; Torsion of Ovary, ovarian pedicle and fallopian tube, Other ovarian cyst, Pelvic and perineal pain; presents for PST prior to Diagnostic Laparoscopy- Poss LEFT Ovarian Cystectomy - Poss Lysis of adhesions with Dr Hosea Lema on 3/19/2021.  35 year old Maori speaking  female;  Torsion of Ovary, ovarian pedicle and fallopian tube, Other ovarian cyst, Pelvic and perineal pain; presents for PST prior to Diagnostic Laparoscopy- Poss LEFT Ovarian Cystectomy - Poss Lysis of adhesions with Dr Hosea Lema on 3/19/2021. Pt notes prior H/O Ovarian Cyst - notes "it burst and now he is going to remove it." Pt notes "the pain is very bad right now." States pain medication is not working (Oxycodone). Pt notes she was seen by Dr Lema and procedure discussed. Pt is electing for scheduled procedure.  35 year old Tamazight speaking  female;  Torsion of Ovary, ovarian pedicle and fallopian tube, Other ovarian cyst, Pelvic and perineal pain; presents for PST prior to Diagnostic Laparoscopy- Poss LEFT Ovarian Cystectomy - Poss Lysis of adhesions with Dr Hosea Lema on 3/19/2021. Pt notes prior H/O Ovarian Cyst - notes "it burst and now he is going to remove it." Pt notes "the pain is very bad right now." States pain medication is not working (Oxycodone/Acetiminophen). Pt notes she was seen by Dr Lema and procedure discussed. Pt is electing for scheduled procedure.     OF NOTE [employer] Interperter Used : Tiffanie ID # [id number]

## 2021-03-19 ENCOUNTER — RESULT REVIEW (OUTPATIENT)
Age: 36
End: 2021-03-19

## 2021-03-19 ENCOUNTER — OUTPATIENT (OUTPATIENT)
Dept: OUTPATIENT SERVICES | Facility: HOSPITAL | Age: 36
LOS: 1 days | End: 2021-03-19
Payer: COMMERCIAL

## 2021-03-19 VITALS
RESPIRATION RATE: 15 BRPM | TEMPERATURE: 99 F | SYSTOLIC BLOOD PRESSURE: 112 MMHG | DIASTOLIC BLOOD PRESSURE: 59 MMHG | HEART RATE: 80 BPM | OXYGEN SATURATION: 98 %

## 2021-03-19 VITALS
TEMPERATURE: 98 F | HEART RATE: 80 BPM | RESPIRATION RATE: 18 BRPM | OXYGEN SATURATION: 97 % | WEIGHT: 214.07 LBS | DIASTOLIC BLOOD PRESSURE: 64 MMHG | SYSTOLIC BLOOD PRESSURE: 103 MMHG | HEIGHT: 62 IN

## 2021-03-19 DIAGNOSIS — N83.291 OTHER OVARIAN CYST, RIGHT SIDE: ICD-10-CM

## 2021-03-19 DIAGNOSIS — N83.53 TORSION OF OVARY, OVARIAN PEDICLE AND FALLOPIAN TUBE: ICD-10-CM

## 2021-03-19 DIAGNOSIS — Z98.89 OTHER SPECIFIED POSTPROCEDURAL STATES: Chronic | ICD-10-CM

## 2021-03-19 DIAGNOSIS — Z41.9 ENCOUNTER FOR PROCEDURE FOR PURPOSES OTHER THAN REMEDYING HEALTH STATE, UNSPECIFIED: Chronic | ICD-10-CM

## 2021-03-19 DIAGNOSIS — Z01.818 ENCOUNTER FOR OTHER PREPROCEDURAL EXAMINATION: ICD-10-CM

## 2021-03-19 DIAGNOSIS — R10.2 PELVIC AND PERINEAL PAIN: ICD-10-CM

## 2021-03-19 LAB
A1C WITH ESTIMATED AVERAGE GLUCOSE RESULT: 6.1 % — HIGH (ref 4–5.6)
ESTIMATED AVERAGE GLUCOSE: 128 MG/DL — HIGH (ref 68–114)

## 2021-03-19 PROCEDURE — 88305 TISSUE EXAM BY PATHOLOGIST: CPT

## 2021-03-19 PROCEDURE — 88108 CYTOPATH CONCENTRATE TECH: CPT | Mod: 26

## 2021-03-19 PROCEDURE — C1889: CPT

## 2021-03-19 PROCEDURE — 88305 TISSUE EXAM BY PATHOLOGIST: CPT | Mod: 26

## 2021-03-19 PROCEDURE — 88108 CYTOPATH CONCENTRATE TECH: CPT

## 2021-03-19 PROCEDURE — 58558 HYSTEROSCOPY BIOPSY: CPT

## 2021-03-19 PROCEDURE — 58662 LAPAROSCOPY EXCISE LESIONS: CPT

## 2021-03-19 RX ORDER — HYDROMORPHONE HYDROCHLORIDE 2 MG/ML
0.5 INJECTION INTRAMUSCULAR; INTRAVENOUS; SUBCUTANEOUS ONCE
Refills: 0 | Status: DISCONTINUED | OUTPATIENT
Start: 2021-03-19 | End: 2021-03-19

## 2021-03-19 RX ORDER — OXYCODONE AND ACETAMINOPHEN 5; 325 MG/1; MG/1
0 TABLET ORAL
Qty: 0 | Refills: 0 | DISCHARGE

## 2021-03-19 RX ORDER — SODIUM CHLORIDE 9 MG/ML
1000 INJECTION, SOLUTION INTRAVENOUS
Refills: 0 | Status: DISCONTINUED | OUTPATIENT
Start: 2021-03-19 | End: 2021-03-19

## 2021-03-19 RX ORDER — IBUPROFEN 200 MG
0 TABLET ORAL
Qty: 0 | Refills: 0 | DISCHARGE
Start: 2021-03-19

## 2021-03-19 RX ORDER — ACETAMINOPHEN 500 MG
1000 TABLET ORAL ONCE
Refills: 0 | Status: COMPLETED | OUTPATIENT
Start: 2021-03-19 | End: 2021-03-19

## 2021-03-19 RX ADMIN — HYDROMORPHONE HYDROCHLORIDE 0.5 MILLIGRAM(S): 2 INJECTION INTRAMUSCULAR; INTRAVENOUS; SUBCUTANEOUS at 12:15

## 2021-03-19 RX ADMIN — HYDROMORPHONE HYDROCHLORIDE 0.5 MILLIGRAM(S): 2 INJECTION INTRAMUSCULAR; INTRAVENOUS; SUBCUTANEOUS at 12:04

## 2021-03-19 RX ADMIN — SODIUM CHLORIDE 75 MILLILITER(S): 9 INJECTION, SOLUTION INTRAVENOUS at 06:52

## 2021-03-19 RX ADMIN — Medication 400 MILLIGRAM(S): at 12:27

## 2021-03-19 RX ADMIN — HYDROMORPHONE HYDROCHLORIDE 0.5 MILLIGRAM(S): 2 INJECTION INTRAMUSCULAR; INTRAVENOUS; SUBCUTANEOUS at 10:07

## 2021-03-19 RX ADMIN — SODIUM CHLORIDE 75 MILLILITER(S): 9 INJECTION, SOLUTION INTRAVENOUS at 09:35

## 2021-03-19 RX ADMIN — HYDROMORPHONE HYDROCHLORIDE 0.5 MILLIGRAM(S): 2 INJECTION INTRAMUSCULAR; INTRAVENOUS; SUBCUTANEOUS at 10:18

## 2021-03-19 NOTE — BRIEF OPERATIVE NOTE - NSICDXBRIEFPROCEDURE_GEN_ALL_CORE_FT
PROCEDURES:  Laparoscopic lysis of adhesions of pelvis 19-Mar-2021 10:02:29  Hosea Lema  Diagnostic hysteroscopy with dilation and curettage of uterus 19-Mar-2021 10:02:09  Hosea Lema  Laparoscopic left ovarian cystectomy 19-Mar-2021 09:59:27  Hosea Lema

## 2021-03-19 NOTE — BRIEF OPERATIVE NOTE - NSICDXBRIEFPREOP_GEN_ALL_CORE_FT
PRE-OP DIAGNOSIS:  Pelvic adhesions 19-Mar-2021 10:03:23  Hosea Lema  Left ovarian cyst 19-Mar-2021 10:03:14  Hosea Lema  Pain pelvic 19-Mar-2021 10:03:04  Hosea Lema

## 2021-03-19 NOTE — ASU DISCHARGE PLAN (ADULT/PEDIATRIC) - CALL YOUR DOCTOR IF YOU HAVE ANY OF THE FOLLOWING:
Bleeding that does not stop/Nausea and vomiting that does not stop/Unable to urinate/Excessive diarrhea/Inability to tolerate liquids or foods

## 2021-03-19 NOTE — ASU DISCHARGE PLAN (ADULT/PEDIATRIC) - CARE PROVIDER_API CALL
Hosea Lema)  Obstetrics and Gynecology  [address] [place] [address], [address]  [place] [address]  Phone: [phone]  Fax: [phone]  Follow Up Time:

## 2021-03-19 NOTE — ASU DISCHARGE PLAN (ADULT/PEDIATRIC) - ASU DC SPECIAL INSTRUCTIONSFT
Please take Motrin over the counter 200 mg pills , 3 tablet 3 times a day with meals for 3 days regardless of pain and as needed thereafter. A prescription for percocet has been provided if additional pain control needed.  please make an appointment to be seen in 2 wks  ****Call the office with any problems including but not limited to heavy vaginal bleeding, fevers, severe abdominal pain, inability to eat/drink/urinate  **** Nothing in the vagina x2 weeks-( No sex, tampons, douching )  *****You may shower as usual but no hot tubs, bath tubs, swimming pools x2 weeks.  for urgent post op care please contact Karina at Dr. Lema's surgical hotline  [phone] Please take Motrin over the counter 200 mg pills , 3 tablet 3 times a day with meals for 3 days regardless of pain and as needed thereafter. A prescription for percocet has been provided if additional pain control needed.  please make an appointment to be seen in 10 days   ****Call the office with any problems including but not limited to heavy vaginal bleeding, fevers, severe abdominal pain, inability to eat/drink/urinate  **** Nothing in the vagina x2 weeks-( No sex, tampons, douching )  *****You may shower as usual but no hot tubs, bath tubs, swimming pools x2 weeks.  for urgent post op care please contact Karina at Dr. Lema's surgical hotline  [phone] Please take Motrin over the counter 200 mg pills , 3 tablet 3 times a day with meals for 3 days regardless of pain and as needed thereafter. A prescription for percocet has been provided if additional pain control needed.  please make an appointment to be seen in 10 days for removal of the Zarco    ****Call the office with any problems including but not limited to heavy vaginal bleeding, fevers, severe abdominal pain, inability to eat/drink/urinate  **** Nothing in the vagina x2 weeks-( No sex, tampons, douching )  *****You may shower as usual but no hot tubs, bath tubs, swimming pools x2 weeks.  for urgent post op care please contact Karina at Dr. Lema's surgical hotline  [phone]

## 2021-03-19 NOTE — BRIEF OPERATIVE NOTE - OPERATION/FINDINGS
Chronic pelvic pain with known history of pelvic adhesions and suspected endometriosis, with recent acute pain associated with left ovarian cyst. At hysteroscopy, endometrial polyps noted in lower uterine segment, evacuated completely. At laparoscopy via LUQ entry, dense adhesions of omentum to parietal peritoneum and bladder noted in left lower quadrant. 10 cc of clear, yellow fluid evacuated from culdesac. Adhesions lysed with aid of bladder filling. 5 mm cystotomy noted at dome of bladder, which was adherent high on the lower abdominal wall. Cystotomy closed with two layers of V-loc with imbricating suture. Bladder filled to capacity x 2 with no leak. 3 cm left ovarian cyst fenestrated with cyst wall to pathology. Clot evacuated.  Cystoscopy at case conclusion demonstrated intact repair with no suture visible in bladder. Zarco catheter/leg bag to remain x 10 days. Findings discussed with  by phone immediately following procedure.

## 2021-03-22 LAB
NON-GYNECOLOGICAL CYTOLOGY STUDY: SIGNIFICANT CHANGE UP
SURGICAL PATHOLOGY STUDY: SIGNIFICANT CHANGE UP

## 2021-03-31 PROBLEM — N83.291 OTHER OVARIAN CYST, RIGHT SIDE: Chronic | Status: ACTIVE | Noted: 2021-03-18

## 2021-03-31 PROBLEM — R10.2 PELVIC AND PERINEAL PAIN: Chronic | Status: ACTIVE | Noted: 2021-03-18

## 2021-03-31 PROBLEM — E78.5 HYPERLIPIDEMIA, UNSPECIFIED: Chronic | Status: ACTIVE | Noted: 2021-03-18

## 2021-03-31 PROBLEM — N83.53 TORSION OF OVARY, OVARIAN PEDICLE AND FALLOPIAN TUBE: Chronic | Status: ACTIVE | Noted: 2021-03-18

## 2021-03-31 PROBLEM — J45.909 UNSPECIFIED ASTHMA, UNCOMPLICATED: Chronic | Status: ACTIVE | Noted: 2020-04-22

## 2021-03-31 PROBLEM — Z87.898 PERSONAL HISTORY OF OTHER SPECIFIED CONDITIONS: Chronic | Status: ACTIVE | Noted: 2021-03-18

## 2021-04-05 ENCOUNTER — APPOINTMENT (OUTPATIENT)
Dept: FAMILY MEDICINE | Facility: CLINIC | Age: 36
End: 2021-04-05
Payer: COMMERCIAL

## 2021-04-05 ENCOUNTER — NON-APPOINTMENT (OUTPATIENT)
Age: 36
End: 2021-04-05

## 2021-04-05 ENCOUNTER — LABORATORY RESULT (OUTPATIENT)
Age: 36
End: 2021-04-05

## 2021-04-05 VITALS
HEIGHT: 62 IN | HEART RATE: 86 BPM | BODY MASS INDEX: 39.93 KG/M2 | TEMPERATURE: 97.2 F | OXYGEN SATURATION: 98 % | DIASTOLIC BLOOD PRESSURE: 80 MMHG | WEIGHT: 217 LBS | SYSTOLIC BLOOD PRESSURE: 110 MMHG | RESPIRATION RATE: 14 BRPM

## 2021-04-05 DIAGNOSIS — Z76.89 PERSONS ENCOUNTERING HEALTH SERVICES IN OTHER SPECIFIED CIRCUMSTANCES: ICD-10-CM

## 2021-04-05 DIAGNOSIS — Z23 ENCOUNTER FOR IMMUNIZATION: ICD-10-CM

## 2021-04-05 DIAGNOSIS — N80.9 ENDOMETRIOSIS, UNSPECIFIED: ICD-10-CM

## 2021-04-05 PROCEDURE — 36415 COLL VENOUS BLD VENIPUNCTURE: CPT

## 2021-04-05 PROCEDURE — 99204 OFFICE O/P NEW MOD 45 MIN: CPT | Mod: 25

## 2021-04-05 PROCEDURE — 99072 ADDL SUPL MATRL&STAF TM PHE: CPT

## 2021-04-05 RX ORDER — CHOLECALCIFEROL (VITAMIN D3) 50 MCG
50 MCG TABLET ORAL DAILY
Qty: 90 | Refills: 0 | Status: DISCONTINUED | COMMUNITY
Start: 2020-07-17 | End: 2021-04-05

## 2021-04-05 RX ORDER — METFORMIN HYDROCHLORIDE 500 MG/1
500 TABLET, COATED ORAL
Qty: 60 | Refills: 2 | Status: DISCONTINUED | COMMUNITY
Start: 2020-07-16 | End: 2021-04-05

## 2021-04-05 RX ORDER — VALACYCLOVIR 500 MG/1
500 TABLET, FILM COATED ORAL TWICE DAILY
Qty: 6 | Refills: 0 | Status: DISCONTINUED | COMMUNITY
Start: 2020-10-12 | End: 2021-04-05

## 2021-04-05 RX ORDER — NITROFURANTOIN MACROCRYSTALS 100 MG/1
100 CAPSULE ORAL
Qty: 10 | Refills: 0 | Status: DISCONTINUED | COMMUNITY
Start: 2020-10-12 | End: 2021-04-05

## 2021-04-05 NOTE — HISTORY OF PRESENT ILLNESS
[FreeTextEntry8] : establish care, PCOS, prediabetes, obesity, hld\par \par Ms Lydia Sanchez is a 37 yo female presents today to establish care and blood work. Pt reports she has history of PCOS, states had last menstrual cycle in Dec 2020, since then had not had menstruation. Pt currently on medroxyprogesterone 10mg, states medication not working. Pt seeks new referral to ob/gyn will provide today. Pt also concerned with weight and prediabetes, hld, pt would like comprehensive fasting labwork today. Advised will need to return for CPE visit.

## 2021-04-05 NOTE — PHYSICAL EXAM
[No Acute Distress] : no acute distress [EOMI] : extraocular movements intact [Supple] : supple [Clear to Auscultation] : lungs were clear to auscultation bilaterally [Normal S1, S2] : normal S1 and S2 [No Edema] : there was no peripheral edema [Non Tender] : non-tender [No Rash] : no rash [Normal Gait] : normal gait [Normal Affect] : the affect was normal [de-identified] : obese

## 2021-04-05 NOTE — ASSESSMENT
[FreeTextEntry1] : hemodynamically stable today\par comprehensive fasting labwork collected today\par advised to return for CPE visit in 1-2 weeks\par referral provided to ob/gyn, hx of pcos, endometriosis, menstrual irregularities\par Advised to follow up accordingly.

## 2021-04-06 LAB
25(OH)D3 SERPL-MCNC: 20 NG/ML
ALBUMIN SERPL ELPH-MCNC: 4.2 G/DL
ALP BLD-CCNC: 113 U/L
ALT SERPL-CCNC: 26 U/L
ANION GAP SERPL CALC-SCNC: 14 MMOL/L
APPEARANCE: ABNORMAL
AST SERPL-CCNC: 16 U/L
BASOPHILS # BLD AUTO: 0.05 K/UL
BASOPHILS NFR BLD AUTO: 0.5 %
BILIRUB SERPL-MCNC: 0.2 MG/DL
BILIRUBIN URINE: NEGATIVE
BLOOD URINE: ABNORMAL
BUN SERPL-MCNC: 11 MG/DL
CALCIUM SERPL-MCNC: 9.4 MG/DL
CHLORIDE SERPL-SCNC: 104 MMOL/L
CHOLEST SERPL-MCNC: 213 MG/DL
CO2 SERPL-SCNC: 21 MMOL/L
COLOR: YELLOW
CREAT SERPL-MCNC: 0.49 MG/DL
EOSINOPHIL # BLD AUTO: 0.59 K/UL
EOSINOPHIL NFR BLD AUTO: 5.4 %
ESTIMATED AVERAGE GLUCOSE: 134 MG/DL
FOLATE SERPL-MCNC: >20 NG/ML
GLUCOSE QUALITATIVE U: NEGATIVE
GLUCOSE SERPL-MCNC: 112 MG/DL
HBA1C MFR BLD HPLC: 6.3 %
HCT VFR BLD CALC: 42.7 %
HCV AB SER QL: NONREACTIVE
HCV S/CO RATIO: 0.07 S/CO
HDLC SERPL-MCNC: 44 MG/DL
HGB BLD-MCNC: 13.7 G/DL
HIV1+2 AB SPEC QL IA.RAPID: NONREACTIVE
IMM GRANULOCYTES NFR BLD AUTO: 0.4 %
KETONES URINE: NEGATIVE
LDLC SERPL CALC-MCNC: 130 MG/DL
LDLC SERPL DIRECT ASSAY-MCNC: 146 MG/DL
LEUKOCYTE ESTERASE URINE: ABNORMAL
LYMPHOCYTES # BLD AUTO: 2.62 K/UL
LYMPHOCYTES NFR BLD AUTO: 23.8 %
MAN DIFF?: NORMAL
MCHC RBC-ENTMCNC: 29.7 PG
MCHC RBC-ENTMCNC: 32.1 GM/DL
MCV RBC AUTO: 92.6 FL
MONOCYTES # BLD AUTO: 0.65 K/UL
MONOCYTES NFR BLD AUTO: 5.9 %
NEUTROPHILS # BLD AUTO: 7.04 K/UL
NEUTROPHILS NFR BLD AUTO: 64 %
NITRITE URINE: POSITIVE
NONHDLC SERPL-MCNC: 169 MG/DL
PH URINE: 5.5
PLATELET # BLD AUTO: 238 K/UL
POTASSIUM SERPL-SCNC: 4.2 MMOL/L
PROT SERPL-MCNC: 7.2 G/DL
PROTEIN URINE: NEGATIVE
RBC # BLD: 4.61 M/UL
RBC # FLD: 12.6 %
SODIUM SERPL-SCNC: 139 MMOL/L
SPECIFIC GRAVITY URINE: 1.01
T4 FREE SERPL-MCNC: 1.2 NG/DL
TRIGL SERPL-MCNC: 196 MG/DL
TSH SERPL-ACNC: 1.06 UIU/ML
UROBILINOGEN URINE: NORMAL
VIT B12 SERPL-MCNC: 653 PG/ML
WBC # FLD AUTO: 10.99 K/UL

## 2021-04-23 ENCOUNTER — APPOINTMENT (OUTPATIENT)
Dept: FAMILY MEDICINE | Facility: CLINIC | Age: 36
End: 2021-04-23
Payer: COMMERCIAL

## 2021-04-23 ENCOUNTER — NON-APPOINTMENT (OUTPATIENT)
Age: 36
End: 2021-04-23

## 2021-04-23 VITALS
OXYGEN SATURATION: 97 % | WEIGHT: 220 LBS | SYSTOLIC BLOOD PRESSURE: 109 MMHG | TEMPERATURE: 98.1 F | HEIGHT: 62 IN | BODY MASS INDEX: 40.48 KG/M2 | HEART RATE: 83 BPM | RESPIRATION RATE: 16 BRPM | DIASTOLIC BLOOD PRESSURE: 75 MMHG

## 2021-04-23 DIAGNOSIS — N39.0 URINARY TRACT INFECTION, SITE NOT SPECIFIED: ICD-10-CM

## 2021-04-23 PROCEDURE — 99072 ADDL SUPL MATRL&STAF TM PHE: CPT

## 2021-04-23 PROCEDURE — 93000 ELECTROCARDIOGRAM COMPLETE: CPT

## 2021-04-23 PROCEDURE — 99395 PREV VISIT EST AGE 18-39: CPT | Mod: 25

## 2021-04-23 NOTE — ASSESSMENT
[FreeTextEntry1] : in light of continued lower back pain, xray imaging negative, completed 10 sessions of physical still ongoing lower back pain, advised, will get MRI of lumbar spine and recommended follow up with orthopedist, new script of physical therapy provided\par \par up to date with pap, vaccine, flu, Tdap, COVID-19 vaccine\par \par continue with low carb diet, exercise, weight loss recommended\par f/u in 3 months for new fasting lab work.

## 2021-04-23 NOTE — PHYSICAL EXAM
[No Acute Distress] : no acute distress [EOMI] : extraocular movements intact [Supple] : supple [Clear to Auscultation] : lungs were clear to auscultation bilaterally [Normal S1, S2] : normal S1 and S2 [No Edema] : there was no peripheral edema [Non Tender] : non-tender [No Rash] : no rash [Normal Gait] : normal gait [Normal Affect] : the affect was normal [de-identified] : obese [de-identified] : lower back pain

## 2021-04-23 NOTE — HEALTH RISK ASSESSMENT
[Very Good] : ~his/her~  mood as very good [Intercurrent ED visits] : went to ED [Intercurrent hospitalizations] : was admitted to the hospital  [No] : In the past 12 months have you used drugs other than those required for medical reasons? No [One fall no injury in past year] : Patient reported one fall in the past year without injury [0] : 2) Feeling down, depressed, or hopeless: Not at all (0) [Patient reported PAP Smear was normal] : Patient reported PAP Smear was normal [HIV Test offered] : HIV Test offered [Hepatitis C test offered] : Hepatitis C test offered [None] : None [With Family] : lives with family [# of Members in Household ___] :  household currently consist of [unfilled] member(s) [Employed] : employed [High School] : high school [] :  [# Of Children ___] : has [unfilled] children [Sexually Active] : sexually active [Feels Safe at Home] : Feels safe at home [Fully functional (bathing, dressing, toileting, transferring, walking, feeding)] : Fully functional (bathing, dressing, toileting, transferring, walking, feeding) [Fully functional (using the telephone, shopping, preparing meals, housekeeping, doing laundry, using] : Fully functional and needs no help or supervision to perform IADLs (using the telephone, shopping, preparing meals, housekeeping, doing laundry, using transportation, managing medications and managing finances) [Smoke Detector] : smoke detector [Carbon Monoxide Detector] : carbon monoxide detector [Safety elements used in home] : safety elements used in home [Seat Belt] :  uses seat belt [Sunscreen] : uses sunscreen [Reviewed no changes] : Reviewed no changes [Name: ___] : Health Care Proxy's Name: [unfilled]  [Relationship: ___] : Relationship: [unfilled] [Aggressive treatment] : aggressive treatment [] : No [de-identified] : fall in Feb 2021.  [de-identified] : ovarian cyst surgery [MDM1Bafnc] : 0 [Change in mental status noted] : No change in mental status noted [Behavior] : denies difficulty with behavior [Language] : denies difficulty with language [Learning/Retaining New Information] : denies difficulty learning/retaining new information [Handling Complex Tasks] : denies difficulty handling complex tasks [Reasoning] : denies difficulty with reasoning [Spatial Ability and Orientation] : denies difficulty with spatial ability and orientation [High Risk Behavior] : no high risk behavior [Reports changes in hearing] : Reports no changes in hearing [Reports changes in vision] : Reports no changes in vision [Reports changes in dental health] : Reports no changes in dental health [Guns at Home] : no guns at home [PapSmearDate] : 03/2021 [HIVDate] : 04/2021 [HIVComments] : negative [HepatitisCDate] : 04/2021 [HepatitisCComments] : negative [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

## 2021-04-23 NOTE — HISTORY OF PRESENT ILLNESS
[FreeTextEntry1] : comprehensive visit [de-identified] : Ms Lydia Sanchez is a 37 yo female presents today for annual comprehensive visit. Labwork results reviewed, pertinent labs showed elevated lipids, low vit D, elevated HgbA1c, and UTI. Pt now completed the Macrobid, reports resolution of LUTS. Pt does report now, she is complaint on medication, metformin daily, vit supplement and statin medication. Pt advised to cut down on carbohydrate, soda, exercise, loose weight, and continue on current medication. Advise to return in 3 months for fasting repeat blood work. Otherwise, pt reports still lower back pain, had xray in Feb, that showed lumbar vertebral straightening, physical therapy also not helping, however, no fracture. Pt reports has upcoming appointment with spine orthopedist, May 7th, 2021.

## 2021-04-23 NOTE — REVIEW OF SYSTEMS
[Joint Pain] : joint pain [Joint Stiffness] : joint stiffness [Muscle Pain] : muscle pain [Back Pain] : back pain [Negative] : Heme/Lymph [FreeTextEntry8] : menstrual irregular

## 2021-05-21 ENCOUNTER — APPOINTMENT (OUTPATIENT)
Dept: MRI IMAGING | Facility: HOSPITAL | Age: 36
End: 2021-05-21
Payer: COMMERCIAL

## 2021-05-21 ENCOUNTER — OUTPATIENT (OUTPATIENT)
Dept: OUTPATIENT SERVICES | Facility: HOSPITAL | Age: 36
LOS: 1 days | End: 2021-05-21
Payer: COMMERCIAL

## 2021-05-21 DIAGNOSIS — M54.16 RADICULOPATHY, LUMBAR REGION: ICD-10-CM

## 2021-05-21 DIAGNOSIS — Z41.9 ENCOUNTER FOR PROCEDURE FOR PURPOSES OTHER THAN REMEDYING HEALTH STATE, UNSPECIFIED: Chronic | ICD-10-CM

## 2021-05-21 DIAGNOSIS — Z98.89 OTHER SPECIFIED POSTPROCEDURAL STATES: Chronic | ICD-10-CM

## 2021-05-21 DIAGNOSIS — M54.5 LOW BACK PAIN: ICD-10-CM

## 2021-05-21 PROCEDURE — 72148 MRI LUMBAR SPINE W/O DYE: CPT | Mod: 26

## 2021-05-21 PROCEDURE — 72148 MRI LUMBAR SPINE W/O DYE: CPT

## 2021-05-27 NOTE — ED ADULT NURSE NOTE - PMH
Refill on asthma pump requested  Endometriosis    Obesity (BMI 30-39.9)    PCOS (polycystic ovarian syndrome)

## 2021-06-03 NOTE — ED PROVIDER NOTE - DISCHARGE REVIEW MATERIAL PRESENTED
Procedure Note  Informed Consent:  Pt was informed of the pap smear results. Discussion of risks/options/ alternatives re.colposcopy were  presented to the pt. Discussion of the expectations and limitations of the procedure were reviewed. Risks are dependent on the extensiveness of the procedure and include, but are not limited to:bleeding,infection,insufficient sampling or visualization to complete the study and obtain a diagnosis. In addition,  procedures were reviewed. Possible cervical biopsy and endocervical biopsy were reviewed. In addition, possible endometrial biopsy was reviewed. Pt apparently understands and questions were answered to her satisfaction. Concerns were addressed. 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 Speculum was inserted without difficulty. Vaginal cuff inspected with colposcope. Findings:   Atrophic vaginal cuff    Biopsies:  Vaginal cuff - cytobrushes x 3  Excellent hemostasis was noted. Review of findings and possible follow up were reviewed. Pt was advised to refrain from vaginal intercourse for 2Days. Pt was advised to expect discharge from 2 to 7 days. Pt advised to call office if increase in bleeding , temp elevation, or change in discharge. If pt experiences pelvic pain not relieved with OTC analgesia pt advised to call office.     Follow up:  Pt to return in 6mos for repeat pap .

## 2021-06-30 ENCOUNTER — TRANSCRIPTION ENCOUNTER (OUTPATIENT)
Age: 36
End: 2021-06-30

## 2021-06-30 ENCOUNTER — APPOINTMENT (OUTPATIENT)
Dept: FAMILY MEDICINE | Facility: CLINIC | Age: 36
End: 2021-06-30
Payer: COMMERCIAL

## 2021-06-30 ENCOUNTER — EMERGENCY (EMERGENCY)
Facility: HOSPITAL | Age: 36
LOS: 1 days | Discharge: HOME | End: 2021-06-30
Attending: EMERGENCY MEDICINE | Admitting: EMERGENCY MEDICINE
Payer: COMMERCIAL

## 2021-06-30 VITALS
OXYGEN SATURATION: 98 % | RESPIRATION RATE: 16 BRPM | WEIGHT: 222.01 LBS | DIASTOLIC BLOOD PRESSURE: 72 MMHG | SYSTOLIC BLOOD PRESSURE: 111 MMHG | HEART RATE: 80 BPM | TEMPERATURE: 98 F | HEIGHT: 62 IN

## 2021-06-30 VITALS
BODY MASS INDEX: 40.85 KG/M2 | WEIGHT: 222 LBS | DIASTOLIC BLOOD PRESSURE: 80 MMHG | HEART RATE: 83 BPM | HEIGHT: 62 IN | RESPIRATION RATE: 15 BRPM | TEMPERATURE: 97.1 F | SYSTOLIC BLOOD PRESSURE: 122 MMHG | OXYGEN SATURATION: 98 %

## 2021-06-30 DIAGNOSIS — Z98.89 OTHER SPECIFIED POSTPROCEDURAL STATES: Chronic | ICD-10-CM

## 2021-06-30 DIAGNOSIS — Z41.9 ENCOUNTER FOR PROCEDURE FOR PURPOSES OTHER THAN REMEDYING HEALTH STATE, UNSPECIFIED: Chronic | ICD-10-CM

## 2021-06-30 LAB
ALBUMIN SERPL ELPH-MCNC: 3.1 G/DL — LOW (ref 3.3–5)
ALP SERPL-CCNC: 109 U/L — SIGNIFICANT CHANGE UP (ref 40–120)
ALT FLD-CCNC: 27 U/L — SIGNIFICANT CHANGE UP (ref 10–45)
ANION GAP SERPL CALC-SCNC: 7 MMOL/L — SIGNIFICANT CHANGE UP (ref 5–17)
AST SERPL-CCNC: 34 U/L — SIGNIFICANT CHANGE UP (ref 10–40)
BASOPHILS # BLD AUTO: 0.02 K/UL — SIGNIFICANT CHANGE UP (ref 0–0.2)
BASOPHILS NFR BLD AUTO: 0.3 % — SIGNIFICANT CHANGE UP (ref 0–2)
BILIRUB SERPL-MCNC: 0.3 MG/DL — SIGNIFICANT CHANGE UP (ref 0.2–1.2)
BUN SERPL-MCNC: 11 MG/DL — SIGNIFICANT CHANGE UP (ref 7–23)
CALCIUM SERPL-MCNC: 8.8 MG/DL — SIGNIFICANT CHANGE UP (ref 8.4–10.5)
CHLORIDE SERPL-SCNC: 104 MMOL/L — SIGNIFICANT CHANGE UP (ref 96–108)
CO2 SERPL-SCNC: 28 MMOL/L — SIGNIFICANT CHANGE UP (ref 22–31)
CREAT SERPL-MCNC: 0.66 MG/DL — SIGNIFICANT CHANGE UP (ref 0.5–1.3)
EOSINOPHIL # BLD AUTO: 0.78 K/UL — HIGH (ref 0–0.5)
EOSINOPHIL NFR BLD AUTO: 11.2 % — HIGH (ref 0–6)
GLUCOSE SERPL-MCNC: 135 MG/DL — HIGH (ref 70–99)
HCT VFR BLD CALC: 36.1 % — SIGNIFICANT CHANGE UP (ref 34.5–45)
HGB BLD-MCNC: 12.1 G/DL — SIGNIFICANT CHANGE UP (ref 11.5–15.5)
IMM GRANULOCYTES NFR BLD AUTO: 0.4 % — SIGNIFICANT CHANGE UP (ref 0–1.5)
LIDOCAIN IGE QN: 57 U/L — LOW (ref 73–393)
LYMPHOCYTES # BLD AUTO: 1.29 K/UL — SIGNIFICANT CHANGE UP (ref 1–3.3)
LYMPHOCYTES # BLD AUTO: 18.5 % — SIGNIFICANT CHANGE UP (ref 13–44)
MCHC RBC-ENTMCNC: 30.6 PG — SIGNIFICANT CHANGE UP (ref 27–34)
MCHC RBC-ENTMCNC: 33.5 GM/DL — SIGNIFICANT CHANGE UP (ref 32–36)
MCV RBC AUTO: 91.4 FL — SIGNIFICANT CHANGE UP (ref 80–100)
MONOCYTES # BLD AUTO: 0.94 K/UL — HIGH (ref 0–0.9)
MONOCYTES NFR BLD AUTO: 13.5 % — SIGNIFICANT CHANGE UP (ref 2–14)
NEUTROPHILS # BLD AUTO: 3.9 K/UL — SIGNIFICANT CHANGE UP (ref 1.8–7.4)
NEUTROPHILS NFR BLD AUTO: 56.1 % — SIGNIFICANT CHANGE UP (ref 43–77)
NRBC # BLD: 0 /100 WBCS — SIGNIFICANT CHANGE UP (ref 0–0)
PLATELET # BLD AUTO: 169 K/UL — SIGNIFICANT CHANGE UP (ref 150–400)
POTASSIUM SERPL-MCNC: 3.8 MMOL/L — SIGNIFICANT CHANGE UP (ref 3.5–5.3)
POTASSIUM SERPL-SCNC: 3.8 MMOL/L — SIGNIFICANT CHANGE UP (ref 3.5–5.3)
PROT SERPL-MCNC: 6.7 G/DL — SIGNIFICANT CHANGE UP (ref 6–8.3)
RBC # BLD: 3.95 M/UL — SIGNIFICANT CHANGE UP (ref 3.8–5.2)
RBC # FLD: 13.1 % — SIGNIFICANT CHANGE UP (ref 10.3–14.5)
SODIUM SERPL-SCNC: 139 MMOL/L — SIGNIFICANT CHANGE UP (ref 135–145)
WBC # BLD: 6.96 K/UL — SIGNIFICANT CHANGE UP (ref 3.8–10.5)
WBC # FLD AUTO: 6.96 K/UL — SIGNIFICANT CHANGE UP (ref 3.8–10.5)

## 2021-06-30 PROCEDURE — 76705 ECHO EXAM OF ABDOMEN: CPT | Mod: 26

## 2021-06-30 PROCEDURE — 71045 X-RAY EXAM CHEST 1 VIEW: CPT | Mod: 26

## 2021-06-30 PROCEDURE — 99284 EMERGENCY DEPT VISIT MOD MDM: CPT | Mod: 25

## 2021-06-30 PROCEDURE — 76705 ECHO EXAM OF ABDOMEN: CPT

## 2021-06-30 PROCEDURE — 36415 COLL VENOUS BLD VENIPUNCTURE: CPT

## 2021-06-30 PROCEDURE — 99072 ADDL SUPL MATRL&STAF TM PHE: CPT

## 2021-06-30 PROCEDURE — 85025 COMPLETE CBC W/AUTO DIFF WBC: CPT

## 2021-06-30 PROCEDURE — 96375 TX/PRO/DX INJ NEW DRUG ADDON: CPT

## 2021-06-30 PROCEDURE — 74177 CT ABD & PELVIS W/CONTRAST: CPT | Mod: MA

## 2021-06-30 PROCEDURE — 83690 ASSAY OF LIPASE: CPT

## 2021-06-30 PROCEDURE — 99213 OFFICE O/P EST LOW 20 MIN: CPT

## 2021-06-30 PROCEDURE — 80053 COMPREHEN METABOLIC PANEL: CPT

## 2021-06-30 PROCEDURE — 96365 THER/PROPH/DIAG IV INF INIT: CPT | Mod: XU

## 2021-06-30 PROCEDURE — 99285 EMERGENCY DEPT VISIT HI MDM: CPT

## 2021-06-30 PROCEDURE — 71045 X-RAY EXAM CHEST 1 VIEW: CPT

## 2021-06-30 PROCEDURE — 74177 CT ABD & PELVIS W/CONTRAST: CPT | Mod: 26,MA

## 2021-06-30 RX ORDER — FAMOTIDINE 10 MG/ML
20 INJECTION INTRAVENOUS ONCE
Refills: 0 | Status: COMPLETED | OUTPATIENT
Start: 2021-06-30 | End: 2021-06-30

## 2021-06-30 RX ORDER — MORPHINE SULFATE 50 MG/1
4 CAPSULE, EXTENDED RELEASE ORAL ONCE
Refills: 0 | Status: DISCONTINUED | OUTPATIENT
Start: 2021-06-30 | End: 2021-06-30

## 2021-06-30 RX ORDER — ONDANSETRON 8 MG/1
1 TABLET, FILM COATED ORAL
Qty: 20 | Refills: 0
Start: 2021-06-30

## 2021-06-30 RX ORDER — IBUPROFEN 200 MG
1 TABLET ORAL
Qty: 30 | Refills: 0
Start: 2021-06-30

## 2021-06-30 RX ORDER — SODIUM CHLORIDE 9 MG/ML
1000 INJECTION INTRAMUSCULAR; INTRAVENOUS; SUBCUTANEOUS ONCE
Refills: 0 | Status: COMPLETED | OUTPATIENT
Start: 2021-06-30 | End: 2021-06-30

## 2021-06-30 RX ORDER — ONDANSETRON 8 MG/1
4 TABLET, FILM COATED ORAL ONCE
Refills: 0 | Status: COMPLETED | OUTPATIENT
Start: 2021-06-30 | End: 2021-06-30

## 2021-06-30 RX ADMIN — FAMOTIDINE 20 MILLIGRAM(S): 10 INJECTION INTRAVENOUS at 18:02

## 2021-06-30 RX ADMIN — SODIUM CHLORIDE 1000 MILLILITER(S): 9 INJECTION INTRAMUSCULAR; INTRAVENOUS; SUBCUTANEOUS at 18:02

## 2021-06-30 RX ADMIN — ONDANSETRON 4 MILLIGRAM(S): 8 TABLET, FILM COATED ORAL at 17:02

## 2021-06-30 RX ADMIN — SODIUM CHLORIDE 1000 MILLILITER(S): 9 INJECTION INTRAMUSCULAR; INTRAVENOUS; SUBCUTANEOUS at 17:02

## 2021-06-30 RX ADMIN — FAMOTIDINE 100 MILLIGRAM(S): 10 INJECTION INTRAVENOUS at 17:02

## 2021-06-30 RX ADMIN — MORPHINE SULFATE 4 MILLIGRAM(S): 50 CAPSULE, EXTENDED RELEASE ORAL at 18:54

## 2021-06-30 NOTE — ED PROVIDER NOTE - PROGRESS NOTE DETAILS
MEGAN Beasley: labs reviewed, US results reviewed- no acute finding. pt reassessed, states she still has 8/10 abd pain. Will give morphine and get abd CT. Pt s/o to MEGAN Leblanc to f/u abd CT results and dispo

## 2021-06-30 NOTE — ED ADULT NURSE REASSESSMENT NOTE - NS ED NURSE REASSESS COMMENT FT1
Received report from Flavio GALINDO. Safety maintained, call bell at bedside, awaiting CT scan. Will continue to monitor and treat per orders.

## 2021-06-30 NOTE — ED PROVIDER NOTE - PATIENT PORTAL LINK FT
You can access the FollowMyHealth Patient Portal offered by [facility] by registering at the following website: [email or web address]. By joining [employer] [facility]’s FollowMyHealth portal, you will also be able to view your health information using other applications (apps) compatible with our system.

## 2021-06-30 NOTE — ED PROVIDER NOTE - CLINICAL SUMMARY MEDICAL DECISION MAKING FREE TEXT BOX
Dr. Schmidt: 36F h/o preDM on metformin, endometriosis s/p surgery, obesity p/w 1 day of constant RUQ pain, epigastric pain, worse with meals, associated with nausea no vomiting. No constipation or diarrhea. No flank pain. No fevers or chills, no dysuria or hematuria. No chest pain or sob. On exam pt is well appearing, nad, rrr, ctab, +epigastric and RUQ TTP, +[place], no CVAT. Well healed laparoscopy scars. will check labs and US r/o jacqueline. Dr. Schmidt: 36F h/o preDM on metformin, endometriosis s/p surgery, obesity p/w 1 day of constant RUQ pain, epigastric pain, worse with meals, associated with nausea no vomiting. No constipation or diarrhea. No flank pain. No fevers or chills, no dysuria or hematuria. No chest pain or sob. On exam pt is well appearing, nad, rrr, ctab, +epigastric and RUQ TTP, +[place], no CVAT. Well healed laparoscopy scars. will check labs and US r/o jacqueline.    dr cobos 2030: Ct shows enteritis. labs unremarakble.  pt feeling better. abd nontender, soft

## 2021-06-30 NOTE — ED ADULT NURSE NOTE - OBJECTIVE STATEMENT
Patient reports abdominal pain x 2 days associated with nausea. Denies vomiting, diarrhea, sick contacts or fevers.

## 2021-06-30 NOTE — ED PROVIDER NOTE - NSFOLLOWUPINSTRUCTIONS_ED_ALL_ED_FT
- take ibuprofen for pain as needed  - take ondansetron for nausea as needed    Follow Up in 1-3 Days with your own doctor or with  [facility]  [address] [place] [id number]  Phone: [phone]      Gastroenteritis    LO QUE NECESITA SABER:    La gastroenteritis, o gripe estomacal, es aura infección del estómago y los intestinos.     Tracto digestivo         INSTRUCCIONES SOBRE [facility]:    Llame al 911 en shandra de presentar lo siguiente:  •Usted tiene dificultad para respirar o pulso acelerado.          Regrese a la jonathan de emergencias si:  •Usted ve karma en delgado diarrea.      •Usted no puede dejar de vomitar.      •Usted no ha orinado en 12 horas.      •Usted siente que se va a desmayar.      Comuníquese con delgado médico si:  •Tiene fiebre.      •Usted continúa con vómitos o diarrea aún después del tratamiento.      •Usted ve lombrices en delgado diarrea.      •Delgado boca u ojos están secos. Usted no está orinando tanto o con la misma frecuencia.      •Usted tiene preguntas o inquietudes acerca de delgado condición o cuidado.      Medicamentos:  •Los medicamentosse pueden administrar para detener los vómitos o la diarrea, disminuir los calambres abdominales o tratar aura infección.      •[place] nidia medicamentos elisabet se le haya indicado.Consulte con delgado médico si usted cassia que delgado medicamento no le está ayudando o si presenta efectos secundarios. Infórmele si es alérgico a cualquier medicamento. Mantenga aura lista actualizada de los medicamentos, las vitaminas y los productos herbales que mckay. Incluya los siguientes datos de los medicamentos: cantidad, frecuencia y motivo de administración. Traiga con usted la lista o los envases de las píldoras a nidia citas de seguimiento. Lleve la lista de los medicamentos con usted en shandra de aura emergencia.      El manejo de nidia síntomas:  •[place] líquidos elisabet se le haya indicado.Pregunte a delgado médico qué cantidad de líquido debe beber a diario y qué líquidos le recomienda. Es posible que también necesite analilia aura solución de rehidratación oral (SRO). Aura SRO contiene la cantidad adecuada de azúcar, sal y minerales en agua para reponer los líquidos corporales.      •Consuma alimentos blandos.Cuando usted sienta hambre, empiece a comer alimentos suaves y blandos. Ejemplos son los plátanos, sopas claras, rut y puré de manzana. No consuma productos lácteos, alcohol, bebidas azucaradas, o bebidas con cafeína hasta que se sienta mejor.      •Descanse el mayor tiempo posible.Cuando se empiece a sentir mejor, comience poco a poco a hacer más cada día.      Evite la propagación de la gastroenteritis:La gastroenteritis se puede propagar fácilmente. Manténgase usted, delgado mamie y nidia alrededores limpios para ayudar a evitar la propagación de la gastroenteritis:   •Lávese las truong frecuentemente.Utilice agua y jabón. Lávese las truong después de usar el baño, cambiarle el pañal a un kenrick o estornudar. Lávese las truong antes de comer o preparar alimentos.   Lavado de truong           •Limpie las superficies y lave la ropa con frecuencia.Lave delgado ropa y nidia toallas por separado del howard de la ropa. Limpie las superficies de delgado hogar con limpiador antibacterial o con blanqueador.      •Lave y cocine kelton los alimentos.Lave las verduras crudas antes de cocinar. Cocine kelton las trey, pescados y huevos. No utilice los mismos platos para las trey crudas que para otros alimentos. Ponga en el refrigerador inmediatamente cualquier alimento que haya sobrado.      •Esté alerta cuando usted vaya de campamento o cuando viaje.Solamente tome agua limpia. No tome agua de los keyon o tata a menos que usted purifique o hierva el agua jesica. Cuando viaje, tome agua embotellada y no le ponga hielo. No coma fruta con la cáscara. No coma pescado crudo o trey que no están cocinadas completamente.      Acuda a nidia consultas de control con delgado médico según le indicaron.Anote nidia preguntas para que se acuerde de hacerlas deniz nidia visitas.

## 2021-07-01 NOTE — ASSESSMENT
[FreeTextEntry1] : WOuld like prompt imaging of patient's abdomen to r/o acute cholecystitis. Patient agreed and will report to [facility] ED

## 2021-07-01 NOTE — HISTORY OF PRESENT ILLNESS
[FreeTextEntry8] : Constant 6-7 out 10 squeezing epigastric pain that is worse with eating with associated nausea, chills and diaphoresis. \par Denies NVD or constipation. Reports normal brown BM today. \par \par Unable to tolerate PO intake.\par \par Symptoms started last night.\par Denies sick contacts.\par  \par \par FDLMP- 6/19-  she is sexual active/ no BC used

## 2021-07-01 NOTE — REVIEW OF SYSTEMS
[Chills] : chills [Fatigue] : fatigue [Abdominal Pain] : abdominal pain [Nausea] : nausea [Negative] : Heme/Lymph

## 2021-07-01 NOTE — PHYSICAL EXAM
[No Acute Distress] : no acute distress [Well Nourished] : well nourished [Well Developed] : well developed [Well-Appearing] : well-appearing [Normal Sclera/Conjunctiva] : normal sclera/conjunctiva [PERRL] : pupils equal round and reactive to light [EOMI] : extraocular movements intact [Normal Outer Ear/Nose] : the outer ears and nose were normal in appearance [Normal Oropharynx] : the oropharynx was normal [No JVD] : no jugular venous distention [No Lymphadenopathy] : no lymphadenopathy [Supple] : supple [Thyroid Normal, No Nodules] : the thyroid was normal and there were no nodules present [No Respiratory Distress] : no respiratory distress  [No Accessory Muscle Use] : no accessory muscle use [Clear to Auscultation] : lungs were clear to auscultation bilaterally [Normal Rate] : normal rate  [Regular Rhythm] : with a regular rhythm [Normal S1, S2] : normal S1 and S2 [No Murmur] : no murmur heard [No Carotid Bruits] : no carotid bruits [No Abdominal Bruit] : a ~M bruit was not heard ~T in the abdomen [No Varicosities] : no varicosities [Pedal Pulses Present] : the pedal pulses are present [No Edema] : there was no peripheral edema [No Palpable Aorta] : no palpable aorta [No Extremity Clubbing/Cyanosis] : no extremity clubbing/cyanosis [Soft] : abdomen soft [Non-distended] : non-distended [No Masses] : no abdominal mass palpated [No HSM] : no HSM [Normal Bowel Sounds] : normal bowel sounds [Normal Posterior Cervical Nodes] : no posterior cervical lymphadenopathy [Normal Anterior Cervical Nodes] : no anterior cervical lymphadenopathy [No CVA Tenderness] : no CVA  tenderness [No Spinal Tenderness] : no spinal tenderness [No Joint Swelling] : no joint swelling [Grossly Normal Strength/Tone] : grossly normal strength/tone [No Rash] : no rash [Coordination Grossly Intact] : coordination grossly intact [No Focal Deficits] : no focal deficits [Normal Gait] : normal gait [Deep Tendon Reflexes (DTR)] : deep tendon reflexes were 2+ and symmetric [Normal Affect] : the affect was normal [Normal Insight/Judgement] : insight and judgment were intact [de-identified] : Moderate RUQ tenderness

## 2021-07-02 ENCOUNTER — NON-APPOINTMENT (OUTPATIENT)
Age: 36
End: 2021-07-02

## 2021-08-18 ENCOUNTER — TRANSCRIPTION ENCOUNTER (OUTPATIENT)
Age: 36
End: 2021-08-18

## 2021-08-20 ENCOUNTER — TRANSCRIPTION ENCOUNTER (OUTPATIENT)
Age: 36
End: 2021-08-20

## 2021-08-20 ENCOUNTER — APPOINTMENT (OUTPATIENT)
Dept: FAMILY MEDICINE | Facility: CLINIC | Age: 36
End: 2021-08-20
Payer: COMMERCIAL

## 2021-08-20 VITALS
OXYGEN SATURATION: 98 % | HEIGHT: 62 IN | BODY MASS INDEX: 40.85 KG/M2 | SYSTOLIC BLOOD PRESSURE: 107 MMHG | WEIGHT: 222 LBS | TEMPERATURE: 97.8 F | RESPIRATION RATE: 16 BRPM | DIASTOLIC BLOOD PRESSURE: 75 MMHG | HEART RATE: 82 BPM

## 2021-08-20 PROCEDURE — 99214 OFFICE O/P EST MOD 30 MIN: CPT | Mod: 25

## 2021-08-20 PROCEDURE — 36415 COLL VENOUS BLD VENIPUNCTURE: CPT

## 2021-08-20 RX ORDER — OMEPRAZOLE 20 MG/1
20 TABLET, ORALLY DISINTEGRATING, DELAYED RELEASE ORAL
Qty: 30 | Refills: 0 | Status: DISCONTINUED | COMMUNITY
Start: 2021-08-20 | End: 2021-08-20

## 2021-08-20 NOTE — ASSESSMENT
[FreeTextEntry1] : repeat labs collected today, will follow up\par Omeprazole 40mg daily before breakfast trial of one month, advised take Tylenol for pain for now, if uses NSAIDs after meals recommended. Avoid acidic foods, spicy foods, don’t skip meals, eat a light dinner at least 3 hrs prior to bedtime\par has upcoming appointment with Neurosurgery follow up accordingly\par provided referral today for Med Weight Management: [facility]\par up to date with COVID-19 vaccine.

## 2021-08-20 NOTE — HISTORY OF PRESENT ILLNESS
[FreeTextEntry1] : follow up low back pain, obesity, GERD, labwork, epigastric pain [de-identified] : Ms Lydia Sanchez is a 35 yo female presents today for follow up regarding her low back pain, obesity, GERD symptoms, epigastric pain, and follow up labwork. Pt reports now she has an upcoming appointment with a neurosurgeon, for hx of herniated disc and low back pain. Pt advised to take copies of her MRI results and Xray to the visit. Pt reports using ibuprofen quite frequently for her low back pain, sometimes in empty stomach. Pt reports occasional epigastric tenderness with GERD. Advised today take tylenol for pain for now, and if using NSAIDs only seldomly and also after meals. Prescribed today Omeprazole take daily about 30min prior to first meal of the day. Advised if symptoms fail to improve then may return, will have her evaluated by GI will possibly need an EGD study as well. Pt still struggling with weight loss, advised today will provide referral to [facility] for Weight management, discuss options for weight loss.

## 2021-08-20 NOTE — PHYSICAL EXAM
[No Acute Distress] : no acute distress [EOMI] : extraocular movements intact [Supple] : supple [Clear to Auscultation] : lungs were clear to auscultation bilaterally [Normal S1, S2] : normal S1 and S2 [No Edema] : there was no peripheral edema [No Rash] : no rash [Normal Gait] : normal gait [Normal Affect] : the affect was normal [de-identified] : morbid obese [de-identified] : epigastric tenderness upon deep palpation [de-identified] : lower back pain, hypertonicity of paraspinal muscles lower lumbar region

## 2021-08-23 ENCOUNTER — APPOINTMENT (OUTPATIENT)
Dept: SPINE | Facility: CLINIC | Age: 36
End: 2021-08-23
Payer: COMMERCIAL

## 2021-08-23 ENCOUNTER — RESULT REVIEW (OUTPATIENT)
Age: 36
End: 2021-08-23

## 2021-08-23 VITALS — SYSTOLIC BLOOD PRESSURE: 104 MMHG | OXYGEN SATURATION: 97 % | DIASTOLIC BLOOD PRESSURE: 81 MMHG | HEART RATE: 85 BPM

## 2021-08-23 VITALS — WEIGHT: 222 LBS | BODY MASS INDEX: 40.85 KG/M2 | HEIGHT: 62 IN

## 2021-08-23 DIAGNOSIS — Z78.9 OTHER SPECIFIED HEALTH STATUS: ICD-10-CM

## 2021-08-23 LAB
25(OH)D3 SERPL-MCNC: 22.2 NG/ML
ALBUMIN SERPL ELPH-MCNC: 4.1 G/DL
ALP BLD-CCNC: 107 U/L
ALT SERPL-CCNC: 34 U/L
ANION GAP SERPL CALC-SCNC: 14 MMOL/L
APPEARANCE: CLEAR
AST SERPL-CCNC: 19 U/L
BASOPHILS # BLD AUTO: 0.05 K/UL
BASOPHILS NFR BLD AUTO: 0.5 %
BILIRUB SERPL-MCNC: 0.3 MG/DL
BILIRUBIN URINE: NEGATIVE
BLOOD URINE: NEGATIVE
BUN SERPL-MCNC: 9 MG/DL
CALCIUM SERPL-MCNC: 9.4 MG/DL
CHLORIDE SERPL-SCNC: 103 MMOL/L
CHOLEST SERPL-MCNC: 244 MG/DL
CO2 SERPL-SCNC: 22 MMOL/L
COLOR: COLORLESS
CREAT SERPL-MCNC: 0.51 MG/DL
EOSINOPHIL # BLD AUTO: 0.87 K/UL
EOSINOPHIL NFR BLD AUTO: 9.2 %
ESTIMATED AVERAGE GLUCOSE: 128 MG/DL
FOLATE SERPL-MCNC: 16.8 NG/ML
GLUCOSE QUALITATIVE U: NEGATIVE
GLUCOSE SERPL-MCNC: 113 MG/DL
HBA1C MFR BLD HPLC: 6.1 %
HCT VFR BLD CALC: 43.1 %
HCV AB SER QL: NONREACTIVE
HCV S/CO RATIO: 0.08 S/CO
HDLC SERPL-MCNC: 52 MG/DL
HGB BLD-MCNC: 13.9 G/DL
IMM GRANULOCYTES NFR BLD AUTO: 0.2 %
KETONES URINE: NEGATIVE
LDLC SERPL CALC-MCNC: 153 MG/DL
LDLC SERPL DIRECT ASSAY-MCNC: 160 MG/DL
LEUKOCYTE ESTERASE URINE: NEGATIVE
LYMPHOCYTES # BLD AUTO: 2.85 K/UL
LYMPHOCYTES NFR BLD AUTO: 30.3 %
MAN DIFF?: NORMAL
MCHC RBC-ENTMCNC: 30.3 PG
MCHC RBC-ENTMCNC: 32.3 GM/DL
MCV RBC AUTO: 93.9 FL
MONOCYTES # BLD AUTO: 0.68 K/UL
MONOCYTES NFR BLD AUTO: 7.2 %
NEUTROPHILS # BLD AUTO: 4.94 K/UL
NEUTROPHILS NFR BLD AUTO: 52.6 %
NITRITE URINE: NEGATIVE
NONHDLC SERPL-MCNC: 192 MG/DL
PH URINE: 6
PLATELET # BLD AUTO: 240 K/UL
POTASSIUM SERPL-SCNC: 4.2 MMOL/L
PROT SERPL-MCNC: 6.8 G/DL
PROTEIN URINE: NEGATIVE
RBC # BLD: 4.59 M/UL
RBC # FLD: 13.6 %
SODIUM SERPL-SCNC: 139 MMOL/L
SPECIFIC GRAVITY URINE: 1.01
T4 FREE SERPL-MCNC: 1.2 NG/DL
TRIGL SERPL-MCNC: 195 MG/DL
TSH SERPL-ACNC: 0.86 UIU/ML
UROBILINOGEN URINE: NORMAL
VIT B12 SERPL-MCNC: 514 PG/ML
WBC # FLD AUTO: 9.41 K/UL

## 2021-08-23 PROCEDURE — 99203 OFFICE O/P NEW LOW 30 MIN: CPT

## 2021-08-23 NOTE — REVIEW OF SYSTEMS
[Numbness] : numbness [Tingling] : tingling [Negative] : Heme/Lymph [de-identified] : back and right leg pain

## 2021-08-23 NOTE — CONSULT LETTER
[Dear  ___] : Dear  [unfilled], [Consult Letter:] : I had the pleasure of evaluating your patient, [unfilled]. [Please see my note below.] : Please see my note below. [Consult Closing:] : Thank you very much for allowing me to participate in the care of this patient.  If you have any questions, please do not hesitate to contact me. [Sincerely,] : Sincerely, [FreeTextEntry3] : Yuri Turk MD\par Chief of Neurosurgery [facility]\par [facility]\par

## 2021-08-23 NOTE — REASON FOR VISIT
[New Patient Visit] : a new patient visit [Referred By: _________] : Patient was referred by ORIANA [Family Member] : family member [FreeTextEntry1] : Lumbar radiculopathy

## 2021-08-23 NOTE — ASSESSMENT
[FreeTextEntry1] : \par Right sided lumbar radiculopathy.  The MRI shows no cord compression or stenosis.  She will  obtain a flexion/extension lumbar xray for spondylolisthesis.  Dr Eubanks performed an EMG and the patient will bring the results when she returns.

## 2021-08-23 NOTE — HISTORY OF PRESENT ILLNESS
[> 3 months] : more  than 3 months [FreeTextEntry1] : back and leg pain  [de-identified] : \par Ms Sanchez is  36-year-old woman seen today for lower back pain and right leg pain since she fell on her back outside and sudden onset of lower back pain since February 2021.   The pain is in the back and radiates into the right LE.   The pain is an 8/10 and Tyleonl and Motrin are taken for pain.  Diclofenac was prescribed without benefit.  Three epidural injections, PT and chiropractor therapy have not been effective.  No bowel or bladder issues.  She reports weakness of her leg without falls.  An MRI scan of the lumbar spine from 3 months ago shows L4-5 disc desiccation without significant herniation, central or foraminal impingement. The official report does mention a slight listhesis at this level.

## 2021-08-23 NOTE — PHYSICAL EXAM
[Person] : oriented to person [Place] : oriented to place [Time] : oriented to time [Short Term Intact] : short term memory intact [Remote Intact] : remote memory intact [Span Intact] : the attention span was normal [Concentration Intact] : normal concentrating ability [Fluency] : fluency intact [Comprehension] : comprehension intact [Current Events] : adequate knowledge of current events [Past History] : adequate knowledge of personal past history [Vocabulary] : adequate range of vocabulary [Cranial Nerves Optic (II)] : visual acuity intact bilaterally,  pupils equal round and reactive to light [Cranial Nerves Oculomotor (III)] : extraocular motion intact [Cranial Nerves Facial (VII)] : face symmetrical [Cranial Nerves Trigeminal (V)] : facial sensation intact symmetrically [Cranial Nerves Vestibulocochlear (VIII)] : hearing was intact bilaterally [Cranial Nerves Glossopharyngeal (IX)] : tongue and palate midline [Cranial Nerves Hypoglossal (XII)] : there was no tongue deviation with protrusion [Cranial Nerves Accessory (XI - Cranial And Spinal)] : head turning and shoulder shrug symmetric [Motor Tone] : muscle tone was normal in all four extremities [Motor Strength] : muscle strength was normal in all four extremities [No Muscle Atrophy] : normal bulk in all four extremities [Sensation Tactile Decrease] : light touch was intact [Abnormal Walk] : normal gait [Balance] : balance was intact [Past-pointing] : there was no past-pointing [Tremor] : no tremor present [2+] : Ankle jerk left 2+ [Plantar Reflex Right Only] : normal on the right [Plantar Reflex Left Only] : normal on the left [Otero] : Otero's sign was not demonstrated [No Tenderness to Palpation] : no spine tenderness on palpation [No Pain with ROM] : no pain with motion in any direction [Straight-Leg Raise Test - Left] : straight leg raise of the left leg was negative [Straight-Leg Raise Test - Right] : straight leg raise  of the right leg was negative

## 2021-08-24 ENCOUNTER — OUTPATIENT (OUTPATIENT)
Dept: OUTPATIENT SERVICES | Facility: HOSPITAL | Age: 36
LOS: 1 days | End: 2021-08-24
Payer: COMMERCIAL

## 2021-08-24 ENCOUNTER — APPOINTMENT (OUTPATIENT)
Dept: RADIOLOGY | Facility: HOSPITAL | Age: 36
End: 2021-08-24
Payer: COMMERCIAL

## 2021-08-24 DIAGNOSIS — Z98.89 OTHER SPECIFIED POSTPROCEDURAL STATES: Chronic | ICD-10-CM

## 2021-08-24 DIAGNOSIS — M54.5 LOW BACK PAIN: ICD-10-CM

## 2021-08-24 DIAGNOSIS — Z41.9 ENCOUNTER FOR PROCEDURE FOR PURPOSES OTHER THAN REMEDYING HEALTH STATE, UNSPECIFIED: Chronic | ICD-10-CM

## 2021-08-24 PROCEDURE — 72110 X-RAY EXAM L-2 SPINE 4/>VWS: CPT | Mod: 26

## 2021-08-24 PROCEDURE — 72110 X-RAY EXAM L-2 SPINE 4/>VWS: CPT

## 2021-08-25 LAB — HIV1+2 AB SPEC QL IA.RAPID: NONREACTIVE

## 2021-09-02 ENCOUNTER — EMERGENCY (EMERGENCY)
Facility: HOSPITAL | Age: 36
LOS: 1 days | Discharge: HOME | End: 2021-09-02
Attending: EMERGENCY MEDICINE | Admitting: EMERGENCY MEDICINE
Payer: COMMERCIAL

## 2021-09-02 VITALS
HEIGHT: 62 IN | RESPIRATION RATE: 16 BRPM | WEIGHT: 222.01 LBS | OXYGEN SATURATION: 100 % | TEMPERATURE: 97 F | DIASTOLIC BLOOD PRESSURE: 87 MMHG | HEART RATE: 98 BPM | SYSTOLIC BLOOD PRESSURE: 123 MMHG

## 2021-09-02 DIAGNOSIS — Z98.89 OTHER SPECIFIED POSTPROCEDURAL STATES: Chronic | ICD-10-CM

## 2021-09-02 DIAGNOSIS — Z41.9 ENCOUNTER FOR PROCEDURE FOR PURPOSES OTHER THAN REMEDYING HEALTH STATE, UNSPECIFIED: Chronic | ICD-10-CM

## 2021-09-02 LAB
ALBUMIN SERPL ELPH-MCNC: 3.5 G/DL — SIGNIFICANT CHANGE UP (ref 3.3–5)
ALP SERPL-CCNC: 111 U/L — SIGNIFICANT CHANGE UP (ref 40–120)
ALT FLD-CCNC: 83 U/L — HIGH (ref 10–45)
ANION GAP SERPL CALC-SCNC: 9 MMOL/L — SIGNIFICANT CHANGE UP (ref 5–17)
APPEARANCE UR: CLEAR — SIGNIFICANT CHANGE UP
AST SERPL-CCNC: 41 U/L — HIGH (ref 10–40)
BACTERIA # UR AUTO: ABNORMAL /HPF
BASOPHILS # BLD AUTO: 0.06 K/UL — SIGNIFICANT CHANGE UP (ref 0–0.2)
BASOPHILS NFR BLD AUTO: 0.4 % — SIGNIFICANT CHANGE UP (ref 0–2)
BILIRUB SERPL-MCNC: 0.1 MG/DL — LOW (ref 0.2–1.2)
BILIRUB UR-MCNC: NEGATIVE — SIGNIFICANT CHANGE UP
BUN SERPL-MCNC: 14 MG/DL — SIGNIFICANT CHANGE UP (ref 7–23)
CALCIUM SERPL-MCNC: 8.6 MG/DL — SIGNIFICANT CHANGE UP (ref 8.4–10.5)
CHLORIDE SERPL-SCNC: 106 MMOL/L — SIGNIFICANT CHANGE UP (ref 96–108)
CO2 SERPL-SCNC: 24 MMOL/L — SIGNIFICANT CHANGE UP (ref 22–31)
COLOR SPEC: YELLOW — SIGNIFICANT CHANGE UP
CREAT SERPL-MCNC: 0.74 MG/DL — SIGNIFICANT CHANGE UP (ref 0.5–1.3)
DIFF PNL FLD: ABNORMAL
EOSINOPHIL # BLD AUTO: 1.09 K/UL — HIGH (ref 0–0.5)
EOSINOPHIL NFR BLD AUTO: 7.9 % — HIGH (ref 0–6)
EPI CELLS # UR: SIGNIFICANT CHANGE UP
GLUCOSE SERPL-MCNC: 144 MG/DL — HIGH (ref 70–99)
GLUCOSE UR QL: NEGATIVE — SIGNIFICANT CHANGE UP
HCT VFR BLD CALC: 39.1 % — SIGNIFICANT CHANGE UP (ref 34.5–45)
HGB BLD-MCNC: 13.2 G/DL — SIGNIFICANT CHANGE UP (ref 11.5–15.5)
IMM GRANULOCYTES NFR BLD AUTO: 0.5 % — SIGNIFICANT CHANGE UP (ref 0–1.5)
KETONES UR-MCNC: NEGATIVE — SIGNIFICANT CHANGE UP
LEUKOCYTE ESTERASE UR-ACNC: NEGATIVE — SIGNIFICANT CHANGE UP
LIDOCAIN IGE QN: 110 U/L — SIGNIFICANT CHANGE UP (ref 73–393)
LYMPHOCYTES # BLD AUTO: 2.88 K/UL — SIGNIFICANT CHANGE UP (ref 1–3.3)
LYMPHOCYTES # BLD AUTO: 20.8 % — SIGNIFICANT CHANGE UP (ref 13–44)
MCHC RBC-ENTMCNC: 31.1 PG — SIGNIFICANT CHANGE UP (ref 27–34)
MCHC RBC-ENTMCNC: 33.8 GM/DL — SIGNIFICANT CHANGE UP (ref 32–36)
MCV RBC AUTO: 92 FL — SIGNIFICANT CHANGE UP (ref 80–100)
MONOCYTES # BLD AUTO: 0.96 K/UL — HIGH (ref 0–0.9)
MONOCYTES NFR BLD AUTO: 6.9 % — SIGNIFICANT CHANGE UP (ref 2–14)
NEUTROPHILS # BLD AUTO: 8.8 K/UL — HIGH (ref 1.8–7.4)
NEUTROPHILS NFR BLD AUTO: 63.5 % — SIGNIFICANT CHANGE UP (ref 43–77)
NITRITE UR-MCNC: NEGATIVE — SIGNIFICANT CHANGE UP
NRBC # BLD: 0 /100 WBCS — SIGNIFICANT CHANGE UP (ref 0–0)
PH UR: 5 — SIGNIFICANT CHANGE UP (ref 5–8)
PLATELET # BLD AUTO: 229 K/UL — SIGNIFICANT CHANGE UP (ref 150–400)
POTASSIUM SERPL-MCNC: 4.1 MMOL/L — SIGNIFICANT CHANGE UP (ref 3.5–5.3)
POTASSIUM SERPL-SCNC: 4.1 MMOL/L — SIGNIFICANT CHANGE UP (ref 3.5–5.3)
PROT SERPL-MCNC: 7 G/DL — SIGNIFICANT CHANGE UP (ref 6–8.3)
PROT UR-MCNC: NEGATIVE — SIGNIFICANT CHANGE UP
RBC # BLD: 4.25 M/UL — SIGNIFICANT CHANGE UP (ref 3.8–5.2)
RBC # FLD: 12.9 % — SIGNIFICANT CHANGE UP (ref 10.3–14.5)
RBC CASTS # UR COMP ASSIST: ABNORMAL /HPF (ref 0–4)
SODIUM SERPL-SCNC: 139 MMOL/L — SIGNIFICANT CHANGE UP (ref 135–145)
SP GR SPEC: 1.01 — SIGNIFICANT CHANGE UP (ref 1.01–1.02)
UROBILINOGEN FLD QL: NEGATIVE — SIGNIFICANT CHANGE UP
WBC # BLD: 13.86 K/UL — HIGH (ref 3.8–10.5)
WBC # FLD AUTO: 13.86 K/UL — HIGH (ref 3.8–10.5)
WBC UR QL: SIGNIFICANT CHANGE UP /HPF (ref 0–5)

## 2021-09-02 PROCEDURE — 76705 ECHO EXAM OF ABDOMEN: CPT | Mod: 26

## 2021-09-02 PROCEDURE — 99285 EMERGENCY DEPT VISIT HI MDM: CPT

## 2021-09-02 RX ORDER — SODIUM CHLORIDE 9 MG/ML
1000 INJECTION INTRAMUSCULAR; INTRAVENOUS; SUBCUTANEOUS ONCE
Refills: 0 | Status: COMPLETED | OUTPATIENT
Start: 2021-09-02 | End: 2021-09-02

## 2021-09-02 RX ORDER — KETOROLAC TROMETHAMINE 30 MG/ML
15 SYRINGE (ML) INJECTION ONCE
Refills: 0 | Status: DISCONTINUED | OUTPATIENT
Start: 2021-09-02 | End: 2021-09-02

## 2021-09-02 RX ADMIN — SODIUM CHLORIDE 1000 MILLILITER(S): 9 INJECTION INTRAMUSCULAR; INTRAVENOUS; SUBCUTANEOUS at 22:02

## 2021-09-02 RX ADMIN — Medication 15 MILLIGRAM(S): at 23:44

## 2021-09-02 RX ADMIN — Medication 30 MILLILITER(S): at 22:02

## 2021-09-02 NOTE — ED PROVIDER NOTE - PHYSICAL EXAMINATION
General:     NAD, well-nourished, well-appearing  Eyes: PERRL  Head:     NC/AT, EOMI, oral mucosa moist  Neck:     trachea midline  Lungs:     CTA b/l  CVS:     RRR  Abd:     RUQ tenderness. no rebound or guarding. no distension   Ext:   no deformities   Neuro: AAOx3, no sensory/motor deficits

## 2021-09-02 NOTE — ED PROVIDER NOTE - PATIENT PORTAL LINK FT
You can access the FollowMyHealth Patient Portal offered by [facility] by registering at the following website: [email or web address]. By joining [employer]’s FollowMyHealth portal, you will also be able to view your health information using other applications (apps) compatible with our system.

## 2021-09-02 NOTE — ED ADULT NURSE NOTE - NSICDXPASTSURGICALHX_GEN_ALL_CORE_FT
PAST SURGICAL HISTORY:  Elective surgery Diagnostic Laparoscopy with FABRICIO and Cystoscopy     S/P appendectomy     S/P  , - tubal ligation also    S/P hemorrhoidectomy

## 2021-09-02 NOTE — ED PROVIDER NOTE - NSICDXPASTMEDICALHX_GEN_ALL_CORE_FT
PAST MEDICAL HISTORY:  Asthma Albuterol Inhaler as needed- last use was over a year ago    Endometriosis     History of prediabetes     Hyperlipidemia No medications at this time    Obesity (BMI 30-39.9)     Other ovarian cyst, right side     PCOS (polycystic ovarian syndrome)     Pelvic and perineal pain     Torsion of ovary, ovarian pedicle and fallopian tube

## 2021-09-02 NOTE — ED PROVIDER NOTE - CLINICAL SUMMARY MEDICAL DECISION MAKING FREE TEXT BOX
37 y/o F with PMH of preDM on metformin presents to the ED with c/o e RUQ x last night a/w nausea. Pain is worse with food. last PO intake was yesterday. She denies f/c, urinary sympts, CP, SOB, vomiting, URI sympts or all other complaints. Reports normal BMs  Abd:     RUQ tenderness. no rebound or guarding. no distension   labs, US gallbladder, reassess Linda: 35 y/o F with PMH of preDM on metformin presents to the ED with c/o e RUQ x last night a/w nausea. Pain is worse with food. last PO intake was yesterday. She denies f/c, urinary sympts, CP, SOB, vomiting, URI sympts or all other complaints. Reports normal BMs  Abd:     RUQ tenderness. no rebound or guarding. no distension   labs, US gallbladder, reassess

## 2021-09-02 NOTE — ED PROVIDER NOTE - NSFOLLOWUPINSTRUCTIONS_ED_ALL_ED_FT
-- Follow up with your primary care physician in 48 hours.    -- Return to the ER for worsening or persistent symptoms, and/or ANY NEW OR CONCERNING SYMPTOMS.    -- If you have difficulty following up, please call: [phone] to obtain a [facility] doctor or specialist who takes your insurance in your area.

## 2021-09-02 NOTE — ED ADULT NURSE NOTE - NSIMPLEMENTINTERV_GEN_ALL_ED
Implemented All Universal Safety Interventions:  [place] to call system. Call bell, personal items and telephone within reach. Instruct patient to call for assistance. Room bathroom lighting operational. Non-slip footwear when patient is off stretcher. Physically safe environment: no spills, clutter or unnecessary equipment. Stretcher in lowest position, wheels locked, appropriate side rails in place.

## 2021-09-02 NOTE — ED PROVIDER NOTE - OBJECTIVE STATEMENT
37 y/o F with PMH of preDM on metformin presents to the ED with c/o e RUQ x last night a/w nausea. Pain is worse with food. last PO intake was yesterday. She denies f/c, urinary sympts, CP, SOB, vomiting, URI sympts or all other complaints. Reports normal BMs

## 2021-09-03 ENCOUNTER — NON-APPOINTMENT (OUTPATIENT)
Age: 36
End: 2021-09-03

## 2021-09-03 PROCEDURE — 96374 THER/PROPH/DIAG INJ IV PUSH: CPT

## 2021-09-03 PROCEDURE — 85025 COMPLETE CBC W/AUTO DIFF WBC: CPT

## 2021-09-03 PROCEDURE — 81001 URINALYSIS AUTO W/SCOPE: CPT

## 2021-09-03 PROCEDURE — 99284 EMERGENCY DEPT VISIT MOD MDM: CPT | Mod: 25

## 2021-09-03 PROCEDURE — 80053 COMPREHEN METABOLIC PANEL: CPT

## 2021-09-03 PROCEDURE — 76705 ECHO EXAM OF ABDOMEN: CPT

## 2021-09-03 PROCEDURE — 96361 HYDRATE IV INFUSION ADD-ON: CPT

## 2021-09-03 PROCEDURE — 87086 URINE CULTURE/COLONY COUNT: CPT

## 2021-09-03 PROCEDURE — 36415 COLL VENOUS BLD VENIPUNCTURE: CPT

## 2021-09-03 PROCEDURE — 83690 ASSAY OF LIPASE: CPT

## 2021-09-03 RX ORDER — SUCRALFATE 1 G
1 TABLET ORAL
Qty: 40 | Refills: 0
Start: 2021-09-03 | End: 2021-09-12

## 2021-09-03 RX ORDER — LIDOCAINE 4 G/100G
10 CREAM TOPICAL ONCE
Refills: 0 | Status: COMPLETED | OUTPATIENT
Start: 2021-09-03 | End: 2021-09-03

## 2021-09-03 RX ADMIN — Medication 30 MILLILITER(S): at 00:38

## 2021-09-03 RX ADMIN — LIDOCAINE 10 MILLILITER(S): 4 CREAM TOPICAL at 00:38

## 2021-09-03 RX ADMIN — SODIUM CHLORIDE 1000 MILLILITER(S): 9 INJECTION INTRAMUSCULAR; INTRAVENOUS; SUBCUTANEOUS at 00:38

## 2021-09-03 RX ADMIN — Medication 15 MILLIGRAM(S): at 00:14

## 2021-09-04 LAB
CULTURE RESULTS: SIGNIFICANT CHANGE UP
SPECIMEN SOURCE: SIGNIFICANT CHANGE UP

## 2021-09-07 ENCOUNTER — APPOINTMENT (OUTPATIENT)
Dept: FAMILY MEDICINE | Facility: CLINIC | Age: 36
End: 2021-09-07

## 2021-09-09 ENCOUNTER — TRANSCRIPTION ENCOUNTER (OUTPATIENT)
Age: 36
End: 2021-09-09

## 2021-09-13 ENCOUNTER — APPOINTMENT (OUTPATIENT)
Dept: SPINE | Facility: CLINIC | Age: 36
End: 2021-09-13
Payer: COMMERCIAL

## 2021-09-13 VITALS
WEIGHT: 222 LBS | HEART RATE: 105 BPM | OXYGEN SATURATION: 95 % | HEIGHT: 72 IN | DIASTOLIC BLOOD PRESSURE: 81 MMHG | BODY MASS INDEX: 30.07 KG/M2 | SYSTOLIC BLOOD PRESSURE: 112 MMHG

## 2021-09-13 PROCEDURE — 99212 OFFICE O/P EST SF 10 MIN: CPT

## 2021-09-13 NOTE — REASON FOR VISIT
[Follow-Up: _____] : a [unfilled] follow-up visit [Other: _____] : [unfilled] [FreeTextEntry1] : 36 year old female with right sided lumbar radiculopathy since a fall in February 2021.  She has ongoing right sided lumbar radiculopathy.  A flexion/extension lumbar xray reveals no instability.

## 2021-09-13 NOTE — ASSESSMENT
[FreeTextEntry1] : \par \par Neurosurgery  is not recommended.  Follow up with pain management and return PRN

## 2021-09-14 ENCOUNTER — APPOINTMENT (OUTPATIENT)
Dept: OBGYN | Facility: CLINIC | Age: 36
End: 2021-09-14
Payer: COMMERCIAL

## 2021-09-14 VITALS
TEMPERATURE: 97.3 F | RESPIRATION RATE: 16 BRPM | HEART RATE: 85 BPM | DIASTOLIC BLOOD PRESSURE: 78 MMHG | BODY MASS INDEX: 29.66 KG/M2 | HEIGHT: 72 IN | WEIGHT: 219 LBS | OXYGEN SATURATION: 98 % | SYSTOLIC BLOOD PRESSURE: 110 MMHG

## 2021-09-14 DIAGNOSIS — R31.29 OTHER MICROSCOPIC HEMATURIA: ICD-10-CM

## 2021-09-14 DIAGNOSIS — N80.3 ENDOMETRIOSIS OF PELVIC PERITONEUM: ICD-10-CM

## 2021-09-14 DIAGNOSIS — Z01.419 ENCOUNTER FOR GYNECOLOGICAL EXAMINATION (GENERAL) (ROUTINE) W/OUT ABNORMAL FINDINGS: ICD-10-CM

## 2021-09-14 PROCEDURE — 99203 OFFICE O/P NEW LOW 30 MIN: CPT

## 2021-09-15 LAB
BILIRUB UR QL STRIP: NEGATIVE
CLARITY UR: NORMAL
COLLECTION METHOD: NORMAL
GLUCOSE UR-MCNC: NEGATIVE
HCG UR QL: 0.2 EU/DL
HCG UR QL: POSITIVE
HGB UR QL STRIP.AUTO: NORMAL
KETONES UR-MCNC: NEGATIVE
LEUKOCYTE ESTERASE UR QL STRIP: NEGATIVE
NITRITE UR QL STRIP: NEGATIVE
PH UR STRIP: 5
PROT UR STRIP-MCNC: NEGATIVE
QUALITY CONTROL: YES
SP GR UR STRIP: 1.03

## 2021-09-16 LAB
APPEARANCE: CLEAR
BACTERIA UR CULT: NORMAL
BILIRUBIN URINE: NEGATIVE
BLOOD URINE: NEGATIVE
COLOR: YELLOW
GLUCOSE QUALITATIVE U: NEGATIVE
KETONES URINE: NEGATIVE
LEUKOCYTE ESTERASE URINE: NEGATIVE
NITRITE URINE: NEGATIVE
PH URINE: 5.5
PROTEIN URINE: NEGATIVE
SPECIFIC GRAVITY URINE: 1.02
UROBILINOGEN URINE: NORMAL

## 2021-09-20 ENCOUNTER — APPOINTMENT (OUTPATIENT)
Dept: OBGYN | Facility: CLINIC | Age: 36
End: 2021-09-20

## 2021-09-20 NOTE — PHYSICAL EXAM
[Appropriately responsive] : appropriately responsive [Alert] : alert [No Acute Distress] : no acute distress [Soft] : soft [No Lesions] : no lesions [Oriented x3] : oriented x3 [Labia Majora] : normal [Labia Minora] : normal [No Bleeding] : There was no active vaginal bleeding [Normal] : normal [Normal Position] : in a normal position [Tenderness] : tender [Uterine Adnexae] : normal [Adnexa Tenderness On The Right] : tender  [Enlarged ___ wks] : not enlarged [Mass ___ cm] : no uterine mass was palpated

## 2021-09-20 NOTE — PLAN
[FreeTextEntry1] : Patient advised to keep pain and menstrual diaries\par \par Record release for prior surgeries

## 2021-09-20 NOTE — HISTORY OF PRESENT ILLNESS
[Y] : Patient uses contraception [Tubal Occlusion] : has had a tubal occlusion [N] : Patient is not sexually active [LMPDate] : 8/16/21 [MensesFreq] : 28 [[facility]] : 3 [[facility] : 3 [FreeTextEntry1] : C/S X 2 First delivery  Denies PP complications

## 2021-09-23 ENCOUNTER — RX RENEWAL (OUTPATIENT)
Age: 36
End: 2021-09-23

## 2021-10-05 ENCOUNTER — APPOINTMENT (OUTPATIENT)
Dept: ULTRASOUND IMAGING | Facility: HOSPITAL | Age: 36
End: 2021-10-05
Payer: COMMERCIAL

## 2021-10-05 ENCOUNTER — OUTPATIENT (OUTPATIENT)
Dept: OUTPATIENT SERVICES | Facility: HOSPITAL | Age: 36
LOS: 1 days | End: 2021-10-05
Payer: COMMERCIAL

## 2021-10-05 DIAGNOSIS — Z41.9 ENCOUNTER FOR PROCEDURE FOR PURPOSES OTHER THAN REMEDYING HEALTH STATE, UNSPECIFIED: Chronic | ICD-10-CM

## 2021-10-05 DIAGNOSIS — Z98.89 OTHER SPECIFIED POSTPROCEDURAL STATES: Chronic | ICD-10-CM

## 2021-10-05 DIAGNOSIS — N80.3 ENDOMETRIOSIS OF PELVIC PERITONEUM: ICD-10-CM

## 2021-10-05 PROCEDURE — 76856 US EXAM PELVIC COMPLETE: CPT | Mod: 26

## 2021-10-05 PROCEDURE — 76830 TRANSVAGINAL US NON-OB: CPT | Mod: 26

## 2021-10-05 PROCEDURE — 76856 US EXAM PELVIC COMPLETE: CPT

## 2021-10-05 PROCEDURE — 76830 TRANSVAGINAL US NON-OB: CPT

## 2021-10-11 ENCOUNTER — APPOINTMENT (OUTPATIENT)
Dept: OBGYN | Facility: CLINIC | Age: 36
End: 2021-10-11
Payer: COMMERCIAL

## 2021-10-11 ENCOUNTER — APPOINTMENT (OUTPATIENT)
Dept: BARIATRICS/WEIGHT MGMT | Facility: CLINIC | Age: 36
End: 2021-10-11

## 2021-10-11 VITALS
DIASTOLIC BLOOD PRESSURE: 80 MMHG | BODY MASS INDEX: 41.35 KG/M2 | TEMPERATURE: 97.3 F | SYSTOLIC BLOOD PRESSURE: 120 MMHG | HEIGHT: 61 IN | WEIGHT: 219 LBS | OXYGEN SATURATION: 98 % | HEART RATE: 75 BPM

## 2021-10-11 DIAGNOSIS — R10.2 PELVIC AND PERINEAL PAIN: ICD-10-CM

## 2021-10-11 DIAGNOSIS — G89.29 PELVIC AND PERINEAL PAIN: ICD-10-CM

## 2021-10-11 PROCEDURE — 99213 OFFICE O/P EST LOW 20 MIN: CPT

## 2021-10-11 RX ORDER — NITROFURANTOIN MACROCRYSTALS 100 MG/1
100 CAPSULE ORAL
Qty: 10 | Refills: 0 | Status: DISCONTINUED | COMMUNITY
Start: 2021-04-06 | End: 2021-10-11

## 2021-10-11 NOTE — PLAN
[FreeTextEntry1] : Rec release for appendectomy requested Rec release for prior surgeries for endometriosis pending\par \par Chronic pain more likely to be related to prior surgery for appendicitis but endometriosis is also a consideration

## 2021-10-11 NOTE — HISTORY OF PRESENT ILLNESS
[FreeTextEntry1] : F/U chronic RLQ pain for past 10 years  Pain worse with menses  Started [facility] appendectomy at  [facility]osp on 8/[facility]\par \par Describes pain as 10/10 during menses  Was Tx'd for 6 months by Dr Streeter ( [address] ) in 2019 with Depo Lupron  Menses abated but medication failed to relieve pain\par \par S/P laparoscopy, left ovarian cystectomy, endometrial polypectomy 3/2021 at [facility] Ovarian path showed CL cyst\par \par Pain localized to McBurney's point, radiating to Rt flank

## 2021-10-12 ENCOUNTER — APPOINTMENT (OUTPATIENT)
Dept: BARIATRICS | Facility: CLINIC | Age: 36
End: 2021-10-12
Payer: COMMERCIAL

## 2021-10-12 VITALS
HEART RATE: 79 BPM | SYSTOLIC BLOOD PRESSURE: 120 MMHG | HEIGHT: 62 IN | BODY MASS INDEX: 40.67 KG/M2 | TEMPERATURE: 97.2 F | WEIGHT: 221 LBS | DIASTOLIC BLOOD PRESSURE: 80 MMHG | OXYGEN SATURATION: 98 %

## 2021-10-12 DIAGNOSIS — Z13.0 ENCOUNTER FOR SCREENING FOR OTHER SUSPECTED ENDOCRINE DISORDER: ICD-10-CM

## 2021-10-12 DIAGNOSIS — Z13.228 ENCOUNTER FOR SCREENING FOR OTHER SUSPECTED ENDOCRINE DISORDER: ICD-10-CM

## 2021-10-12 DIAGNOSIS — Z01.818 ENCOUNTER FOR OTHER PREPROCEDURAL EXAMINATION: ICD-10-CM

## 2021-10-12 DIAGNOSIS — Z92.29 PERSONAL HISTORY OF OTHER DRUG THERAPY: ICD-10-CM

## 2021-10-12 DIAGNOSIS — Z13.29 ENCOUNTER FOR SCREENING FOR OTHER SUSPECTED ENDOCRINE DISORDER: ICD-10-CM

## 2021-10-12 DIAGNOSIS — Z13.0 ENCOUNTER FOR SCREENING FOR DISEASES OF THE BLOOD AND BLOOD-FORMING ORGANS AND CERTAIN DISORDERS INVOLVING THE IMMUNE MECHANISM: ICD-10-CM

## 2021-10-12 PROCEDURE — 99205 OFFICE O/P NEW HI 60 MIN: CPT

## 2021-10-12 RX ORDER — ATORVASTATIN CALCIUM 20 MG/1
20 TABLET, FILM COATED ORAL
Qty: 90 | Refills: 1 | Status: DISCONTINUED | COMMUNITY
Start: 2021-09-23 | End: 2021-10-12

## 2021-10-13 NOTE — PHYSICAL EXAM
[Obese, well nourished, in no acute distress] : obese, well nourished, in no acute distress [Normal] : affect appropriate [de-identified] : obese, soft, nontender, no evidence of hernia

## 2021-10-13 NOTE — ASSESSMENT
[FreeTextEntry1] : 36 year old woman with long-standing history of morbid obesity presents today to discuss options for weight loss surgery.  I had an extensive discussion with the patient reviewing the Laparoscopic Sleeve Gastrectomy and laparoscopic Gastric Bypass. Diagrams were used. All questions were answered.  \par \par Complications were discussed including but not limited to: vitamin and protein deficiencies, dumping, pneumonia, urinary infection, wound infection, leaks/peritonitis possibly requiring intraabdominal drains or reoperation, bleeding, DVT, pulmonary embolus, severe reflux, sleeve obstruction, anastomotic strictures, anastomotic ulcers, abdominal wall hernias, internal hernia,  revisions, death, inadequate weight loss. The importance of vitamins and protein supplementation was stressed, as was the importance of follow-up and exercise. \par \par Patient encouraged to make dietary and lifestyle changes in preparation for surgery.\par \par Patient with a long history of morbid obesity.She is interested in the Laparoscopic Sleeve Gastrectomy. She was given written material to review.  Pre-operative evaluations were reviewed. She will need to lose weight prior to surgery and will be seen again prior to surgery.She was told to call with any questions. \par \par May need gallbladder emptying study to evaluate for biliary dyskinesia.

## 2021-10-13 NOTE — CONSULT LETTER
[Dear  ___] : Dear  [unfilled], [Consult Letter:] : I had the pleasure of evaluating your patient, [unfilled]. [Please see my note below.] : Please see my note below. [Consult Closing:] : Thank you very much for allowing me to participate in the care of this patient.  If you have any questions, please do not hesitate to contact me. [Sincerely,] : Sincerely, [FreeTextEntry3] : Barby Win MD, FACS

## 2021-10-13 NOTE — REASON FOR VISIT
[Initial Consult] : an initial consult for [Morbid Obesity (BMI>40)] : morbid obesity (bmi>40) [[employer] ] : provided by [employer]   [Interpreters_IDNumber] : [id number] [Interpreters_FullName] : Damaris [TWNoteComboBox1] : [place]

## 2021-11-11 ENCOUNTER — APPOINTMENT (OUTPATIENT)
Dept: FAMILY MEDICINE | Facility: CLINIC | Age: 36
End: 2021-11-11
Payer: COMMERCIAL

## 2021-11-11 VITALS
HEIGHT: 62 IN | TEMPERATURE: 97.2 F | OXYGEN SATURATION: 97 % | BODY MASS INDEX: 39.38 KG/M2 | HEART RATE: 68 BPM | DIASTOLIC BLOOD PRESSURE: 76 MMHG | WEIGHT: 214 LBS | RESPIRATION RATE: 15 BRPM | SYSTOLIC BLOOD PRESSURE: 106 MMHG

## 2021-11-11 DIAGNOSIS — M54.50 LOW BACK PAIN, UNSPECIFIED: ICD-10-CM

## 2021-11-11 DIAGNOSIS — Z23 ENCOUNTER FOR IMMUNIZATION: ICD-10-CM

## 2021-11-11 DIAGNOSIS — Z86.39 PERSONAL HISTORY OF OTHER ENDOCRINE, NUTRITIONAL AND METABOLIC DISEASE: ICD-10-CM

## 2021-11-11 PROCEDURE — 99214 OFFICE O/P EST MOD 30 MIN: CPT | Mod: 25

## 2021-11-11 PROCEDURE — 36415 COLL VENOUS BLD VENIPUNCTURE: CPT

## 2021-11-11 PROCEDURE — 90686 IIV4 VACC NO PRSV 0.5 ML IM: CPT

## 2021-11-11 PROCEDURE — G0008: CPT

## 2021-11-11 NOTE — PHYSICAL EXAM
[No Acute Distress] : no acute distress [EOMI] : extraocular movements intact [Supple] : supple [Clear to Auscultation] : lungs were clear to auscultation bilaterally [Normal S1, S2] : normal S1 and S2 [No Edema] : there was no peripheral edema [No Rash] : no rash [Normal Gait] : normal gait [Normal Affect] : the affect was normal [de-identified] : morbid obese [de-identified] : epigastric tenderness upon deep palpation [de-identified] : lower back pain, hypertonicity of paraspinal muscles lower lumbar region

## 2021-11-11 NOTE — HISTORY OF PRESENT ILLNESS
[FreeTextEntry1] : follow up [de-identified] : Ms Lydia Sanchez is a 37 yo female presents today for routine follow up. Pt reports has now seen Dr. Turk Neurosurgery, regarding her low back pain, recommended no surgery at this time, no nerve impingement, advised to follow up with pain management, Dr. Eubanks. Pt also followed up with ObGYN Dr. Beasley for the constant chronic pelvic pain, RLQ, s/p past appendectomy, considering possible endometriosis. Pt also following up with Bariatric team at [facility] for Weight Management. Pt has lost 7lbs since last visit. Pt due for flu vaccine and lab repeat to be done today.

## 2021-11-11 NOTE — ASSESSMENT
[FreeTextEntry1] : repeat labs collected today, will follow up\par Omeprazole 40mg daily before breakfast trial of one month, advised take Tylenol for pain for now, if uses NSAIDs after meals recommended. Avoid acidic foods, spicy foods, don’t skip meals, eat a light dinner at least 3 hrs prior to bedtime\par c/w follow up with Med Weight Management: [facility] for Weight Management\par flu vaccine updated today, Tdap up to date\par COVID-19 vaccine up to date. Advised to get the booster vaccine when available.\par

## 2021-11-15 ENCOUNTER — APPOINTMENT (OUTPATIENT)
Dept: FAMILY MEDICINE | Facility: CLINIC | Age: 36
End: 2021-11-15

## 2021-11-15 ENCOUNTER — APPOINTMENT (OUTPATIENT)
Dept: OBGYN | Facility: CLINIC | Age: 36
End: 2021-11-15

## 2021-11-15 ENCOUNTER — RX RENEWAL (OUTPATIENT)
Age: 36
End: 2021-11-15

## 2021-11-15 LAB
25(OH)D3 SERPL-MCNC: 18 NG/ML
ALBUMIN SERPL ELPH-MCNC: 4 G/DL
ALP BLD-CCNC: 110 U/L
ALT SERPL-CCNC: 46 U/L
ANION GAP SERPL CALC-SCNC: 12 MMOL/L
AST SERPL-CCNC: 25 U/L
BASOPHILS # BLD AUTO: 0.05 K/UL
BASOPHILS NFR BLD AUTO: 0.4 %
BILIRUB SERPL-MCNC: 0.3 MG/DL
BUN SERPL-MCNC: 10 MG/DL
CALCIUM SERPL-MCNC: 9.1 MG/DL
CHLORIDE SERPL-SCNC: 101 MMOL/L
CHOLEST SERPL-MCNC: 193 MG/DL
CO2 SERPL-SCNC: 24 MMOL/L
CREAT SERPL-MCNC: 0.45 MG/DL
EOSINOPHIL # BLD AUTO: 0.59 K/UL
EOSINOPHIL NFR BLD AUTO: 5.3 %
ESTIMATED AVERAGE GLUCOSE: 126 MG/DL
FOLATE SERPL-MCNC: 11 NG/ML
GLUCOSE SERPL-MCNC: 115 MG/DL
HBA1C MFR BLD HPLC: 6 %
HCT VFR BLD CALC: 43 %
HDLC SERPL-MCNC: 49 MG/DL
HGB BLD-MCNC: 14.1 G/DL
IMM GRANULOCYTES NFR BLD AUTO: 0.3 %
LDLC SERPL CALC-MCNC: 106 MG/DL
LDLC SERPL DIRECT ASSAY-MCNC: 118 MG/DL
LYMPHOCYTES # BLD AUTO: 2.68 K/UL
LYMPHOCYTES NFR BLD AUTO: 24.1 %
MAN DIFF?: NORMAL
MCHC RBC-ENTMCNC: 30.2 PG
MCHC RBC-ENTMCNC: 32.8 GM/DL
MCV RBC AUTO: 92.1 FL
MONOCYTES # BLD AUTO: 0.72 K/UL
MONOCYTES NFR BLD AUTO: 6.5 %
NEUTROPHILS # BLD AUTO: 7.07 K/UL
NEUTROPHILS NFR BLD AUTO: 63.4 %
NONHDLC SERPL-MCNC: 144 MG/DL
PLATELET # BLD AUTO: 217 K/UL
POTASSIUM SERPL-SCNC: 3.8 MMOL/L
PROT SERPL-MCNC: 6.5 G/DL
RBC # BLD: 4.67 M/UL
RBC # FLD: 13.1 %
SODIUM SERPL-SCNC: 137 MMOL/L
T4 FREE SERPL-MCNC: 1.2 NG/DL
TRIGL SERPL-MCNC: 189 MG/DL
TSH SERPL-ACNC: 0.91 UIU/ML
VIT B12 SERPL-MCNC: 1355 PG/ML
WBC # FLD AUTO: 11.14 K/UL

## 2021-11-19 NOTE — ED PROVIDER NOTE - OTHER RECORDS SUMMARY FREE TEXT FOR MDM OBTAINED AND REVIEWED OLD RECORDS QUESTION
Received signout on this patient who has hypertension, arthritis, heart disease, AFib not on anticoagulation here with knee pain after left knee injury.  He has had ongoing knee pain since last summer/June.  Prior to the pain he had fell.  It has also been exacerbated by his work.  Patient seen and evaluated prior to sign-out and other etiologies considered and much less likely. Upon sign-out, patient was awaiting results of ultrasound and x-ray.     Radiologic imaging:   The images and image results were reviewed by myself. The results were subsequently discussed with the patient.  US Lower Extremity Venous Duplex Left   Final Result   IMPRESSION:   1.  No evidence of deep venous thrombosis in the left lower extremity deep   venous system.   2.  Small to moderate-sized simple appearing left popliteal cyst.            XR Knee 3 View Left   Final Result   IMPRESSION:  Essentially negative left knee series.                Interventions: Examination, patient teaching, x-ray, ultrasound, medications, observation.  Ace wrap applied by nursing.  Status upon Re-Evaluation:   Continued but tolerable symptoms.  No new symptoms in the interim.  Appears comfortable.  Breathing normally.  Warm well perfused.  Mildly and diffusely tender in the area of the left knee.  Good distal strength and sensation.  No significant erythema edema or wounds.  Otherwise the patient continues to be hemodynamically stable and otherwise well-appearing.  Vital sign interpretation:   Visit Vitals  BP (!) 148/86 (BP Location: RUE - Right upper extremity, Patient Position: Sitting)   Pulse (!) 58   Temp 97.9 °F (36.6 °C) (Oral)   Resp 20   Ht 5' 8\" (1.727 m)   Wt 108 kg (238 lb)   SpO2 95%   BMI 36.19 kg/m²    Normal heart rate, normal respiratory rate, moderately elevated but acceptable blood pressure, and good oxygen saturation on room air.  Disposition: Discharge with followup and reevaluation.  Ortho referral placed    The patient was apprised of  the results, current diagnosis, current prognosis, and the plan. They were in agreement with the plan as it occurred and understood the risks and benefits of pursuing further diagnostic testing/treatment and preferred the plan as it is. Detailed discharge instructions including followup were provided and the patient communicated an understanding and acceptance of this.    Diagnosis:   Left knee pain-subacute to chronic       Khadar Cartwright MD  11/18/21 2040     lumbar xray results reviewed, no fx but straightening for lumbar lordosis noted

## 2021-12-01 NOTE — ASU PREOP CHECKLIST - WEIGHT IN LBS
Assessment & Plan     Hyperlipidemia LDL goal <130  LDL >200 on recent check. He is interested in starting treatment for this. Will initiate atorvastatin 40mg daily. Discussed possible side effects, including myalgias and hepatotoxicity.  - atorvastatin (LIPITOR) 40 MG tablet; Take 1 tablet (40 mg) by mouth daily    Encounter for screening for HIV  - HIV Antigen Antibody Combo    Encounter for hepatitis C screening test for low risk patient  - Hepatitis C Screen Reflex to HCV RNA Quant and Genotype    Screening for tuberculosis  - Quantiferon-TB Gold Plus    No follow-ups on file.    Minor Rivas MD  [facility]    Sharad Crespo is a 48 year old who presents today to discuss his high cholesterol which was found by his mental health team on screening labs in October. Not currently on medication for it. Interested in starting it. His living situation is requesting HIV, hepatitis C, and TB testing which Zak would like to proceed with.    Review of Systems   Constitutional, cardiovascular systems are negative, except as otherwise noted.      Objective    /64   Pulse 88   Temp 98.7  F (37.1  C) (Tympanic)   Wt 88.6 kg (195 lb 4.8 oz)   SpO2 98%   BMI 28.43 kg/m    Body mass index is 28.43 kg/m .     Physical Exam   GENERAL: alert and in no distress.  EYES: conjunctivae/corneas clear. EOMs grossly intact  HENT: Facies symmetric.  RESP: No iWOB.  MSK: Moves all four extremities freely  SKIN: No significant ulcers, lesions, or rashes on the visualized portions of the skin  NEURO: Alert. Oriented.   205.9

## 2021-12-06 ENCOUNTER — EMERGENCY (EMERGENCY)
Facility: HOSPITAL | Age: 36
LOS: 1 days | Discharge: HOME | End: 2021-12-06
Attending: EMERGENCY MEDICINE | Admitting: EMERGENCY MEDICINE
Payer: COMMERCIAL

## 2021-12-06 VITALS
DIASTOLIC BLOOD PRESSURE: 78 MMHG | TEMPERATURE: 98 F | RESPIRATION RATE: 18 BRPM | SYSTOLIC BLOOD PRESSURE: 123 MMHG | HEIGHT: 62 IN | WEIGHT: 199.96 LBS | OXYGEN SATURATION: 98 % | HEART RATE: 76 BPM

## 2021-12-06 DIAGNOSIS — Z98.89 OTHER SPECIFIED POSTPROCEDURAL STATES: Chronic | ICD-10-CM

## 2021-12-06 DIAGNOSIS — Z41.9 ENCOUNTER FOR PROCEDURE FOR PURPOSES OTHER THAN REMEDYING HEALTH STATE, UNSPECIFIED: Chronic | ICD-10-CM

## 2021-12-06 PROCEDURE — 73562 X-RAY EXAM OF KNEE 3: CPT

## 2021-12-06 PROCEDURE — 99283 EMERGENCY DEPT VISIT LOW MDM: CPT | Mod: 25

## 2021-12-06 PROCEDURE — 99283 EMERGENCY DEPT VISIT LOW MDM: CPT

## 2021-12-06 PROCEDURE — 73562 X-RAY EXAM OF KNEE 3: CPT | Mod: 26,LT

## 2021-12-06 RX ORDER — IBUPROFEN 200 MG
600 TABLET ORAL ONCE
Refills: 0 | Status: COMPLETED | OUTPATIENT
Start: 2021-12-06 | End: 2021-12-06

## 2021-12-06 RX ORDER — FAMOTIDINE 10 MG/ML
20 INJECTION INTRAVENOUS ONCE
Refills: 0 | Status: COMPLETED | OUTPATIENT
Start: 2021-12-06 | End: 2021-12-06

## 2021-12-06 RX ORDER — ALBUTEROL 90 UG/1
2 AEROSOL, METERED ORAL
Qty: 0 | Refills: 0 | DISCHARGE

## 2021-12-06 RX ORDER — MULTIVIT-MIN/FERROUS GLUCONATE 9 MG/15 ML
1 LIQUID (ML) ORAL
Qty: 0 | Refills: 0 | DISCHARGE

## 2021-12-06 RX ADMIN — FAMOTIDINE 20 MILLIGRAM(S): 10 INJECTION INTRAVENOUS at 15:51

## 2021-12-06 RX ADMIN — Medication 600 MILLIGRAM(S): at 15:51

## 2021-12-06 NOTE — ED PROVIDER NOTE - PATIENT PORTAL LINK FT
You can access the FollowMyHealth Patient Portal offered by [facility] by registering at the following website: [email or web address]. By joining [employer]’s FollowMyHealth portal, you will also be able to view your health information using other applications (apps) compatible with our system.

## 2021-12-06 NOTE — ED PROVIDER NOTE - CARE PROVIDER_API CALL
Guzman Stevens)  Orthopaedic Sports Medicine; Orthopaedic Surgery  [address] [address]  Phone: [phone]  Fax: [phone]  Follow Up Time:

## 2021-12-06 NOTE — ED PROVIDER NOTE - WR INTERPRETATION 1
[FreeTextEntry1] : 5:34 PM01/13/2021 I reviewed this patient;s lab work with him over the phone/ HLD: LDL > 190. Will start high dose statin and check LFTs within 6 weeks. Other labwork unremarkable. I called this patient to the number on record. Tests results discussed over the phone. All questions answered 
MSK XR negative - No fracture, No dislocation, No foreign body

## 2021-12-06 NOTE — ED PROVIDER NOTE - CLINICAL SUMMARY MEDICAL DECISION MAKING FREE TEXT BOX
l knee pain- atraumatic, will xray, motrin, pepcid (patient states pain medications give her an upset stomach) l knee pain- atraumatic, will xray, motrin, pepcid (patient states pain medications give her an upset stomach)    Xray without acute findings. ACE and Knee immob applied. Worsening, continued or ANY new concerning symptoms return to the emergency department.   F/U Ortho recc.

## 2021-12-06 NOTE — ED PROVIDER NOTE - OBJECTIVE STATEMENT
36 year old female, PMHx of DM, presents to the ED complaining of left knee pain for the last week. Patient states she noticed on Tuesday she had a slight discomfort. Throughout the week it has gotten progressively worse and is now accompanied by swelling, prompting her to come to the ED for evaluation. Patient denies any injuries/trauma to the knee. Denies numbness, weakness, other injuries/trauma, f/c/n/v/d. Pt able to ambulate. Did not take any medications for the pain.

## 2021-12-06 NOTE — ED PROVIDER NOTE - NSFOLLOWUPINSTRUCTIONS_ED_ALL_ED_FT
Esguince de rodilla    LO QUE NECESITA SABER:    ¿Qué es un esguince de rodilla?Un esguince de rodilla es un estiramiento o desgarro de un ligamento en la rodilla. Los ligamentos sostienen la rodilla y mantienen los huesos y la articulación en la posición correcta. Un esguince de rodilla podría involucrar alona o más ligamentos.    Anatomía de la rodilla         ¿Qué aumenta mi riesgo de un esguince de rodilla?  •No usar el calzado adecuado o los elementos de protección deniz la actividad.      •No calentar o estirarse antes del ejercicio      •Demasiado ejercicio de aura vez, o un aumento repentino del ejercicio      ¿Cuáles son los signos y síntomas de un esguince de rodilla?  •Rigidez o disminución en movimiento      •Dolor o sensibilidad      •Crujido doloroso que usted puede sentir y oír      •Inflamación o moretones      •Rodilla que se traba o agota al caminar      ¿Cómo se diagnostica un esguince de rodilla?Delgado médico le va a preguntar acerca de la lesión y lo examinará. Infórmele si usted escuchó un crujido o estallido cuando se lesionó. Delgado médico revisará el movimiento y la fuerza de delgado articulación. Es posible que le pida que mueva la articulación. Es posible que también necesite alguno de los siguientes tratamientos:   •Aura radiografía, tomografía computarizada o resonancia magnéticapodrían mostrar el esguince u otros daños. Es posible que le den líquido de contraste para que delgado lesión se adelaida mejor en las imágenes. Dígale al médico si usted alguna vez ha tenido aura reacción alérgica al líquido de contraste. No entre a la jonathan donde se realiza la resonancia magnética con algo de metal. El metal puede causar lesiones serias. Dígale al médico si usted tiene algo de metal dentro de delgado cuerpo o por encima.      •Aura artroscopiaes un procedimiento para observar el interior de la articulación con un artroscopio. El endoscopio es un tubo nelia con aura lupa, aura cámara y aura melina en un extremo.      ¿Cómo se trata un esguince de rodilla?El tratamiento depende del tipo y la causa de delgado esguince de rodilla. Es posible que usted necesite alguno de los siguientes:   •Los ANUSHA,elisabet el ibuprofeno, ayudan a disminuir la inflamación, el dolor y la fiebre. Guerline medicamento está disponible con o sin aura receta médica. Los ANUSHA pueden causar sangrado estomacal o problemas renales en ciertas personas. Si usted mckay un medicamento anticoagulante, siempre pregúntele a delgado médico si los ANUSHA son seguros para usted. Siempre jose rafael la etiqueta de guerline medicamento y siga las instrucciones.      •Acetaminofénalivia el dolor y baja la fiebre. Está disponible sin receta médica. Pregunte la cantidad y la frecuencia con que debe tomarlos. Siga las indicaciones. Jose Rafael las etiquetas de todos los demás medicamentos que esté usando para saber si también contienen acetaminofén, o pregunte a delgado médico o farmacéutico. El acetaminofén puede causar daño en el hígado cuando no se cmkay de forma correcta. No use más de 4 gramos (4000 miligramos) en total de acetaminofeno en un día.      •Puede administrarsepodrían administrarse. Pregunte al médico cómo debe analilia guerline medicamento de forma rouse. Algunos medicamentos recetados para el dolor contienen acetaminofén. No tome otros medicamentos que contengan acetaminofén sin consultarlo con delgado médico. Demasiado acetaminofeno puede causar daño al hígado. Los medicamentos recetados para el dolor podrían causar estreñimiento. Pregunte a delgado médico elisabet prevenir o tratar estreñimiento.      •Un dispositivo de apoyocomo aura férula o aura abrazadera podrían ser necesarios. Estos dispositivos limitan el movimiento y protegen la articulación mientras kamini. Es posible que le den unas muletas para que las use hasta que pueda pararse sobre delgado pierna lesionada sin dolor.      •La fisioterapiapodrían ser necesarios. Un fisioterapeuta le puede enseñar ejercicios para ayudarle a mejorar el movimiento y la fuerza, y para disminuir el dolor.      •La cirugíapodría ser necesaria si otros tratamientos no funcionan o si delgado esguince es severo. La cirugía podría incluir aura artroscopia de la rodilla para yuliet dentro de la articulación de la rodilla y reparar el daño.      ¿Cómo puedo controlar un esguince de rodilla?  •Descansesu rodilla y no figueroa ejercicio. No camine sobre la pierna lesionada si se le pide que mantenga el peso fuera de la rodilla. El reposo ayuda a disminuir la inflamación y permite que la lesión sane. Usted puede hacer ejercicios suaves de rango de movimiento elisabet se le indique para prevenir la rigidez.      •Aplique hieloen la rodilla de 15 a 20 minutos cada hora o elisabet se le indique. Use aura compresa de hielo o ponga hielo triturado en aura bolsa de plástico. Cubra la bolsa con aura toalla antes de aplicarlo. El hielo ayuda a evitar daño al tejido y a disminuir la inflamación y el dolor.      •Aplique compresiónen la rodilla según indicaciones. Es probable que necesite usar aura venda elástica. La venda elástica ayuda a evitar que delgado rodilla lesionada se mueva demasiado mientras kamini. Debería estar lo suficientemente ajustada para brindarle soporte camilo no ajustada elisabet para que le provoque entumecimiento o sensación de hormigueo en los dedos de los pies. Quítese el vendaje y vuelva a colocarla 1 vez al día.  Cómo colocar aura venda elástica           •Eleve delgado rodillapor encima del nivel del corazón con la frecuencia que pueda. [place] va a disminuir inflamación y el dolor. Coloque delgado pierna sobre almohadas o cobijas para mantenerla elevada cómodamente. No coloque almohadas directamente detrás de delgado rodilla.  Elevar la pierna           ¿Cómo puedo evitar otro esguince de rodilla?Ejercite nidia piernas para mantener nidia músculos juju. Los músculos juju de las piernas ayudan a proteger delgado rodilla y a evitar un esguince. Lo siguiente también podría evitar un esguince de rodilla:   •Comience lentamente nidia ejercicios o delgado programa de entrenamiento.Aumente lentamente el tiempo, la distancia y la intensidad del ejercicio. Los aumentos repentinos en el entrenamiento pueden causar otro esguince de rodilla.      •Use dispositivos y equipo de protección elisabet se le indique.Las abrazaderas podrían evitar que la rodilla se mueva en la dirección incorrecta y provoque otro esguince. El equipo de protección podría sostener nidia huesos y ligamentos para evitar aura lesión.      •Entre en calor y estírese antes de hacer ejercicio.Antes de comenzar con delgado ejercicio habitual, figueroa movimientos de calentamiento, elisabet caminar o pedalear en aura bicicleta estática. Después de calentarse figueroa estiramientos suaves. [place] ayuda a aflojar nidia músculos y a disminuir el estrés en delgado rodilla. Después de ejercitarse, refrésquese y figueroa estiramientos.      •Use zapatos que le calcen kelton y que sujeten nidia pies.Reemplace nidia zapatos de ejercicio o de correr, antes que el acolchado o el amortiguador de golpes esté desgastado. Pregúntele a delgado médico cuáles zapatos deportivos son más convenientes para usted. Pregunte si debería usar plantillas en nidia zapatos. Las plantillas pueden ayudar a apoyar nidia talones y lupillo, o a mantener alineados correctamente nidia pies dentro de los zapatos. Ejercítese sobre superficies lupe.      ¿Cuándo marisa buscar atención inmediata?  •Alguna parte de delgado pierna se siente fría, entumecida o se ve pálida.          ¿Cuándo marisa llamar a mi médico?  •Usted tiene nueva o mayor inflamación, moretones o dolor en delgado rodilla.      •Los síntomas no mejoran dentro de las 6 semanas, incluso con tratamiento.      •Usted tiene preguntas o inquietudes acerca de delgado condición o cuidado.      ACUERDOS SOBRE DELGADO CUIDADO:    Usted tiene el derecho de ayudar a planear delgado cuidado. Aprenda todo lo que pueda sobre delgado condición y elisabet darle tratamiento. Discuta nidia opciones de tratamiento con nidia médicos para decidir el cuidado que usted desea recibir. Usted siempre tiene el derecho de rechazar el tratamiento.

## 2021-12-06 NOTE — ED PROVIDER NOTE - ATTENDING CONTRIBUTION TO CARE
Eval with MEGAN Madsen. l knee pain- atraumatic, will xray, motrin, pepcid (patient states pain medications give her an upset stomach)    Xray without acute findings. ACE and Knee immob applied. Worsening, continued or ANY new concerning symptoms return to the emergency department.   F/U Ortho recc.    I performed a face to face bedside interview with patient regarding history of present illness, review of symptoms and past medical history. I completed an independent physical exam.  I have discussed the patient's plan of care with Physician Assistant (PA). I agree with note as stated above, having amended the EMR as needed to reflect my findings.   This includes History of Present Illness, HIV, Past Medical/Surgical/Family/Social History, Allergies and Home Medications, Review of Systems, Physical Exam, and any Progress Notes during the time I functioned as the attending physician for this patient.

## 2021-12-07 ENCOUNTER — APPOINTMENT (OUTPATIENT)
Dept: ORTHOPEDIC SURGERY | Facility: CLINIC | Age: 36
End: 2021-12-07
Payer: COMMERCIAL

## 2021-12-07 ENCOUNTER — NON-APPOINTMENT (OUTPATIENT)
Age: 36
End: 2021-12-07

## 2021-12-07 DIAGNOSIS — S83.412A SPRAIN OF MEDIAL COLLATERAL LIGAMENT OF LEFT KNEE, INITIAL ENCOUNTER: ICD-10-CM

## 2021-12-07 DIAGNOSIS — M76.899 OTHER SPECIFIED ENTHESOPATHIES OF UNSPECIFIED LOWER LIMB, EXCLUDING FOOT: ICD-10-CM

## 2021-12-07 PROCEDURE — 99203 OFFICE O/P NEW LOW 30 MIN: CPT

## 2021-12-07 PROCEDURE — 73562 X-RAY EXAM OF KNEE 3: CPT | Mod: LT

## 2021-12-07 NOTE — ADDENDUM
[FreeTextEntry1] : I, Deborah Feng wrote this note acting as a scribe for Dr. Mariano Jesus on Dec 07, 2021.

## 2021-12-07 NOTE — DISCUSSION/SUMMARY
[de-identified] : The underlying pathophysiology was reviewed with the patient. XR films were reviewed with the patient. Discussed at length the nature of the patient’s condition. The left knee symptoms appear secondary to possible MCL sprain. \par \par At this time, I am recommending the patient attend physical therapy. The patient wishes to proceed with physical therapy of the left knee (ROM and strengthening).  A script was given.\par She was advised on the use of topical analgesics and NSAIDs.\par I did discuss with her that if her symptoms do not resolve with PT, an MRI may be ordered for further evaluation.\par Rx: Ibuprofen 800mg, BID (30 tablets)\par \par All questions answered, understanding verbalized. Patient in agreement with plan of care. Follow up as needed, according to her symptoms.

## 2021-12-07 NOTE — END OF VISIT
[FreeTextEntry3] : All medical record entries made by the Scribe were at my,  Dr. Mariano Jesus MD., direction and personally dictated by me on 12/07/2021. I have personally reviewed the chart and agree that the record accurately reflects my personal performance of the history, physical exam, assessment and plan.

## 2021-12-07 NOTE — HISTORY OF PRESENT ILLNESS
[de-identified] : YAMILETH OSCAR is a 36 year female who presents for initial evaluation of left knee pain that started about 2 weeks ago. She denies injury. She states that her pain is both medial and lateral, as well as posterior. Her ROM is painful at the extremes. She experiences buckling intermittently. She has also noticed swelling. She has pain when moving in her sleep. Stairs are uncomfortable and difficult for her. She recalls injuring her left knee about 8 years ago. She did PT for her knee at that time, which helped.

## 2021-12-07 NOTE — PHYSICAL EXAM
[de-identified] : Patient is WDWN, alert, and in no acute distress. Breathing is unlabored. She is grossly oriented to person, place, and time.\par \par Left Knee:\par No bruising, discoloration or edema present \par No crepitus\par Flexion and extension are full with pain elicited at end ranges of both planes\par Tenderness present medially and anteriorly\par No pain elicited with twisting \par Joint stability intact\par  [de-identified] : AP, lateral, skyline, and tunnel views of the left knee were obtained and revealed no abnormality\par \par \par XRAYS OBTAINED AT THE ER ON 12/6/21 WERE REVIEWED BY ME IN OFFICE TODAY:\par IMPRESSION:\par 3 views of the left knee --> no evidence of fracture

## 2021-12-10 ENCOUNTER — APPOINTMENT (OUTPATIENT)
Dept: ORTHOPEDIC SURGERY | Facility: CLINIC | Age: 36
End: 2021-12-10

## 2021-12-29 ENCOUNTER — EMERGENCY (EMERGENCY)
Facility: HOSPITAL | Age: 36
LOS: 1 days | Discharge: HOME | End: 2021-12-29
Attending: EMERGENCY MEDICINE | Admitting: EMERGENCY MEDICINE
Payer: COMMERCIAL

## 2021-12-29 VITALS
RESPIRATION RATE: 16 BRPM | HEIGHT: 62 IN | OXYGEN SATURATION: 97 % | SYSTOLIC BLOOD PRESSURE: 127 MMHG | DIASTOLIC BLOOD PRESSURE: 85 MMHG | TEMPERATURE: 98 F | HEART RATE: 74 BPM | WEIGHT: 199.96 LBS

## 2021-12-29 DIAGNOSIS — Z41.9 ENCOUNTER FOR PROCEDURE FOR PURPOSES OTHER THAN REMEDYING HEALTH STATE, UNSPECIFIED: Chronic | ICD-10-CM

## 2021-12-29 DIAGNOSIS — Z98.89 OTHER SPECIFIED POSTPROCEDURAL STATES: Chronic | ICD-10-CM

## 2021-12-29 LAB — SARS-COV-2 RNA SPEC QL NAA+PROBE: DETECTED

## 2021-12-29 PROCEDURE — 99284 EMERGENCY DEPT VISIT MOD MDM: CPT

## 2021-12-29 PROCEDURE — 99283 EMERGENCY DEPT VISIT LOW MDM: CPT

## 2021-12-29 PROCEDURE — U0005: CPT

## 2021-12-29 PROCEDURE — U0003: CPT

## 2021-12-29 NOTE — ED PROVIDER NOTE - PATIENT PORTAL LINK FT
You can access the FollowMyHealth Patient Portal offered by [facility] by registering at the following website: [email or web address]. By joining [employer]’s FollowMyHealth portal, you will also be able to view your health information using other applications (apps) compatible with our system.

## 2021-12-29 NOTE — ED PROVIDER NOTE - NSFOLLOWUPINSTRUCTIONS_ED_ALL_ED_FT
Merly un seguimiento con delgado doctor(a).  Descansar. Si ya está tomando medicamentos recetados, continúe con eso. No se realizaron cambios.  Para la congestión nasal, puede usar el aerosol nasal Flonase 1 en cada fosa nasal dos veces al día deniz el tiempo de enfermedad.  Para la tos, está kelton usar Robitussin DM o Delsym o Mucinex DM sin receta, sin embargo, tenga cuidado de no usarlo en exceso, ya que puede elevar la presión arterial (vigílelo si está preocupado).  Puede analilia ibuprofeno 600 mg o Tylenol 650 mg cada 4-6 horas para el dolor o la temperatura superior a 99,9.  Fiebre, escalofríos, debilidad, náuseas, vómitos, dificultad para respirar, dolor abdominal o síntomas nuevos que empeoran o continúan regresando al Departamento de Emergencias.    Follow up with your PMD.   Rest. If you are already on prescribed medicines, continue that. No changes made.  For nasal congestion, may use nasal spray Flonase 1 spray in each nostril twice per day during the time of illness.   For cough, okay to use Robitussin DM or Delsym or Mucinex DM over the counter, however, be mindful not to overuse as it may raise the blood pressure (monitor if concerned).   May take Ibuprofen 600mg or Tylenol 650mg every 4-6 hours for pain or temp greater than 99.9.   Worsening or continued fever, chills, weakness, nausea, vomiting, shortness of breath, abdominal pain or new concerning symptoms return to Emergency Department.

## 2021-12-29 NOTE — ED PROVIDER NOTE - OBJECTIVE STATEMENT
#  [id number] Fortino.   36F presents reporting 3 days of fever, sore throat, occasional rash and cough. + COVID contact. No abd pain or vomiting. No diarrhea. No SOB. No recent travel. No change in voice.

## 2021-12-29 NOTE — ED ADULT NURSE NOTE - NS ED NURSE RECORD ANOTHER VITAL SIGN
"Chief Complaint   Patient presents with     Procedure     internal hemorrhoids       Initial /82 (BP Location: Right arm, Patient Position: Sitting, Cuff Size: Adult Regular)   Pulse 51   Temp 97.7  F (36.5  C) (Temporal)   Resp 14   Wt 80.3 kg (177 lb)   LMP  (LMP Unknown)   SpO2 95%   BMI 30.38 kg/m   Estimated body mass index is 30.38 kg/m  as calculated from the following:    Height as of 7/5/21: 1.626 m (5' 4\").    Weight as of this encounter: 80.3 kg (177 lb).  Medication Reconciliation: complete    Damaris Barrett LPN    "
Yes

## 2021-12-29 NOTE — ED PROVIDER NOTE - CLINICAL SUMMARY MEDICAL DECISION MAKING FREE TEXT BOX
#  [id number] Fortino.   36F presents reporting 3 days of fever, sore throat, occasional rash and cough. + COVID contact. No abd pain or vomiting. No diarrhea. No SOB. No recent travel. No change in voice.   Exam WNL. No acute findings. Pt well appearing.   COVID testing sent.   Worsening, continued or ANY new concerning symptoms return to the emergency department.

## 2022-01-06 ENCOUNTER — APPOINTMENT (OUTPATIENT)
Dept: BARIATRICS/WEIGHT MGMT | Facility: CLINIC | Age: 37
End: 2022-01-06

## 2022-01-10 ENCOUNTER — RX RENEWAL (OUTPATIENT)
Age: 37
End: 2022-01-10

## 2022-01-11 ENCOUNTER — APPOINTMENT (OUTPATIENT)
Dept: BARIATRICS | Facility: CLINIC | Age: 37
End: 2022-01-11

## 2022-01-17 NOTE — ED PROVIDER NOTE - DURATION
Weekly Wound Education Note    Teaching Provided To: Patient  Training Topics: Discharge instructions;Cleasing and general instructions;Dressing  Training Method: Explain/Verbal;Written  Training Response: Patient responds and understands        Notes: NPW yesterday

## 2022-01-25 ENCOUNTER — APPOINTMENT (OUTPATIENT)
Dept: BARIATRICS/WEIGHT MGMT | Facility: CLINIC | Age: 37
End: 2022-01-25

## 2022-02-07 ENCOUNTER — RX RENEWAL (OUTPATIENT)
Age: 37
End: 2022-02-07

## 2022-02-08 ENCOUNTER — APPOINTMENT (OUTPATIENT)
Dept: FAMILY MEDICINE | Facility: CLINIC | Age: 37
End: 2022-02-08

## 2022-02-11 ENCOUNTER — TRANSCRIPTION ENCOUNTER (OUTPATIENT)
Age: 37
End: 2022-02-11

## 2022-03-01 ENCOUNTER — APPOINTMENT (OUTPATIENT)
Dept: FAMILY MEDICINE | Facility: CLINIC | Age: 37
End: 2022-03-01

## 2022-03-11 ENCOUNTER — APPOINTMENT (OUTPATIENT)
Dept: FAMILY MEDICINE | Facility: CLINIC | Age: 37
End: 2022-03-11

## 2022-03-21 ENCOUNTER — LABORATORY RESULT (OUTPATIENT)
Age: 37
End: 2022-03-21

## 2022-03-21 ENCOUNTER — APPOINTMENT (OUTPATIENT)
Dept: GASTROENTEROLOGY | Facility: CLINIC | Age: 37
End: 2022-03-21
Payer: COMMERCIAL

## 2022-03-21 ENCOUNTER — APPOINTMENT (OUTPATIENT)
Dept: FAMILY MEDICINE | Facility: CLINIC | Age: 37
End: 2022-03-21
Payer: COMMERCIAL

## 2022-03-21 VITALS
HEIGHT: 62 IN | HEART RATE: 96 BPM | WEIGHT: 217 LBS | DIASTOLIC BLOOD PRESSURE: 72 MMHG | SYSTOLIC BLOOD PRESSURE: 100 MMHG | BODY MASS INDEX: 39.93 KG/M2 | TEMPERATURE: 97.5 F | OXYGEN SATURATION: 98 % | RESPIRATION RATE: 16 BRPM

## 2022-03-21 VITALS
HEART RATE: 90 BPM | SYSTOLIC BLOOD PRESSURE: 116 MMHG | BODY MASS INDEX: 40.3 KG/M2 | HEIGHT: 62 IN | WEIGHT: 219 LBS | DIASTOLIC BLOOD PRESSURE: 70 MMHG | OXYGEN SATURATION: 98 %

## 2022-03-21 DIAGNOSIS — R10.13 EPIGASTRIC PAIN: ICD-10-CM

## 2022-03-21 PROCEDURE — 99204 OFFICE O/P NEW MOD 45 MIN: CPT

## 2022-03-21 PROCEDURE — 99214 OFFICE O/P EST MOD 30 MIN: CPT | Mod: 25

## 2022-03-21 RX ORDER — IBUPROFEN 800 MG/1
800 TABLET, FILM COATED ORAL TWICE DAILY
Qty: 60 | Refills: 0 | Status: DISCONTINUED | COMMUNITY
Start: 2021-12-07 | End: 2022-03-21

## 2022-03-21 RX ORDER — FAMOTIDINE 20 MG/1
20 TABLET, FILM COATED ORAL TWICE DAILY
Qty: 28 | Refills: 1 | Status: COMPLETED | COMMUNITY
Start: 2022-03-21 | End: 2022-04-18

## 2022-03-21 RX ORDER — SUCRALFATE 1 G/1
1 TABLET ORAL
Qty: 40 | Refills: 0 | Status: DISCONTINUED | COMMUNITY
Start: 2021-09-03 | End: 2022-03-21

## 2022-03-21 NOTE — REVIEW OF SYSTEMS
[Abdominal Pain] : abdominal pain [Nausea] : nausea [Constipation] : no constipation [Diarrhea] : diarrhea [Vomiting] : no vomiting [Joint Pain] : joint pain [Joint Stiffness] : joint stiffness [Muscle Pain] : muscle pain [Back Pain] : back pain [Negative] : Heme/Lymph [FreeTextEntry7] : RUQ pain, epigastric pain

## 2022-03-21 NOTE — PHYSICAL EXAM
[General Appearance - Well Nourished] : well nourished [General Appearance - Alert] : alert [General Appearance - Well Developed] : well developed [Sclera] : the sclera and conjunctiva were normal [Extraocular Movements] : extraocular movements were intact [Outer Ear] : the ears and nose were normal in appearance [Nasal Cavity] : the nasal mucosa and septum were normal [Neck Appearance] : the appearance of the neck was normal [Neck Cervical Mass (___cm)] : no neck mass was observed [Jugular Venous Distention Increased] : there was no jugular-venous distention [Respiration, Rhythm And Depth] : normal respiratory rhythm and effort [Auscultation Breath Sounds / Voice Sounds] : lungs were clear to auscultation bilaterally [Heart Rate And Rhythm] : heart rate was normal and rhythm regular [Murmurs] : no murmurs [Edema] : there was no peripheral edema [Bowel Sounds] : normal bowel sounds [Abdomen Soft] : soft [Abdomen Mass (___ Cm)] : no abdominal mass palpated [Epigastric] : in the epigastric area [RUQ] : in the right upper quadrant [No CVA Tenderness] : no ~M costovertebral angle tenderness [Abnormal Walk] : normal gait [Skin Color & Pigmentation] : normal skin color and pigmentation [] : no rash [No Focal Deficits] : no focal deficits [Oriented To Time, Place, And Person] : oriented to person, place, and time

## 2022-03-21 NOTE — HISTORY OF PRESENT ILLNESS
[FreeTextEntry8] : Ms Lydia Sanchez is a 37 yo female presents today for concerns of right upper quadrant pain, epigastric pain about 8/10 on/off for the last few days. Pt notes pain more after eating. Pt states last year around June 2021, was in ER for same issue at that time imaging negative for any acute findings, no gall stones detected, no cholecystitis. Pt presents today seeking referral to GI, states has upcoming appointment this afternoon at 4:20pm. Pt advised will need re imaging, US abdomen and labwork. Advised pt since pain worsening better to get evaluated ER. Pt report prefers to get evaluated first by GI, and has upcoming appointment this afternoon. Advised pt if pain continues to worsen go to ER. For now ordered US abdomen, advised to get it ASAP and collected labwork in office. 
no

## 2022-03-21 NOTE — ASSESSMENT
[FreeTextEntry1] : Get EGD. Explained the risks, benefits, indications, alternatives. The risks include but are not limited to bleeding, infection, perforation, reaction to anesthesia, or missed lesions. The patient verbalized understanding and wishes to proceed.\par \par Continue PPI. Trial of prn famotidine as well.\par \par Diet, exercise, weight management for fatty liver. Follow up after EGD for further workup of this.\par

## 2022-03-21 NOTE — ASSESSMENT
[FreeTextEntry1] : Advised pt in lieu of persistent RUQ pain, epigastric discomfort worsening, advised to get evaluated in ER. \par States she has upcoming appointment with GI this afternoon 4:20pm with Dr. Rdz, seeks referral.\par Would like to get evaluated by GI first prior to deciding if needed to go to ER. \par will collect labwork today, ordered US abdomen, advised to get imaging done ASAP. \par If pain continually worsens, reemphasized importance of prompt evaluation in ER.

## 2022-03-21 NOTE — PHYSICAL EXAM
[No Acute Distress] : no acute distress [EOMI] : extraocular movements intact [Supple] : supple [Clear to Auscultation] : lungs were clear to auscultation bilaterally [Normal S1, S2] : normal S1 and S2 [No Edema] : there was no peripheral edema [Non-distended] : non-distended [Normal Bowel Sounds] : normal bowel sounds [No Rash] : no rash [Normal Gait] : normal gait [Normal Affect] : the affect was normal [de-identified] : morbid obese [de-identified] : epigastric & RUQ tenderness upon deep palpation, positive plaza's sign [de-identified] : lower back pain, hypertonicity of paraspinal muscles lower lumbar region

## 2022-03-21 NOTE — HISTORY OF PRESENT ILLNESS
[Heartburn] : denies heartburn [Nausea] : denies nausea [Diarrhea] : denies diarrhea [Vomiting] : denies vomiting [Constipation] : denies constipation [Yellow Skin Or Eyes (Jaundice)] : denies jaundice [Abdominal Pain] : stable abdominal pain [Abdominal Swelling] : denies abdominal swelling [Rectal Pain] : denies rectal pain [Wt Gain ___ Lbs] : no recent weight gain [de-identified] : Longstanding epigastric pain and RUQ pain.\par Has had multiple imaging studies including abdominal US, CT, and HIDA which have been unrevealing other than showing fatty liver. Denies previous EGD.\par Pain seems to worsen with food.\par Denies rectal bleeding, melena, weight loss.

## 2022-03-22 LAB
ALBUMIN SERPL ELPH-MCNC: 4.5 G/DL
ALP BLD-CCNC: 103 U/L
ALT SERPL-CCNC: 28 U/L
AMYLASE/CREAT SERPL: 28 U/L
ANION GAP SERPL CALC-SCNC: 13 MMOL/L
APPEARANCE: CLEAR
AST SERPL-CCNC: 42 U/L
BASOPHILS # BLD AUTO: 0.05 K/UL
BASOPHILS NFR BLD AUTO: 0.4 %
BILIRUB SERPL-MCNC: 0.2 MG/DL
BILIRUBIN URINE: NEGATIVE
BLOOD URINE: NEGATIVE
BUN SERPL-MCNC: 9 MG/DL
CALCIUM SERPL-MCNC: 9.4 MG/DL
CHLORIDE SERPL-SCNC: 106 MMOL/L
CO2 SERPL-SCNC: 20 MMOL/L
COLOR: NORMAL
CREAT SERPL-MCNC: 0.46 MG/DL
EGFR: 128 ML/MIN/1.73M2
EOSINOPHIL # BLD AUTO: 0.43 K/UL
EOSINOPHIL NFR BLD AUTO: 3.3 %
GGT SERPL-CCNC: 6 U/L
GLUCOSE QUALITATIVE U: NEGATIVE
GLUCOSE SERPL-MCNC: 102 MG/DL
HCT VFR BLD CALC: 41.3 %
HGB BLD-MCNC: 13.4 G/DL
IMM GRANULOCYTES NFR BLD AUTO: 0.3 %
KETONES URINE: NEGATIVE
LEUKOCYTE ESTERASE URINE: NEGATIVE
LPL SERPL-CCNC: 29 U/L
LYMPHOCYTES # BLD AUTO: 3.4 K/UL
LYMPHOCYTES NFR BLD AUTO: 26.1 %
MAN DIFF?: NORMAL
MCHC RBC-ENTMCNC: 29.6 PG
MCHC RBC-ENTMCNC: 32.4 GM/DL
MCV RBC AUTO: 91.4 FL
MONOCYTES # BLD AUTO: 0.78 K/UL
MONOCYTES NFR BLD AUTO: 6 %
NEUTROPHILS # BLD AUTO: 8.31 K/UL
NEUTROPHILS NFR BLD AUTO: 63.9 %
NITRITE URINE: NEGATIVE
PH URINE: 5.5
PLATELET # BLD AUTO: NORMAL K/UL
POTASSIUM SERPL-SCNC: 4.7 MMOL/L
PROT SERPL-MCNC: 7 G/DL
PROTEIN URINE: NEGATIVE
RBC # BLD: 4.52 M/UL
RBC # FLD: 13.1 %
SODIUM SERPL-SCNC: 139 MMOL/L
SPECIFIC GRAVITY URINE: 1
T4 FREE SERPL-MCNC: 1 NG/DL
TSH SERPL-ACNC: 0.85 UIU/ML
UROBILINOGEN URINE: NORMAL
WBC # FLD AUTO: 13.01 K/UL

## 2022-04-11 ENCOUNTER — OUTPATIENT (OUTPATIENT)
Dept: OUTPATIENT SERVICES | Facility: HOSPITAL | Age: 37
LOS: 1 days | End: 2022-04-11
Payer: MEDICAID

## 2022-04-11 DIAGNOSIS — Z41.9 ENCOUNTER FOR PROCEDURE FOR PURPOSES OTHER THAN REMEDYING HEALTH STATE, UNSPECIFIED: Chronic | ICD-10-CM

## 2022-04-11 DIAGNOSIS — Z98.89 OTHER SPECIFIED POSTPROCEDURAL STATES: Chronic | ICD-10-CM

## 2022-04-11 DIAGNOSIS — Z20.828 CONTACT WITH AND (SUSPECTED) EXPOSURE TO OTHER VIRAL COMMUNICABLE DISEASES: ICD-10-CM

## 2022-04-11 LAB — SARS-COV-2 RNA SPEC QL NAA+PROBE: SIGNIFICANT CHANGE UP

## 2022-04-11 PROCEDURE — 87635 SARS-COV-2 COVID-19 AMP PRB: CPT

## 2022-04-13 ENCOUNTER — TRANSCRIPTION ENCOUNTER (OUTPATIENT)
Age: 37
End: 2022-04-13

## 2022-04-14 ENCOUNTER — RESULT REVIEW (OUTPATIENT)
Age: 37
End: 2022-04-14

## 2022-04-14 ENCOUNTER — OUTPATIENT (OUTPATIENT)
Dept: OUTPATIENT SERVICES | Facility: HOSPITAL | Age: 37
LOS: 1 days | End: 2022-04-14
Payer: MEDICAID

## 2022-04-14 ENCOUNTER — APPOINTMENT (OUTPATIENT)
Dept: GASTROENTEROLOGY | Facility: HOSPITAL | Age: 37
End: 2022-04-14

## 2022-04-14 DIAGNOSIS — Z98.89 OTHER SPECIFIED POSTPROCEDURAL STATES: Chronic | ICD-10-CM

## 2022-04-14 DIAGNOSIS — R10.13 EPIGASTRIC PAIN: ICD-10-CM

## 2022-04-14 DIAGNOSIS — Z41.9 ENCOUNTER FOR PROCEDURE FOR PURPOSES OTHER THAN REMEDYING HEALTH STATE, UNSPECIFIED: Chronic | ICD-10-CM

## 2022-04-14 LAB — GLUCOSE BLDC GLUCOMTR-MCNC: 104 MG/DL — HIGH (ref 70–99)

## 2022-04-14 PROCEDURE — 88305 TISSUE EXAM BY PATHOLOGIST: CPT

## 2022-04-14 PROCEDURE — 43239 EGD BIOPSY SINGLE/MULTIPLE: CPT

## 2022-04-14 PROCEDURE — 88312 SPECIAL STAINS GROUP 1: CPT

## 2022-04-14 PROCEDURE — 82962 GLUCOSE BLOOD TEST: CPT

## 2022-04-14 PROCEDURE — 88312 SPECIAL STAINS GROUP 1: CPT | Mod: 26

## 2022-04-14 PROCEDURE — 88305 TISSUE EXAM BY PATHOLOGIST: CPT | Mod: 26

## 2022-04-14 RX ORDER — SODIUM CHLORIDE 9 MG/ML
500 INJECTION INTRAMUSCULAR; INTRAVENOUS; SUBCUTANEOUS
Refills: 0 | Status: COMPLETED | OUTPATIENT
Start: 2022-04-14 | End: 2022-04-14

## 2022-04-14 RX ADMIN — SODIUM CHLORIDE 75 MILLILITER(S): 9 INJECTION INTRAMUSCULAR; INTRAVENOUS; SUBCUTANEOUS at 13:28

## 2022-04-18 LAB — SURGICAL PATHOLOGY STUDY: SIGNIFICANT CHANGE UP

## 2022-04-29 ENCOUNTER — APPOINTMENT (OUTPATIENT)
Dept: FAMILY MEDICINE | Facility: CLINIC | Age: 37
End: 2022-04-29

## 2022-04-29 ENCOUNTER — APPOINTMENT (OUTPATIENT)
Dept: GASTROENTEROLOGY | Facility: CLINIC | Age: 37
End: 2022-04-29

## 2022-04-29 ENCOUNTER — APPOINTMENT (OUTPATIENT)
Dept: GASTROENTEROLOGY | Facility: CLINIC | Age: 37
End: 2022-04-29
Payer: MEDICAID

## 2022-04-29 VITALS
OXYGEN SATURATION: 97 % | HEIGHT: 62 IN | WEIGHT: 209 LBS | BODY MASS INDEX: 38.46 KG/M2 | DIASTOLIC BLOOD PRESSURE: 60 MMHG | HEART RATE: 92 BPM | SYSTOLIC BLOOD PRESSURE: 110 MMHG

## 2022-04-29 DIAGNOSIS — R10.9 UNSPECIFIED ABDOMINAL PAIN: ICD-10-CM

## 2022-04-29 PROCEDURE — 99212 OFFICE O/P EST SF 10 MIN: CPT

## 2022-04-29 NOTE — ASSESSMENT
[FreeTextEntry1] : Discussed results with patient. \par \par If no relief from PPI, should stop this. She agrees.\par \par Get RUQ sono. May need HIDA scan to evaluate for gallbladder dyskinesia as a possible cause given her location of pain and post-prandial occurrence of pain with relatively unimpressive EGD. She agrees.\par \par Follow up in 2-3 weeks.

## 2022-04-29 NOTE — HISTORY OF PRESENT ILLNESS
[de-identified] : Follow up visit after EGD. Mild gastritis noted, biopsies unremarkable.\par No relief with omeprazole.\par Pain post-prandial, worse with fatty meals, RUQ.

## 2022-05-06 ENCOUNTER — OUTPATIENT (OUTPATIENT)
Dept: OUTPATIENT SERVICES | Facility: HOSPITAL | Age: 37
LOS: 1 days | End: 2022-05-06
Payer: MEDICAID

## 2022-05-06 ENCOUNTER — APPOINTMENT (OUTPATIENT)
Dept: ULTRASOUND IMAGING | Facility: HOSPITAL | Age: 37
End: 2022-05-06
Payer: MEDICAID

## 2022-05-06 DIAGNOSIS — Z98.89 OTHER SPECIFIED POSTPROCEDURAL STATES: Chronic | ICD-10-CM

## 2022-05-06 DIAGNOSIS — Z41.9 ENCOUNTER FOR PROCEDURE FOR PURPOSES OTHER THAN REMEDYING HEALTH STATE, UNSPECIFIED: Chronic | ICD-10-CM

## 2022-05-06 DIAGNOSIS — R10.11 RIGHT UPPER QUADRANT PAIN: ICD-10-CM

## 2022-05-06 PROCEDURE — 76700 US EXAM ABDOM COMPLETE: CPT

## 2022-05-06 PROCEDURE — 76700 US EXAM ABDOM COMPLETE: CPT | Mod: 26

## 2022-05-18 ENCOUNTER — NON-APPOINTMENT (OUTPATIENT)
Age: 37
End: 2022-05-18

## 2022-05-18 ENCOUNTER — APPOINTMENT (OUTPATIENT)
Dept: FAMILY MEDICINE | Facility: CLINIC | Age: 37
End: 2022-05-18
Payer: MEDICAID

## 2022-05-18 VITALS
WEIGHT: 212 LBS | HEART RATE: 74 BPM | RESPIRATION RATE: 14 BRPM | TEMPERATURE: 98 F | OXYGEN SATURATION: 98 % | HEIGHT: 62 IN | SYSTOLIC BLOOD PRESSURE: 102 MMHG | BODY MASS INDEX: 39.01 KG/M2 | DIASTOLIC BLOOD PRESSURE: 68 MMHG

## 2022-05-18 PROCEDURE — 99395 PREV VISIT EST AGE 18-39: CPT | Mod: 25

## 2022-05-18 PROCEDURE — 93005 ELECTROCARDIOGRAM TRACING: CPT

## 2022-05-18 NOTE — HISTORY OF PRESENT ILLNESS
[FreeTextEntry1] : comprehensive visit [de-identified] : Ms Lydia Sanchez is a 36 yo female presents today for comprehensive exam. Pt advised today will provide referral to [facility] for Weight management. Pt also followed up with ObGYN Dr. Beasley. Pt states up to date with pap smear advised to follow up back with Dr. Beasley. Pt also reports seen Dr. Kendell CROWE, had EGD, found to gastritis, advised to limit acidic foods, weight loss. Pt was advised about US abdomen which showed no gallstone, but advised about fatty liver, advised again weight loss.

## 2022-05-18 NOTE — PHYSICAL EXAM
[No Acute Distress] : no acute distress [EOMI] : extraocular movements intact [Supple] : supple [Clear to Auscultation] : lungs were clear to auscultation bilaterally [Normal S1, S2] : normal S1 and S2 [No Edema] : there was no peripheral edema [Soft] : abdomen soft [Non-distended] : non-distended [Normal Bowel Sounds] : normal bowel sounds [No Rash] : no rash [Normal Gait] : normal gait [Normal Affect] : the affect was normal [de-identified] :  obese [de-identified] : mild RUQ tenderness [de-identified] : lower back pain, hypertonicity of paraspinal muscles lower lumbar region, mild

## 2022-05-18 NOTE — HEALTH RISK ASSESSMENT
[Good] : ~his/her~  mood as  good [Never] : Never [No] : In the past 12 months have you used drugs other than those required for medical reasons? No [0] : 2) Feeling down, depressed, or hopeless: Not at all (0) Detail Level: Detailed [PHQ-2 Negative - No further assessment needed] : PHQ-2 Negative - No further assessment needed Depth Of Biopsy: dermis [Patient reported PAP Smear was normal] : Patient reported PAP Smear was normal Was A Bandage Applied: Yes [HIV Test offered] : HIV Test offered Size Of Lesion In Cm: 0 [Hepatitis C test offered] : Hepatitis C test offered Biopsy Type: H and E [None] : None Biopsy Method: Personna blade [With Family] : lives with family Anesthesia Type: 2% lidocaine with epinephrine [Employed] : employed Anesthesia Volume In Cc (Will Not Render If 0): 0.5 [Less Than High School] : less than high school Hemostasis: Drysol [] :  Wound Care: Vaseline [# Of Children ___] : has [unfilled] children Dressing: bandage [Sexually Active] : sexually active Destruction After The Procedure: No [Feels Safe at Home] : Feels safe at home Type Of Destruction Used: Curettage [Fully functional (bathing, dressing, toileting, transferring, walking, feeding)] : Fully functional (bathing, dressing, toileting, transferring, walking, feeding) Curettage Text: The wound bed was treated with curettage after the biopsy was performed. [Fully functional (using the telephone, shopping, preparing meals, housekeeping, doing laundry, using] : Fully functional and needs no help or supervision to perform IADLs (using the telephone, shopping, preparing meals, housekeeping, doing laundry, using transportation, managing medications and managing finances) Cryotherapy Text: The wound bed was treated with cryotherapy after the biopsy was performed. [Smoke Detector] : smoke detector Electrodesiccation Text: The wound bed was treated with electrodesiccation after the biopsy was performed. [Carbon Monoxide Detector] : carbon monoxide detector Electrodesiccation And Curettage Text: The wound bed was treated with electrodesiccation and curettage after the biopsy was performed. [Safety elements used in home] : safety elements used in home Silver Nitrate Text: The wound bed was treated with silver nitrate after the biopsy was performed. [Seat Belt] :  uses seat belt Lab: [id number] [Sunscreen] : uses sunscreen Lab Facility: [id number] [With Patient/Caregiver] : , with patient/caregiver Consent: Verbal consent was obtained and risks were reviewed including but not limited to scarring, infection, bleeding, scabbing, incomplete removal, nerve damage and allergy to anesthesia. [Reviewed no changes] : Reviewed, no changes Post-Care Instructions: I reviewed with the patient in detail post-care instructions. Patient is to keep the biopsy site dry overnight, and then apply vaseline twice daily under a bandaid until healed. [Name: ___] : Health Care Proxy's Name: [unfilled]  Notification Instructions: Patient will be notified of biopsy results. However, patient instructed to call the office if not contacted within 2 weeks. [Relationship: ___] : Relationship: [unfilled] Billing Type: Third-Party Bill [Aggressive treatment] : aggressive treatment Information: Selecting Yes will display possible errors in your note based on the variables you have selected. This validation is only offered as a suggestion for you. PLEASE NOTE THAT THE VALIDATION TEXT WILL BE REMOVED WHEN YOU FINALIZE YOUR NOTE. IF YOU WANT TO FAX A PRELIMINARY NOTE YOU WILL NEED TO TOGGLE THIS TO 'NO' IF YOU DO NOT WANT IT IN YOUR FAXED NOTE. [[id number]] : 0 [Change in mental status noted] : No change in mental status noted [Language] : denies difficulty with language [Behavior] : denies difficulty with behavior [Learning/Retaining New Information] : denies difficulty learning/retaining new information [Handling Complex Tasks] : denies difficulty handling complex tasks [Reasoning] : denies difficulty with reasoning [Spatial Ability and Orientation] : denies difficulty with spatial ability and orientation [High Risk Behavior] : no high risk behavior [Reports changes in hearing] : Reports no changes in hearing [Reports changes in vision] : Reports no changes in vision [Reports changes in dental health] : Reports no changes in dental health [Guns at Home] : no guns at home [PapSmearDate] : 2021 [PapSmearComments] : f/u with Dr. Beasley (ob/gyn) [AdvancecareDate] : 0518/2022

## 2022-05-18 NOTE — ASSESSMENT
[FreeTextEntry1] : completed physical exam, blood work and urinalysis ordered today, will follow up accordingly.\par advised importance of further weight loss\par referral provided to [facility] for Weight Management\par exercise, portion control, weight loss encouraged\par f/u with ob/gyn need for new pap smear\par up to date with COVID-19, flu vaccine, Tdap vaccine.

## 2022-05-18 NOTE — REVIEW OF SYSTEMS
[Abdominal Pain] : abdominal pain [Back Pain] : back pain [Negative] : Heme/Lymph [Nausea] : no nausea [Constipation] : no constipation [Diarrhea] : diarrhea [Vomiting] : no vomiting [Joint Pain] : no joint pain [Joint Stiffness] : no joint stiffness [Muscle Pain] : no muscle pain [FreeTextEntry7] : mild  [FreeTextEntry9] : mild pain

## 2022-05-20 ENCOUNTER — APPOINTMENT (OUTPATIENT)
Dept: GASTROENTEROLOGY | Facility: CLINIC | Age: 37
End: 2022-05-20
Payer: MEDICAID

## 2022-05-20 VITALS
HEART RATE: 82 BPM | DIASTOLIC BLOOD PRESSURE: 80 MMHG | SYSTOLIC BLOOD PRESSURE: 110 MMHG | BODY MASS INDEX: 39.01 KG/M2 | WEIGHT: 212 LBS | OXYGEN SATURATION: 99 % | HEIGHT: 62 IN

## 2022-05-20 PROCEDURE — 99212 OFFICE O/P EST SF 10 MIN: CPT

## 2022-05-20 RX ORDER — OMEPRAZOLE 40 MG/1
40 CAPSULE, DELAYED RELEASE ORAL
Qty: 30 | Refills: 0 | Status: DISCONTINUED | COMMUNITY
Start: 2021-08-20 | End: 2022-05-20

## 2022-05-20 RX ORDER — ATORVASTATIN CALCIUM 40 MG/1
40 TABLET, FILM COATED ORAL
Qty: 90 | Refills: 0 | Status: DISCONTINUED | COMMUNITY
Start: 2021-04-06 | End: 2022-05-20

## 2022-05-20 NOTE — REVIEW OF SYSTEMS
[As Noted in HPI] : as noted in HPI [Abdominal Pain] : abdominal pain [Negative] : Heme/Lymph [Vomiting] : no vomiting [Constipation] : no constipation [Diarrhea] : no diarrhea

## 2022-05-20 NOTE — HISTORY OF PRESENT ILLNESS
[de-identified] : Still with post-prandial RUQ pain.\par Medications not effective.\par US showed fatty liver. GB within normal limits.

## 2022-05-20 NOTE — PHYSICAL EXAM
[General Appearance - Alert] : alert [General Appearance - Well Nourished] : well nourished [General Appearance - Well Developed] : well developed [Sclera] : the sclera and conjunctiva were normal [Extraocular Movements] : extraocular movements were intact [Outer Ear] : the ears and nose were normal in appearance [Nasal Cavity] : the nasal mucosa and septum were normal [Neck Appearance] : the appearance of the neck was normal [Neck Cervical Mass (___cm)] : no neck mass was observed [Jugular Venous Distention Increased] : there was no jugular-venous distention [Respiration, Rhythm And Depth] : normal respiratory rhythm and effort [Auscultation Breath Sounds / Voice Sounds] : lungs were clear to auscultation bilaterally [Heart Rate And Rhythm] : heart rate was normal and rhythm regular [Murmurs] : no murmurs [Edema] : there was no peripheral edema [Bowel Sounds] : normal bowel sounds [Abdomen Soft] : soft [Abdomen Mass (___ Cm)] : no abdominal mass palpated [Epigastric] : in the epigastric area [RUQ] : in the right upper quadrant [No CVA Tenderness] : no ~M costovertebral angle tenderness [Abnormal Walk] : normal gait [Skin Color & Pigmentation] : normal skin color and pigmentation [] : no rash [No Focal Deficits] : no focal deficits [Oriented To Time, Place, And Person] : oriented to person, place, and time

## 2022-05-23 LAB
25(OH)D3 SERPL-MCNC: 20.4 NG/ML
ALBUMIN SERPL ELPH-MCNC: 4.4 G/DL
ALP BLD-CCNC: 111 U/L
ALT SERPL-CCNC: 25 U/L
ANION GAP SERPL CALC-SCNC: 13 MMOL/L
APPEARANCE: CLEAR
AST SERPL-CCNC: 19 U/L
BASOPHILS # BLD AUTO: 0.04 K/UL
BASOPHILS NFR BLD AUTO: 0.5 %
BILIRUB SERPL-MCNC: 0.4 MG/DL
BILIRUBIN URINE: NEGATIVE
BLOOD URINE: NEGATIVE
BUN SERPL-MCNC: 10 MG/DL
CALCIUM SERPL-MCNC: 9.5 MG/DL
CHLORIDE SERPL-SCNC: 104 MMOL/L
CHOLEST SERPL-MCNC: 212 MG/DL
CO2 SERPL-SCNC: 23 MMOL/L
COLOR: YELLOW
CREAT SERPL-MCNC: 0.55 MG/DL
EGFR: 122 ML/MIN/1.73M2
EOSINOPHIL # BLD AUTO: 0.55 K/UL
EOSINOPHIL NFR BLD AUTO: 6.2 %
ESTIMATED AVERAGE GLUCOSE: 128 MG/DL
FOLATE SERPL-MCNC: 19 NG/ML
GLUCOSE QUALITATIVE U: NEGATIVE
GLUCOSE SERPL-MCNC: 121 MG/DL
HBA1C MFR BLD HPLC: 6.1 %
HCT VFR BLD CALC: 43 %
HCV AB SER QL: NONREACTIVE
HCV S/CO RATIO: 0.09 S/CO
HDLC SERPL-MCNC: 55 MG/DL
HGB BLD-MCNC: 13.9 G/DL
HIV1+2 AB SPEC QL IA.RAPID: NONREACTIVE
IMM GRANULOCYTES NFR BLD AUTO: 0.2 %
KETONES URINE: NEGATIVE
LDLC SERPL CALC-MCNC: 134 MG/DL
LDLC SERPL DIRECT ASSAY-MCNC: 145 MG/DL
LEUKOCYTE ESTERASE URINE: NEGATIVE
LYMPHOCYTES # BLD AUTO: 3.03 K/UL
LYMPHOCYTES NFR BLD AUTO: 34.4 %
MAN DIFF?: NORMAL
MCHC RBC-ENTMCNC: 28.8 PG
MCHC RBC-ENTMCNC: 32.3 GM/DL
MCV RBC AUTO: 89 FL
MONOCYTES # BLD AUTO: 0.73 K/UL
MONOCYTES NFR BLD AUTO: 8.3 %
NEUTROPHILS # BLD AUTO: 4.45 K/UL
NEUTROPHILS NFR BLD AUTO: 50.4 %
NITRITE URINE: NEGATIVE
NONHDLC SERPL-MCNC: 157 MG/DL
PH URINE: 6.5
PLATELET # BLD AUTO: 231 K/UL
POTASSIUM SERPL-SCNC: 4.3 MMOL/L
PROT SERPL-MCNC: 7.1 G/DL
PROTEIN URINE: NEGATIVE
RBC # BLD: 4.83 M/UL
RBC # FLD: 12.9 %
SODIUM SERPL-SCNC: 140 MMOL/L
SPECIFIC GRAVITY URINE: 1.01
T4 FREE SERPL-MCNC: 1.3 NG/DL
TRIGL SERPL-MCNC: 113 MG/DL
TSH SERPL-ACNC: 1.2 UIU/ML
UROBILINOGEN URINE: NORMAL
VIT B12 SERPL-MCNC: 727 PG/ML
WBC # FLD AUTO: 8.82 K/UL

## 2022-06-01 ENCOUNTER — APPOINTMENT (OUTPATIENT)
Dept: CT IMAGING | Facility: HOSPITAL | Age: 37
End: 2022-06-01

## 2022-06-01 NOTE — ASU PREOP CHECKLIST - HAND OFF
Chief Complaint   Patient presents with   • Follow-up     6 month f/u      HISTORY OF PRESENT ILLNESS:  80 year old male here for routine follow-up He is overall doing well feeling well without complaints.  Denies any chest pain shortness of breath fevers or chills body aches or other issues at the current time.    He was hospitalized for pulmonary embolism.  He is now on Xarelto.  I  Past Medical History:   Diagnosis Date   • Bilateral leg edema    • Blood clot associated with vein wall inflammation    • History of colon polyps    • Hyperglycemia    • Osteoarthritis    • Other spondylosis with radiculopathy, lumbar region 2014   • Polyneuropathy 2014   • Scrotal edema      Past Surgical History:   Procedure Laterality Date   • Back surgery     • Colonoscopy  04/15/2002   • Colonoscopy w/ polypectomy  2011   • Joint replacement Right 2016    total hip-anterior   • Synovectomy  2010   • Total hip replacement  2007     Social History     Socioeconomic History   • Marital status: /Civil Union     Spouse name: Not on file   • Number of children: Not on file   • Years of education: Not on file   • Highest education level: Not on file   Occupational History   • Not on file   Tobacco Use   • Smoking status: Former Smoker     Packs/day: 1.00     Years: 35.00     Pack years: 35.00     Types: Cigarettes     Quit date: 2008     Years since quittin.4   • Smokeless tobacco: Never Used   Substance and Sexual Activity   • Alcohol use: Yes     Alcohol/week: 2.0 standard drinks     Types: 2 Glasses of wine per week   • Drug use: Never   • Sexual activity: Not on file   Other Topics Concern   •  Service Not Asked   • Blood Transfusions Not Asked   • Caffeine Concern Not Asked   • Occupational Exposure Not Asked   • Hobby Hazards Not Asked   • Sleep Concern Not Asked   • Stress Concern Not Asked   • Weight Concern Not Asked   • Special Diet Not Asked   • Back Care Not Asked  yes   • Exercise Not Asked   • Bike Helmet Not Asked   • Seat Belt Yes   • Self-Exams Not Asked   Social History Narrative   • Not on file     Social Determinants of Health     Financial Resource Strain: Not on file   Food Insecurity: Not on file   Transportation Needs: Not on file   Physical Activity: Not on file   Stress: Not on file   Social Connections: Not on file   Intimate Partner Violence: Not At Risk   • Social Determinants: Intimate Partner Violence Past Fear: No   • Social Determinants: Intimate Partner Violence Current Fear: No     ALLERGIES:  No Known Allergies  Family History   Problem Relation Age of Onset   • Stroke Mother      Review of patient's family status indicates:    Father                                       82    Mother                                       58    Brother                        Alive                     Brother                        Alive                     Brother                        Alive                     Sister                         Alive                     Current Outpatient Medications   Medication Sig Dispense Refill   • Xarelto 20 MG Tab TAKE 1 TABLET BY MOUTH DAILY WITH DINNER 30 tablet 0   • furosemide (LASIX) 20 MG tablet TAKE 1 TABLET BY MOUTH DAILY 90 tablet 1   • Elastic Bandages & Supports (JOBST RELIEF 30-40MMHG MEDIUM) MISC 1 each once. 1 each 3   • Elastic Bandages & Supports (JOBST KNEE HIGH COMPRESSION SM) MISC Please fit 1 each 3   • atorvastatin (LIPITOR) 80 MG tablet Take 1 tablet by mouth nightly. 30 tablet 0   • famotidine (PEPCID) 20 MG tablet Take 1 tablet by mouth every 12 hours. 60 tablet 0   • amLODIPine (NORVASC) 5 MG tablet Take 1 tablet by mouth daily. 30 tablet 0     No current facility-administered medications for this visit.       REVIEW OF SYSTEMS:    See History of Present Illness, otherwise, all systems reviewed and are negative.        PHYSICAL EXAM:  Visit Vitals  /68   Pulse (!) 47   Resp 16   Ht 5' 8\"  (1.727 m)   Wt 102 kg (224 lb 12.8 oz)   SpO2 97%   BMI 34.18 kg/m²     Constitutional:  Patient is alert, pleasant, in no acute distress. Well-developed, well-nourished who appears his stated age. Alert and oriented x3 with normal mood and affect.  .  Respiratory:  Clear to auscultation bilaterally without any wheezes, rhonchi or rales. he is moving air very well. No accessory muscles of respiration.  Cardiovascular:  Normal rate, normal rhythm, no murmurs, no gallops, no rubs.   Gastrointestinal:  Soft, nontender and nondistended. Normal active bowel sounds. No hepatosplenomegaly. No rebound or guarding.     Psychiatric:  Speech and behavior appropriate.    ASSESSMENT:  Other pulmonary embolism without acute cor pulmonale, unspecified chronicity (CMS/HCC)  Continue on Xarelto.  He is doing well from the standpoint    Hyperglycemia  Stable most recent hemoglobin A1c is 5.7    Primary insomnia  Stable no medications    Bilateral leg edema  Stable    Questionable TIA he was started on Lipitor Norvasc but he is no longer taking his medications per his decision      Follow-up in 6 months for Medicare wellness visit

## 2022-06-10 ENCOUNTER — APPOINTMENT (OUTPATIENT)
Dept: GASTROENTEROLOGY | Facility: CLINIC | Age: 37
End: 2022-06-10

## 2022-08-02 ENCOUNTER — APPOINTMENT (OUTPATIENT)
Dept: BARIATRICS | Facility: CLINIC | Age: 37
End: 2022-08-02

## 2022-08-02 ENCOUNTER — APPOINTMENT (OUTPATIENT)
Dept: FAMILY MEDICINE | Facility: CLINIC | Age: 37
End: 2022-08-02

## 2022-08-02 VITALS
TEMPERATURE: 97.3 F | OXYGEN SATURATION: 99 % | HEIGHT: 62 IN | BODY MASS INDEX: 37.73 KG/M2 | RESPIRATION RATE: 14 BRPM | WEIGHT: 205 LBS | HEART RATE: 77 BPM | SYSTOLIC BLOOD PRESSURE: 114 MMHG | DIASTOLIC BLOOD PRESSURE: 78 MMHG

## 2022-08-02 VITALS
HEART RATE: 77 BPM | HEIGHT: 62 IN | TEMPERATURE: 96.7 F | OXYGEN SATURATION: 99 % | BODY MASS INDEX: 37.89 KG/M2 | SYSTOLIC BLOOD PRESSURE: 114 MMHG | WEIGHT: 205.91 LBS | DIASTOLIC BLOOD PRESSURE: 78 MMHG

## 2022-08-02 PROCEDURE — 99401 PREV MED CNSL INDIV APPRX 15: CPT

## 2022-08-02 PROCEDURE — 99214 OFFICE O/P EST MOD 30 MIN: CPT

## 2022-08-02 PROCEDURE — 99214 OFFICE O/P EST MOD 30 MIN: CPT | Mod: 25

## 2022-08-02 NOTE — PHYSICAL EXAM
[No Acute Distress] : no acute distress [EOMI] : extraocular movements intact [Supple] : supple [Clear to Auscultation] : lungs were clear to auscultation bilaterally [Normal S1, S2] : normal S1 and S2 [No Edema] : there was no peripheral edema [Soft] : abdomen soft [Non-distended] : non-distended [Normal Bowel Sounds] : normal bowel sounds [No Rash] : no rash [Normal Gait] : normal gait [Normal Affect] : the affect was normal [de-identified] :  obese

## 2022-08-02 NOTE — HISTORY OF PRESENT ILLNESS
[FreeTextEntry1] : follow up for bariatric gastric sleeve consideration, letter regarding current treatment for prediabetes/diabetes [de-identified] : Ms Lydia Sanchez is a 36 yo female presents today for routine follow up. Pt planning on having bariatric surgery, gastric sleeve. Pt seeks a letter outlining her plan for her medication Metformin perioperatively. Pt was advised today, she is taking regular release metformin which can be crushed/cut if having difficulty taking the medication. Currently taking BID, plan to reduce to once a day post bariatric surgery, with plans to discontinue with completely with further meaningful weight loss and hgbA1c trending down. Pt also seeks a referral to endocrinology which was provided today.

## 2022-08-02 NOTE — ASSESSMENT
[FreeTextEntry1] : letter provided to pt\par referral to endocrinology provided as per pt request\par advised further weight loss, low carb, low chol diet\par RTO 1 month post bariatric surgery for follow up and repeat labwork.

## 2022-08-02 NOTE — REVIEW OF SYSTEMS
[Abdominal Pain] : no abdominal pain [Nausea] : no nausea [Constipation] : no constipation [Diarrhea] : diarrhea [Vomiting] : no vomiting [Joint Pain] : no joint pain [Joint Stiffness] : no joint stiffness [Muscle Pain] : no muscle pain [Back Pain] : no back pain [Negative] : Heme/Lymph

## 2022-08-05 ENCOUNTER — NON-APPOINTMENT (OUTPATIENT)
Age: 37
End: 2022-08-05

## 2022-08-05 ENCOUNTER — APPOINTMENT (OUTPATIENT)
Dept: CARDIOLOGY | Facility: CLINIC | Age: 37
End: 2022-08-05

## 2022-08-05 VITALS
BODY MASS INDEX: 37.5 KG/M2 | OXYGEN SATURATION: 96 % | HEART RATE: 74 BPM | SYSTOLIC BLOOD PRESSURE: 111 MMHG | TEMPERATURE: 97.4 F | DIASTOLIC BLOOD PRESSURE: 73 MMHG | RESPIRATION RATE: 20 BRPM | WEIGHT: 205 LBS

## 2022-08-05 PROCEDURE — 93000 ELECTROCARDIOGRAM COMPLETE: CPT | Mod: NC

## 2022-08-05 PROCEDURE — 99203 OFFICE O/P NEW LOW 30 MIN: CPT | Mod: 25

## 2022-08-08 NOTE — ASSESSMENT
[FreeTextEntry1] : 37 year old F presents to the office today to resume workup for Laparoscopic Sleeve Gastrectomy. Pt is concerned with recently diagnosed pre-DM and fatty liver - U/S abd 5/6/2022 demonstrates steatosis hepatitis. Weight lost since last visit 10/12/2021.\par Doing well overall.\par \par Counseled pt for 15 minutes about health maintenance principles including diet and exercise in preparation for surgery\par Protein focus diet and 3 meals/day\par Increase zero calorie liquid intake per day - goal 64oz/day\par Increase activities and keep track of steps - goal 8-10k steps/day\par Limit caffeine intake\par \par Labs performed 5/21/2022, results reviewed with pt - elevated HbA1c 6.1, vitamin D 20.4 (taking over the counter supplement)\par Continue workup for planned Laparoscopic Sleeve Gastrectomy\par Will make appointments for Pulmonary/Cardiology evaluations\par EGD 4/14/2022: Healing antral gastritis, path is neg for H.pylori \par All questions answered \par \par Return to office in one month\par Follow up with Nutritionist Khalida Umaña and Dr. JELANI Shaver Psychologist as scheduled\par Obesity Medicine referral\par Pt seen with Dr. Win\par \par Additional time spent before and after visit reviewing chart

## 2022-08-08 NOTE — HISTORY OF PRESENT ILLNESS
[de-identified] : 37 year old F presents to the office today to possibly resume workup for Laparoscopic Sleeve Gastrectomy versus non-surgical alternatives to weight loss. Pt is concerned with recently diagnosed pre-DM and fatty liver - U/S abd 5/6/2022 demonstrates steatosis hepatitis. Weight lost since last visit 10/12/2021. Pt continues to try to make better food choices, consuming 3 meals/day trying to focus on protein and vegetables. Reports no snacking. Drinking zero calorie liquid, 40oz/day. Pt is walking daily - works as housekeeping. Sleeping 7-8 hr/night. No abdominal pain, reflux, nausea, vomiting, constipation or diarrhea. Pt adds also concerned about losing weight with history of PCOS.\par \par EDG 4/14/2022: Healing antral gastritis

## 2022-08-11 NOTE — PHYSICAL EXAM
[Normal] : alert and oriented, normal memory [de-identified] : obese, young woman, no acute distress [de-identified] : supple, no carotid bruits b/l [de-identified] : JVP ~ 7 cm H20, RRR, s1, s2, no murmurs [de-identified] : unlabored respirations, clear lung fields [de-identified] : obese

## 2022-08-11 NOTE — CARDIOLOGY SUMMARY
[de-identified] : ECG (8/5/22): normal sinus rhythm, nonspecific ST abnormalities  [de-identified] : TTE (2016): Normal LV systolic function. Normal RV. Mild TR. No pericardial effusion.

## 2022-08-11 NOTE — REVIEW OF SYSTEMS
[Headache] : no headache [Blurry Vision] : no blurred vision [Earache] : no earache [SOB] : no shortness of breath [Chest Discomfort] : no chest discomfort [Lower Ext Edema] : no extremity edema [Orthopnea] : no orthopnea [Cough] : no cough [Abdominal Pain] : no abdominal pain [Rash] : no rash [Dizziness] : no dizziness [Confusion] : no confusion was observed [Easy Bruising] : no tendency for easy bruising

## 2022-08-11 NOTE — HISTORY OF PRESENT ILLNESS
[FreeTextEntry1] : Lydia Sanchez is a 37 year old obese woman with past medical history of Hyperlipidemia, Pre-diabetes mellitus and Fatty liver disease who presents for pre-operative cardiac risk stratification prior to bariatric surgery.\par \par The patient reports that she has exercised for a long time and it is difficult for her due to back pain, she has had difficulty losing weight. In general, reports a normal exercise tolerance, denies chest discomfort or shortness of breath. Denies palpitations or syncope. Denies orthopnea. Denies any recent hospitalizations. Denies ever being evaluated by a cardiologist in the past.

## 2022-08-11 NOTE — ASSESSMENT
[FreeTextEntry1] : Assessment:\par Lydia Sanchez is a 37 year old obese woman with past medical history of Hyperlipidemia, Pre-diabetes mellitus and Fatty liver disease who presents for pre-operative cardiac risk stratification prior to bariatric surgery; laparoscopic sleeve gastrectomy. \par \par The patient appears to have had exercised in the past for some time without any success in losing weight she states. Her exercise tolerance appears > 4 METs, with no concerning symptoms of angina or dyspnea. ECG consistent with normal sinus rhythm. BP normotensive and physical exam unremarkable. No signs of congestive heart failure on exam. No signs/symptoms of ischemic heart disease. Prior echocardiogram from 2016 was unremarkable.\par \par Recommendations:\par [] Cardiac pre-operative risk stratification: As requested by Bariatric surgery office, ECG done today, will check echocardiogram and exercise treadmill stress test for risk stratification. \par [] Hyperlipidemia: 10 yr ASCVD risk score not applicable due to age, however LDL direct of 145 mg/dl is elevated. Recommend lifestyle modifications with exercise regimen and low sodium/low fat diet. Patient should have repeat lipid panel in 3 months\par [] Return to office: 3-4 weeks after testing for final risk stratification

## 2022-08-12 ENCOUNTER — APPOINTMENT (OUTPATIENT)
Dept: CARDIOLOGY | Facility: CLINIC | Age: 37
End: 2022-08-12

## 2022-08-12 VITALS
TEMPERATURE: 97.4 F | RESPIRATION RATE: 19 BRPM | OXYGEN SATURATION: 97 % | DIASTOLIC BLOOD PRESSURE: 60 MMHG | HEART RATE: 70 BPM | SYSTOLIC BLOOD PRESSURE: 104 MMHG | WEIGHT: 206 LBS | BODY MASS INDEX: 37.68 KG/M2

## 2022-08-12 PROCEDURE — 93015 CV STRESS TEST SUPVJ I&R: CPT

## 2022-08-16 ENCOUNTER — APPOINTMENT (OUTPATIENT)
Dept: PULMONOLOGY | Facility: CLINIC | Age: 37
End: 2022-08-16

## 2022-08-16 VITALS
BODY MASS INDEX: 37.73 KG/M2 | DIASTOLIC BLOOD PRESSURE: 60 MMHG | TEMPERATURE: 98.1 F | HEART RATE: 80 BPM | SYSTOLIC BLOOD PRESSURE: 110 MMHG | OXYGEN SATURATION: 97 % | HEIGHT: 62 IN | WEIGHT: 205 LBS

## 2022-08-16 PROCEDURE — 99203 OFFICE O/P NEW LOW 30 MIN: CPT

## 2022-08-16 NOTE — PHYSICAL EXAM
[No Acute Distress] : no acute distress [Normal Oropharynx] : normal oropharynx [Normal Appearance] : normal appearance [Neck Circumference: ___] : neck circumference: [unfilled] [No Neck Mass] : no neck mass [Normal Rate/Rhythm] : normal rate/rhythm [Normal S1, S2] : normal s1, s2 [No Murmurs] : no murmurs [No Resp Distress] : no resp distress [Clear to Auscultation Bilaterally] : clear to auscultation bilaterally [No Abnormalities] : no abnormalities [Benign] : benign [Normal Gait] : normal gait [No Clubbing] : no clubbing [No Cyanosis] : no cyanosis [No Edema] : no edema [FROM] : FROM [Normal Color/ Pigmentation] : normal color/ pigmentation [No Focal Deficits] : no focal deficits [Oriented x3] : oriented x3 [Normal Affect] : normal affect

## 2022-08-25 ENCOUNTER — OUTPATIENT (OUTPATIENT)
Dept: OUTPATIENT SERVICES | Facility: HOSPITAL | Age: 37
LOS: 1 days | End: 2022-08-25
Payer: MEDICAID

## 2022-08-25 DIAGNOSIS — Z41.9 ENCOUNTER FOR PROCEDURE FOR PURPOSES OTHER THAN REMEDYING HEALTH STATE, UNSPECIFIED: Chronic | ICD-10-CM

## 2022-08-25 DIAGNOSIS — Z98.89 OTHER SPECIFIED POSTPROCEDURAL STATES: Chronic | ICD-10-CM

## 2022-08-25 DIAGNOSIS — E66.01 MORBID (SEVERE) OBESITY DUE TO EXCESS CALORIES: ICD-10-CM

## 2022-08-25 PROCEDURE — 94060 EVALUATION OF WHEEZING: CPT

## 2022-08-25 PROCEDURE — 94729 DIFFUSING CAPACITY: CPT

## 2022-08-25 PROCEDURE — 94726 PLETHYSMOGRAPHY LUNG VOLUMES: CPT

## 2022-09-01 ENCOUNTER — NON-APPOINTMENT (OUTPATIENT)
Age: 37
End: 2022-09-01

## 2022-09-02 ENCOUNTER — APPOINTMENT (OUTPATIENT)
Dept: GASTROENTEROLOGY | Facility: CLINIC | Age: 37
End: 2022-09-02

## 2022-09-07 ENCOUNTER — APPOINTMENT (OUTPATIENT)
Dept: BARIATRICS | Facility: CLINIC | Age: 37
End: 2022-09-07

## 2022-09-07 ENCOUNTER — APPOINTMENT (OUTPATIENT)
Dept: FAMILY MEDICINE | Facility: CLINIC | Age: 37
End: 2022-09-07

## 2022-09-07 VITALS
BODY MASS INDEX: 38.01 KG/M2 | SYSTOLIC BLOOD PRESSURE: 120 MMHG | OXYGEN SATURATION: 98 % | HEART RATE: 72 BPM | WEIGHT: 206.57 LBS | HEIGHT: 62 IN | TEMPERATURE: 96.4 F | DIASTOLIC BLOOD PRESSURE: 70 MMHG

## 2022-09-07 PROCEDURE — 99214 OFFICE O/P EST MOD 30 MIN: CPT

## 2022-09-07 NOTE — HISTORY OF PRESENT ILLNESS
[de-identified] : 37 year old F undergoing workup for planned Bariatric surgery. Since last visit, pt does not want to pursue non-surgical alternatives to weight loss. Weight stable since last visit. Pt continues to try to make better food choices, consuming 3 meals/day trying to focus on protein and vegetables. Reports no snacking. Drinking zero calorie liquid, 64oz/day. Pt is walking daily (works as housekeeping). Sleeping 7-8 hr/night. No abdominal pain, reflux, nausea, vomiting, constipation or diarrhea. \par \par  [id number].

## 2022-09-07 NOTE — ASSESSMENT
[FreeTextEntry1] : 37 year old F undergoing workup for planned Bariatric surgery. Since last visit, pt does not want to pursue non-surgical alternatives to weight loss. Weight stable since last visit.\par Doing well overall.\par \par Reviewed nutrition guidelines\par Protein focus diet and 3 meals/day\par Maintain daily zero calorie liquid intake\par Increase activities and keep track of steps - goal 8-10k steps/day\par Limit caffeine intake\par \par Labs performed 5/21/2022, results reviewed with pt - elevated HbA1c 6.1, vitamin D 20.4 (taking over the counter supplement)\par \par Continue workup for planned Laparoscopic Sleeve Gastrectomy\par Await for insurance approval for Sleep study, completed PFTs\par ECHO scheduled for 9/9/2022\par Nutritionist Khalida Umaña and Dr. JELANI Shaver Psychologist evaluations as scheduled\par EGD 4/14/2022: Healing antral gastritis, path is neg for H.pylori \par  \par Return to office in one month for weigh-in\par All questions answered\par Pt seen with Dr. Win\par \par \par Additional time spent before and after visit reviewing chart

## 2022-09-09 ENCOUNTER — APPOINTMENT (OUTPATIENT)
Dept: CARDIOLOGY | Facility: CLINIC | Age: 37
End: 2022-09-09

## 2022-09-09 ENCOUNTER — NON-APPOINTMENT (OUTPATIENT)
Age: 37
End: 2022-09-09

## 2022-09-09 VITALS
WEIGHT: 206 LBS | OXYGEN SATURATION: 99 % | TEMPERATURE: 98.2 F | BODY MASS INDEX: 37.91 KG/M2 | DIASTOLIC BLOOD PRESSURE: 73 MMHG | SYSTOLIC BLOOD PRESSURE: 109 MMHG | HEIGHT: 62 IN | HEART RATE: 66 BPM | RESPIRATION RATE: 16 BRPM

## 2022-09-09 DIAGNOSIS — Z01.810 ENCOUNTER FOR PREPROCEDURAL CARDIOVASCULAR EXAMINATION: ICD-10-CM

## 2022-09-09 PROCEDURE — 99214 OFFICE O/P EST MOD 30 MIN: CPT | Mod: 25

## 2022-09-09 PROCEDURE — 93306 TTE W/DOPPLER COMPLETE: CPT

## 2022-09-09 PROCEDURE — 93000 ELECTROCARDIOGRAM COMPLETE: CPT | Mod: NC

## 2022-09-13 PROBLEM — Z01.810 PREOP CARDIOVASCULAR EXAM: Status: ACTIVE | Noted: 2022-08-08

## 2022-09-13 NOTE — ASSESSMENT
[FreeTextEntry1] : Assessment:\par Lydia Sanchez is a 37 year old obese woman with past medical history of Hyperlipidemia, Pre-diabetes mellitus and Fatty liver disease who presents for pre-operative cardiac risk stratification prior to bariatric surgery; laparoscopic sleeve gastrectomy. \par \par The patient appears well at this time, denies exertional angina or dyspnea. ECG today consistent with sinus bradycardia. Recent echo consistent with normal left and right ventricular systolic function, no significant valvular disease. Exercise treadmill stress test was negative for ischemic ST abnormalities at a diagnostic peak level of stress. At this time the likelihood of obstructive CAD is low. \par \par Recommendations:\par [] Cardiac pre-operative risk stratification: As requested by Bariatric surgery office, cardiac testing done and as reported above is unremarkable. The patient is at low to moderate cardiovascular risk prior to moderate risk surgery; laparoscopic sleeve gastrectomy, may proceed at this level of risk. No further cardiac testing required before surgery. \par [] Hyperlipidemia: 10 yr ASCVD risk score not applicable due to age, however LDL direct of 145 mg/dl is elevated. Recommend lifestyle modifications with exercise regimen and low sodium/low fat diet. Patient should have repeat lipid panel in 3 months\par [] Return to office: After bariatric surgery for post-operative cardiac evaluation

## 2022-09-13 NOTE — PHYSICAL EXAM
[Normal] : alert and oriented, normal memory [de-identified] : obese, young woman, no acute distress [de-identified] : supple [de-identified] : JVP ~ 7 cm H20, RRR, s1, s2, no murmurs [de-identified] : unlabored respirations, clear lung fields [de-identified] : obese

## 2022-09-13 NOTE — HISTORY OF PRESENT ILLNESS
[FreeTextEntry1] : Lydia Sanchez is a 37 year old obese woman with past medical history of Hyperlipidemia, Pre-diabetes mellitus and Fatty liver disease who presents for pre-operative cardiac risk stratification prior to bariatric surgery.\par \par Since her last visit the patient reports feeling well. Has no concerns, denies chest discomfort, shortness of breath or palpitations.

## 2022-09-13 NOTE — CARDIOLOGY SUMMARY
[de-identified] : ECG (8/5/22): normal sinus rhythm, nonspecific ST abnormalities\par ECG (9/9/22): sinus bradycardia  [de-identified] : Exercise Treadmill Stress Test (8/12/22): No acute ischemic ST abnormalities (97% MPHR). 11 METs. [de-identified] : TTE (2016): Normal LV systolic function. Normal RV. Mild TR. No pericardial effusion. \par TTE (9/9/22): LVEF 67%. Normal RV size and function. Mild TR. No pericardial effusion.

## 2022-09-14 NOTE — ED PROVIDER NOTE - HIGHEST TEMPERATURE
Detail Level: Zone Patient Reported Weight (Optional - Include Units): 190 Ipledge Number (Optional): [id number] subjective

## 2022-09-19 ENCOUNTER — APPOINTMENT (OUTPATIENT)
Dept: BARIATRICS/WEIGHT MGMT | Facility: CLINIC | Age: 37
End: 2022-09-19

## 2022-09-19 VITALS — BODY MASS INDEX: 37.91 KG/M2 | WEIGHT: 206 LBS | HEIGHT: 62 IN

## 2022-09-19 PROCEDURE — 90785 PSYTX COMPLEX INTERACTIVE: CPT | Mod: 95

## 2022-09-19 PROCEDURE — 90791 PSYCH DIAGNOSTIC EVALUATION: CPT | Mod: 95

## 2022-09-20 ENCOUNTER — APPOINTMENT (OUTPATIENT)
Dept: RADIOLOGY | Facility: HOSPITAL | Age: 37
End: 2022-09-20

## 2022-09-20 ENCOUNTER — RESULT REVIEW (OUTPATIENT)
Age: 37
End: 2022-09-20

## 2022-09-20 ENCOUNTER — OUTPATIENT (OUTPATIENT)
Dept: OUTPATIENT SERVICES | Facility: HOSPITAL | Age: 37
LOS: 1 days | End: 2022-09-20
Payer: MEDICAID

## 2022-09-20 DIAGNOSIS — Z98.89 OTHER SPECIFIED POSTPROCEDURAL STATES: Chronic | ICD-10-CM

## 2022-09-20 DIAGNOSIS — Z41.9 ENCOUNTER FOR PROCEDURE FOR PURPOSES OTHER THAN REMEDYING HEALTH STATE, UNSPECIFIED: Chronic | ICD-10-CM

## 2022-09-20 DIAGNOSIS — Z01.810 ENCOUNTER FOR PREPROCEDURAL CARDIOVASCULAR EXAMINATION: ICD-10-CM

## 2022-09-20 PROCEDURE — 71046 X-RAY EXAM CHEST 2 VIEWS: CPT | Mod: 26

## 2022-09-20 PROCEDURE — 71046 X-RAY EXAM CHEST 2 VIEWS: CPT

## 2022-09-28 NOTE — H&P PST ADULT - PSH
S/P appendectomy    S/P   2013- tubal ligation also  S/P hemorrhoidectomy Adbry Counseling: I discussed with the patient the risks of tralokinumab including but not limited to eye infection and irritation, cold sores, injection site reactions, worsening of asthma, allergic reactions and increased risk of parasitic infection.  Live vaccines should be avoided while taking tralokinumab. The patient understands that monitoring is required and they must alert us or the primary physician if symptoms of infection or other concerning signs are noted.

## 2022-10-05 ENCOUNTER — APPOINTMENT (OUTPATIENT)
Dept: BARIATRICS | Facility: CLINIC | Age: 37
End: 2022-10-05

## 2022-10-05 NOTE — ASSESSMENT
[FreeTextEntry1] : \par Doing well overall.\par \par Reviewed nutrition and exercise guidelines\par Protein focus diet and increase zero calorie liquid intake per day \par Increase activities and keep track of steps - goal 8-10k steps/day\par Limit caffeine intake\par \par Continue workup for planned Laparoscopic Sleeve Gastrectomy - 1 more weigh-in\par Completed Cardiac and GI evaluations\par Pulmonologist to review Sleep study and discuss with pt\par Completed Dr. JELANI Shaver Psychologist evaluations\par Nutritionist Khalida Umaña evaluation as schedule\par All questions answered \par \par Return to office in one month for weigh-in.\par \par \par Additional time spent before and after visit reviewing chart

## 2022-10-05 NOTE — HISTORY OF PRESENT ILLNESS
[de-identified] : 37 year old F undergoing workup for Laparoscopic Sleeve Gastrectomy. Weight loss/gained/stable since last visit. Pt continues to try to make better food choices, consuming 3 meals/day trying to focus on protein and vegetables. Reports no snacking. Drinking zero calorie liquid, 40oz/day. 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 No abdominal pain, reflux, nausea, vomiting, constipation or diarrhea.

## 2022-10-10 ENCOUNTER — APPOINTMENT (OUTPATIENT)
Dept: BARIATRICS | Facility: CLINIC | Age: 37
End: 2022-10-10

## 2022-10-10 VITALS
HEIGHT: 62 IN | TEMPERATURE: 96.7 F | HEART RATE: 89 BPM | OXYGEN SATURATION: 99 % | WEIGHT: 205.69 LBS | DIASTOLIC BLOOD PRESSURE: 72 MMHG | BODY MASS INDEX: 37.85 KG/M2 | SYSTOLIC BLOOD PRESSURE: 110 MMHG

## 2022-10-10 PROCEDURE — 99214 OFFICE O/P EST MOD 30 MIN: CPT

## 2022-10-11 NOTE — PHYSICAL EXAM
[Normal] : affect appropriate [de-identified] : soft, NT, ND, no diastasis or hernias appreciated

## 2022-10-11 NOTE — HISTORY OF PRESENT ILLNESS
[de-identified] : 37 year old F undergoing workup for Laparoscopic Sleeve Gastrectomy. Weight stable since last visit. Pt continues to try to make better food choices, consuming 3 meals/day trying to focus on protein and vegetables. Reports no snacking. Increased zero calorie liquid intake since last visit, 64oz/day. 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 No abdominal pain, reflux, nausea, vomiting, constipation or diarrhea.\par \par Abdominal U/S 5/6/2022: no cholelithiasis; findings consistent with hepatic steatosis

## 2022-10-11 NOTE — ASSESSMENT
[FreeTextEntry1] : 37 year old F undergoing workup for Laparoscopic Sleeve Gastrectomy. Weight stable since last visit.\par Doing well overall.\par \par Reviewed nutrition and exercise guidelines\par Protein focus diet and increase zero calorie liquid intake per day \par Increase activities and keep track of steps - goal 8-10k steps/day\par Limit caffeine intake\par \par Continue workup for planned Laparoscopic Sleeve Gastrectomy - 1 more weigh-in\par Labs performed 5/21/2022, results reviewed with pt\par Completed Pulmonary, Cardiac and GI evaluations\par Completed Dr. JELANI Shaver Psychologist evaluations\par Nutritionist Khalida Umaña evaluation as schedule\par All questions answered \par \par Return to office in one month for weigh-in.\par Pt would like to have surgery as soon as possible after last weigh-in\par \par \par Additional time spent before and after visit reviewing chart.

## 2022-10-12 ENCOUNTER — NON-APPOINTMENT (OUTPATIENT)
Age: 37
End: 2022-10-12

## 2022-10-13 ENCOUNTER — APPOINTMENT (OUTPATIENT)
Dept: BARIATRICS/WEIGHT MGMT | Facility: CLINIC | Age: 37
End: 2022-10-13

## 2022-10-14 ENCOUNTER — NON-APPOINTMENT (OUTPATIENT)
Age: 37
End: 2022-10-14

## 2022-10-15 ENCOUNTER — EMERGENCY (EMERGENCY)
Facility: HOSPITAL | Age: 37
LOS: 1 days | Discharge: HOME | End: 2022-10-15
Attending: INTERNAL MEDICINE | Admitting: INTERNAL MEDICINE
Payer: MEDICAID

## 2022-10-15 VITALS
TEMPERATURE: 98 F | HEART RATE: 78 BPM | RESPIRATION RATE: 17 BRPM | SYSTOLIC BLOOD PRESSURE: 123 MMHG | DIASTOLIC BLOOD PRESSURE: 80 MMHG | OXYGEN SATURATION: 98 %

## 2022-10-15 VITALS
OXYGEN SATURATION: 99 % | RESPIRATION RATE: 16 BRPM | DIASTOLIC BLOOD PRESSURE: 91 MMHG | SYSTOLIC BLOOD PRESSURE: 135 MMHG | HEART RATE: 80 BPM | WEIGHT: 205.91 LBS | TEMPERATURE: 97 F | HEIGHT: 62 IN

## 2022-10-15 DIAGNOSIS — Z98.89 OTHER SPECIFIED POSTPROCEDURAL STATES: Chronic | ICD-10-CM

## 2022-10-15 DIAGNOSIS — Z41.9 ENCOUNTER FOR PROCEDURE FOR PURPOSES OTHER THAN REMEDYING HEALTH STATE, UNSPECIFIED: Chronic | ICD-10-CM

## 2022-10-15 LAB
ALBUMIN SERPL ELPH-MCNC: 3.5 G/DL — SIGNIFICANT CHANGE UP (ref 3.3–5)
ALP SERPL-CCNC: 96 U/L — SIGNIFICANT CHANGE UP (ref 40–120)
ALT FLD-CCNC: 25 U/L — SIGNIFICANT CHANGE UP (ref 10–45)
ANION GAP SERPL CALC-SCNC: 7 MMOL/L — SIGNIFICANT CHANGE UP (ref 5–17)
APPEARANCE UR: CLEAR — SIGNIFICANT CHANGE UP
AST SERPL-CCNC: 32 U/L — SIGNIFICANT CHANGE UP (ref 10–40)
BASOPHILS # BLD AUTO: 0.04 K/UL — SIGNIFICANT CHANGE UP (ref 0–0.2)
BASOPHILS NFR BLD AUTO: 0.3 % — SIGNIFICANT CHANGE UP (ref 0–2)
BILIRUB SERPL-MCNC: 0.4 MG/DL — SIGNIFICANT CHANGE UP (ref 0.2–1.2)
BILIRUB UR-MCNC: NEGATIVE — SIGNIFICANT CHANGE UP
BUN SERPL-MCNC: 15 MG/DL — SIGNIFICANT CHANGE UP (ref 7–23)
CALCIUM SERPL-MCNC: 9 MG/DL — SIGNIFICANT CHANGE UP (ref 8.4–10.5)
CHLORIDE SERPL-SCNC: 103 MMOL/L — SIGNIFICANT CHANGE UP (ref 96–108)
CO2 SERPL-SCNC: 25 MMOL/L — SIGNIFICANT CHANGE UP (ref 22–31)
COLOR SPEC: YELLOW — SIGNIFICANT CHANGE UP
CREAT SERPL-MCNC: 0.69 MG/DL — SIGNIFICANT CHANGE UP (ref 0.5–1.3)
DIFF PNL FLD: NEGATIVE — SIGNIFICANT CHANGE UP
EGFR: 115 ML/MIN/1.73M2 — SIGNIFICANT CHANGE UP
EOSINOPHIL # BLD AUTO: 0.47 K/UL — SIGNIFICANT CHANGE UP (ref 0–0.5)
EOSINOPHIL NFR BLD AUTO: 4 % — SIGNIFICANT CHANGE UP (ref 0–6)
GLUCOSE SERPL-MCNC: 120 MG/DL — HIGH (ref 70–99)
GLUCOSE UR QL: NEGATIVE — SIGNIFICANT CHANGE UP
HCG SERPL-ACNC: <1 MIU/ML — SIGNIFICANT CHANGE UP
HCT VFR BLD CALC: 39.8 % — SIGNIFICANT CHANGE UP (ref 34.5–45)
HGB BLD-MCNC: 13.5 G/DL — SIGNIFICANT CHANGE UP (ref 11.5–15.5)
IMM GRANULOCYTES NFR BLD AUTO: 0.3 % — SIGNIFICANT CHANGE UP (ref 0–0.9)
KETONES UR-MCNC: NEGATIVE — SIGNIFICANT CHANGE UP
LEUKOCYTE ESTERASE UR-ACNC: NEGATIVE — SIGNIFICANT CHANGE UP
LIDOCAIN IGE QN: 101 U/L — SIGNIFICANT CHANGE UP (ref 73–393)
LYMPHOCYTES # BLD AUTO: 2.63 K/UL — SIGNIFICANT CHANGE UP (ref 1–3.3)
LYMPHOCYTES # BLD AUTO: 22.2 % — SIGNIFICANT CHANGE UP (ref 13–44)
MCHC RBC-ENTMCNC: 30.4 PG — SIGNIFICANT CHANGE UP (ref 27–34)
MCHC RBC-ENTMCNC: 33.9 GM/DL — SIGNIFICANT CHANGE UP (ref 32–36)
MCV RBC AUTO: 89.6 FL — SIGNIFICANT CHANGE UP (ref 80–100)
MONOCYTES # BLD AUTO: 0.89 K/UL — SIGNIFICANT CHANGE UP (ref 0–0.9)
MONOCYTES NFR BLD AUTO: 7.5 % — SIGNIFICANT CHANGE UP (ref 2–14)
NEUTROPHILS # BLD AUTO: 7.79 K/UL — HIGH (ref 1.8–7.4)
NEUTROPHILS NFR BLD AUTO: 65.7 % — SIGNIFICANT CHANGE UP (ref 43–77)
NITRITE UR-MCNC: NEGATIVE — SIGNIFICANT CHANGE UP
NRBC # BLD: 0 /100 WBCS — SIGNIFICANT CHANGE UP (ref 0–0)
PH UR: 6 — SIGNIFICANT CHANGE UP (ref 5–8)
PLATELET # BLD AUTO: 202 K/UL — SIGNIFICANT CHANGE UP (ref 150–400)
POTASSIUM SERPL-MCNC: 4.5 MMOL/L — SIGNIFICANT CHANGE UP (ref 3.5–5.3)
POTASSIUM SERPL-SCNC: 4.5 MMOL/L — SIGNIFICANT CHANGE UP (ref 3.5–5.3)
PROT SERPL-MCNC: 7.4 G/DL — SIGNIFICANT CHANGE UP (ref 6–8.3)
PROT UR-MCNC: NEGATIVE — SIGNIFICANT CHANGE UP
RBC # BLD: 4.44 M/UL — SIGNIFICANT CHANGE UP (ref 3.8–5.2)
RBC # FLD: 12.5 % — SIGNIFICANT CHANGE UP (ref 10.3–14.5)
SODIUM SERPL-SCNC: 135 MMOL/L — SIGNIFICANT CHANGE UP (ref 135–145)
SP GR SPEC: 1.01 — SIGNIFICANT CHANGE UP (ref 1.01–1.02)
UROBILINOGEN FLD QL: NEGATIVE — SIGNIFICANT CHANGE UP
WBC # BLD: 11.86 K/UL — HIGH (ref 3.8–10.5)
WBC # FLD AUTO: 11.86 K/UL — HIGH (ref 3.8–10.5)

## 2022-10-15 PROCEDURE — 83690 ASSAY OF LIPASE: CPT

## 2022-10-15 PROCEDURE — 76705 ECHO EXAM OF ABDOMEN: CPT | Mod: 26

## 2022-10-15 PROCEDURE — 71045 X-RAY EXAM CHEST 1 VIEW: CPT | Mod: 26

## 2022-10-15 PROCEDURE — 36415 COLL VENOUS BLD VENIPUNCTURE: CPT

## 2022-10-15 PROCEDURE — 99285 EMERGENCY DEPT VISIT HI MDM: CPT

## 2022-10-15 PROCEDURE — 80053 COMPREHEN METABOLIC PANEL: CPT

## 2022-10-15 PROCEDURE — 85025 COMPLETE CBC W/AUTO DIFF WBC: CPT

## 2022-10-15 PROCEDURE — 84702 CHORIONIC GONADOTROPIN TEST: CPT

## 2022-10-15 PROCEDURE — 71045 X-RAY EXAM CHEST 1 VIEW: CPT

## 2022-10-15 PROCEDURE — 99284 EMERGENCY DEPT VISIT MOD MDM: CPT | Mod: 25

## 2022-10-15 PROCEDURE — 96365 THER/PROPH/DIAG IV INF INIT: CPT

## 2022-10-15 PROCEDURE — 96375 TX/PRO/DX INJ NEW DRUG ADDON: CPT

## 2022-10-15 PROCEDURE — 87086 URINE CULTURE/COLONY COUNT: CPT

## 2022-10-15 PROCEDURE — 76705 ECHO EXAM OF ABDOMEN: CPT

## 2022-10-15 RX ORDER — FAMOTIDINE 10 MG/ML
20 INJECTION INTRAVENOUS ONCE
Refills: 0 | Status: COMPLETED | OUTPATIENT
Start: 2022-10-15 | End: 2022-10-15

## 2022-10-15 RX ORDER — ONDANSETRON 8 MG/1
1 TABLET, FILM COATED ORAL
Qty: 30 | Refills: 0
Start: 2022-10-15 | End: 2022-10-24

## 2022-10-15 RX ORDER — MORPHINE SULFATE 50 MG/1
2 CAPSULE, EXTENDED RELEASE ORAL ONCE
Refills: 0 | Status: DISCONTINUED | OUTPATIENT
Start: 2022-10-15 | End: 2022-10-15

## 2022-10-15 RX ORDER — SODIUM CHLORIDE 9 MG/ML
1000 INJECTION INTRAMUSCULAR; INTRAVENOUS; SUBCUTANEOUS ONCE
Refills: 0 | Status: COMPLETED | OUTPATIENT
Start: 2022-10-15 | End: 2022-10-15

## 2022-10-15 RX ORDER — ONDANSETRON 8 MG/1
4 TABLET, FILM COATED ORAL ONCE
Refills: 0 | Status: COMPLETED | OUTPATIENT
Start: 2022-10-15 | End: 2022-10-15

## 2022-10-15 RX ORDER — PANTOPRAZOLE SODIUM 20 MG/1
1 TABLET, DELAYED RELEASE ORAL
Qty: 30 | Refills: 0
Start: 2022-10-15 | End: 2022-11-13

## 2022-10-15 RX ADMIN — FAMOTIDINE 20 MILLIGRAM(S): 10 INJECTION INTRAVENOUS at 10:28

## 2022-10-15 RX ADMIN — SODIUM CHLORIDE 1000 MILLILITER(S): 9 INJECTION INTRAMUSCULAR; INTRAVENOUS; SUBCUTANEOUS at 10:18

## 2022-10-15 RX ADMIN — MORPHINE SULFATE 2 MILLIGRAM(S): 50 CAPSULE, EXTENDED RELEASE ORAL at 14:16

## 2022-10-15 RX ADMIN — ONDANSETRON 4 MILLIGRAM(S): 8 TABLET, FILM COATED ORAL at 09:14

## 2022-10-15 RX ADMIN — FAMOTIDINE 200 MILLIGRAM(S): 10 INJECTION INTRAVENOUS at 09:19

## 2022-10-15 RX ADMIN — MORPHINE SULFATE 2 MILLIGRAM(S): 50 CAPSULE, EXTENDED RELEASE ORAL at 14:18

## 2022-10-15 RX ADMIN — SODIUM CHLORIDE 1000 MILLILITER(S): 9 INJECTION INTRAMUSCULAR; INTRAVENOUS; SUBCUTANEOUS at 09:14

## 2022-10-15 NOTE — ED PROVIDER NOTE - CONSTITUTIONAL, MLM
OFFICE VISIT 3/23/22  7:00 AM CDT  LAST ENCOUNTER WITH ME:  12/7/2021   CHIEF COMPLAINT: Follow-up    DIAGNOSES SPECIFICALLY EVALUATED AND TREATED THIS ENCOUNTER  1. Essential hypertension  - triamterene-hydrochlorothiazide 37.5-25 mg (DYAZIDE) 37.5-25 mg per capsule; Take 1 capsule by mouth once daily.  Dispense: 90 capsule; Refill: 1  - amlodipine-valsartan (EXFORGE)  mg per tablet; Take 1 tablet by mouth every evening.  Dispense: 90 tablet; Refill: 3  - Lipid Panel; Future  - Comprehensive Metabolic Panel; Future    2. Prediabetes  - Ambulatory referral/consult to [facility]; Future  - Hemoglobin A1C; Future    3. Mixed hyperlipidemia  - Ambulatory referral/consult to [facility]; Future  - atorvastatin (LIPITOR) 20 MG tablet; Take 1 tablet (20 mg total) by mouth every evening.  Dispense: 90 tablet; Refill: 3  - Lipid Panel; Future  - Comprehensive Metabolic Panel; Future    4. Class 1 obesity due to excess calories with serious comorbidity and body mass index (BMI) of 34.0 to 34.9 in adult  - Ambulatory referral/consult to [facility]; Future    5. Mild persistent asthma without complication  - fluticasone-salmeterol diskus inhaler 100-50 mcg; Inhale 1 puff into the lungs 2 (two) times daily. Controller  Dispense: 180 each; Refill: 3  - montelukast (SINGULAIR) 10 mg tablet; Take 1 tablet (10 mg total) by mouth every evening.  Dispense: 90 tablet; Refill: 3  - albuterol (PROAIR HFA) 90 mcg/actuation inhaler; Inhale 2 puffs into the lungs every 6 (six) hours as needed for Wheezing. Rescue  Dispense: 36 g; Refill: 5    6. Frequent PVCs    7. Low testosterone in male    8. Need for shingles vaccine  - varicella-zoster gE-AS01B, PF, (SHINGRIX) 50 mcg/0.5 mL injection; Inject 0.5 mL (one dose) into muscle now; schedule second dose  days later  Dispense: 1 each; Refill: 1    9. Need for 23-polyvalent pneumococcal polysaccharide vaccine  - Pneumococcal  Polysaccharide Vaccine (23 Valent) (SQ/IM)    10. Screening PSA (prostate specific antigen)  - PSA, Screening; Future     Follow up in about 9 months (around 12/13/2022) for wellness and preventive services.    Problem List Items Addressed This Visit     Essential hypertension - Primary (Chronic)    Current Assessment & Plan     BP Readings from Last 6 Encounters:   03/23/22 118/82   12/15/21 (!) 142/88   12/07/21 130/80   04/14/21 (!) 152/86   03/10/21 138/88   02/03/21 (!) 160/106      Last 5 Patient Entered Readings                Current 30 Day Average: 120/67  Recent Readings 3/22/2022 3/16/2022 3/10/2022 3/1/2022 2/20/2022   SBP (mmHg) 122 118 121 118 118   DBP (mmHg) 66 65 70 67 60   Pulse 66 72 71 72 69                 Lab Results   Component Value Date    ESTGFRAFRICA >60.0 03/09/2022    EGFRNONAA >60.0 03/09/2022    CREATININE 1.0 03/09/2022    BUN 16 03/09/2022    K 4.1 03/09/2022     03/09/2022     03/09/2022     Results for orders placed or performed during the hospital encounter of 03/24/19   EKG 12-lead    Collection Time: 03/24/19  3:49 PM    Narrative    Test Reason : R06.02,    Vent. Rate : 090 BPM     Atrial Rate : 090 BPM     P-R Int : 122 ms          QRS Dur : 068 ms      QT Int : 348 ms       P-R-T Axes : 036 019 068 degrees     QTc Int : 425 ms    Sinus rhythm with frequent Premature ventricular complexes  Otherwise normal ECG  When compared with ECG of 02-OCT-2018 16:47,  Nonspecific T wave abnormality no longer evident in Lateral leads  Confirmed by LUIS E SLATER ([id number]) on 3/25/2019 8:16:05 PM    Referred By: TABBY   SELF           Confirmed By:LUIS E SLATER                Relevant Medications    triamterene-hydrochlorothiazide 37.5-25 mg (DYAZIDE) 37.5-25 mg per capsule    amlodipine-valsartan (EXFORGE)  mg per tablet    Other Relevant Orders    Lipid Panel    Comprehensive Metabolic Panel    Class 1 obesity due to excess calories with serious comorbidity and body mass  "index (BMI) of 34.0 to 34.9 in adult (Chronic)    Current Assessment & Plan     Wt Readings from Last 6 Encounters:   03/23/22 120.8 kg (266 lb 5.1 oz)   12/15/21 129.9 kg (286 lb 6 oz)   12/07/21 127.4 kg (280 lb 13.9 oz)   04/14/21 120.2 kg (264 lb 15.9 oz)   03/10/21 121.7 kg (268 lb 6.6 oz)   02/03/21 122.6 kg (270 lb 4.5 oz)     Estimated body mass index is 34.19 kg/m² as calculated from the following:    Height as of this encounter: 6' 2" (1.88 m).    Weight as of this encounter: 120.8 kg (266 lb 5.1 oz).    MUCH improved with therapeutic lifestyle changes.           Relevant Orders    Ambulatory referral/consult to [facility]    Mixed hyperlipidemia (Chronic)    Current Assessment & Plan     Lab Results   Component Value Date    CHOL 181 12/07/2021    CHOL 167 12/12/2019    TRIG 77 12/07/2021    TRIG 53 12/12/2019    HDL 44 12/07/2021    HDL 48 12/12/2019    LDLCALC 121.6 12/07/2021    LDLCALC 108.4 12/12/2019    NONHDLCHOL 137 12/07/2021    NONHDLCHOL 119 12/12/2019    AST 31 12/07/2021    ALT 38 12/07/2021     The 10-year ASCVD risk score (Marilyn LAZARO Jr., et al., 2013) is: 13.9%    Values used to calculate the score:      Age: 57 years      Sex: Male      Is Non- : Yes      Diabetic: No      Tobacco smoker: No      Systolic Blood Pressure: 142 mmHg      Is BP treated: Yes      HDL Cholesterol: 44 mg/dL      Total Cholesterol: 181 mg/dL            Relevant Medications    atorvastatin (LIPITOR) 20 MG tablet    Other Relevant Orders    Ambulatory referral/consult to [facility]    Lipid Panel    Comprehensive Metabolic Panel    Low testosterone in male (Chronic)    Overview     UROLOGIST: Jaycob Preston MD           Current Assessment & Plan     Lab Results   Component Value Date    TOTALTESTOST 291 (L) 12/07/2021    TOTALTESTOST 219 (L) 10/07/2020    PSA 2.2 12/07/2021    PSA 2.1 10/07/2020    PSA 2.0 12/12/2019    HGB 14.1 12/12/2019    HGB 13.5 (L) " 11/01/2018    HGB 13.7 (L) 10/04/2017    HCT 43.7 12/12/2019    HCT 40.9 11/01/2018    HCT 40.4 10/04/2017              Mild persistent asthma without complication (Chronic)    Current Assessment & Plan     Lv is doing very well on his Advair Diskus. Not needing Albuterol. Lv reports preceiving no adverse side-effects and wants to continue current treatment. It was agreed to proceed with trial of lower dose Advair.           Relevant Medications    fluticasone-salmeterol diskus inhaler 100-50 mcg    montelukast (SINGULAIR) 10 mg tablet    albuterol (PROAIR HFA) 90 mcg/actuation inhaler    Prediabetes (Chronic)    Current Assessment & Plan     Lab Results   Component Value Date    HGBA1C 6.3 (H) 12/07/2021    HGBA1C 6.2 (H) 10/07/2020    HGBA1C 5.9 (H) 12/12/2019    HGBA1C 6.1 (H) 11/01/2018    HGBA1C 5.7 (H) 10/04/2017    GLU 98 03/09/2022    GLU 98 12/07/2021    GLU 84 10/07/2020    GLU 92 12/12/2019    GLU 86 11/01/2018     Lab Results   Component Value Date    GLUCFAST 92 03/15/2022    JVKZ3UB 155 03/15/2022    EHGP4ZG 132 03/15/2022     No results found for: LABINSU            Relevant Orders    Ambulatory referral/consult to [facility]    Hemoglobin A1C    Frequent PVCs    Overview     EKG 12-lead 03/24/2019  Test Reason : R06.02  Vent. Rate : 090 BPM     Atrial Rate : 090 BPM     P-R Int : 122 ms          QRS Dur : 068 ms      QT Int : 348 ms       P-R-T Axes : 036 019 068 degrees     QTc Int : 425 ms  Sinus rhythm with frequent Premature ventricular complexes. Otherwise normal ECG.  When compared with ECG of 02-OCT-2018 16:47, Nonspecific T wave abnormality no longer evident in Lateral leads.           Current Assessment & Plan     Asymptomatic. Clinically stable.              Other Visit Diagnoses     Need for shingles vaccine        Relevant Medications    varicella-zoster gE-AS01B, PF, (SHINGRIX) 50 mcg/0.5 mL injection    Need for 23-polyvalent pneumococcal polysaccharide vaccine         Relevant Orders    Pneumococcal Polysaccharide Vaccine (23 Valent) (SQ/IM)    Screening PSA (prostate specific antigen)        Relevant Orders    PSA, Screening      Unless noted herein, any chronic conditions are represented as and appear compensated/controlled and stable, and no other significant complaints or concerns were reported.    PRESCRIPTION DRUG MANAGEMENT  Outpatient Medications Prior to Visit   Medication Sig Dispense Refill    inhalation spacing device Use as directed when taking inhaled medicine 1 each 0    tadalafiL (CIALIS) 20 MG Tab Take 1 tablet (20 mg total) by mouth once daily. 30 tablet 5    albuterol (PROAIR HFA) 90 mcg/actuation inhaler Inhale 2 puffs into the lungs every 6 (six) hours as needed for Wheezing. Rescue 36 g 5    amlodipine-valsartan (EXFORGE)  mg per tablet Take 1 tablet by mouth every evening. 90 tablet 1    atorvastatin (LIPITOR) 20 MG tablet Take 1 tablet (20 mg total) by mouth every evening. 90 tablet 1    fluticasone-salmeterol diskus inhaler 250-50 mcg Inhale 1 puff into the lungs 2 (two) times daily. Controller 60 each 5    montelukast (SINGULAIR) 10 mg tablet Take 1 tablet (10 mg total) by mouth every evening. 90 tablet 1    triamterene-hydrochlorothiazide 37.5-25 mg (DYAZIDE) 37.5-25 mg per capsule Take 1 capsule by mouth once daily. 90 capsule 1    methylPREDNISolone (MEDROL DOSEPACK) 4 mg tablet use as directed 1 each 0     No facility-administered medications prior to visit.     Medications Discontinued During This Encounter   Medication Reason    fluticasone-salmeterol diskus inhaler 250-50 mcg Dose adjustment    methylPREDNISolone (MEDROL DOSEPACK) 4 mg tablet Therapy completed    atorvastatin (LIPITOR) 20 MG tablet     amlodipine-valsartan (EXFORGE)  mg per tablet     albuterol (PROAIR HFA) 90 mcg/actuation inhaler     montelukast (SINGULAIR) 10 mg tablet     triamterene-hydrochlorothiazide 37.5-25 mg (DYAZIDE) 37.5-25 mg per  "capsule      Medications Ordered This Encounter   Medications    albuterol (PROAIR HFA) 90 mcg/actuation inhaler     Sig: Inhale 2 puffs into the lungs every 6 (six) hours as needed for Wheezing. Rescue     Dispense:  36 g     Refill:  5     -    amlodipine-valsartan (EXFORGE)  mg per tablet     Sig: Take 1 tablet by mouth every evening.     Dispense:  90 tablet     Refill:  3     -    atorvastatin (LIPITOR) 20 MG tablet     Sig: Take 1 tablet (20 mg total) by mouth every evening.     Dispense:  90 tablet     Refill:  3     -    fluticasone-salmeterol diskus inhaler 100-50 mcg     Sig: Inhale 1 puff into the lungs 2 (two) times daily. Controller     Dispense:  180 each     Refill:  3     NOTE DOSE CHANGE. DISCONTINUE any prescription for this drug issued prior to 03/23/2022.    montelukast (SINGULAIR) 10 mg tablet     Sig: Take 1 tablet (10 mg total) by mouth every evening.     Dispense:  90 tablet     Refill:  3     -    triamterene-hydrochlorothiazide 37.5-25 mg (DYAZIDE) 37.5-25 mg per capsule     Sig: Take 1 capsule by mouth once daily.     Dispense:  90 capsule     Refill:  1     -    varicella-zoster gE-AS01B, PF, (SHINGRIX) 50 mcg/0.5 mL injection     Sig: Inject 0.5 mL (one dose) into muscle now; schedule second dose  days later     Dispense:  1 each     Refill:  1     PHYSICAL EXAM  Vitals:    03/23/22 0713   BP: 118/82   BP Location: Left arm   Patient Position: Sitting   BP Method: Large (Manual)   Pulse: 86   Resp: 16   Temp: 96.2 °F (35.7 °C)   TempSrc: Tympanic   SpO2: 98%   Weight: 120.8 kg (266 lb 5.1 oz)   Height: 6' 2" (1.88 m)   CONSTITUTIONAL: Vital signs noted. No apparent distress. Does not appear acutely ill or septic. Appears adequately hydrated.  PULM: Lungs clear. Breathing unlabored.  HEART: Regular.  DERM: Skin warm and moist with normal turgor.  NEURO: There are no gross focal motor deficits.  PSYCHIATRIC: Alert and conversant. Mood grossly neutral. Judgment and " "insight grossly intact.     Documentation entered by me for this encounter may have been done in part using speech-recognition technology. Although I have made an effort to ensure accuracy, "sound like" errors may exist and should be interpreted in context.   " normal... Well appearing, awake, alert, oriented to person, place, time/situation and in no apparent distress.

## 2022-10-15 NOTE — ED PROVIDER NOTE - OBJECTIVE STATEMENT
36 y/o F h/o preDm, HLD pw epigastric pain/RUQ pain starting last night 9:30pm s/p eating chicken accompanied with nausea and vomiting x 1 today.  Took no meds to help alleviate. Had a prior episode 6 months ago in which labs and ab US were normal. Denies f/c, CP, SOB, diarrhea, constipation, dysuria, hematuria, weakness. Appears well, no distress.   LMP- 1 week ago  Surg hx: , Appendectomy

## 2022-10-15 NOTE — ED PROVIDER NOTE - ATTENDING APP SHARED VISIT CONTRIBUTION OF CARE
38 y/o F h/o preDm, HLD pw epigastric pain/RUQ pain starting last night 9:30pm s/p eating chicken accompanied with nausea and vomiting x 1 today.  Took no meds to help alleviate. Had a prior episode 6 months ago in which labs and ab US were normal. Denies f/c, CP, SOB, diarrhea, constipation, dysuria, hematuria, weakness. Appears well, no distress.   LMP- 1 week ago  Surg hx: , Appendectomy  ruq , epigastric tenderness cxr no free air abd sono fatty liver , gall bladder normal   labs wnl pepcid relieved epig pain has moderate ruq tenderness   plan pain meds dc with pepcid and meghna Cohen:  I have reviewed and discussed with the PA/ resident the case specifics, including the history, physical assessment, evaluation, conclusion, laboratory results, and medical plan. I agree with the contents, and conclusions. I have personally examined, and interviewed the patient.

## 2022-10-15 NOTE — ED PROVIDER NOTE - PATIENT PORTAL LINK FT
You can access the FollowMyHealth Patient Portal offered by [facility] by registering at the following website: [email or web address]. By joining [email or web address]’s FollowMyHealth portal, you will also be able to view your health information using other applications (apps) compatible with our system.

## 2022-10-15 NOTE — ED PROVIDER NOTE - CLINICAL SUMMARY MEDICAL DECISION MAKING FREE TEXT BOX
38 y/o F h/o preDm, HLD pw epigastric pain/RUQ pain starting last night 9:30pm s/p eating chicken accompanied with nausea and vomiting x 1 today.  Took no meds to help alleviate. Had a prior episode 6 months ago in which labs and ab US were normal. Denies f/c, CP, SOB, diarrhea, constipation, dysuria, hematuria, weakness. Appears well, no distress.   LMP- 1 week ago  Surg hx: , Appendectomy   Plan: Will check basic labs with lipase, RUQ US, UA/UCX/UCG, give pepcid/zofran/fluids and reassess

## 2022-10-16 LAB
CULTURE RESULTS: SIGNIFICANT CHANGE UP
SPECIMEN SOURCE: SIGNIFICANT CHANGE UP

## 2022-10-18 ENCOUNTER — APPOINTMENT (OUTPATIENT)
Dept: GASTROENTEROLOGY | Facility: CLINIC | Age: 37
End: 2022-10-18

## 2022-10-18 VITALS
RESPIRATION RATE: 14 BRPM | HEIGHT: 62 IN | SYSTOLIC BLOOD PRESSURE: 110 MMHG | WEIGHT: 205 LBS | TEMPERATURE: 97 F | BODY MASS INDEX: 37.73 KG/M2 | OXYGEN SATURATION: 97 % | HEART RATE: 82 BPM | DIASTOLIC BLOOD PRESSURE: 70 MMHG

## 2022-10-18 PROCEDURE — 99213 OFFICE O/P EST LOW 20 MIN: CPT

## 2022-10-18 NOTE — HISTORY OF PRESENT ILLNESS
[FreeTextEntry1] :  Jonnathan #[id number]\par \par Follow up visit. Patient has been seeing bariatric surgery. They recommended liver evaluation prior to surgery.\par \par On Saturday she went to the ED after experiencing recurrent right upper abdominal pain. Abdominal US showed fatty liver and HM. No gallstones seen on US. Started on pantoprazole with improvement.  Denies abdominal pain at present.\par \par Did not get HIDA as recommended on last visit.

## 2022-10-18 NOTE — PHYSICAL EXAM
[Alert] : alert [Normal Voice/Communication] : normal voice/communication [Healthy Appearing] : healthy appearing [No Acute Distress] : no acute distress [Sclera] : the sclera and conjunctiva were normal [Hearing Threshold Finger Rub Not Steele] : hearing was normal [Normal Lips/Gums] : the lips and gums were normal [Oropharynx] : the oropharynx was normal [Normal Appearance] : the appearance of the neck was normal [No Neck Mass] : no neck mass was observed [No Respiratory Distress] : no respiratory distress [No Acc Muscle Use] : no accessory muscle use [Respiration, Rhythm And Depth] : normal respiratory rhythm and effort [Auscultation Breath Sounds / Voice Sounds] : lungs were clear to auscultation bilaterally [Heart Rate And Rhythm] : heart rate was normal and rhythm regular [Normal S1, S2] : normal S1 and S2 [Murmurs] : no murmurs [Bowel Sounds] : normal bowel sounds [Abdomen Tenderness] : non-tender [No Masses] : no abdominal mass palpated [Abdomen Soft] : soft [No CVA Tenderness] : no CVA  tenderness [No Clubbing, Cyanosis] : no clubbing or cyanosis of the fingernails [Normal Color / Pigmentation] : normal skin color and pigmentation [] : no rash [No Focal Deficits] : no focal deficits [Oriented To Time, Place, And Person] : oriented to person, place, and time

## 2022-10-18 NOTE — ASSESSMENT
[FreeTextEntry1] : Get Fibroscan to evaluate fatty liver, check for fibrosis.\par \par Get blood testing for liver.\par \par Continue pantoprazole. Patient has had longstanding recurrent RUQ pain and seems to be responding now to pantoprazole. Will defer HIDA at present.

## 2022-10-19 ENCOUNTER — APPOINTMENT (OUTPATIENT)
Dept: FAMILY MEDICINE | Facility: CLINIC | Age: 37
End: 2022-10-19

## 2022-10-20 ENCOUNTER — APPOINTMENT (OUTPATIENT)
Dept: HEPATOLOGY | Facility: CLINIC | Age: 37
End: 2022-10-20

## 2022-10-20 ENCOUNTER — APPOINTMENT (OUTPATIENT)
Dept: FAMILY MEDICINE | Facility: CLINIC | Age: 37
End: 2022-10-20

## 2022-10-20 PROCEDURE — 91200 LIVER ELASTOGRAPHY: CPT

## 2022-10-25 LAB
CERULOPLASMIN SERPL-MCNC: 39 MG/DL
FERRITIN SERPL-MCNC: 129 NG/ML
HAV IGM SER QL: NONREACTIVE
HBV CORE IGM SER QL: NONREACTIVE
HBV SURFACE AB SER QL: NONREACTIVE
HBV SURFACE AG SER QL: NONREACTIVE
HCV AB SER QL: NONREACTIVE
HCV S/CO RATIO: 0.12 S/CO
IRON SATN MFR SERPL: 26 %
IRON SERPL-MCNC: 82 UG/DL
TIBC SERPL-MCNC: 318 UG/DL
TRIGL SERPL-MCNC: 365 MG/DL
UIBC SERPL-MCNC: 236 UG/DL

## 2022-10-29 LAB
A1AT PHENOTYP SERPL-IMP: NORMAL
A1AT SERPL-MCNC: 190 MG/DL
MITOCHONDRIA AB SER IF-ACNC: NORMAL
SMOOTH MUSCLE AB SER QL IF: NORMAL

## 2022-11-02 ENCOUNTER — APPOINTMENT (OUTPATIENT)
Dept: BARIATRICS | Facility: CLINIC | Age: 37
End: 2022-11-02

## 2022-11-02 VITALS — BODY MASS INDEX: 38.09 KG/M2 | WEIGHT: 207 LBS | HEIGHT: 62 IN

## 2022-11-02 PROCEDURE — 99213 OFFICE O/P EST LOW 20 MIN: CPT | Mod: 95

## 2022-11-02 NOTE — REASON FOR VISIT
[Follow-Up Visit] : a follow-up visit for [Home] : at home, [unfilled] , at the time of the visit. [Medical Office: ([facility])___] : at the medical office located in  [] :  [Patient] : the patient [Self] : self

## 2022-11-03 NOTE — ASSESSMENT
[FreeTextEntry1] : 37 year old F undergoing workup for Laparoscopic Sleeve Gastrectomy. Weight stable. Patient saw GI Dr. Rdz for evaluation of hepatic steatosis. Fibroscan showed stage I steatosis with no fibrosis. Negative findings for autoimmune causes as well as negative hepatitis panel. Pt did have ED visit for RUQ pain 3 weeks ago, U/S 10/15/2022 then showed fatty liver and hepatomegaly but no gallstones. Pain has improved since being on protonix.\par \par \par Reviewed nutrition and exercise guidelines\par Protein focus diet and increase zero calorie liquid intake per day \par Increase activities and keep track of steps - goal 8-10k steps/day\par Limit caffeine intake\par \par Completed weigh-in's\par Labs performed 5/21/2022, results reviewed with pt\par Completed Pulmonary, Cardiac and GI evaluations\par Completed Dr. JELANI Shaver Psychologist evaluations\par Nutritionist Khalida Umaña evaluation as schedule\par All questions answered \par \par Return to office in one month\par \par \par Additional time spent before and after visit reviewing chart.

## 2022-11-03 NOTE — HISTORY OF PRESENT ILLNESS
[de-identified] : 37 year old F undergoing workup for Laparoscopic Sleeve Gastrectomy. Weight stable.  Patient saw GI Dr. Rdz for evaluation of hepatic steatosis. Fibroscan showed stage I steatosis with no fibrosis. Negative findings for autoimmune causes as well as negative hepatitis panel. Pt did have ED visit for RUQ pain 3 weeks ago, U/S 10/15/2022 then showed fatty liver and hepatomegaly but no gallstones. Pain has improved since being on protonix. Continues to try to make better food choices, consuming 3 meals/day trying to focus on protein and vegetables. Reports no snacking. Increased zero calorie liquid intake since last visit, 64oz/day. Trying to walk more 30min/day. Sleeping 8-9 hr/night. No reflux, nausea, vomiting, constipation or diarrhea.\par \par Abdominal U/S 5/6/2022: no cholelithiasis; findings consistent with hepatic steatosis

## 2022-11-08 ENCOUNTER — NON-APPOINTMENT (OUTPATIENT)
Age: 37
End: 2022-11-08

## 2022-11-16 ENCOUNTER — APPOINTMENT (OUTPATIENT)
Dept: BARIATRICS/WEIGHT MGMT | Facility: CLINIC | Age: 37
End: 2022-11-16

## 2022-11-16 VITALS — BODY MASS INDEX: 38.28 KG/M2 | HEIGHT: 62 IN | WEIGHT: 208 LBS

## 2022-11-16 PROCEDURE — 97802 MEDICAL NUTRITION INDIV IN: CPT | Mod: 95

## 2022-11-17 ENCOUNTER — NON-APPOINTMENT (OUTPATIENT)
Age: 37
End: 2022-11-17

## 2022-11-22 ENCOUNTER — APPOINTMENT (OUTPATIENT)
Dept: BARIATRICS | Facility: CLINIC | Age: 37
End: 2022-11-22

## 2022-11-22 ENCOUNTER — OUTPATIENT (OUTPATIENT)
Dept: OUTPATIENT SERVICES | Facility: HOSPITAL | Age: 37
LOS: 1 days | End: 2022-11-22
Payer: COMMERCIAL

## 2022-11-22 VITALS
RESPIRATION RATE: 14 BRPM | WEIGHT: 207.68 LBS | DIASTOLIC BLOOD PRESSURE: 76 MMHG | HEART RATE: 71 BPM | TEMPERATURE: 97 F | SYSTOLIC BLOOD PRESSURE: 111 MMHG | HEIGHT: 64 IN | OXYGEN SATURATION: 98 %

## 2022-11-22 VITALS
HEART RATE: 78 BPM | TEMPERATURE: 97.2 F | BODY MASS INDEX: 38.83 KG/M2 | WEIGHT: 210.98 LBS | DIASTOLIC BLOOD PRESSURE: 72 MMHG | SYSTOLIC BLOOD PRESSURE: 112 MMHG | OXYGEN SATURATION: 98 % | HEIGHT: 62 IN

## 2022-11-22 DIAGNOSIS — E66.01 MORBID (SEVERE) OBESITY DUE TO EXCESS CALORIES: ICD-10-CM

## 2022-11-22 DIAGNOSIS — Z41.9 ENCOUNTER FOR PROCEDURE FOR PURPOSES OTHER THAN REMEDYING HEALTH STATE, UNSPECIFIED: Chronic | ICD-10-CM

## 2022-11-22 DIAGNOSIS — K76.0 FATTY (CHANGE OF) LIVER, NOT ELSEWHERE CLASSIFIED: ICD-10-CM

## 2022-11-22 DIAGNOSIS — Z01.818 ENCOUNTER FOR OTHER PREPROCEDURAL EXAMINATION: ICD-10-CM

## 2022-11-22 DIAGNOSIS — Z98.89 OTHER SPECIFIED POSTPROCEDURAL STATES: Chronic | ICD-10-CM

## 2022-11-22 LAB
A1C WITH ESTIMATED AVERAGE GLUCOSE RESULT: 6.2 % — HIGH (ref 4–5.6)
ALBUMIN SERPL ELPH-MCNC: 3.9 G/DL — SIGNIFICANT CHANGE UP (ref 3.3–5)
ALP SERPL-CCNC: 95 U/L — SIGNIFICANT CHANGE UP (ref 30–120)
ALT FLD-CCNC: 33 U/L DA — SIGNIFICANT CHANGE UP (ref 10–60)
ANION GAP SERPL CALC-SCNC: 7 MMOL/L — SIGNIFICANT CHANGE UP (ref 5–17)
AST SERPL-CCNC: 17 U/L — SIGNIFICANT CHANGE UP (ref 10–40)
BILIRUB SERPL-MCNC: 0.2 MG/DL — SIGNIFICANT CHANGE UP (ref 0.2–1.2)
BUN SERPL-MCNC: 14 MG/DL — SIGNIFICANT CHANGE UP (ref 7–23)
CALCIUM SERPL-MCNC: 9.3 MG/DL — SIGNIFICANT CHANGE UP (ref 8.4–10.5)
CHLORIDE SERPL-SCNC: 103 MMOL/L — SIGNIFICANT CHANGE UP (ref 96–108)
CO2 SERPL-SCNC: 28 MMOL/L — SIGNIFICANT CHANGE UP (ref 22–31)
CREAT SERPL-MCNC: 0.58 MG/DL — SIGNIFICANT CHANGE UP (ref 0.5–1.3)
EGFR: 120 ML/MIN/1.73M2 — SIGNIFICANT CHANGE UP
ESTIMATED AVERAGE GLUCOSE: 131 MG/DL — HIGH (ref 68–114)
GLUCOSE SERPL-MCNC: 117 MG/DL — HIGH (ref 70–99)
HCT VFR BLD CALC: 41.1 % — SIGNIFICANT CHANGE UP (ref 34.5–45)
HGB BLD-MCNC: 13.7 G/DL — SIGNIFICANT CHANGE UP (ref 11.5–15.5)
MCHC RBC-ENTMCNC: 29.6 PG — SIGNIFICANT CHANGE UP (ref 27–34)
MCHC RBC-ENTMCNC: 33.3 GM/DL — SIGNIFICANT CHANGE UP (ref 32–36)
MCV RBC AUTO: 88.8 FL — SIGNIFICANT CHANGE UP (ref 80–100)
NRBC # BLD: 0 /100 WBCS — SIGNIFICANT CHANGE UP (ref 0–0)
PLATELET # BLD AUTO: 240 K/UL — SIGNIFICANT CHANGE UP (ref 150–400)
POTASSIUM SERPL-MCNC: 4.2 MMOL/L — SIGNIFICANT CHANGE UP (ref 3.5–5.3)
POTASSIUM SERPL-SCNC: 4.2 MMOL/L — SIGNIFICANT CHANGE UP (ref 3.5–5.3)
PROT SERPL-MCNC: 8.1 G/DL — SIGNIFICANT CHANGE UP (ref 6–8.3)
RBC # BLD: 4.63 M/UL — SIGNIFICANT CHANGE UP (ref 3.8–5.2)
RBC # FLD: 12.8 % — SIGNIFICANT CHANGE UP (ref 10.3–14.5)
SODIUM SERPL-SCNC: 138 MMOL/L — SIGNIFICANT CHANGE UP (ref 135–145)
WBC # BLD: 9.34 K/UL — SIGNIFICANT CHANGE UP (ref 3.8–10.5)
WBC # FLD AUTO: 9.34 K/UL — SIGNIFICANT CHANGE UP (ref 3.8–10.5)

## 2022-11-22 PROCEDURE — G0463: CPT

## 2022-11-22 PROCEDURE — 99215 OFFICE O/P EST HI 40 MIN: CPT | Mod: 25

## 2022-11-22 PROCEDURE — G0447 BEHAVIOR COUNSEL OBESITY 15M: CPT

## 2022-11-22 RX ORDER — DOCOSAHEXAENOIC ACID 300 MG
50 MCG CAPSULE ORAL
Qty: 90 | Refills: 1 | Status: DISCONTINUED | COMMUNITY
Start: 2021-04-06 | End: 2022-11-22

## 2022-11-22 RX ORDER — METFORMIN HYDROCHLORIDE 850 MG/1
0 TABLET ORAL
Qty: 0 | Refills: 0 | DISCHARGE

## 2022-11-22 NOTE — H&P PST ADULT - HEART RATE (BEATS/MIN)
Is This A New Presentation, Or A Follow-Up?: Skin Lesion What Type Of Note Output Would You Prefer (Optional)?: Bullet Format How Severe Is Your Skin Lesion?: mild Has Your Skin Lesion Been Treated?: not been treated 72

## 2022-11-22 NOTE — H&P PST ADULT - HISTORY OF PRESENT ILLNESS
this is a 38 y/o female who was told of fatty liver and also has been overweight for yrs, tried different weight loss programs without success, to have gastric sleeve surgery

## 2022-11-22 NOTE — H&P PST ADULT - ABILITY TO HEAR (WITH HEARING AID OR HEARING APPLIANCE IF NORMALLY USED):
Pt arrives via ems [facility] s/p left hip fx. The pt went home and immediately fell In the driveway with ems being called to the house.   On scene pt was noted to have a fever of 103.1
Report called to floor
Adequate: hears normal conversation without difficulty

## 2022-11-23 NOTE — ADDENDUM
[FreeTextEntry1] : Pulmonary function testing overnight sleep study and chest x-ray reviewed.  All are within normal limits.  There is no pulmonary contraindication to proposed surgery Surgeon Performing Repair (Optional): Nicolette Matt MD

## 2022-11-23 NOTE — ASSESSMENT
[FreeTextEntry1] : Pulmonary function testing chest x-ray and overnight sleep study have been ordered..  Follow-up pending results of above testing

## 2022-11-25 RX ORDER — HYDROMORPHONE HYDROCHLORIDE 2 MG/ML
0.5 INJECTION INTRAMUSCULAR; INTRAVENOUS; SUBCUTANEOUS EVERY 4 HOURS
Refills: 0 | Status: DISCONTINUED | OUTPATIENT
Start: 2022-12-05 | End: 2022-12-06

## 2022-11-25 RX ORDER — ENOXAPARIN SODIUM 100 MG/ML
30 INJECTION SUBCUTANEOUS EVERY 12 HOURS
Refills: 0 | Status: DISCONTINUED | OUTPATIENT
Start: 2022-12-06 | End: 2022-12-06

## 2022-11-25 RX ORDER — SODIUM CHLORIDE 9 MG/ML
2000 INJECTION, SOLUTION INTRAVENOUS
Refills: 0 | Status: DISCONTINUED | OUTPATIENT
Start: 2022-12-05 | End: 2022-12-06

## 2022-11-25 RX ORDER — SODIUM CHLORIDE 9 MG/ML
1000 INJECTION, SOLUTION INTRAVENOUS
Refills: 0 | Status: DISCONTINUED | OUTPATIENT
Start: 2022-12-05 | End: 2022-12-06

## 2022-11-25 RX ORDER — PANTOPRAZOLE SODIUM 20 MG/1
40 TABLET, DELAYED RELEASE ORAL DAILY
Refills: 0 | Status: DISCONTINUED | OUTPATIENT
Start: 2022-12-05 | End: 2022-12-06

## 2022-11-25 RX ORDER — OXYCODONE HYDROCHLORIDE 5 MG/1
5 TABLET ORAL EVERY 6 HOURS
Refills: 0 | Status: DISCONTINUED | OUTPATIENT
Start: 2022-12-06 | End: 2022-12-06

## 2022-11-25 RX ORDER — SIMETHICONE 80 MG/1
80 TABLET, CHEWABLE ORAL EVERY 8 HOURS
Refills: 0 | Status: DISCONTINUED | OUTPATIENT
Start: 2022-12-05 | End: 2022-12-06

## 2022-11-25 RX ORDER — ONDANSETRON 8 MG/1
4 TABLET, FILM COATED ORAL EVERY 6 HOURS
Refills: 0 | Status: DISCONTINUED | OUTPATIENT
Start: 2022-12-05 | End: 2022-12-06

## 2022-11-25 RX ORDER — HYOSCYAMINE SULFATE 0.13 MG
0.12 TABLET ORAL EVERY 6 HOURS
Refills: 0 | Status: DISCONTINUED | OUTPATIENT
Start: 2022-12-05 | End: 2022-12-06

## 2022-11-28 ENCOUNTER — APPOINTMENT (OUTPATIENT)
Dept: FAMILY MEDICINE | Facility: CLINIC | Age: 37
End: 2022-11-28

## 2022-11-28 ENCOUNTER — NON-APPOINTMENT (OUTPATIENT)
Age: 37
End: 2022-11-28

## 2022-11-28 VITALS
BODY MASS INDEX: 39.12 KG/M2 | SYSTOLIC BLOOD PRESSURE: 117 MMHG | HEART RATE: 84 BPM | WEIGHT: 212.56 LBS | TEMPERATURE: 98.1 F | RESPIRATION RATE: 16 BRPM | DIASTOLIC BLOOD PRESSURE: 74 MMHG | HEIGHT: 62 IN | OXYGEN SATURATION: 97 %

## 2022-11-28 DIAGNOSIS — Z01.818 ENCOUNTER FOR OTHER PREPROCEDURAL EXAMINATION: ICD-10-CM

## 2022-11-28 DIAGNOSIS — E28.2 POLYCYSTIC OVARIAN SYNDROME: ICD-10-CM

## 2022-11-28 DIAGNOSIS — Z87.898 PERSONAL HISTORY OF OTHER SPECIFIED CONDITIONS: ICD-10-CM

## 2022-11-28 PROCEDURE — 99214 OFFICE O/P EST MOD 30 MIN: CPT

## 2022-11-28 RX ORDER — PANTOPRAZOLE SODIUM 40 MG/1
40 TABLET, DELAYED RELEASE ORAL
Refills: 0 | Status: DISCONTINUED | COMMUNITY
End: 2022-11-28

## 2022-11-28 NOTE — PHYSICAL EXAM
[Normal] : affect appropriate [de-identified] : soft, NT, ND, no diastasis or hernias appreciated

## 2022-11-28 NOTE — REVIEW OF SYSTEMS
[Recent Change In Weight] : ~T recent weight change [Abdominal Pain] : no abdominal pain [Nausea] : no nausea [Constipation] : no constipation [Diarrhea] : diarrhea [Vomiting] : no vomiting [Joint Pain] : no joint pain [Joint Stiffness] : no joint stiffness [Muscle Pain] : no muscle pain [Back Pain] : no back pain [Negative] : Heme/Lymph [FreeTextEntry2] : weight gain

## 2022-11-28 NOTE — HISTORY OF PRESENT ILLNESS
[de-identified] : 37 year old F who presented for initial consult of obesity with BMI of 40.4. Now completed workup for Laparoscopic Sleeve Gastrectomy and has made lifestyle modifications to lose weight. Presents today to the office with her daughter for preop visit as weight has plateaued. Continues to try to make better food choices, consuming 3 meals/day trying to focus on protein and vegetables. Reports no snacking. No abdominal pain nor reflux, continues to take protonix daily. Increased zero calorie liquid intake since last visit, 64oz/day. Trying to walk more 30min/day. Sleeping 8-9 hr/night. No nausea, vomiting, constipation or diarrhea. No history of postop nausea or motion sickness\par \par 10/20/2022 Fibroscan showed stage I steatosis with no fibrosis. Per GI Dr. Rdz to repeat fibroscan in 6 months.\par 10/15/2022 Abd U/S showed fatty liver and hepatomegaly but no gallstones\par 5/6/2022 Abd U/S no cholelithiasis; findings consistent with hepatic steatosis

## 2022-11-28 NOTE — PHYSICAL EXAM
[No Acute Distress] : no acute distress [EOMI] : extraocular movements intact [Supple] : supple [Clear to Auscultation] : lungs were clear to auscultation bilaterally [Normal S1, S2] : normal S1 and S2 [No Edema] : there was no peripheral edema [Soft] : abdomen soft [Non-distended] : non-distended [Normal Bowel Sounds] : normal bowel sounds [No Rash] : no rash [Normal Gait] : normal gait [Normal Affect] : the affect was normal [de-identified] :  obese

## 2022-11-28 NOTE — HISTORY OF PRESENT ILLNESS
[No Pertinent Cardiac History] : no history of aortic stenosis, atrial fibrillation, coronary artery disease, recent myocardial infarction, or implantable device/pacemaker [No Pertinent Pulmonary History] : no history of asthma, COPD, sleep apnea, or smoking [No Adverse Anesthesia Reaction] : no adverse anesthesia reaction in self or family member [(Patient denies any chest pain, claudication, dyspnea on exertion, orthopnea, palpitations or syncope)] : Patient denies any chest pain, claudication, dyspnea on exertion, orthopnea, palpitations or syncope [Good (7-10 METs)] : Good (7-10 METs) [Aortic Stenosis] : no aortic stenosis [Atrial Fibrillation] : no atrial fibrillation [Coronary Artery Disease] : no coronary artery disease [Recent Myocardial Infarction] : no recent myocardial infarction [Implantable Device/Pacemaker] : no implantable device/pacemaker [Asthma] : no asthma [COPD] : no COPD [Sleep Apnea] : no sleep apnea [Smoker] : not a smoker [Family Member] : no family member with adverse anesthesia reaction/sudden death [Self] : no previous adverse anesthesia reaction [Chronic Anticoagulation] : no chronic anticoagulation [Chronic Kidney Disease] : no chronic kidney disease [Diabetes] : no diabetes [FreeTextEntry1] :  laparoscopic sleeve gastrectomy [FreeTextEntry2] : 12/05/2022 [FreeTextEntry3] : Dr. Win [FreeTextEntry4] : Ms Lydia Sanchez is a 38 yo female presents today for preop evaluation for a  laparoscopic sleeve gastrectomy to be done by Dr. Win on 12/05/2022. Pt has been struggle with her weight for several years, has tried multiple modalities of weight loss with dismal results. Pt now opts to proceed with this surgery.  [FreeTextEntry5] : pre diabetes [FreeTextEntry7] : 09/09/2022: NSR 59 sinus bradycardia, no acute ST-T wave abnormalities\par cardiology clearance received

## 2022-11-28 NOTE — ASSESSMENT
[FreeTextEntry1] : 37 year old F who presented for initial consult of obesity with BMI of 40.4. Now completed workup for Laparoscopic Sleeve Gastrectomy and has made lifestyle modifications to lose weight. Presents today to the office with her daughter for preop visit as weight has plateaued.\par Doing well overall\par \par Counseled pt for 15 minutes about health maintenance principles including diet and exercise in preparation for surgery\par Protein focus diet and increase zero calorie liquid intake 64oz/day \par Increase activities and keep track of steps - goal 8-10k steps/day\par Limit caffeine intake\par \par Discussed with pt the risks/benefits/alternatives of surgery\par Schedule OR date\par Preoperative modified liquid diet 1 week\par Presurgical testing\par Preoperative education class\par Advised to monitor bowel habits during perioperative period and use constipation remedies as needed - constipation remedies list given to pt\par Reviewed pt's meds - pt will have PCP prescribe alternatives if applicable\par \par All questions answered\par Pt seen with Dr. Win\par \par Additional time spent before and after visit reviewing chart

## 2022-11-28 NOTE — RESULTS/DATA
[] : results reviewed [de-identified] : 11/22/2022: Results within acceptable limits [de-identified] : 11/22/2022: Results within acceptable limits [de-identified] : 09/09/2022: NSR 59 sinus bradycardia, no acute ST-T wave abnormalities [de-identified] : 11/22/2022: HgbA1c: 6.2, acceptable for surgery

## 2022-11-28 NOTE — ASSESSMENT
[High Risk Surgery - Intraperitoneal, Intrathoracic or Supringuinal Vascular Procedures] : High Risk Surgery - Intraperitoneal, Intrathoracic or Supringuinal Vascular Procedures - Yes (1) [Ischemic Heart Disease] : Ischemic Heart Disease - No (0) [Congestive Heart Failure] : Congestive Heart Failure - No (0) [Prior Cerebrovascular Accident or TIA] : Prior Cerebrovascular Accident or TIA - No (0) [Creatinine >= 2mg/dL (1 Point)] : Creatinine >= 2mg/dL - No (0) [Insulin-dependent Diabetic (1 Point)] : Insulin-dependent Diabetic - No (0) [1] : 1 , RCRI Class: II, Risk of Post-Op Cardiac Complications: 6.0%, 95% CI for Risk Estimate: 4.9% - 7.4% [Patient Optimized for Surgery] : Patient optimized for surgery [As per surgery] : as per surgery [FreeTextEntry4] : Completed physical Exam, reviewed pre op labs, EKG. \par Advised pt to avoid any NSAIDs, vitamins, aspirin for 7 days till post surgery.\par  Avoid any food or drinks past midnight, the day prior to surgery. \par HOLD Metformin 48 hrs prior to surgery, resume in 24 hrs after surgery\par \par RCRI Class II for surgery\par Pt at current time has been optimized for surgery\par  RTO for a follow up appointment post surgery.\par

## 2022-11-29 ENCOUNTER — NON-APPOINTMENT (OUTPATIENT)
Age: 37
End: 2022-11-29

## 2022-12-02 ENCOUNTER — OUTPATIENT (OUTPATIENT)
Dept: OUTPATIENT SERVICES | Facility: HOSPITAL | Age: 37
LOS: 1 days | End: 2022-12-02
Payer: MEDICAID

## 2022-12-02 DIAGNOSIS — Z98.89 OTHER SPECIFIED POSTPROCEDURAL STATES: Chronic | ICD-10-CM

## 2022-12-02 DIAGNOSIS — Z41.9 ENCOUNTER FOR PROCEDURE FOR PURPOSES OTHER THAN REMEDYING HEALTH STATE, UNSPECIFIED: Chronic | ICD-10-CM

## 2022-12-02 DIAGNOSIS — Z20.828 CONTACT WITH AND (SUSPECTED) EXPOSURE TO OTHER VIRAL COMMUNICABLE DISEASES: ICD-10-CM

## 2022-12-02 LAB — SARS-COV-2 RNA SPEC QL NAA+PROBE: SIGNIFICANT CHANGE UP

## 2022-12-02 PROCEDURE — U0003: CPT

## 2022-12-02 PROCEDURE — U0005: CPT

## 2022-12-04 ENCOUNTER — TRANSCRIPTION ENCOUNTER (OUTPATIENT)
Age: 37
End: 2022-12-04

## 2022-12-05 ENCOUNTER — INPATIENT (INPATIENT)
Facility: HOSPITAL | Age: 37
LOS: 0 days | Discharge: HOME | DRG: 621 | End: 2022-12-06
Attending: SURGERY | Admitting: SURGERY
Payer: MEDICAID

## 2022-12-05 ENCOUNTER — TRANSCRIPTION ENCOUNTER (OUTPATIENT)
Age: 37
End: 2022-12-05

## 2022-12-05 ENCOUNTER — RESULT REVIEW (OUTPATIENT)
Age: 37
End: 2022-12-05

## 2022-12-05 ENCOUNTER — APPOINTMENT (OUTPATIENT)
Dept: BARIATRICS | Facility: HOSPITAL | Age: 37
End: 2022-12-05

## 2022-12-05 VITALS
HEIGHT: 62 IN | HEART RATE: 70 BPM | OXYGEN SATURATION: 99 % | WEIGHT: 202.6 LBS | DIASTOLIC BLOOD PRESSURE: 73 MMHG | SYSTOLIC BLOOD PRESSURE: 105 MMHG | TEMPERATURE: 98 F | RESPIRATION RATE: 16 BRPM

## 2022-12-05 DIAGNOSIS — Z41.9 ENCOUNTER FOR PROCEDURE FOR PURPOSES OTHER THAN REMEDYING HEALTH STATE, UNSPECIFIED: Chronic | ICD-10-CM

## 2022-12-05 DIAGNOSIS — E66.01 MORBID (SEVERE) OBESITY DUE TO EXCESS CALORIES: ICD-10-CM

## 2022-12-05 DIAGNOSIS — K76.0 FATTY (CHANGE OF) LIVER, NOT ELSEWHERE CLASSIFIED: ICD-10-CM

## 2022-12-05 DIAGNOSIS — Z98.89 OTHER SPECIFIED POSTPROCEDURAL STATES: Chronic | ICD-10-CM

## 2022-12-05 LAB
GLUCOSE BLDC GLUCOMTR-MCNC: 136 MG/DL — HIGH (ref 70–99)
HCG UR QL: NEGATIVE — SIGNIFICANT CHANGE UP

## 2022-12-05 PROCEDURE — 43775 LAP SLEEVE GASTRECTOMY: CPT | Mod: 22

## 2022-12-05 PROCEDURE — 99223 1ST HOSP IP/OBS HIGH 75: CPT

## 2022-12-05 PROCEDURE — 88307 TISSUE EXAM BY PATHOLOGIST: CPT | Mod: 26

## 2022-12-05 DEVICE — STAPLER COVIDIEN TRI-STAPLE 45MM PURPLE INTELLIGENT RELOAD: Type: IMPLANTABLE DEVICE | Site: ABDOMINAL | Status: FUNCTIONAL

## 2022-12-05 DEVICE — CLIP APPLIER ETHICON LIGAMAX 5MM: Type: IMPLANTABLE DEVICE | Site: ABDOMINAL | Status: FUNCTIONAL

## 2022-12-05 DEVICE — STAPLER COVIDIEN TRI-STAPLE 60MM PURPLE INTELLIGENT RELOAD: Type: IMPLANTABLE DEVICE | Site: ABDOMINAL | Status: FUNCTIONAL

## 2022-12-05 DEVICE — STAPLER COVIDIEN TRI-STAPLE 60MM BLACK INTELLIGENT RELOAD: Type: IMPLANTABLE DEVICE | Site: ABDOMINAL | Status: FUNCTIONAL

## 2022-12-05 DEVICE — SEALANT VISTASEAL FIBRIN HUMAN 4ML: Type: IMPLANTABLE DEVICE | Site: ABDOMINAL | Status: FUNCTIONAL

## 2022-12-05 DEVICE — STAPLER COVIDIEN TRI-STAPLE 45MM BLACK INTELLIGENT RELOAD: Type: IMPLANTABLE DEVICE | Site: ABDOMINAL | Status: FUNCTIONAL

## 2022-12-05 RX ORDER — GLUCAGON INJECTION, SOLUTION 0.5 MG/.1ML
1 INJECTION, SOLUTION SUBCUTANEOUS ONCE
Refills: 0 | Status: DISCONTINUED | OUTPATIENT
Start: 2022-12-05 | End: 2022-12-06

## 2022-12-05 RX ORDER — ACETAMINOPHEN 500 MG
1000 TABLET ORAL EVERY 6 HOURS
Refills: 0 | Status: COMPLETED | OUTPATIENT
Start: 2022-12-05 | End: 2022-12-06

## 2022-12-05 RX ORDER — DEXTROSE 50 % IN WATER 50 %
12.5 SYRINGE (ML) INTRAVENOUS ONCE
Refills: 0 | Status: DISCONTINUED | OUTPATIENT
Start: 2022-12-05 | End: 2022-12-06

## 2022-12-05 RX ORDER — SODIUM CHLORIDE 9 MG/ML
1000 INJECTION, SOLUTION INTRAVENOUS
Refills: 0 | Status: DISCONTINUED | OUTPATIENT
Start: 2022-12-05 | End: 2022-12-06

## 2022-12-05 RX ORDER — DEXTROSE 50 % IN WATER 50 %
25 SYRINGE (ML) INTRAVENOUS ONCE
Refills: 0 | Status: DISCONTINUED | OUTPATIENT
Start: 2022-12-05 | End: 2022-12-06

## 2022-12-05 RX ORDER — APREPITANT 80 MG/1
40 CAPSULE ORAL ONCE
Refills: 0 | Status: COMPLETED | OUTPATIENT
Start: 2022-12-05 | End: 2022-12-05

## 2022-12-05 RX ORDER — SODIUM CHLORIDE 9 MG/ML
1000 INJECTION, SOLUTION INTRAVENOUS ONCE
Refills: 0 | Status: COMPLETED | OUTPATIENT
Start: 2022-12-05 | End: 2022-12-05

## 2022-12-05 RX ORDER — INFLUENZA VIRUS VACCINE 15; 15; 15; 15 UG/.5ML; UG/.5ML; UG/.5ML; UG/.5ML
0.5 SUSPENSION INTRAMUSCULAR ONCE
Refills: 0 | Status: DISCONTINUED | OUTPATIENT
Start: 2022-12-05 | End: 2022-12-06

## 2022-12-05 RX ORDER — DEXTROSE 50 % IN WATER 50 %
15 SYRINGE (ML) INTRAVENOUS ONCE
Refills: 0 | Status: DISCONTINUED | OUTPATIENT
Start: 2022-12-05 | End: 2022-12-06

## 2022-12-05 RX ORDER — ENOXAPARIN SODIUM 100 MG/ML
30 INJECTION SUBCUTANEOUS ONCE
Refills: 0 | Status: COMPLETED | OUTPATIENT
Start: 2022-12-05 | End: 2022-12-05

## 2022-12-05 RX ORDER — OXYCODONE HYDROCHLORIDE 5 MG/1
5 TABLET ORAL ONCE
Refills: 0 | Status: DISCONTINUED | OUTPATIENT
Start: 2022-12-05 | End: 2022-12-05

## 2022-12-05 RX ORDER — HYDROMORPHONE HYDROCHLORIDE 2 MG/ML
0.5 INJECTION INTRAMUSCULAR; INTRAVENOUS; SUBCUTANEOUS
Refills: 0 | Status: DISCONTINUED | OUTPATIENT
Start: 2022-12-05 | End: 2022-12-05

## 2022-12-05 RX ORDER — CHLORHEXIDINE GLUCONATE 213 G/1000ML
1 SOLUTION TOPICAL ONCE
Refills: 0 | Status: COMPLETED | OUTPATIENT
Start: 2022-12-05 | End: 2022-12-05

## 2022-12-05 RX ORDER — SODIUM CHLORIDE 9 MG/ML
1000 INJECTION, SOLUTION INTRAVENOUS
Refills: 0 | Status: DISCONTINUED | OUTPATIENT
Start: 2022-12-05 | End: 2022-12-05

## 2022-12-05 RX ORDER — ERGOCALCIFEROL 1.25 MG/1
1 CAPSULE ORAL
Qty: 0 | Refills: 0 | DISCHARGE

## 2022-12-05 RX ORDER — ACETAMINOPHEN 500 MG
1000 TABLET ORAL EVERY 6 HOURS
Refills: 0 | Status: COMPLETED | OUTPATIENT
Start: 2022-12-06 | End: 2022-12-06

## 2022-12-05 RX ORDER — INSULIN LISPRO 100/ML
VIAL (ML) SUBCUTANEOUS
Refills: 0 | Status: DISCONTINUED | OUTPATIENT
Start: 2022-12-05 | End: 2022-12-06

## 2022-12-05 RX ORDER — CEFAZOLIN SODIUM 1 G
2000 VIAL (EA) INJECTION ONCE
Refills: 0 | Status: COMPLETED | OUTPATIENT
Start: 2022-12-05 | End: 2022-12-05

## 2022-12-05 RX ORDER — IBUPROFEN 200 MG
800 TABLET ORAL EVERY 6 HOURS
Refills: 0 | Status: DISCONTINUED | OUTPATIENT
Start: 2022-12-05 | End: 2022-12-06

## 2022-12-05 RX ORDER — HYDROMORPHONE HYDROCHLORIDE 2 MG/ML
1 INJECTION INTRAMUSCULAR; INTRAVENOUS; SUBCUTANEOUS
Refills: 0 | Status: DISCONTINUED | OUTPATIENT
Start: 2022-12-05 | End: 2022-12-05

## 2022-12-05 RX ORDER — ACETAMINOPHEN 500 MG
1000 TABLET ORAL ONCE
Refills: 0 | Status: COMPLETED | OUTPATIENT
Start: 2022-12-05 | End: 2022-12-05

## 2022-12-05 RX ORDER — ONDANSETRON 8 MG/1
4 TABLET, FILM COATED ORAL ONCE
Refills: 0 | Status: DISCONTINUED | OUTPATIENT
Start: 2022-12-05 | End: 2022-12-05

## 2022-12-05 RX ADMIN — HYDROMORPHONE HYDROCHLORIDE 0.5 MILLIGRAM(S): 2 INJECTION INTRAMUSCULAR; INTRAVENOUS; SUBCUTANEOUS at 11:06

## 2022-12-05 RX ADMIN — ONDANSETRON 4 MILLIGRAM(S): 8 TABLET, FILM COATED ORAL at 18:17

## 2022-12-05 RX ADMIN — Medication 800 MILLIGRAM(S): at 17:41

## 2022-12-05 RX ADMIN — HYDROMORPHONE HYDROCHLORIDE 0.5 MILLIGRAM(S): 2 INJECTION INTRAMUSCULAR; INTRAVENOUS; SUBCUTANEOUS at 11:40

## 2022-12-05 RX ADMIN — CHLORHEXIDINE GLUCONATE 1 APPLICATION(S): 213 SOLUTION TOPICAL at 07:08

## 2022-12-05 RX ADMIN — SODIUM CHLORIDE 150 MILLILITER(S): 9 INJECTION, SOLUTION INTRAVENOUS at 20:57

## 2022-12-05 RX ADMIN — Medication 400 MILLIGRAM(S): at 20:57

## 2022-12-05 RX ADMIN — HYDROMORPHONE HYDROCHLORIDE 0.5 MILLIGRAM(S): 2 INJECTION INTRAMUSCULAR; INTRAVENOUS; SUBCUTANEOUS at 11:10

## 2022-12-05 RX ADMIN — SODIUM CHLORIDE 1000 MILLILITER(S): 9 INJECTION, SOLUTION INTRAVENOUS at 07:08

## 2022-12-05 RX ADMIN — ONDANSETRON 4 MILLIGRAM(S): 8 TABLET, FILM COATED ORAL at 23:44

## 2022-12-05 RX ADMIN — APREPITANT 40 MILLIGRAM(S): 80 CAPSULE ORAL at 07:08

## 2022-12-05 RX ADMIN — Medication 400 MILLIGRAM(S): at 11:19

## 2022-12-05 RX ADMIN — Medication 400 MILLIGRAM(S): at 13:52

## 2022-12-05 RX ADMIN — SODIUM CHLORIDE 75 MILLILITER(S): 9 INJECTION, SOLUTION INTRAVENOUS at 10:54

## 2022-12-05 RX ADMIN — Medication 1000 MILLIGRAM(S): at 21:34

## 2022-12-05 RX ADMIN — HYDROMORPHONE HYDROCHLORIDE 0.5 MILLIGRAM(S): 2 INJECTION INTRAMUSCULAR; INTRAVENOUS; SUBCUTANEOUS at 10:49

## 2022-12-05 RX ADMIN — Medication 400 MILLIGRAM(S): at 17:11

## 2022-12-05 RX ADMIN — Medication 800 MILLIGRAM(S): at 11:40

## 2022-12-05 RX ADMIN — SODIUM CHLORIDE 150 MILLILITER(S): 9 INJECTION, SOLUTION INTRAVENOUS at 17:11

## 2022-12-05 RX ADMIN — Medication 1000 MILLIGRAM(S): at 19:20

## 2022-12-05 RX ADMIN — SODIUM CHLORIDE 1000 MILLILITER(S): 9 INJECTION, SOLUTION INTRAVENOUS at 15:50

## 2022-12-05 RX ADMIN — ENOXAPARIN SODIUM 30 MILLIGRAM(S): 100 INJECTION SUBCUTANEOUS at 07:08

## 2022-12-05 RX ADMIN — Medication 0.12 MILLIGRAM(S): at 14:00

## 2022-12-05 NOTE — CONSULT NOTE ADULT - SUBJECTIVE AND OBJECTIVE BOX
HPI: 37F  with morbid Obesity, DM2, Asthma, PCOS, HLD has been attempting weight loss for some time and has been unsuccessful.  Patient has tried multiple diets and excercise programs but has not had meaningful weight loss.  Patient has opted to undergo elective gastric sleeve which was successfully performed today.  Patient also had endoscopic repair of a pre-existing hiatal hernia.  Currently patient is resting comfortably in the recovery room.    REVIEW OF SYSTEMS:  CONSTITUTIONAL: No fever, weight loss, or fatigue  EYES: No eye pain, visual disturbances, or discharge  ENMT:  No difficulty hearing, tinnitus, vertigo; No sinus or throat pain  NECK: No pain or stiffness  RESPIRATORY: No cough, wheezing, chills or hemoptysis; No shortness of breath  CARDIOVASCULAR: No chest pain, palpitations, dizziness, or leg swelling  GASTROINTESTINAL: No abdominal or epigastric pain. No nausea, vomiting, or hematemesis; No diarrhea or constipation. No melena or hematochezia.  GENITOURINARY: No dysuria, frequency, hematuria, or incontinence  NEUROLOGICAL: No headaches, memory loss, loss of strength, numbness, or tremors  MUSCULOSKELETAL: No muscle or back pain      PAST MEDICAL & SURGICAL HISTORY:  Endometriosis      PCOS (polycystic ovarian syndrome)      Obesity (BMI 30-39.9)      Asthma  Albuterol Inhaler as needed- last use was over a year ago      Torsion of ovary, ovarian pedicle and fallopian tube      Other ovarian cyst, right side      Pelvic and perineal pain      History of prediabetes      Hyperlipidemia  No medications at this time      S/P   , - tubal ligation also      S/P appendectomy      S/P hemorrhoidectomy      Elective surgery  Diagnostic Laparoscopy with FABRICIO and Cystoscopy           SOCIAL HISTORY:  Tobacco; EtOH; Illicit Drugs    Allergies    No Known Allergies    Intolerances        MEDICATIONS  (STANDING):  influenza   Vaccine 0.5 milliLiter(s) IntraMuscular once  lactated ringers. 1000 milliLiter(s) (75 mL/Hr) IV Continuous <Continuous>  lactated ringers. 2000 milliLiter(s) (1000 mL/Hr) IV Continuous <Continuous>    MEDICATIONS  (PRN):  HYDROmorphone  Injectable 0.5 milliGRAM(s) IV Push every 10 minutes PRN Moderate Pain (4 - 6)  HYDROmorphone  Injectable 1 milliGRAM(s) IV Push every 10 minutes PRN Severe Pain (7 - 10)  ibuprofen IVPB .. 800 milliGRAM(s) IV Intermittent every 6 hours PRN Moderate Pain (4 - 6)  ondansetron Injectable 4 milliGRAM(s) IV Push once PRN Nausea and/or Vomiting  oxyCODONE    IR 5 milliGRAM(s) Oral once PRN Moderate Pain (4 - 6)      FAMILY HISTORY:  FH: type 2 diabetes (Father)        Vital Signs Last 24 Hrs  T(C): 36.9 (05 Dec 2022 10:21), Max: 36.9 (05 Dec 2022 10:21)  T(F): 98.4 (05 Dec 2022 10:21), Max: 98.4 (05 Dec 2022 10:21)  HR: 67 (05 Dec 2022 11:36) (67 - 91)  BP: 118/80 (05 Dec 2022 11:36) (105/73 - 126/73)  BP(mean): --  RR: 12 (05 Dec 2022 11:36) (12 - 19)  SpO2: 100% (05 Dec 2022 11:36) (99% - 100%)    Parameters below as of 05 Dec 2022 11:36  Patient On (Oxygen Delivery Method): nasal cannula  O2 Flow (L/min): 2      PHYSICAL EXAM:    GENERAL: NAD, well-developed  HEAD:  Atraumatic, Normocephalic  EYES: EOMI, PERRLA, conjunctiva and sclera clear  ENMT: No tonsillar erythema, exudates, or enlargement; Moist mucous membranes, Good dentition, No lesions  NECK: Supple, No JVD, Normal thyroid  NERVOUS SYSTEM:  Alert & Oriented X3, Good concentration;  CHEST/LUNG: Clear to auscultation bilaterally; No rales, rhonchi, wheezing, or rubs  HEART: Regular rate and rhythm; No murmurs, rubs, or gallops  ABDOMEN: Soft, Nontender, Nondistended; Bowel sounds present  EXTREMITIES:  2+ Peripheral Pulses, No clubbing, cyanosis, or edema      LABS:              CAPILLARY BLOOD GLUCOSE          RADIOLOGY & ADDITIONAL STUDIES:    EKG:   Personally Reviewed:  [ ] YES     Imaging:   Personally Reviewed:  [ ] YES     Consultant(s) Notes Reviewed:      Care Discussed with Consultants/Other Providers:

## 2022-12-05 NOTE — DISCHARGE NOTE PROVIDER - NSDCFUSCHEDAPPT_GEN_ALL_CORE_FT
Barby Win  [facility]  BARIATRIC [address]  Scheduled Appointment: 12/13/2022    Barby Win  [facility]  BARIATRIC [address]  Scheduled Appointment: 01/10/2023    [facility]  WEIGHTMGMT [address]  Scheduled Appointment: 02/07/2023

## 2022-12-05 NOTE — DISCHARGE NOTE PROVIDER - CARE PROVIDER_API CALL
Barby Win (MD)  Surgery  [address] [place] [address]  Phone: [phone]  Fax: [phone]  Follow Up Time: 1 week

## 2022-12-05 NOTE — DISCHARGE NOTE PROVIDER - NSDCFUADDAPPT_GEN_ALL_CORE_FT
Please call Dr. RAMESH Win's office ([phone]) to schedule a follow-up appointment to be seen in 1 week.    Contact primary care provider for medication adjustments and dosages.

## 2022-12-05 NOTE — BRIEF OPERATIVE NOTE - NSICDXBRIEFPOSTOP_GEN_ALL_CORE_FT
POST-OP DIAGNOSIS:  Morbid obesity 05-Dec-2022 10:25:47  Veena Singh  Hiatal hernia 05-Dec-2022 10:26:18  Veena Singh

## 2022-12-05 NOTE — CONSULT NOTE ADULT - ASSESSMENT
37F  with morbid Obesity, DM2, Asthma, PCOS, HLD admitted for aftercare following Gastric Sleeve    S/P Gastric Sleeve  POD 0    Continue Bowel and pain control regimen;    Incentive Spirometer for lung expansion;   Monitor Hgb and follow up electrolytes.      DM 2 / PCOS  Metformin Daily     Asthma  Albuterol PRN    Diet  As Per Surgery    DVT Prophylaxis   Lovenox    Disposition  Discharge planning pending hospital course

## 2022-12-05 NOTE — DISCHARGE NOTE PROVIDER - NSDCMRMEDTOKEN_GEN_ALL_CORE_FT
acetaminophen 500 mg/15 mL oral liquid: 1000 milligram(s) orally every 8 hours  metFORMIN 500 mg oral tablet: 1 tab(s) orally 2 times a day  omeprazole 20 mg oral delayed release capsule: 1 cap(s) orally once a day  ondansetron 4 mg oral tablet, disintegratin tab(s) orally 3 times a day, As Needed for nausea  oxyCODONE 5 mg oral tablet: 1 tab(s) orally every 6 hours, As Needed for severe pain   acetaminophen 500 mg/15 mL oral liquid: 30 milliliter(s) orally every 8 hours for a minimum of 3 days and a maximum of 5 days  metFORMIN 500 mg oral tablet: 1 tab(s) orally 2 times a day  omeprazole 20 mg oral delayed release capsule: 1 cap(s) orally once a day, open capsule and sprinkle on spoon of yogurt or pudding  ondansetron 4 mg oral tablet, disintegratin tab(s) orally 3 times a day, As Needed for nausea  oxyCODONE 5 mg oral tablet: 1 tab(s) orally every 6 hours, As Needed for severe pain

## 2022-12-05 NOTE — BRIEF OPERATIVE NOTE - NSICDXBRIEFPROCEDURE_GEN_ALL_CORE_FT
PROCEDURES:  Laparoscopic sleeve gastrectomy with laparoscopic repair of hiatal hernia 05-Dec-2022 10:25:28  Veena Singh  EGD 05-Dec-2022 10:25:35  Veena Singh

## 2022-12-05 NOTE — DISCHARGE NOTE PROVIDER - HOSPITAL COURSE
36 y/o HF with PMHx of morbid obesity, PCOS admitted to [facility] on 12/5/2022 and underwent laparoscopic sleeve gastrectomy with hiatal hernia repair. Patient tolerated procedure well and progressed appropriately. Currently tolerating Bariatric Phase 1 Clears diet, voiding independently with appropriate volume and ambulating. Nutritional guidelines were reviewed with Nutritionist, and patient instructed to drink small frequent amounts, start protein drinks and follow dietary guidelines.  Discharged on DATE with home medications, incentive spirometer, abdominal binder and MEDS TO BED to follow-up with Bariatric Surgeon/Dr. RAMESH Win in 1 week.   36 y/o HF with PMHx of morbid obesity, PCOS admitted to [facility] on 12/5/2022 and underwent laparoscopic sleeve gastrectomy with hiatal hernia repair. Patient tolerated procedure well and progressed appropriately. Currently tolerating Bariatric Phase 1 Clears diet, voiding independently with appropriate volume and ambulating. Nutritional guidelines were reviewed with Nutritionist, and patient instructed to drink small frequent amounts, start protein drinks and follow dietary guidelines.  Discharged on 12/6/2022 with home medications, incentive spirometer, abdominal binder and MEDS TO BED to follow-up with Bariatric Surgeon/Dr. RAMESH Win in 1 week.

## 2022-12-05 NOTE — PROGRESS NOTE ADULT - SUBJECTIVE AND OBJECTIVE BOX
SURGERY PA POST-OP NOTE    38 y/o HF with PMHx of PCOS, DM on Metformin s/p laparoscopic sleeve gastrectomy with hiatal hernia repair on 12/5/2022    SUBJECTIVE:  Patient seen at beside, feeling drowsy but arousable after receiving Dilaudid in PACU, c/o pain in chest with deep breaths and abdominal pain, denies shortness of breath, headache, dizziness, fever/chills, dysuria, nausea, vomiting, diarrhea.  Patient denies flatus, bowel movement, voiding independently, ambulating.      OBJECTIVE:   T(F): 98 (12-05-22 @ 12:08), Max: 98.4 (12-05-22 @ 10:21)  HR: 76 (12-05-22 @ 12:08) (67 - 91)  BP: 122/78 (12-05-22 @ 12:08) (105/73 - 126/73)  RR: 14 (12-05-22 @ 12:08) (12 - 19)  SpO2: 100% (12-05-22 @ 12:08) (99% - 100%)    I&O's Detail  05 Dec 2022 07:01  -  05 Dec 2022 13:23  --------------------------------------------------------  IN:    Lactated Ringers: 1600 mL  Total IN: 1600 mL  OUT:  Total OUT: 0 mL  Total NET: 1600 mL      PHYSICAL EXAM:  General: A+O x 3, NAD  HEENT: PERRLA, EOMs intact, non-icteric  Chest: Clear to auscultation bilaterally, no rales/rhonchi/wheezes noted  Heart: S1, S2 clear, RRR  Abdomen: soft, non-distended, mild incisional tenderness, incisions clean, sterstrips intact, +BS, no guarding, no rebound, no CVA noted  Extremities: nonedematous bilaterally, warm, no calf tenderness noted     MEDICATIONS  (STANDING):  acetaminophen   IVPB .. 1000 milliGRAM(s) IV Intermittent every 6 hours  influenza   Vaccine 0.5 milliLiter(s) IntraMuscular once  lactated ringers. 2000 milliLiter(s) (1000 mL/Hr) IV Continuous <Continuous>  lactated ringers. 1000 milliLiter(s) (150 mL/Hr) IV Continuous <Continuous>  ondansetron Injectable 4 milliGRAM(s) IV Push every 6 hours  pantoprazole  Injectable 40 milliGRAM(s) IV Push daily    MEDICATIONS  (PRN):  HYDROmorphone  Injectable 0.5 milliGRAM(s) IV Push every 4 hours PRN Severe Pain (7 - 10)  hyoscyamine SL 0.125 milliGRAM(s) SubLingual every 6 hours PRN nausea  ibuprofen IVPB .. 800 milliGRAM(s) IV Intermittent every 6 hours PRN Moderate Pain (4 - 6)  simethicone 80 milliGRAM(s) Chew every 8 hours PRN Gas       SURGERY PA POST-OP NOTE    38 y/o HF with PMHx of PCOS, DM on Metformin s/p laparoscopic sleeve gastrectomy with hiatal hernia repair on 12/5/2022    SUBJECTIVE:  Patient seen at beside, feeling drowsy but arousable after receiving Dilaudid in PACU, c/o discomfort in chest with deep breaths and abdominal pain, denies shortness of breath, headache, dizziness, fever/chills, dysuria, nausea, vomiting, diarrhea.  Patient denies flatus, bowel movement, voiding independently, ambulating.      OBJECTIVE:   T(F): 98 (12-05-22 @ 12:08), Max: 98.4 (12-05-22 @ 10:21)  HR: 76 (12-05-22 @ 12:08) (67 - 91)  BP: 122/78 (12-05-22 @ 12:08) (105/73 - 126/73)  RR: 14 (12-05-22 @ 12:08) (12 - 19)  SpO2: 100% (12-05-22 @ 12:08) (99% - 100%)    I&O's Detail  05 Dec 2022 07:01  -  05 Dec 2022 13:23  --------------------------------------------------------  IN:    Lactated Ringers: 1600 mL  Total IN: 1600 mL  OUT:  Total OUT: 0 mL  Total NET: 1600 mL      PHYSICAL EXAM:  General: A+O x 3, NAD  HEENT: PERRLA, EOMs intact, non-icteric  Chest: Clear to auscultation bilaterally, no rales/rhonchi/wheezes noted  Heart: S1, S2 clear, RRR  Abdomen: soft, non-distended, mild incisional tenderness, incisions clean, sterstrips intact, +BS, no guarding, no rebound, no CVA noted  Extremities: nonedematous bilaterally, warm, no calf tenderness noted     MEDICATIONS  (STANDING):  acetaminophen   IVPB .. 1000 milliGRAM(s) IV Intermittent every 6 hours  influenza   Vaccine 0.5 milliLiter(s) IntraMuscular once  lactated ringers. 2000 milliLiter(s) (1000 mL/Hr) IV Continuous <Continuous>  lactated ringers. 1000 milliLiter(s) (150 mL/Hr) IV Continuous <Continuous>  ondansetron Injectable 4 milliGRAM(s) IV Push every 6 hours  pantoprazole  Injectable 40 milliGRAM(s) IV Push daily    MEDICATIONS  (PRN):  HYDROmorphone  Injectable 0.5 milliGRAM(s) IV Push every 4 hours PRN Severe Pain (7 - 10)  hyoscyamine SL 0.125 milliGRAM(s) SubLingual every 6 hours PRN nausea  ibuprofen IVPB .. 800 milliGRAM(s) IV Intermittent every 6 hours PRN Moderate Pain (4 - 6)  simethicone 80 milliGRAM(s) Chew every 8 hours PRN Gas

## 2022-12-05 NOTE — DISCHARGE NOTE PROVIDER - NSDCFUADDINST_GEN_ALL_CORE_FT
Increase ambulation and utilize incentive spirometry frequently.   Apply ice packs to abdominal wall/incision sites for 20 mins on/20 mins off every 4 hours for the next 24 hours and to shoulders as needed for discomfort.   Take acetaminophen/Tylenol regularly for a minimum of 3 days and a maximum of 5 days.  Continue Bariatric Phase 1 Diet and follow nutritional guidelines as instructed.  Pain medications as needed. If taking narcotic pain medications, may take Colace/OTC stool softener (whole) as directed to prevent constipation.  Powdered medications can be mixed in low-fat yogurt or pudding, capsules can be opened and mixed with low-fat yogurt or pudding and swallowed (not chewed) or OR crush allowed medications and put in low-fat yogurt or pudding.  Wear binder for comfort and support.  Avoid heavy lifting in excess of 20-25 pounds and strenuous activities for duration of 4-6 weeks.  Call office with any questions or concerns.

## 2022-12-05 NOTE — DISCHARGE NOTE PROVIDER - NSDCCPTREATMENT_GEN_ALL_CORE_FT
PRINCIPAL PROCEDURE  Procedure: Laparoscopic sleeve gastrectomy with laparoscopic repair of hiatal hernia  Findings and Treatment:       SECONDARY PROCEDURE  Procedure: EGD  Findings and Treatment:

## 2022-12-05 NOTE — PATIENT PROFILE ADULT - FALL HARM RISK - UNIVERSAL INTERVENTIONS
Bed in lowest position, wheels locked, appropriate side rails in place/Call bell, personal items and telephone in reach/Instruct patient to call for assistance before getting out of bed or chair/Non-slip footwear when patient is out of bed/[place] to call system/Physically safe environment - no spills, clutter or unnecessary equipment/Purposeful Proactive Rounding/Room/bathroom lighting operational, light cord in reach

## 2022-12-05 NOTE — PATIENT PROFILE ADULT - FUNCTIONAL SCREEN CURRENT LEVEL: SWALLOWING (IF SCORE 2 OR MORE FOR ANY ITEM, CONSULT REHAB SERVICES), MLM)
Principal Discharge DX:	Fall, initial encounter  Secondary Diagnosis:	Head injury  Secondary Diagnosis:	Shoulder pain
0 = swallows foods/liquids without difficulty

## 2022-12-06 ENCOUNTER — TRANSCRIPTION ENCOUNTER (OUTPATIENT)
Age: 37
End: 2022-12-06

## 2022-12-06 VITALS
RESPIRATION RATE: 18 BRPM | TEMPERATURE: 98 F | DIASTOLIC BLOOD PRESSURE: 64 MMHG | SYSTOLIC BLOOD PRESSURE: 100 MMHG | HEART RATE: 68 BPM | OXYGEN SATURATION: 95 %

## 2022-12-06 LAB
ANION GAP SERPL CALC-SCNC: 9 MMOL/L — SIGNIFICANT CHANGE UP (ref 5–17)
BILIRUB SERPL-MCNC: 0.4 MG/DL — SIGNIFICANT CHANGE UP (ref 0.2–1.2)
BUN SERPL-MCNC: 6 MG/DL — LOW (ref 7–23)
CALCIUM SERPL-MCNC: 8.3 MG/DL — LOW (ref 8.4–10.5)
CHLORIDE SERPL-SCNC: 105 MMOL/L — SIGNIFICANT CHANGE UP (ref 96–108)
CO2 SERPL-SCNC: 27 MMOL/L — SIGNIFICANT CHANGE UP (ref 22–31)
CREAT SERPL-MCNC: 0.65 MG/DL — SIGNIFICANT CHANGE UP (ref 0.5–1.3)
EGFR: 117 ML/MIN/1.73M2 — SIGNIFICANT CHANGE UP
GLUCOSE BLDC GLUCOMTR-MCNC: 100 MG/DL — HIGH (ref 70–99)
GLUCOSE BLDC GLUCOMTR-MCNC: 110 MG/DL — HIGH (ref 70–99)
GLUCOSE BLDC GLUCOMTR-MCNC: 95 MG/DL — SIGNIFICANT CHANGE UP (ref 70–99)
GLUCOSE BLDC GLUCOMTR-MCNC: 98 MG/DL — SIGNIFICANT CHANGE UP (ref 70–99)
GLUCOSE SERPL-MCNC: 97 MG/DL — SIGNIFICANT CHANGE UP (ref 70–99)
HCT VFR BLD CALC: 34.8 % — SIGNIFICANT CHANGE UP (ref 34.5–45)
HGB BLD-MCNC: 11.5 G/DL — SIGNIFICANT CHANGE UP (ref 11.5–15.5)
INR BLD: 1.15 RATIO — SIGNIFICANT CHANGE UP (ref 0.88–1.16)
MCHC RBC-ENTMCNC: 29.9 PG — SIGNIFICANT CHANGE UP (ref 27–34)
MCHC RBC-ENTMCNC: 33 GM/DL — SIGNIFICANT CHANGE UP (ref 32–36)
MCV RBC AUTO: 90.6 FL — SIGNIFICANT CHANGE UP (ref 80–100)
MELD SCORE WITH DIALYSIS: 21 POINTS — SIGNIFICANT CHANGE UP
MELD SCORE WITHOUT DIALYSIS: 8 POINTS — SIGNIFICANT CHANGE UP
NRBC # BLD: 0 /100 WBCS — SIGNIFICANT CHANGE UP (ref 0–0)
PLATELET # BLD AUTO: 201 K/UL — SIGNIFICANT CHANGE UP (ref 150–400)
POTASSIUM SERPL-MCNC: 3.2 MMOL/L — LOW (ref 3.5–5.3)
POTASSIUM SERPL-SCNC: 3.2 MMOL/L — LOW (ref 3.5–5.3)
PROTHROM AB SERPL-ACNC: 13.6 SEC — HIGH (ref 10.5–13.4)
RBC # BLD: 3.84 M/UL — SIGNIFICANT CHANGE UP (ref 3.8–5.2)
RBC # FLD: 12.8 % — SIGNIFICANT CHANGE UP (ref 10.3–14.5)
SODIUM SERPL-SCNC: 141 MMOL/L — SIGNIFICANT CHANGE UP (ref 135–145)
WBC # BLD: 12.78 K/UL — HIGH (ref 3.8–10.5)
WBC # FLD AUTO: 12.78 K/UL — HIGH (ref 3.8–10.5)

## 2022-12-06 PROCEDURE — 36415 COLL VENOUS BLD VENIPUNCTURE: CPT

## 2022-12-06 PROCEDURE — 81025 URINE PREGNANCY TEST: CPT

## 2022-12-06 PROCEDURE — 88307 TISSUE EXAM BY PATHOLOGIST: CPT

## 2022-12-06 PROCEDURE — 82962 GLUCOSE BLOOD TEST: CPT

## 2022-12-06 PROCEDURE — 99233 SBSQ HOSP IP/OBS HIGH 50: CPT

## 2022-12-06 PROCEDURE — 85027 COMPLETE CBC AUTOMATED: CPT

## 2022-12-06 PROCEDURE — 80048 BASIC METABOLIC PNL TOTAL CA: CPT

## 2022-12-06 PROCEDURE — C1889: CPT

## 2022-12-06 RX ORDER — SODIUM CHLORIDE 9 MG/ML
1000 INJECTION, SOLUTION INTRAVENOUS ONCE
Refills: 0 | Status: COMPLETED | OUTPATIENT
Start: 2022-12-06 | End: 2022-12-06

## 2022-12-06 RX ORDER — POTASSIUM CHLORIDE 20 MEQ
10 PACKET (EA) ORAL
Refills: 0 | Status: COMPLETED | OUTPATIENT
Start: 2022-12-06 | End: 2022-12-06

## 2022-12-06 RX ORDER — ACETAMINOPHEN 500 MG
1000 TABLET ORAL
Qty: 0 | Refills: 0 | DISCHARGE

## 2022-12-06 RX ORDER — OMEPRAZOLE 10 MG/1
1 CAPSULE, DELAYED RELEASE ORAL
Qty: 0 | Refills: 0 | DISCHARGE

## 2022-12-06 RX ADMIN — Medication 400 MILLIGRAM(S): at 05:18

## 2022-12-06 RX ADMIN — SIMETHICONE 80 MILLIGRAM(S): 80 TABLET, CHEWABLE ORAL at 10:27

## 2022-12-06 RX ADMIN — Medication 100 MILLIEQUIVALENT(S): at 10:49

## 2022-12-06 RX ADMIN — ENOXAPARIN SODIUM 30 MILLIGRAM(S): 100 INJECTION SUBCUTANEOUS at 05:18

## 2022-12-06 RX ADMIN — PANTOPRAZOLE SODIUM 40 MILLIGRAM(S): 20 TABLET, DELAYED RELEASE ORAL at 12:16

## 2022-12-06 RX ADMIN — Medication 800 MILLIGRAM(S): at 05:45

## 2022-12-06 RX ADMIN — ONDANSETRON 4 MILLIGRAM(S): 8 TABLET, FILM COATED ORAL at 05:18

## 2022-12-06 RX ADMIN — ONDANSETRON 4 MILLIGRAM(S): 8 TABLET, FILM COATED ORAL at 12:17

## 2022-12-06 RX ADMIN — Medication 100 MILLIEQUIVALENT(S): at 14:21

## 2022-12-06 RX ADMIN — Medication 400 MILLIGRAM(S): at 13:33

## 2022-12-06 RX ADMIN — Medication 1000 MILLIGRAM(S): at 03:13

## 2022-12-06 RX ADMIN — Medication 1000 MILLIGRAM(S): at 17:00

## 2022-12-06 RX ADMIN — SODIUM CHLORIDE 1000 MILLILITER(S): 9 INJECTION, SOLUTION INTRAVENOUS at 01:18

## 2022-12-06 RX ADMIN — OXYCODONE HYDROCHLORIDE 5 MILLIGRAM(S): 5 TABLET ORAL at 11:20

## 2022-12-06 RX ADMIN — Medication 1000 MILLIGRAM(S): at 08:30

## 2022-12-06 RX ADMIN — Medication 800 MILLIGRAM(S): at 14:00

## 2022-12-06 RX ADMIN — OXYCODONE HYDROCHLORIDE 5 MILLIGRAM(S): 5 TABLET ORAL at 10:28

## 2022-12-06 RX ADMIN — Medication 400 MILLIGRAM(S): at 07:53

## 2022-12-06 RX ADMIN — Medication 400 MILLIGRAM(S): at 02:38

## 2022-12-06 RX ADMIN — Medication 400 MILLIGRAM(S): at 16:37

## 2022-12-06 RX ADMIN — Medication 100 MILLIEQUIVALENT(S): at 12:17

## 2022-12-06 NOTE — DISCHARGE NOTE NURSING/CASE MANAGEMENT/SOCIAL WORK - PATIENT PORTAL LINK FT
You can access the FollowMyHealth Patient Portal offered by [facility] by registering at the following website: [email or web address]. By joining [email or web address] FollowMyHealth portal, you will also be able to view your health information using other applications (apps) compatible with our system.

## 2022-12-06 NOTE — PHARMACOTHERAPY INTERVENTION NOTE - COMMENTS
All discharge meds, Omeprazole, Oxy 5 mg, Ondansetrone 4mg ODT, APAP liquid, and Bariatric Vitamin was discussed.  home med; metformin can be crushed.  Not to take Bariatric multi vitamin till first visit to Dr. Lyles.

## 2022-12-06 NOTE — PROGRESS NOTE ADULT - SUBJECTIVE AND OBJECTIVE BOX
HPI:   37 year old Female POD #1 s/p Laparoscopic sleeve gastrectomy with laparoscopic repair of hiatal hernia     Pt seen and examined sitting in chair.  Patient is ambulating on the floor and utilizing incentive spirometry. She is tolerating clear liquid diet and urinating well. Minimal incisional discomfort. Patient states she had nausea last night, but denies nausea and vomiting now.  Complains of some gas discomfort    VITALS:  Vital Signs Last 24 Hrs  T(C): 37.2 (06 Dec 2022 07:51), Max: 37.3 (06 Dec 2022 03:27)  T(F): 98.9 (06 Dec 2022 07:51), Max: 99.1 (06 Dec 2022 03:27)  HR: 80 (06 Dec 2022 07:51) (66 - 88)  BP: 107/68 (06 Dec 2022 07:51) (95/61 - 126/73)  BP(mean): --  RR: 16 (06 Dec 2022 07:51) (12 - 17)  SpO2: 97% (06 Dec 2022 07:51) (95% - 100%)    Parameters below as of 06 Dec 2022 07:51  Patient On (Oxygen Delivery Method): room air        12-05 @ 07:01  -  12-06 @ 07:00  --------------------------------------------------------  IN: 5600 mL / OUT: 2800 mL / NET: 2800 mL    12-06 @ 07:01  -  12-06 @ 10:27  --------------------------------------------------------  IN: 60 mL / OUT: 1200 mL / NET: -1140 mL        LABS:                        11.5   12.78 )-----------( 201      ( 06 Dec 2022 07:00 )             34.8     12-06    141  |  105  |  6<L>  ----------------------------<  97  3.2<L>   |  27  |  0.65    Ca    8.3<L>      06 Dec 2022 07:00    TPro  x   /  Alb  x   /  TBili  0.4  /  DBili  x   /  AST  x   /  ALT  x   /  AlkPhos  x   12-06    PT/INR - ( 06 Dec 2022 07:00 )   PT: 13.6 sec;   INR: 1.15 ratio           CAPILLARY BLOOD GLUCOSE      POCT Blood Glucose.: 98 mg/dL (06 Dec 2022 07:48)  POCT Blood Glucose.: 95 mg/dL (06 Dec 2022 06:10)  POCT Blood Glucose.: 100 mg/dL (06 Dec 2022 00:35)  POCT Blood Glucose.: 136 mg/dL (05 Dec 2022 16:56)            Physical Exam:  General: A/O x 3, NAD, resting comfortably in chair  Abdominal: soft, ND, appropriate incisional tenderness  Incisions: incisions clean, dry and intact  Extremities: no edema, no calf tenderness B/L      A/P:   37 year old Female POD #1  s/p Laparoscopic sleeve gastrectomy with laparoscopic repair of hiatal hernia    Potassium repleted  Simethicone to be given  Cont GI/DVT prophylaxis.   Cont  OOB/ambulation on floor / incentive spirometry.  Cont bariatric clear diet as tolerated - monitor oral intake  Dietician consult.   Discussed and reviewed home meds  and pain medication with patient . Discussed medication that requires crushing.  Plan to begin protein shakes at home.  Possibly discharged later today or tomorrow.  Case and plan D/W Dr. Win

## 2022-12-06 NOTE — DISCHARGE NOTE NURSING/CASE MANAGEMENT/SOCIAL WORK - NSDCPEFALRISK_GEN_ALL_CORE
For information on Fall & Injury Prevention, visit: [email or web address] OR  [email or web address] OR  https://www.cdc.gov/steadi/patient.html

## 2022-12-06 NOTE — DISCHARGE NOTE NURSING/CASE MANAGEMENT/SOCIAL WORK - NSDCFUADDAPPT_GEN_ALL_CORE_FT
Please call Dr. RAMESH Win's office ([phone]) to schedule a follow-up appointment to be seen in 1 week.    Contact primary care provider for medication adjustments and dosages.

## 2022-12-06 NOTE — PROGRESS NOTE ADULT - SUBJECTIVE AND OBJECTIVE BOX
Subjective: Patient seen and examined. No overnight events. Pain minimal.     MEDICATIONS  (STANDING):  dextrose 5%. 1000 milliLiter(s) (50 mL/Hr) IV Continuous <Continuous>  dextrose 5%. 1000 milliLiter(s) (100 mL/Hr) IV Continuous <Continuous>  dextrose 50% Injectable 25 Gram(s) IV Push once  dextrose 50% Injectable 12.5 Gram(s) IV Push once  dextrose 50% Injectable 25 Gram(s) IV Push once  enoxaparin Injectable 30 milliGRAM(s) SubCutaneous every 12 hours  glucagon  Injectable 1 milliGRAM(s) IntraMuscular once  influenza   Vaccine 0.5 milliLiter(s) IntraMuscular once  insulin lispro (ADMELOG) corrective regimen sliding scale   SubCutaneous three times a day before meals  lactated ringers. 2000 milliLiter(s) (1000 mL/Hr) IV Continuous <Continuous>  lactated ringers. 1000 milliLiter(s) (150 mL/Hr) IV Continuous <Continuous>  ondansetron Injectable 4 milliGRAM(s) IV Push every 6 hours  pantoprazole  Injectable 40 milliGRAM(s) IV Push daily  potassium chloride  10 mEq/100 mL IVPB 10 milliEquivalent(s) IV Intermittent every 1 hour    MEDICATIONS  (PRN):  acetaminophen   IVPB .. 1000 milliGRAM(s) IV Intermittent every 6 hours PRN Mild Pain (1 - 3)  dextrose Oral Gel 15 Gram(s) Oral once PRN Blood Glucose LESS THAN 70 milliGRAM(s)/deciliter  HYDROmorphone  Injectable 0.5 milliGRAM(s) IV Push every 4 hours PRN Severe Pain (7 - 10)  hyoscyamine SL 0.125 milliGRAM(s) SubLingual every 6 hours PRN nausea  ibuprofen IVPB .. 800 milliGRAM(s) IV Intermittent every 6 hours PRN Moderate Pain (4 - 6)  oxyCODONE    IR 5 milliGRAM(s) Oral every 6 hours PRN Severe Pain (7 - 10)  simethicone 80 milliGRAM(s) Chew every 8 hours PRN Gas      Allergies    No Known Allergies    Intolerances        Vital Signs Last 24 Hrs  T(C): 37.2 (06 Dec 2022 07:51), Max: 37.3 (06 Dec 2022 03:27)  T(F): 98.9 (06 Dec 2022 07:51), Max: 99.1 (06 Dec 2022 03:27)  HR: 80 (06 Dec 2022 07:51) (66 - 80)  BP: 107/68 (06 Dec 2022 07:51) (95/61 - 122/78)  BP(mean): --  RR: 16 (06 Dec 2022 07:51) (12 - 16)  SpO2: 97% (06 Dec 2022 07:51) (95% - 100%)    Parameters below as of 06 Dec 2022 07:51  Patient On (Oxygen Delivery Method): room air        PHYSICAL EXAM:  GENERAL: NAD, well-groomed, well-developed  HEAD:  Atraumatic, Normocephalic  ENMT: Moist mucous membranes,   NECK: Supple, No JVD, Normal thyroid  NERVOUS SYSTEM:  All 4 extremities mobile, no gross sensory deficits.   CHEST/LUNG: Clear to auscultation bilaterally; No rales, rhonchi, wheezing, or rubs  HEART: Regular rate and rhythm; No murmurs, rubs, or gallops  ABDOMEN: Soft, Nontender, Nondistended; Bowel sounds present  EXTREMITIES:  2+ Peripheral Pulses, No clubbing, cyanosis, or edema      LABS:                        11.5   12.78 )-----------( 201      ( 06 Dec 2022 07:00 )             34.8     06 Dec 2022 07:00    141    |  105    |  6      ----------------------------<  97     3.2     |  27     |  0.65     Ca    8.3        06 Dec 2022 07:00    TPro  x      /  Alb  x      /  TBili  0.4    /  DBili  x      /  AST  x      /  ALT  x      /  AlkPhos  x      06 Dec 2022 07:00    PT/INR - ( 06 Dec 2022 07:00 )   PT: 13.6 sec;   INR: 1.15 ratio             CAPILLARY BLOOD GLUCOSE      POCT Blood Glucose.: 98 mg/dL (06 Dec 2022 07:48)  POCT Blood Glucose.: 95 mg/dL (06 Dec 2022 06:10)  POCT Blood Glucose.: 100 mg/dL (06 Dec 2022 00:35)  POCT Blood Glucose.: 136 mg/dL (05 Dec 2022 16:56)      RADIOLOGY & ADDITIONAL TESTS:    Imaging Personally Reviewed:  [ ] YES     Consultant(s) Notes Reviewed:      Care Discussed with Consultants/Other Providers:    Advanced Directives: [ ] DNR  [ ] No feeding tube  [ ] MOLST in chart  [ ] MOLST completed today  [ ] Unknown

## 2022-12-06 NOTE — PROGRESS NOTE ADULT - ASSESSMENT
A/P: POST-OP s/p laparoscopic sleeve gastrectomy with hiatal hernia repair, h/o DM, PCOS,    Continue GI/DVT prophylaxis.  Encourage OOB/ambulation on floor.  Encourage incentive spirometry, deep breathing.  Analgesia prn - continue acetaminophen, ibuprofen and narcotics for breakthrough.  Hospitalist f/u noted.  DM protocol.  Await void, possible bladder scan, straight cath if patient unable to void.    Case & plan discussed with Bariatric Surgeon/Dr. RAMESH Win and patient's nurse.  
 37F  with morbid Obesity, DM2, Asthma, PCOS, HLD admitted for aftercare following Gastric Sleeve    S/P Gastric Sleeve  POD 1  Continue Bowel and pain control regimen;    Incentive Spirometer for lung expansion;   Monitor Hgb and follow up electrolytes.    Hypokalemia  Repleted      DM 2 / PCOS  Metformin Daily     Asthma  Albuterol PRN    Diet  As Per Surgery    DVT Prophylaxis   Lovenox    Disposition  Patient medically optimized for discharge home if cleared by PT and Ortho.

## 2022-12-07 ENCOUNTER — NON-APPOINTMENT (OUTPATIENT)
Age: 37
End: 2022-12-07

## 2022-12-08 NOTE — ED PROVIDER NOTE - PSH
Problem: NORMAL   Goal: Experiences normal transition  Description: INTERVENTIONS:  - Assess and monitor vital signs and lab values. - Encourage skin-to-skin with caregiver for thermoregulation  - Assess signs, symptoms and risk factors for hypoglycemia and follow protocol as needed. - Assess signs, symptoms and risk factors for jaundice risk and follow protocol as needed. - Utilize standard precautions and use personal protective equipment as indicated. Wash hands properly before and after each patient care activity.   - Ensure proper skin care and diapering and educate caregiver. - Follow proper infant identification and infant security measures (secure access to the unit, provider ID, visiting policy, Ludi labs and Kisses system), and educate caregiver. - Ensure proper circumcision care and instruct/demonstrate to caregiver. Outcome: Completed  Goal: Total weight loss less than 10% of birth weight  Description: INTERVENTIONS:  - Initiate breastfeeding within first hour after birth. - Encourage rooming-in.  - Assess infant feedings. - Monitor intake and output and daily weight.  - Encourage maternal fluid intake for breastfeeding mother.  - Encourage feeding on-demand or as ordered per pediatrician.  - Educate caregiver on proper bottle-feeding technique as needed. - Provide information about early infant feeding cues (e.g., rooting, lip smacking, sucking fingers/hand) versus late cue of crying.  - Review techniques for breastfeeding moms for expression (breast pumping) and storage of breast milk.   Outcome: Completed S/P appendectomy    S/P   2013- tubal ligation also  S/P hemorrhoidectomy

## 2022-12-13 ENCOUNTER — APPOINTMENT (OUTPATIENT)
Dept: BARIATRICS | Facility: CLINIC | Age: 37
End: 2022-12-13

## 2022-12-13 VITALS
BODY MASS INDEX: 36.27 KG/M2 | HEIGHT: 62 IN | TEMPERATURE: 97.4 F | WEIGHT: 197.09 LBS | HEART RATE: 84 BPM | SYSTOLIC BLOOD PRESSURE: 116 MMHG | DIASTOLIC BLOOD PRESSURE: 72 MMHG | OXYGEN SATURATION: 98 %

## 2022-12-13 PROCEDURE — 99024 POSTOP FOLLOW-UP VISIT: CPT

## 2022-12-13 RX ORDER — ONDANSETRON 4 MG/1
4 TABLET, ORALLY DISINTEGRATING ORAL EVERY 8 HOURS
Qty: 15 | Refills: 0 | Status: DISCONTINUED | COMMUNITY
Start: 2022-11-30 | End: 2022-12-13

## 2022-12-13 RX ORDER — OXYCODONE 5 MG/1
5 TABLET ORAL
Qty: 6 | Refills: 0 | Status: DISCONTINUED | COMMUNITY
Start: 2022-11-30 | End: 2022-12-13

## 2022-12-15 NOTE — REVIEW OF SYSTEMS
[Negative] : Allergic/Immunologic [Abdominal Pain] : abdominal pain [Recent Change In Weight] : ~T recent weight change [Vomiting] : no vomiting [Constipation] : no constipation [Diarrhea] : no diarrhea [Hernia] : no hernia [FreeTextEntry7] : mild right-sided incisional pain

## 2022-12-15 NOTE — ASSESSMENT
[FreeTextEntry1] : 37 year old woman 1 week s/p Laparoscopic Sleeve Gastrectomy with hiatal hernia repair. Patient is doing well. Nutrition and exercise guidelines were reviewed with the patient.\par \par Continue aiming for 60 g protein/day; next week, start soft food and reduce number of protein drinks\par Encourage zero calorie fluid intake (64 oz/day)\par Start bariatric vitamins after today's visit (2 in the AM, 2 in the PM)\par Continue walking for exercise; can start strength training at 6 weeks post op\par Use step counter\par Weigh yourself 1x-2x/week\par Follow-up with nutritionist \par Follow-up in the office in 1 month\par All questions answered\par \par Call with any questions or concerns\par \par \par \par

## 2022-12-15 NOTE — PHYSICAL EXAM
[Obese, well nourished, in no acute distress] : obese, well nourished, in no acute distress [Normal] : affect appropriate [de-identified] : soft, mild right-sided tenderness at incision site, ND, incisions healing appropriately without erythema or drainage, Steri-Strips removed

## 2022-12-15 NOTE — HISTORY OF PRESENT ILLNESS
[Procedure: ___] : Procedure performed: [unfilled]  [Date of Surgery: ___] : Date of Surgery:   [unfilled] [Surgeon Name:   ___] : Surgeon Name: Dr. GASTELUM [Pre-Op Weight ___] : Pre-op weight was [unfilled] lbs [de-identified] : 37 year old woman 1 week s/p Laparoscopic Sleeve Gastrectomy with hiatal hernia repair. Patient is doing well. Patient c/o discomfort right sided incision.  Patient is trying to consume 60 g protein/day (drinks 1 shake/day), drink adequate zero-calorie liquids/day (16 oz/day), ambulating for exercise, and will start bariatric vitamins after today's visit. Patient reports no fever/sweats/chills, leg pain, nausea/vomiting, constipation, reflux, or diarrhea. Taking omeprazole.\par \par Next nutritionist appointment 2/7/2023

## 2022-12-15 NOTE — REASON FOR VISIT
[Post Operative Visit] : a post operative visit for [S/P Bariatric Surgery] : s/p bariatric surgery [Family Member] : family member [Ad Hoc ] : provided by an ad hoc  [FreeTextEntry2] : Patient is 1 week s/p Laparoscopic Sleeve Gastrectomy and hiatal hernia repair.

## 2022-12-21 NOTE — ASU DISCHARGE PLAN (ADULT/PEDIATRIC). - INSTRUCTIONS
"Anesthesia Transfer of Care Note    Patient: Jaxson Burch Jr.    Procedure(s) Performed: Procedure(s) (LRB):  COLONOSCOPY (N/A)    Patient location: GI    Anesthesia Type: MAC    Transport from OR: Transported from OR on room air with adequate spontaneous ventilation    Post pain: adequate analgesia    Post assessment: no apparent anesthetic complications    Post vital signs: stable    Level of consciousness: awake, alert and oriented    Nausea/Vomiting: no nausea/vomiting    Complications: none    Transfer of care protocol was followed      Last vitals:   Visit Vitals  /76 (BP Location: Left arm, Patient Position: Lying)   Pulse 73   Temp 36.3 °C (97.4 °F) (Temporal)   Resp 17   Ht 5' 7" (1.702 m)   Wt 64 kg (141 lb)   SpO2 99%   BMI 22.08 kg/m²     " ****Call the office with any problems including but not limited to heavy vaginal bleeding, fevers, severe abdominal pain, inability to eat/drink/urinate  **** Nothing in the vagina x2 weeks-( No sex, tampons, douching )  *****You may shower as usual but no hot tubs, bath tubs, swimming pools x2 weeks.

## 2023-01-10 ENCOUNTER — APPOINTMENT (OUTPATIENT)
Dept: BARIATRICS | Facility: CLINIC | Age: 38
End: 2023-01-10
Payer: MEDICAID

## 2023-01-10 VITALS
WEIGHT: 188.49 LBS | OXYGEN SATURATION: 99 % | DIASTOLIC BLOOD PRESSURE: 76 MMHG | TEMPERATURE: 97 F | HEART RATE: 87 BPM | BODY MASS INDEX: 34.69 KG/M2 | HEIGHT: 62 IN | SYSTOLIC BLOOD PRESSURE: 114 MMHG

## 2023-01-10 PROCEDURE — 99024 POSTOP FOLLOW-UP VISIT: CPT

## 2023-01-10 NOTE — HISTORY OF PRESENT ILLNESS
[Procedure: ___] : Procedure performed: [unfilled]  [Date of Surgery: ___] : Date of Surgery:   [unfilled] [Surgeon Name:   ___] : Surgeon Name: Dr. GASTELUM [Pre-Op Weight ___] : Pre-op weight was [unfilled] lbs [de-identified] : 37 year old F is 5 weeks s/p Laparoscopic Sleeve Gastrectomy with hiatal hernia repair. Feels good, happy with recovery process. Weight loss since last visit. Tolerating soft foods well. Pt is consuming 3 meals/day with adequate protein intake, taking 20-30min to consume each meal. No snacking. Reports discomfort with nausea and vomiting when eating too fast - occurred 2-3x since surgery. Continues to take omeprazole. Drinking adequate zero calorie liquid, 50oz/day. Reports constipation, taking Colace as needed. Taking Bariatric vitamins caps daily. Ambulating daily for exercise, averaging - ~ 40min/day. Sleeping ~ 8-9 hours/night. No abdominal pain, or reflux. 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

## 2023-01-10 NOTE — PHYSICAL EXAM
[Normal] : PERRL, EOMI, no conjunctival infection, anicteric [de-identified] : soft, NT, ND, no diastasis or hernias appreciated, incisions well healed; ingrown hair noted near epigastric incision but no erythema, swelling or drainage

## 2023-01-10 NOTE — ASSESSMENT
[FreeTextEntry1] : 37 year old F is 5 weeks s/p Laparoscopic Sleeve Gastrectomy with hiatal hernia repair. Feels good, happy with recovery process. Weight loss since last visit. Tolerating soft foods well.\par Doing well overall.\par \par Continue 3 meals/day, pureed/soft low fat, low carbohydrate and high protein diet until 6 weeks postop, then progress to regular foods as tolerated\par Maintain daily food log\par Daily zero calorie liquid intake 64 oz per day\par Constipation remedies list given to pt\par Continue daily Bariatric Fusion vitamins as directed\par Increase ambulation and to start strength exercises 6 weeks post op - may start next week\par Pre-printed exercise packet given to pt\par Discontinue omeprazole\par \par Labs ordered to be done before next visit \par Return to the office in 2 months\par Follow up with Nutritionist Khalida Umaña as scheduled\par All questions answered\par \par Pt seen with Dr. Win

## 2023-01-11 NOTE — ED ADULT NURSE NOTE - CAS EDP DISCH TYPE
Home Chonodrocutaneous Helical Advancement Flap Text: Because of the tightness of the surrounding skin, the full-thickness nature of the defect with exposed cartilage, and to avoid deformity, a helical rim advancement flap was planed.  After prep and anesthesia, an incision was made in the anterior portion of the ear through the skin and the cartilage to the posterior perichondrium both superiorly and inferiorly within the scapha of the ear.  Inferiorly, this extended to the lobule where a Burow’s triangle was excised anteriorly.  The chondrocutaneous flaps were then  from the ear posteriorly at the level of the perichondrium to the postauricular sulcus.  A small rim of cartilage was also excised around the contour of the ear and after hemostasis obtained, the chondrocutaneous flaps were advanced and closed in a layered fashion

## 2023-02-07 ENCOUNTER — APPOINTMENT (OUTPATIENT)
Dept: BARIATRICS/WEIGHT MGMT | Facility: CLINIC | Age: 38
End: 2023-02-07
Payer: MEDICAID

## 2023-02-07 VITALS — HEIGHT: 62 IN | BODY MASS INDEX: 33.49 KG/M2 | WEIGHT: 182 LBS

## 2023-02-07 PROCEDURE — 97803 MED NUTRITION INDIV SUBSEQ: CPT | Mod: 95

## 2023-02-09 NOTE — H&P PST ADULT - PSYCHIATRIC COMMENTS
09-Feb-2023 18:27 pt is having pain today while in PST - lower abdominal pain secondary to ovarian cyst; however cooperative and patient for H&P. Pt knows to call Dr Lema should anything change between now and procedure.

## 2023-02-21 ENCOUNTER — NON-APPOINTMENT (OUTPATIENT)
Age: 38
End: 2023-02-21

## 2023-02-22 NOTE — ED PROVIDER NOTE - CPE EDP [facility] NORM
Repeat sulfa for an additional 2 weeks    Resume furosemide 40 mg daily    Continue finasteride 5 mg daily    Continue Flomax 0.4 mg twice daily.  Unclear why this was stopped when prescription was sent for 1 year on January 27    Repeat labs to follow-up anemia and resumption of furosemide    Decrease narcotics if able    Follow-up as needed   normal...

## 2023-03-07 ENCOUNTER — NON-APPOINTMENT (OUTPATIENT)
Age: 38
End: 2023-03-07

## 2023-03-07 ENCOUNTER — APPOINTMENT (OUTPATIENT)
Dept: BARIATRICS | Facility: CLINIC | Age: 38
End: 2023-03-07
Payer: MEDICAID

## 2023-03-07 VITALS
SYSTOLIC BLOOD PRESSURE: 116 MMHG | OXYGEN SATURATION: 98 % | BODY MASS INDEX: 33.27 KG/M2 | WEIGHT: 180.78 LBS | HEIGHT: 62 IN | TEMPERATURE: 96.7 F | DIASTOLIC BLOOD PRESSURE: 74 MMHG | HEART RATE: 69 BPM

## 2023-03-07 LAB
25(OH)D3 SERPL-MCNC: 28.5 NG/ML
ALBUMIN SERPL ELPH-MCNC: 4 G/DL
ALP BLD-CCNC: 79 U/L
ALT SERPL-CCNC: 15 U/L
ANION GAP SERPL CALC-SCNC: 12 MMOL/L
AST SERPL-CCNC: 16 U/L
BASOPHILS # BLD AUTO: 0.04 K/UL
BASOPHILS NFR BLD AUTO: 0.4 %
BILIRUB SERPL-MCNC: 0.3 MG/DL
BUN SERPL-MCNC: 10 MG/DL
CALCIUM SERPL-MCNC: 9.5 MG/DL
CHLORIDE SERPL-SCNC: 106 MMOL/L
CO2 SERPL-SCNC: 23 MMOL/L
CREAT SERPL-MCNC: 0.55 MG/DL
EGFR: 122 ML/MIN/1.73M2
EOSINOPHIL # BLD AUTO: 0.39 K/UL
EOSINOPHIL NFR BLD AUTO: 4.1 %
ESTIMATED AVERAGE GLUCOSE: 120 MG/DL
FOLATE SERPL-MCNC: >20 NG/ML
GLUCOSE SERPL-MCNC: 104 MG/DL
HBA1C MFR BLD HPLC: 5.8 %
HCT VFR BLD CALC: 39.2 %
HGB BLD-MCNC: 12.8 G/DL
IMM GRANULOCYTES NFR BLD AUTO: 0.1 %
IRON SERPL-MCNC: 71 UG/DL
LYMPHOCYTES # BLD AUTO: 2.83 K/UL
LYMPHOCYTES NFR BLD AUTO: 29.8 %
MAN DIFF?: NORMAL
MCHC RBC-ENTMCNC: 30.2 PG
MCHC RBC-ENTMCNC: 32.7 GM/DL
MCV RBC AUTO: 92.5 FL
MONOCYTES # BLD AUTO: 0.67 K/UL
MONOCYTES NFR BLD AUTO: 7 %
NEUTROPHILS # BLD AUTO: 5.57 K/UL
NEUTROPHILS NFR BLD AUTO: 58.6 %
PLATELET # BLD AUTO: 188 K/UL
POTASSIUM SERPL-SCNC: 4 MMOL/L
PROT SERPL-MCNC: 6.5 G/DL
RBC # BLD: 4.24 M/UL
RBC # FLD: 13.4 %
SODIUM SERPL-SCNC: 141 MMOL/L
TSH SERPL-ACNC: 0.6 UIU/ML
VIT A SERPL-MCNC: 16.6 UG/DL
VIT B1 SERPL-MCNC: 143.6 NMOL/L
VIT B12 SERPL-MCNC: 942 PG/ML
WBC # FLD AUTO: 9.51 K/UL
ZINC SERPL-MCNC: 77 UG/DL

## 2023-03-07 PROCEDURE — 99214 OFFICE O/P EST MOD 30 MIN: CPT

## 2023-03-07 RX ORDER — OMEPRAZOLE 20 MG/1
20 CAPSULE, DELAYED RELEASE ORAL DAILY
Qty: 30 | Refills: 1 | Status: DISCONTINUED | COMMUNITY
Start: 2022-11-30 | End: 2023-03-07

## 2023-03-07 NOTE — REASON FOR VISIT
[Follow-Up Visit] : a follow-up visit for [S/P Bariatric Surgery] : s/p bariatric surgery [Family Member] : family member

## 2023-03-09 NOTE — PHYSICAL EXAM
[Normal] : affect appropriate [de-identified] : soft, NT, ND, no evidence of hernia or diastasis, incisions healing well

## 2023-03-09 NOTE — HISTORY OF PRESENT ILLNESS
[Procedure: ___] : Procedure performed: [unfilled]  [Date of Surgery: ___] : Date of Surgery:   [unfilled] [Surgeon Name:   ___] : Surgeon Name: Dr. GASTELUM [Pre-Op Weight ___] : Pre-op weight was [unfilled] lbs [de-identified] : 37 year old woman 3 months s/p Laparoscopic Sleeve Gastrectomy with hiatal hernia repair. Patient is doing well. Reports no abdominal pain, nausea/vomiting, diarrhea, reflux/heartburn; reports constipation (takes Colace occasionally, without much success, and has used 2 enemas). Reports hair loss. Patient eating 3 protein-rich meals/day, drinking adequate zero-calorie fluids/day, taking bariatric vitamins, and is ambulating for exercise. \par \par Last nutritionist appointment 2/7/2023

## 2023-03-09 NOTE — ASSESSMENT
[FreeTextEntry1] : 37 year old woman 3 months s/p Laparoscopic Sleeve Gastrectomy with hiatal hernia repair. Patient is doing well. Nutriton and exercise guidelines reviewed with the patient. \par \par Last blood work reviewed with patient: elevated glucose and hemoglobin A1C; recommended patient follow-up with PCP, to possibly adjust Metformin dose; Vitamin D3 is low (prescription for vitamin D3 ordered at today's visit); Vitamin A is low; recommended patient consume foods rich in vitamin A.\par \par Continue 3 protein-focused meals/day (aim for 60 g - 70 g protein/day); increase fiber intake, for constipation (can supplement with benefiber, daily, only if drinking 64 oz water/day to avoid worsened constipation); eliminate snacking\par Encourage zero calorie fluid intake (64 oz/day)\par Continue bariatric vitamins \par Exercise with cardio (aim for 8k-10k steps/day), and strength training 2x/week\par Use step counter\par Weigh yourself 1x-2x/week\par Continue taking Biotin\par Start supplementing with vitamin D3 (ordered at today's visit)\par Follow-up with PCP regarding elevated glucose and HbA1C\par Follow-up with nutritionist \par Follow-up in 6 weeks\par All questions answered\par \par Call with any questions or concerns\par \par Time before and after visit spent reviewing chart \par \par

## 2023-03-15 ENCOUNTER — APPOINTMENT (OUTPATIENT)
Dept: FAMILY MEDICINE | Facility: CLINIC | Age: 38
End: 2023-03-15
Payer: MEDICAID

## 2023-03-15 VITALS
HEIGHT: 62 IN | WEIGHT: 180 LBS | RESPIRATION RATE: 17 BRPM | TEMPERATURE: 97.1 F | SYSTOLIC BLOOD PRESSURE: 128 MMHG | OXYGEN SATURATION: 99 % | DIASTOLIC BLOOD PRESSURE: 75 MMHG | HEART RATE: 74 BPM | BODY MASS INDEX: 33.13 KG/M2

## 2023-03-15 PROCEDURE — 99214 OFFICE O/P EST MOD 30 MIN: CPT

## 2023-03-15 NOTE — HISTORY OF PRESENT ILLNESS
[FreeTextEntry1] : follow up after bariatric surgery. prediabetes, weight [de-identified] : Ms Lydia Sanchez is a 36 yo female presents today for follow up post bariatric gastric sleeve gastrectomy done in Dec 2022. Currently also following up with Dr. Win at the bariatric team and nutritionist. Pt reports lately implementing low fat diet, exercising and attaining further weight loss. Pt today weighs, 180 lbs, heaviest at 233 lbs in 2011. Pt recent labwork was reviewed HgbA1c 5.8 improving from prior readings. Pt advised for now continue further weight loss, diet, and continue with current dosage regimen of metformin. Repeat labs in 3 months. Also advised to take vit d supplement, and low chol diet. Pt does report today chronic constipation, seeks renewal for colace today, advised also increase fiber, and hydration.

## 2023-03-15 NOTE — ASSESSMENT
[FreeTextEntry1] : Approximately  30 min of face to face time spent, reviewed chart, old labwork results, addressed various health concerns, ordered necessary new labs, imaging, referral for follow up. Answered all pt questions, coordinated care for patient.\par Continue on Metformin 500 mg BID for now\par implement diet high in fiber, and hydration\par low carb, low chol diet\par c/w f/u with bariatric team\par Return in 3 months for repeat labwork follow up

## 2023-03-15 NOTE — PHYSICAL EXAM
[No Acute Distress] : no acute distress [EOMI] : extraocular movements intact [Supple] : supple [Clear to Auscultation] : lungs were clear to auscultation bilaterally [Normal S1, S2] : normal S1 and S2 [No Edema] : there was no peripheral edema [Soft] : abdomen soft [Non-distended] : non-distended [Normal Bowel Sounds] : normal bowel sounds [No Rash] : no rash [Normal Gait] : normal gait [Normal Affect] : the affect was normal

## 2023-03-15 NOTE — REVIEW OF SYSTEMS
[Recent Change In Weight] : ~T recent weight change [Negative] : Heme/Lymph [Fever] : no fever [Abdominal Pain] : no abdominal pain [Nausea] : no nausea [Constipation] : no constipation [Diarrhea] : diarrhea [Vomiting] : no vomiting [Joint Pain] : no joint pain [Joint Stiffness] : no joint stiffness [Muscle Pain] : no muscle pain [Back Pain] : no back pain [FreeTextEntry2] : weight loss

## 2023-03-22 ENCOUNTER — EMERGENCY (EMERGENCY)
Facility: HOSPITAL | Age: 38
LOS: 1 days | Discharge: HOME | End: 2023-03-22
Attending: EMERGENCY MEDICINE | Admitting: EMERGENCY MEDICINE
Payer: MEDICAID

## 2023-03-22 VITALS
RESPIRATION RATE: 18 BRPM | HEART RATE: 75 BPM | HEIGHT: 62 IN | TEMPERATURE: 98 F | OXYGEN SATURATION: 98 % | SYSTOLIC BLOOD PRESSURE: 117 MMHG | WEIGHT: 177.91 LBS | DIASTOLIC BLOOD PRESSURE: 80 MMHG

## 2023-03-22 VITALS
DIASTOLIC BLOOD PRESSURE: 83 MMHG | TEMPERATURE: 98 F | OXYGEN SATURATION: 98 % | RESPIRATION RATE: 18 BRPM | HEART RATE: 70 BPM | SYSTOLIC BLOOD PRESSURE: 121 MMHG

## 2023-03-22 DIAGNOSIS — Z98.89 OTHER SPECIFIED POSTPROCEDURAL STATES: Chronic | ICD-10-CM

## 2023-03-22 DIAGNOSIS — Z41.9 ENCOUNTER FOR PROCEDURE FOR PURPOSES OTHER THAN REMEDYING HEALTH STATE, UNSPECIFIED: Chronic | ICD-10-CM

## 2023-03-22 LAB
ALBUMIN SERPL ELPH-MCNC: 3.6 G/DL — SIGNIFICANT CHANGE UP (ref 3.3–5)
ALP SERPL-CCNC: 88 U/L — SIGNIFICANT CHANGE UP (ref 40–120)
ALT FLD-CCNC: 23 U/L — SIGNIFICANT CHANGE UP (ref 10–45)
ANION GAP SERPL CALC-SCNC: 12 MMOL/L — SIGNIFICANT CHANGE UP (ref 5–17)
AST SERPL-CCNC: 21 U/L — SIGNIFICANT CHANGE UP (ref 10–40)
BASOPHILS # BLD AUTO: 0.05 K/UL — SIGNIFICANT CHANGE UP (ref 0–0.2)
BASOPHILS NFR BLD AUTO: 0.5 % — SIGNIFICANT CHANGE UP (ref 0–2)
BILIRUB SERPL-MCNC: 0.1 MG/DL — LOW (ref 0.2–1.2)
BUN SERPL-MCNC: 19 MG/DL — SIGNIFICANT CHANGE UP (ref 7–23)
CALCIUM SERPL-MCNC: 9.4 MG/DL — SIGNIFICANT CHANGE UP (ref 8.4–10.5)
CHLORIDE SERPL-SCNC: 104 MMOL/L — SIGNIFICANT CHANGE UP (ref 96–108)
CO2 SERPL-SCNC: 24 MMOL/L — SIGNIFICANT CHANGE UP (ref 22–31)
CREAT SERPL-MCNC: 0.75 MG/DL — SIGNIFICANT CHANGE UP (ref 0.5–1.3)
EGFR: 106 ML/MIN/1.73M2 — SIGNIFICANT CHANGE UP
EOSINOPHIL # BLD AUTO: 0.31 K/UL — SIGNIFICANT CHANGE UP (ref 0–0.5)
EOSINOPHIL NFR BLD AUTO: 2.8 % — SIGNIFICANT CHANGE UP (ref 0–6)
GLUCOSE SERPL-MCNC: 111 MG/DL — HIGH (ref 70–99)
HCT VFR BLD CALC: 40.4 % — SIGNIFICANT CHANGE UP (ref 34.5–45)
HGB BLD-MCNC: 13.3 G/DL — SIGNIFICANT CHANGE UP (ref 11.5–15.5)
HIV 1 & 2 AB SERPL IA.RAPID: SIGNIFICANT CHANGE UP
IMM GRANULOCYTES NFR BLD AUTO: 0.4 % — SIGNIFICANT CHANGE UP (ref 0–0.9)
LIDOCAIN IGE QN: 178 U/L — SIGNIFICANT CHANGE UP (ref 73–393)
LYMPHOCYTES # BLD AUTO: 27.6 % — SIGNIFICANT CHANGE UP (ref 13–44)
LYMPHOCYTES # BLD AUTO: 3.01 K/UL — SIGNIFICANT CHANGE UP (ref 1–3.3)
MCHC RBC-ENTMCNC: 30 PG — SIGNIFICANT CHANGE UP (ref 27–34)
MCHC RBC-ENTMCNC: 32.9 GM/DL — SIGNIFICANT CHANGE UP (ref 32–36)
MCV RBC AUTO: 91 FL — SIGNIFICANT CHANGE UP (ref 80–100)
MONOCYTES # BLD AUTO: 0.87 K/UL — SIGNIFICANT CHANGE UP (ref 0–0.9)
MONOCYTES NFR BLD AUTO: 8 % — SIGNIFICANT CHANGE UP (ref 2–14)
NEUTROPHILS # BLD AUTO: 6.64 K/UL — SIGNIFICANT CHANGE UP (ref 1.8–7.4)
NEUTROPHILS NFR BLD AUTO: 60.7 % — SIGNIFICANT CHANGE UP (ref 43–77)
NRBC # BLD: 0 /100 WBCS — SIGNIFICANT CHANGE UP (ref 0–0)
PLATELET # BLD AUTO: 193 K/UL — SIGNIFICANT CHANGE UP (ref 150–400)
POTASSIUM SERPL-MCNC: 3.4 MMOL/L — LOW (ref 3.5–5.3)
POTASSIUM SERPL-SCNC: 3.4 MMOL/L — LOW (ref 3.5–5.3)
PROT SERPL-MCNC: 7.3 G/DL — SIGNIFICANT CHANGE UP (ref 6–8.3)
RBC # BLD: 4.44 M/UL — SIGNIFICANT CHANGE UP (ref 3.8–5.2)
RBC # FLD: 13.2 % — SIGNIFICANT CHANGE UP (ref 10.3–14.5)
SODIUM SERPL-SCNC: 140 MMOL/L — SIGNIFICANT CHANGE UP (ref 135–145)
WBC # BLD: 10.92 K/UL — HIGH (ref 3.8–10.5)
WBC # FLD AUTO: 10.92 K/UL — HIGH (ref 3.8–10.5)

## 2023-03-22 PROCEDURE — 99284 EMERGENCY DEPT VISIT MOD MDM: CPT

## 2023-03-22 PROCEDURE — 80053 COMPREHEN METABOLIC PANEL: CPT

## 2023-03-22 PROCEDURE — 74177 CT ABD & PELVIS W/CONTRAST: CPT | Mod: 26,MA

## 2023-03-22 PROCEDURE — 96375 TX/PRO/DX INJ NEW DRUG ADDON: CPT

## 2023-03-22 PROCEDURE — 96361 HYDRATE IV INFUSION ADD-ON: CPT

## 2023-03-22 PROCEDURE — 85025 COMPLETE CBC W/AUTO DIFF WBC: CPT

## 2023-03-22 PROCEDURE — 74177 CT ABD & PELVIS W/CONTRAST: CPT | Mod: MA

## 2023-03-22 PROCEDURE — 99284 EMERGENCY DEPT VISIT MOD MDM: CPT | Mod: 25

## 2023-03-22 PROCEDURE — 96374 THER/PROPH/DIAG INJ IV PUSH: CPT | Mod: XU

## 2023-03-22 PROCEDURE — 36415 COLL VENOUS BLD VENIPUNCTURE: CPT

## 2023-03-22 PROCEDURE — 86703 HIV-1/HIV-2 1 RESULT ANTBDY: CPT

## 2023-03-22 PROCEDURE — 83690 ASSAY OF LIPASE: CPT

## 2023-03-22 RX ORDER — FAMOTIDINE 10 MG/ML
20 INJECTION INTRAVENOUS ONCE
Refills: 0 | Status: COMPLETED | OUTPATIENT
Start: 2023-03-22 | End: 2023-03-22

## 2023-03-22 RX ORDER — ONDANSETRON 8 MG/1
4 TABLET, FILM COATED ORAL ONCE
Refills: 0 | Status: COMPLETED | OUTPATIENT
Start: 2023-03-22 | End: 2023-03-22

## 2023-03-22 RX ORDER — KETOROLAC TROMETHAMINE 30 MG/ML
30 SYRINGE (ML) INJECTION ONCE
Refills: 0 | Status: DISCONTINUED | OUTPATIENT
Start: 2023-03-22 | End: 2023-03-22

## 2023-03-22 RX ORDER — SODIUM CHLORIDE 9 MG/ML
1000 INJECTION INTRAMUSCULAR; INTRAVENOUS; SUBCUTANEOUS ONCE
Refills: 0 | Status: COMPLETED | OUTPATIENT
Start: 2023-03-22 | End: 2023-03-22

## 2023-03-22 RX ADMIN — FAMOTIDINE 100 MILLIGRAM(S): 10 INJECTION INTRAVENOUS at 19:16

## 2023-03-22 RX ADMIN — ONDANSETRON 4 MILLIGRAM(S): 8 TABLET, FILM COATED ORAL at 19:16

## 2023-03-22 RX ADMIN — Medication 30 MILLIGRAM(S): at 19:16

## 2023-03-22 RX ADMIN — Medication 30 MILLILITER(S): at 19:15

## 2023-03-22 RX ADMIN — SODIUM CHLORIDE 1000 MILLILITER(S): 9 INJECTION INTRAMUSCULAR; INTRAVENOUS; SUBCUTANEOUS at 19:15

## 2023-03-22 NOTE — ED PROVIDER NOTE - PHYSICAL EXAMINATION
exam:   General: well appearing, NAD.   HEENT: eyes perrl  cor: RRR, s1s2, 2+rad pulses.   lungs: ctabl, no resp distress.   abd: soft, nondistended. mild epigastric ttp. no rebound/guarding  neuro: a&ox3, cn2-12 intact, APODACA, 5/5 strength c nl sensation all extremities, nl coordination.   MSK: no extremity swelling.  Skin: normal, no rash

## 2023-03-22 NOTE — ED ADULT NURSE NOTE - OBJECTIVE STATEMENT
Assumed pt care for a 37 yr old female alert and oriented x4 complaining of abdominal to RUQ that radiates to her lower back since yesterday. Pt reports pain is a sharp pain, 6 of 10. Reports nausea, denies any vomiting, dizziness, nausea, chest pain or dyspnea. IV established. Labs collected. Awaiting further disposition.

## 2023-03-22 NOTE — ED PROVIDER NOTE - NSFOLLOWUPINSTRUCTIONS_ED_ALL_ED_FT
- acute-take Tylenol as needed for pain    -Follow-up with gastroenterology in the next 1 to 3 days    -Return to the ER or see your doctor immediately for worsening belly pain, vomiting, fever or other concerns      Abdominal Pain    WHAT YOU NEED TO KNOW:    Abdominal pain can be dull, achy, or sharp. You may have pain in one area of your abdomen, or in your entire abdomen. Your pain may be caused by a condition such as constipation, food sensitivity or poisoning, infection, or a blockage. Abdominal pain can also be from a hernia, appendicitis, or an ulcer. Liver, gallbladder, or kidney conditions can also cause abdominal pain. The cause of your abdominal pain may be unknown.     DISCHARGE INSTRUCTIONS:    Return to the emergency department if:   •You have new chest pain or shortness of breath.      •You have pulsing pain in your upper abdomen or lower back that suddenly becomes constant.      •Your pain is in the right lower abdominal area and worsens with movement.       •You have a fever over 100.4°F (38°C) or shaking chills.       •You are vomiting and cannot keep food or liquids down.       •Your pain does not improve or gets worse over the next 8 to 12 hours.       •You see blood in your vomit or bowel movements, or they look black and tarry.       •Your skin or the whites of your eyes turn yellow.       •You are a woman and have a large amount of vaginal bleeding that is not your monthly period.       Contact your healthcare provider if:   •You have pain in your lower back.       •You are a man and have pain in your testicles.      •You have pain when you urinate.       •You have questions or concerns about your condition or care.      Follow up with your healthcare provider within 24 hours or as directed: Write down your questions so you remember to ask them during your visits.     Medicines:   •Medicines may be given to calm your stomach and prevent vomiting or to decrease pain. Ask how to take pain medicine safely.      •Take your medicine as directed. Contact your healthcare provider if you think your medicine is not helping or if you have side effects. Tell him of her if you are allergic to any medicine. Keep a list of the medicines, vitamins, and herbs you take. Include the amounts, and when and why you take them. Bring the list or the pill bottles to follow-up visits. Carry your medicine list with you in case of an emergency

## 2023-03-22 NOTE — ED PROVIDER NOTE - CLINICAL SUMMARY MEDICAL DECISION MAKING FREE TEXT BOX
Patient feeling much better.  No abdominal pain now.  Abdomen soft and nontender.  No right upper quadrant tenderness.  CT no acute findings except some pericholecystic fluid.  Patient's LFTs, bili normal.  With nontender belly and no pain currently.  Not consistent with acute cholecystitis.  I did offer patient sonogram to evaluate gallbladder now the patient will defer/declined.  She will follow-up with GI.  Told patient to return for any worsening pain, vomiting fever or other concerns 37-year-old female with history of appendectomy, tubal ligation, gastric sleeve presents with abdominal pain, epigastric area associated with nausea today.  No vomiting no diarrhea.  No black or bloody stools.  No fever or chills.  No chest pain shortness of breath.  Gastric sleeve done December 2022    Patient with mild epigastric tenderness.  Vitals unremarkable.  Partial DDx: Gastritis, PUD, gastric sleeve complication, obstruction, pancreatitis, cholecystitis.  Will check CT abdomen pelvis, labs.  Give IV fluids, IV analgesics, Pepcid.  Reassess      Patient feeling much better.  No abdominal pain now.  Abdomen soft and nontender.  No right upper quadrant tenderness.  CT no acute findings except some pericholecystic fluid.  Patient's LFTs, bili normal.  With nontender belly and no pain currently.  Not consistent with acute cholecystitis.  I did offer patient sonogram to evaluate gallbladder now the patient will defer/declined.  She will follow-up with GI.  Told patient to return for any worsening pain, vomiting fever or other concerns

## 2023-03-22 NOTE — ED PROVIDER NOTE - NPI NUMBER (FOR SYSADMIN USE ONLY) :
Focus Note:    Chart reviewed.    Urine, Bacterial Culture (no units)   Date Value   12/31/2020 Culture in progress.   12/31/2020 >100,000 CFU/mL Gram Negative Bacilli (A)     Given ongoing retention, recommend starting antibiotics for treatment. Initally was going to plan voiding trial for 1/4, however given the positive culture, would recommend the patient be on antibiotics for a day or two prior to voiding trial.    MAURO Aiken  Nurse Practitioner    [facility]  [address]    Office Phone: [phone]             [[phone]]

## 2023-03-22 NOTE — ED PROVIDER NOTE - NS_EDPROVIDERDISPOUSERTYPE_ED_A_ED
Nostril Rim Text: The closure involved the nostril rim. Attending Attestation (For Attendings USE Only)...

## 2023-03-22 NOTE — ED PROVIDER NOTE - OBJECTIVE STATEMENT
37-year-old female with history of appendectomy, tubal ligation, gastric sleeve presents with abdominal pain, epigastric area associated with nausea today.  No vomiting no diarrhea.  No black or bloody stools.  No fever or chills.  No chest pain shortness of breath.  Gastric sleeve done December 2022

## 2023-03-22 NOTE — ED PROVIDER NOTE - PATIENT PORTAL LINK FT
You can access the FollowMyHealth Patient Portal offered by [facility] by registering at the following website: [email or web address]. By joining [employer]’s FollowMyHealth portal, you will also be able to view your health information using other applications (apps) compatible with our system.

## 2023-03-22 NOTE — ED PROVIDER NOTE - CARE PROVIDER_API CALL
Jason Barry)  Gastroenterology  [address], [address]  [place] [address]  Phone: [phone]  Fax: [phone]  Follow Up Time: 1-3 Days

## 2023-03-29 NOTE — ED PROVIDER NOTE - CPE EDP CARDIAC NORM
"Chief Complaint   Patient presents with   • Diarrhea       History of Present Illness:   80 y.o. male        He last had an EGD and colonoscopy in 7/2021.        I last saw him in 11 of 22:  Assessment:  1. Diarrhea with urgency. He is on Olmesartan which can cause sprue like enteropathy.  2. H/o GERD  3. He is on a lactose free diet with his wife.      Recommendations:  1. Ask Dr. DOROTHEA Wright if he could stop the Olmesartan (to see if his diarrhea will resolve)?  2. F/u 3 mos.        He thinks that stopping the Olmesartan helped the diarrhea. He thinks his diarrhea is better. It is not as frequent but when he has diarrhea he has \"diarrhea with urgency\". He averages 1-2 BM/day. When he has diarrhea he might have 5 BM/day with urgency and may lose control of his bowels when he cannot get to a toilet. No abdominal or chest pain. No nausea or vomiting. Weight stable.     Past Medical History:   Diagnosis Date   • Anxiety    • Colon polyp    • Fatty liver    • Hyperlipidemia    • Hypertension    • RA (rheumatoid arthritis) (HCC)        Past Surgical History:   Procedure Laterality Date   • COLONOSCOPY     • COLONOSCOPY N/A 12/06/2018    normal ileum, diverticulosis in sigmoid, IH, TA with low grade dysplasia, otherwise normal exam   • COLONOSCOPY N/A 03/27/2020    Procedure: COLONOSCOPY into cecum with biopsies;  Surgeon: Pablito Mcleod MD;  Location: [facility] ENDOSCOPY;  Service: Gastroenterology;  Laterality: N/A;  Pre: diarrhea, wt loss, hx colon polyps  post: poor prep, diverticulosis, internal hemrroids   • COLONOSCOPY N/A 07/02/2021    Procedure: COLONOSCOPY TO CECUM WITH COLD BIOPSY POLYPECTOMY;  Surgeon: Pablito Mcleod MD;  Location: [facility] ENDOSCOPY;  Service: Gastroenterology;  Laterality: N/A;  PRE:DIARRHEA, DYSPEPSIA  POST:DIVERTICULOSIS, POLYP, INTERNALHEMORRHOIDS, MUCOSAL ERYTHEMA @20CM   • CYSTOSCOPY CYSTOGRAM     • ENDOSCOPY N/A 03/27/2020    Procedure: ESOPHAGOGASTRODUODENOSCOPY with biopsies;  Surgeon: " Pablito Mcleod MD;  Location: [facility] ENDOSCOPY;  Service: Gastroenterology;  Laterality: N/A;  Pre: diarrhea, wt loss,   Post: hiatal hernia, gastritis   • ENDOSCOPY N/A 07/02/2021    Procedure: ESOPHAGOGASTRODUODENOSCOPY WITH COLD BIOPSIES;  Surgeon: Pablito Mcleod MD;  Location: [facility] ENDOSCOPY;  Service: Gastroenterology;  Laterality: N/A;  PRE:DIARRHEA, DYSPEPSIA  POST: ESOPHAGITIS, GASTRITIS   • LUMBAR EPIDURAL INJECTION     • TURP / TRANSURETHRAL INCISION / DRAINAGE PROSTATE     • UPPER GASTROINTESTINAL ENDOSCOPY           Current Outpatient Medications:   •  amLODIPine-benazepril (LOTREL 5-20) 5-20 MG per capsule, Take 1 capsule by mouth Daily., Disp: , Rfl:   •  citalopram (CeleXA) 40 MG tablet, Take 1 tablet by mouth Daily., Disp: , Rfl:   •  folic acid (FOLVITE) 1 MG tablet, Take 1 tablet by mouth Daily., Disp: , Rfl:   •  hydroCHLOROthiazide (HYDRODIURIL) 25 MG tablet, Take 1 tablet by mouth Daily., Disp: , Rfl:   •  methotrexate 2.5 MG tablet, Take 10 tablets by mouth 1 (One) Time Per Week., Disp: , Rfl:   •  Psyllium (METAMUCIL FIBER PO), Take  by mouth As Needed., Disp: , Rfl:   •  TAMSULOSIN HCL PO, Take 0.4 mg by mouth Daily., Disp: , Rfl:   •  AMLODIPINE-OLMESARTAN PO, , Disp: , Rfl:   •  cholecalciferol (VITAMIN D3) 25 MCG (1000 UT) tablet, Take 1 tablet by mouth Daily. (Patient not taking: Reported on 3/29/2023), Disp: , Rfl:     Allergies   Allergen Reactions   • Amoxicillin Hives and Swelling   • Penicillins Swelling     NOT SPECIFIED   • Clavulanic Acid Unknown - High Severity       Family History   Problem Relation Age of Onset   • Parkinsonism Father    • Dementia Maternal Grandmother    • Malig Hyperthermia Neg Hx        Social History     Socioeconomic History   • Marital status:    Tobacco Use   • Smoking status: Former     Types: Cigars   • Smokeless tobacco: Never   • Tobacco comments:     quit mid 20s   Vaping Use   • Vaping Use: Never used   Substance and Sexual Activity   •  Alcohol use: Yes     Alcohol/week: 2.0 standard drinks     Types: 2 Cans of beer per week     Comment: SOC   • Drug use: No   • Sexual activity: Yes     Partners: Female       Review of Systems   Gastrointestinal: Positive for diarrhea.   All other systems reviewed and are negative.    Pertinent positives and negatives documented in the HPI and all other systems reviewed and were found to be negative.  Vitals:    03/29/23 0809   BP: 126/76   Pulse: 86   Temp: 97.1 °F (36.2 °C)   SpO2: 96%       Physical Exam  Vitals reviewed.   Constitutional:       General: He is not in acute distress.     Appearance: Normal appearance. He is well-developed. He is not diaphoretic.   HENT:      Head: Normocephalic and atraumatic. Hair is normal.      Right Ear: Hearing, tympanic membrane, ear canal and external ear normal.      Left Ear: Hearing, tympanic membrane, ear canal and external ear normal.      Nose: Nose normal. No nasal deformity.      Mouth/Throat:      Mouth: Mucous membranes are moist. No oral lesions.      Pharynx: Uvula midline. No uvula swelling.   Eyes:      General: Lids are normal. No scleral icterus.        Right eye: No discharge.         Left eye: No discharge.      Extraocular Movements: Extraocular movements intact.      Right eye: Normal extraocular motion and no nystagmus.      Left eye: Normal extraocular motion and no nystagmus.      Conjunctiva/sclera: Conjunctivae normal.      Pupils: Pupils are equal, round, and reactive to light.   Neck:      Thyroid: No thyromegaly.      Vascular: No JVD.   Cardiovascular:      Rate and Rhythm: Normal rate and regular rhythm.      Pulses: Normal pulses.      Heart sounds: Normal heart sounds. No murmur heard.    No gallop.   Pulmonary:      Effort: Pulmonary effort is normal. No respiratory distress.      Breath sounds: Normal breath sounds. No wheezing or rales.   Chest:      Chest wall: No tenderness.   Abdominal:      General: Bowel sounds are normal. There is  normal... no distension.      Palpations: Abdomen is soft. There is no mass.      Tenderness: There is no abdominal tenderness. There is no guarding.      Hernia: No hernia is present.   Musculoskeletal:         General: No tenderness or deformity. Normal range of motion.      Cervical back: Normal range of motion and neck supple.   Lymphadenopathy:      Cervical: No cervical adenopathy.   Skin:     General: Skin is warm and dry.      Findings: No rash.   Neurological:      Mental Status: He is alert and oriented to person, place, and time.      Cranial Nerves: No cranial nerve deficit.      Motor: No abnormal muscle tone.      Coordination: Coordination normal.      Deep Tendon Reflexes: Reflexes are normal and symmetric. Reflexes normal.   Psychiatric:         Mood and Affect: Mood normal.         Behavior: Behavior normal.         Thought Content: Thought content normal.         Judgment: Judgment normal.         Diagnoses and all orders for this visit:    1. Diarrhea, unspecified type (Primary)    2. Polyp of colon, unspecified part of colon, unspecified type    3. Gastroesophageal reflux disease, unspecified whether esophagitis present    4. Lafleur's esophagus without dysplasia      Assessment:  1. Diarrhea with urgency. He is better off the Olmesartan (which can cause sprue like enteropathy) but he is not cured.  2. H/o GERD  3. He is on a lactose free diet with his wife.      Recommendations:  1. Try taking a fiber supplement daily to see if the diarrhea will resolve.  2. Try Beano  3. F/u 3 mos.     No follow-ups on file.    Pablito Mcleod MD  3/29/2023

## 2023-04-12 ENCOUNTER — APPOINTMENT (OUTPATIENT)
Dept: ULTRASOUND IMAGING | Facility: CLINIC | Age: 38
End: 2023-04-12

## 2023-04-12 ENCOUNTER — APPOINTMENT (OUTPATIENT)
Dept: GASTROENTEROLOGY | Facility: CLINIC | Age: 38
End: 2023-04-12
Payer: MEDICAID

## 2023-04-12 ENCOUNTER — OUTPATIENT (OUTPATIENT)
Dept: OUTPATIENT SERVICES | Facility: HOSPITAL | Age: 38
LOS: 1 days | End: 2023-04-12
Payer: MEDICAID

## 2023-04-12 VITALS
OXYGEN SATURATION: 98 % | WEIGHT: 175 LBS | HEART RATE: 71 BPM | DIASTOLIC BLOOD PRESSURE: 69 MMHG | BODY MASS INDEX: 32.2 KG/M2 | SYSTOLIC BLOOD PRESSURE: 104 MMHG | HEIGHT: 62 IN | TEMPERATURE: 98 F | RESPIRATION RATE: 17 BRPM

## 2023-04-12 DIAGNOSIS — Z41.9 ENCOUNTER FOR PROCEDURE FOR PURPOSES OTHER THAN REMEDYING HEALTH STATE, UNSPECIFIED: Chronic | ICD-10-CM

## 2023-04-12 DIAGNOSIS — Z98.89 OTHER SPECIFIED POSTPROCEDURAL STATES: Chronic | ICD-10-CM

## 2023-04-12 DIAGNOSIS — R10.11 RIGHT UPPER QUADRANT PAIN: ICD-10-CM

## 2023-04-12 DIAGNOSIS — R93.3 ABNORMAL FINDINGS ON DIAGNOSTIC IMAGING OF OTHER PARTS OF DIGESTIVE TRACT: ICD-10-CM

## 2023-04-12 DIAGNOSIS — Z87.898 PERSONAL HISTORY OF OTHER SPECIFIED CONDITIONS: ICD-10-CM

## 2023-04-12 PROCEDURE — 99213 OFFICE O/P EST LOW 20 MIN: CPT

## 2023-04-12 PROCEDURE — 76705 ECHO EXAM OF ABDOMEN: CPT | Mod: 26

## 2023-04-12 PROCEDURE — 76705 ECHO EXAM OF ABDOMEN: CPT

## 2023-04-12 RX ORDER — DOCUSATE SODIUM 100 MG/1
100 CAPSULE ORAL
Qty: 60 | Refills: 3 | Status: COMPLETED | COMMUNITY
Start: 2023-03-15 | End: 2023-04-12

## 2023-04-12 RX ORDER — DOCUSATE SODIUM 100 MG/1
100 CAPSULE ORAL 3 TIMES DAILY
Qty: 90 | Refills: 5 | Status: COMPLETED | COMMUNITY
End: 2023-04-12

## 2023-04-12 NOTE — CONSULT LETTER
[Dear  ___] : Dear  [unfilled], [Courtesy Letter:] : I had the pleasure of seeing your patient, [unfilled], in my office today. [Please see my note below.] : Please see my note below. [Consult Closing:] : Thank you very much for allowing me to participate in the care of this patient.  If you have any questions, please do not hesitate to contact me. [DrJose  ___] : Dr. GASTELUM

## 2023-04-12 NOTE — ASSESSMENT
[FreeTextEntry1] : Trial of omeprazole x 14 days for possible GERD as cause of symptoms. \par \par Based on CT findings and her reported symptoms, however, I suspect her pain is originating from the gallbladder.\par At the moment I recommended her to get abdominal U/S. Depending on results and symptoms consider HIDA\par \par She should avoid fatty foods.\par \par Should follow up with surgery if ultrasound examination supports gallbladder as the cause of her pain.

## 2023-04-12 NOTE — PHYSICAL EXAM
[Alert] : alert [Normal Voice/Communication] : normal voice/communication [Healthy Appearing] : healthy appearing [No Acute Distress] : no acute distress [Sclera] : the sclera and conjunctiva were normal [Hearing Threshold Finger Rub Not Josephine] : hearing was normal [Normal Lips/Gums] : the lips and gums were normal [Oropharynx] : the oropharynx was normal [Normal Appearance] : the appearance of the neck was normal [No Neck Mass] : no neck mass was observed [No Respiratory Distress] : no respiratory distress [No Acc Muscle Use] : no accessory muscle use [Respiration, Rhythm And Depth] : normal respiratory rhythm and effort [Auscultation Breath Sounds / Voice Sounds] : lungs were clear to auscultation bilaterally [Heart Rate And Rhythm] : heart rate was normal and rhythm regular [Normal S1, S2] : normal S1 and S2 [Murmurs] : no murmurs [Bowel Sounds] : normal bowel sounds [Abdomen Tenderness] : non-tender [No Masses] : no abdominal mass palpated [Abdomen Soft] : soft [No CVA Tenderness] : no CVA  tenderness [Involuntary Movements] : no involuntary movements were seen [Normal Color / Pigmentation] : normal skin color and pigmentation [] : no rash [Oriented To Time, Place, And Person] : oriented to person, place, and time [de-identified] : negative Goodman's sign

## 2023-04-12 NOTE — HISTORY OF PRESENT ILLNESS
[FreeTextEntry1] : Seen in the ED recently for pain in epigastrium and RUQ.\par She underwent CT which showed pericholecystic fluid but no calculi visualized. She did not get an abdominal ultrasound in the ED.\par She has had intermittent discomfort since then which she thinks may be precipitated by fatty foods.\par On no meds at present.\par \par Denies rectal bleeding, melena, change in bowel pattern. Has constipation which is longstanding and is taking docusate for constipation which helps per patient.\par \par Had surgery with Dr. Win (sleeve gastrectomy) and lost approx 40 lbs since then. [de-identified] :  ACC: [id number] EXAM:  CT ABDOMEN AND PELVIS OC IC   ORDERED BY: DOMENICA BENITEZ \par \par PROCEDURE DATE:  03/22/2023  \par \par \par \par INTERPRETATION:  CLINICAL INFORMATION: Status post sleeve gastrectomy in \par December 2022. Epigastric pain.\par \par COMPARISON: 6/30/2021.\par \par CONTRAST/COMPLICATIONS:\par IV Contrast: Omnipaque 350  90 cc administered   10 cc discarded\par Oral Contrast: Omnipaque 300\par Complications: None reported at time of study completion\par \par PROCEDURE:\par CT of the Abdomen and Pelvis was performed.\par Sagittal and coronal reformats were performed.\par \par FINDINGS:\par LOWER CHEST: Within normal limits.\par \par LIVER: Within normal limits.\par BILE DUCTS: Normal caliber.\par GALLBLADDER: Small pericholecystic fluid.\par SPLEEN: Within normal limits.\par PANCREAS: Within normal limits.\par ADRENALS: Within normal limits.\par KIDNEYS/URETERS: Within normal limits.\par \par BLADDER: Within normal limits.\par REPRODUCTIVE ORGANS: Uterus and adnexa within normal limits.\par \par BOWEL: No bowel obstruction. Appendectomy. Sleeve gastrectomy.\par PERITONEUM: No ascites.\par VESSELS: Within normal limits.\par RETROPERITONEUM/LYMPH NODES: No lymphadenopathy.\par ABDOMINAL WALL: Within normal limits.\par BONES: Within normal limits.\par \par IMPRESSION:\par Small pericholecystic fluid. Further evaluation with ultrasound is \par advised.\par No other abnormality.\par \par \par --- End of Report ---\par \par \par \par \par \par GODWIN BEACH MD; Attending Radiologist\par This document has been electronically signed. Mar 22 2023  9:03PM\par

## 2023-04-18 ENCOUNTER — INPATIENT (INPATIENT)
Facility: HOSPITAL | Age: 38
LOS: 2 days | Discharge: HOME | DRG: 419 | End: 2023-04-21
Attending: STUDENT IN AN ORGANIZED HEALTH CARE EDUCATION/TRAINING PROGRAM | Admitting: INTERNAL MEDICINE
Payer: MEDICAID

## 2023-04-18 ENCOUNTER — APPOINTMENT (OUTPATIENT)
Dept: BARIATRICS | Facility: CLINIC | Age: 38
End: 2023-04-18
Payer: MEDICAID

## 2023-04-18 VITALS
SYSTOLIC BLOOD PRESSURE: 114 MMHG | RESPIRATION RATE: 24 BRPM | DIASTOLIC BLOOD PRESSURE: 78 MMHG | OXYGEN SATURATION: 100 % | HEIGHT: 62 IN | HEART RATE: 78 BPM | WEIGHT: 179.02 LBS | TEMPERATURE: 98 F

## 2023-04-18 VITALS
BODY MASS INDEX: 32.78 KG/M2 | HEIGHT: 62 IN | HEART RATE: 64 BPM | DIASTOLIC BLOOD PRESSURE: 64 MMHG | WEIGHT: 178.13 LBS | SYSTOLIC BLOOD PRESSURE: 100 MMHG | OXYGEN SATURATION: 98 % | TEMPERATURE: 96.6 F

## 2023-04-18 DIAGNOSIS — Z98.89 OTHER SPECIFIED POSTPROCEDURAL STATES: Chronic | ICD-10-CM

## 2023-04-18 DIAGNOSIS — K80.20 CALCULUS OF GALLBLADDER WITHOUT CHOLECYSTITIS WITHOUT OBSTRUCTION: ICD-10-CM

## 2023-04-18 DIAGNOSIS — Z41.9 ENCOUNTER FOR PROCEDURE FOR PURPOSES OTHER THAN REMEDYING HEALTH STATE, UNSPECIFIED: Chronic | ICD-10-CM

## 2023-04-18 LAB
ALBUMIN SERPL ELPH-MCNC: 3.4 G/DL — SIGNIFICANT CHANGE UP (ref 3.3–5)
ALP SERPL-CCNC: 97 U/L — SIGNIFICANT CHANGE UP (ref 40–120)
ALT FLD-CCNC: 24 U/L — SIGNIFICANT CHANGE UP (ref 10–45)
ANION GAP SERPL CALC-SCNC: 11 MMOL/L — SIGNIFICANT CHANGE UP (ref 5–17)
APPEARANCE UR: ABNORMAL
AST SERPL-CCNC: 32 U/L — SIGNIFICANT CHANGE UP (ref 10–40)
BACTERIA # UR AUTO: ABNORMAL /HPF
BASOPHILS # BLD AUTO: 0.05 K/UL — SIGNIFICANT CHANGE UP (ref 0–0.2)
BASOPHILS NFR BLD AUTO: 0.5 % — SIGNIFICANT CHANGE UP (ref 0–2)
BILIRUB SERPL-MCNC: 0.1 MG/DL — LOW (ref 0.2–1.2)
BILIRUB UR-MCNC: NEGATIVE — SIGNIFICANT CHANGE UP
BLD GP AB SCN SERPL QL: SIGNIFICANT CHANGE UP
BUN SERPL-MCNC: 14 MG/DL — SIGNIFICANT CHANGE UP (ref 7–23)
CALCIUM SERPL-MCNC: 9.2 MG/DL — SIGNIFICANT CHANGE UP (ref 8.4–10.5)
CHLORIDE SERPL-SCNC: 107 MMOL/L — SIGNIFICANT CHANGE UP (ref 96–108)
CO2 SERPL-SCNC: 26 MMOL/L — SIGNIFICANT CHANGE UP (ref 22–31)
COLOR SPEC: YELLOW — SIGNIFICANT CHANGE UP
COMMENT - URINE: SIGNIFICANT CHANGE UP
CREAT SERPL-MCNC: 0.46 MG/DL — LOW (ref 0.5–1.3)
DIFF PNL FLD: NEGATIVE — SIGNIFICANT CHANGE UP
EGFR: 126 ML/MIN/1.73M2 — SIGNIFICANT CHANGE UP
EOSINOPHIL # BLD AUTO: 0.34 K/UL — SIGNIFICANT CHANGE UP (ref 0–0.5)
EOSINOPHIL NFR BLD AUTO: 3.2 % — SIGNIFICANT CHANGE UP (ref 0–6)
EPI CELLS # UR: SIGNIFICANT CHANGE UP
GLUCOSE SERPL-MCNC: 101 MG/DL — HIGH (ref 70–99)
GLUCOSE UR QL: NEGATIVE — SIGNIFICANT CHANGE UP
HCG SERPL-ACNC: <1 MIU/ML — SIGNIFICANT CHANGE UP
HCT VFR BLD CALC: 37.8 % — SIGNIFICANT CHANGE UP (ref 34.5–45)
HGB BLD-MCNC: 12.5 G/DL — SIGNIFICANT CHANGE UP (ref 11.5–15.5)
IMM GRANULOCYTES NFR BLD AUTO: 0.3 % — SIGNIFICANT CHANGE UP (ref 0–0.9)
KETONES UR-MCNC: NEGATIVE — SIGNIFICANT CHANGE UP
LEUKOCYTE ESTERASE UR-ACNC: NEGATIVE — SIGNIFICANT CHANGE UP
LIDOCAIN IGE QN: 186 U/L — SIGNIFICANT CHANGE UP (ref 73–393)
LYMPHOCYTES # BLD AUTO: 3.93 K/UL — HIGH (ref 1–3.3)
LYMPHOCYTES # BLD AUTO: 37.5 % — SIGNIFICANT CHANGE UP (ref 13–44)
MCHC RBC-ENTMCNC: 30.3 PG — SIGNIFICANT CHANGE UP (ref 27–34)
MCHC RBC-ENTMCNC: 33.1 GM/DL — SIGNIFICANT CHANGE UP (ref 32–36)
MCV RBC AUTO: 91.5 FL — SIGNIFICANT CHANGE UP (ref 80–100)
MONOCYTES # BLD AUTO: 0.97 K/UL — HIGH (ref 0–0.9)
MONOCYTES NFR BLD AUTO: 9.2 % — SIGNIFICANT CHANGE UP (ref 2–14)
NEUTROPHILS # BLD AUTO: 5.17 K/UL — SIGNIFICANT CHANGE UP (ref 1.8–7.4)
NEUTROPHILS NFR BLD AUTO: 49.3 % — SIGNIFICANT CHANGE UP (ref 43–77)
NITRITE UR-MCNC: NEGATIVE — SIGNIFICANT CHANGE UP
NRBC # BLD: 0 /100 WBCS — SIGNIFICANT CHANGE UP (ref 0–0)
PH UR: 8 — SIGNIFICANT CHANGE UP (ref 5–8)
PLATELET # BLD AUTO: 179 K/UL — SIGNIFICANT CHANGE UP (ref 150–400)
POTASSIUM SERPL-MCNC: 3.5 MMOL/L — SIGNIFICANT CHANGE UP (ref 3.5–5.3)
POTASSIUM SERPL-SCNC: 3.5 MMOL/L — SIGNIFICANT CHANGE UP (ref 3.5–5.3)
PROT SERPL-MCNC: 6.8 G/DL — SIGNIFICANT CHANGE UP (ref 6–8.3)
PROT UR-MCNC: NEGATIVE — SIGNIFICANT CHANGE UP
RBC # BLD: 4.13 M/UL — SIGNIFICANT CHANGE UP (ref 3.8–5.2)
RBC # FLD: 12.6 % — SIGNIFICANT CHANGE UP (ref 10.3–14.5)
RBC CASTS # UR COMP ASSIST: NEGATIVE /HPF — SIGNIFICANT CHANGE UP (ref 0–4)
SARS-COV-2 RNA SPEC QL NAA+PROBE: SIGNIFICANT CHANGE UP
SODIUM SERPL-SCNC: 144 MMOL/L — SIGNIFICANT CHANGE UP (ref 135–145)
SP GR SPEC: 1.01 — SIGNIFICANT CHANGE UP (ref 1.01–1.02)
UROBILINOGEN FLD QL: NEGATIVE — SIGNIFICANT CHANGE UP
WBC # BLD: 10.49 K/UL — SIGNIFICANT CHANGE UP (ref 3.8–10.5)
WBC # FLD AUTO: 10.49 K/UL — SIGNIFICANT CHANGE UP (ref 3.8–10.5)
WBC UR QL: NEGATIVE /HPF — SIGNIFICANT CHANGE UP (ref 0–5)

## 2023-04-18 PROCEDURE — 99223 1ST HOSP IP/OBS HIGH 75: CPT

## 2023-04-18 PROCEDURE — 93010 ELECTROCARDIOGRAM REPORT: CPT

## 2023-04-18 PROCEDURE — 71045 X-RAY EXAM CHEST 1 VIEW: CPT | Mod: 26

## 2023-04-18 PROCEDURE — 99214 OFFICE O/P EST MOD 30 MIN: CPT

## 2023-04-18 PROCEDURE — 99285 EMERGENCY DEPT VISIT HI MDM: CPT

## 2023-04-18 RX ORDER — PIPERACILLIN AND TAZOBACTAM 4; .5 G/20ML; G/20ML
3.38 INJECTION, POWDER, LYOPHILIZED, FOR SOLUTION INTRAVENOUS ONCE
Refills: 0 | Status: DISCONTINUED | OUTPATIENT
Start: 2023-04-19 | End: 2023-04-19

## 2023-04-18 RX ORDER — SODIUM CHLORIDE 9 MG/ML
1000 INJECTION INTRAMUSCULAR; INTRAVENOUS; SUBCUTANEOUS ONCE
Refills: 0 | Status: COMPLETED | OUTPATIENT
Start: 2023-04-18 | End: 2023-04-18

## 2023-04-18 RX ORDER — PIPERACILLIN AND TAZOBACTAM 4; .5 G/20ML; G/20ML
3.38 INJECTION, POWDER, LYOPHILIZED, FOR SOLUTION INTRAVENOUS EVERY 8 HOURS
Refills: 0 | Status: DISCONTINUED | OUTPATIENT
Start: 2023-04-19 | End: 2023-04-19

## 2023-04-18 RX ORDER — PIPERACILLIN AND TAZOBACTAM 4; .5 G/20ML; G/20ML
3.38 INJECTION, POWDER, LYOPHILIZED, FOR SOLUTION INTRAVENOUS ONCE
Refills: 0 | Status: COMPLETED | OUTPATIENT
Start: 2023-04-18 | End: 2023-04-18

## 2023-04-18 RX ORDER — ACETAMINOPHEN 500 MG
650 TABLET ORAL EVERY 6 HOURS
Refills: 0 | Status: DISCONTINUED | OUTPATIENT
Start: 2023-04-19 | End: 2023-04-20

## 2023-04-18 RX ORDER — ONDANSETRON 8 MG/1
4 TABLET, FILM COATED ORAL ONCE
Refills: 0 | Status: COMPLETED | OUTPATIENT
Start: 2023-04-18 | End: 2023-04-18

## 2023-04-18 RX ORDER — ACETAMINOPHEN 500 MG
1000 TABLET ORAL ONCE
Refills: 0 | Status: COMPLETED | OUTPATIENT
Start: 2023-04-18 | End: 2023-04-18

## 2023-04-18 RX ORDER — SODIUM CHLORIDE 9 MG/ML
1000 INJECTION, SOLUTION INTRAVENOUS
Refills: 0 | Status: DISCONTINUED | OUTPATIENT
Start: 2023-04-18 | End: 2023-04-19

## 2023-04-18 RX ORDER — ONDANSETRON 8 MG/1
4 TABLET, FILM COATED ORAL EVERY 6 HOURS
Refills: 0 | Status: DISCONTINUED | OUTPATIENT
Start: 2023-04-18 | End: 2023-04-20

## 2023-04-18 RX ORDER — MORPHINE SULFATE 50 MG/1
2 CAPSULE, EXTENDED RELEASE ORAL EVERY 4 HOURS
Refills: 0 | Status: DISCONTINUED | OUTPATIENT
Start: 2023-04-18 | End: 2023-04-20

## 2023-04-18 RX ORDER — MORPHINE SULFATE 50 MG/1
4 CAPSULE, EXTENDED RELEASE ORAL ONCE
Refills: 0 | Status: DISCONTINUED | OUTPATIENT
Start: 2023-04-18 | End: 2023-04-18

## 2023-04-18 RX ORDER — FAMOTIDINE 10 MG/ML
20 INJECTION INTRAVENOUS ONCE
Refills: 0 | Status: COMPLETED | OUTPATIENT
Start: 2023-04-19 | End: 2023-04-19

## 2023-04-18 RX ORDER — FAMOTIDINE 10 MG/ML
20 INJECTION INTRAVENOUS ONCE
Refills: 0 | Status: COMPLETED | OUTPATIENT
Start: 2023-04-18 | End: 2023-04-18

## 2023-04-18 RX ORDER — KETOROLAC TROMETHAMINE 30 MG/ML
15 SYRINGE (ML) INJECTION ONCE
Refills: 0 | Status: DISCONTINUED | OUTPATIENT
Start: 2023-04-18 | End: 2023-04-18

## 2023-04-18 RX ORDER — MORPHINE SULFATE 50 MG/1
4 CAPSULE, EXTENDED RELEASE ORAL
Refills: 0 | Status: DISCONTINUED | OUTPATIENT
Start: 2023-04-18 | End: 2023-04-20

## 2023-04-18 RX ADMIN — PIPERACILLIN AND TAZOBACTAM 200 GRAM(S): 4; .5 INJECTION, POWDER, LYOPHILIZED, FOR SOLUTION INTRAVENOUS at 19:36

## 2023-04-18 RX ADMIN — Medication 15 MILLIGRAM(S): at 20:38

## 2023-04-18 RX ADMIN — ONDANSETRON 4 MILLIGRAM(S): 8 TABLET, FILM COATED ORAL at 19:36

## 2023-04-18 RX ADMIN — MORPHINE SULFATE 4 MILLIGRAM(S): 50 CAPSULE, EXTENDED RELEASE ORAL at 23:29

## 2023-04-18 RX ADMIN — SODIUM CHLORIDE 1000 MILLILITER(S): 9 INJECTION INTRAMUSCULAR; INTRAVENOUS; SUBCUTANEOUS at 18:28

## 2023-04-18 RX ADMIN — FAMOTIDINE 100 MILLIGRAM(S): 10 INJECTION INTRAVENOUS at 18:28

## 2023-04-18 RX ADMIN — FAMOTIDINE 20 MILLIGRAM(S): 10 INJECTION INTRAVENOUS at 19:10

## 2023-04-18 RX ADMIN — Medication 400 MILLIGRAM(S): at 20:37

## 2023-04-18 RX ADMIN — MORPHINE SULFATE 4 MILLIGRAM(S): 50 CAPSULE, EXTENDED RELEASE ORAL at 20:04

## 2023-04-18 RX ADMIN — MORPHINE SULFATE 4 MILLIGRAM(S): 50 CAPSULE, EXTENDED RELEASE ORAL at 19:34

## 2023-04-18 NOTE — ED PROVIDER NOTE - ATTENDING APP SHARED VISIT CONTRIBUTION OF CARE
37-year-old female with history of appendectomy, tubal ligation, gastric sleeve presents with abdominal pain, epigastric area associated with nausea today.  No vomiting no diarrhea.  No black or bloody stools.  No fever or chills.  No chest pain shortness of breath.  Gastric sleeve done December 2022  had sx scheduled may 1st but pain is worsening  discussed with dr. sol. will admit. npo after midnight. sx in am. zosyn  Dr. Cohen:  I have reviewed and discussed with the PA/ resident the case specifics, including the history, physical assessment, evaluation, conclusion, laboratory results, and medical plan. I agree with the contents, and conclusions. I have personally examined, and interviewed the patient.

## 2023-04-18 NOTE — H&P ADULT - ASSESSMENT
38 year old F w/PCOS, Obesity, recent laparoscopic Sleeve Gastrectomy with hiatal hernia repair 12/5/2022, presents with sudden RUQ and epigastric pain associated with nausea today.  She was apparently in the ED 3 weeks (3/23/23) ago w/ same complaints, but milder, had CT abd/pelvis, which showed pericholecystic fluid but no calculi visualized. She did improve after pain meds and was d/jenn home. Pt was okay, has mild intermittent epigastric discomfort, so she followed up with GI last week and a RUQ sono was ordered. RUQ sono 4/12/23 reported cholelithiasis, without sonographic evidence of acute cholecystitis. So then she a followed up w/ bariatric MD, and she was sched to have lap jacqueline 5/01/23.   Pt was fine until late in the afternoon had severe RUQ pain and epigastric pain, ass w/ nausea. No vomiting.  Her last meal was a late breakfast around 11am.  Had nml BM this AM. Pt denies fever, chills, chest pain, cough, dyspnea, diarrhea.  38 year old F w/PCOS, Obesity, recent laparoscopic Sleeve Gastrectomy with hiatal hernia repair 12/5/2022, presents with sudden RUQ and epigastric pain associated with nausea today.  She was apparently in the ED 3 weeks (3/23/23) ago w/ same complaints, but milder, had CT abd/pelvis, which showed pericholecystic fluid but no calculi visualized. She did improve after pain meds and was d/jenn home. Pt was okay, has mild intermittent epigastric discomfort, so she followed up with GI last week and a RUQ sono was ordered. RUQ sono 4/12/23 reported cholelithiasis, without sonographic evidence of acute cholecystitis. So did a follow up w/ her bariatric MD, and she was sched to have lap jacqueline 5/01/23.   Pt was fine until late in the afternoon had severe RUQ pain and epigastric pain, ass w/ nausea. No vomiting.  Her last meal was a late breakfast around 11am.  Had nml BM this AM. Pt denies fever, chills, chest pain, cough, dyspnea, diarrhea.     RUQ, epigastric pain due to cholelithiasis w/acute cholecystitis  Pt w/ PCOS, Obesisty, s/p recent bariatric gastric sleeve, hiatal hernia repair    Admit  NPO, IV hydration  Analgesics prn  Antieemetics prn 38 year old F w/PCOS, Obesity, recent laparoscopic Sleeve Gastrectomy with hiatal hernia repair 12/5/2022, presents with sudden RUQ and epigastric pain associated with nausea today.  She was apparently in the ED 3 weeks (3/23/23) ago w/ same complaints, but milder, had CT abd/pelvis, which showed pericholecystic fluid but no calculi visualized. She did improve after pain meds and was d/jenn home. Pt was okay, has mild intermittent epigastric discomfort, so she followed up with GI last week and a RUQ sono was ordered. RUQ sono 4/12/23 reported cholelithiasis, without sonographic evidence of acute cholecystitis. So did a follow up w/ her bariatric MD, and she was sched to have lap jacqueline 5/01/23.   Pt was fine until late in the afternoon had severe RUQ pain and epigastric pain, ass w/ nausea. No vomiting.  Her last meal was a late breakfast around 11am.  Had nml BM this AM. Pt denies fever, chills, chest pain, cough, dyspnea, diarrhea.     RUQ, epigastric pain due to cholelithiasis w/acute cholecystitis  Pt w/ PCOS, Obesisty, s/p recent bariatric gastric sleeve, hiatal hernia repair    Admit  NPO, IV hydration  IV Zosyn  Analgesics prn  Antieemetics prn  Surgery consult- Dr Martins- poss lap jacqueline in AM  GI/DVT prophylaxis

## 2023-04-18 NOTE — ED ADULT NURSE NOTE - NS ED NURSE DISCH DISPOSITION
Primary nurse requesting ABG due to inability to obtain SPO2 reading. ED physician notified.   Admitted

## 2023-04-18 NOTE — ED PROVIDER NOTE - PHYSICAL EXAMINATION
General:     NAD, well-nourished, well-appearing  Eyes: PERRL  Head:     NC/AT, EOMI, oral mucosa moist  Neck:     trachea midline  Lungs:     CTA b/l  CVS:     RRR  Abd:     RUQ TTP, no rebound or guarding, no cva tenderness  Ext:   no deformities   Neuro: AAOx3, no sensory/motor deficits

## 2023-04-18 NOTE — ED ADULT TRIAGE NOTE - CHIEF COMPLAINT QUOTE
Pt c/o severe RUQ abdominal pain x 4 hrs  and nausea. Pt states she has a history of gallstones. Denies vomiting and diarrhea.

## 2023-04-18 NOTE — PROGRESS NOTE ADULT - SUBJECTIVE AND OBJECTIVE BOX
F/U Note:    38y Female admitted with symptomatic cholecystitis, will go to OR in the morning        Vital Signs Last 24 Hrs  T(C): 36.8 (18 Apr 2023 23:18), Max: 36.9 (18 Apr 2023 19:20)  T(F): 98.2 (18 Apr 2023 23:18), Max: 98.5 (18 Apr 2023 19:20)  HR: 73 (18 Apr 2023 23:18) (73 - 91)  BP: 99/63 (18 Apr 2023 23:18) (99/63 - 114/78)  BP(mean): --  RR: 20 (18 Apr 2023 23:18) (19 - 24)  SpO2: 99% (18 Apr 2023 23:18) (99% - 100%)    Parameters below as of 18 Apr 2023 23:18  Patient On (Oxygen Delivery Method): room air                                12.5   10.49 )-----------( 179      ( 18 Apr 2023 18:30 )             37.8         04-18    144  |  107  |  14  ----------------------------<  101<H>  3.5   |  26  |  0.46<L>    Ca    9.2      18 Apr 2023 18:30    TPro  6.8  /  Alb  3.4  /  TBili  0.1<L>  /  DBili  x   /  AST  32  /  ALT  24  /  AlkPhos  97  04-18              PLAN:  -NPO after midnight  -AM labs with coags and type and screen  -serial abd exams  -pain control  -IVFs with LR  -           F/U Note:    38y Female admitted with symptomatic cholecystitis, will go to OR in the morning        Vital Signs Last 24 Hrs  T(C): 36.8 (18 Apr 2023 23:18), Max: 36.9 (18 Apr 2023 19:20)  T(F): 98.2 (18 Apr 2023 23:18), Max: 98.5 (18 Apr 2023 19:20)  HR: 73 (18 Apr 2023 23:18) (73 - 91)  BP: 99/63 (18 Apr 2023 23:18) (99/63 - 114/78)  BP(mean): --  RR: 20 (18 Apr 2023 23:18) (19 - 24)  SpO2: 99% (18 Apr 2023 23:18) (99% - 100%)    Parameters below as of 18 Apr 2023 23:18  Patient On (Oxygen Delivery Method): room air                                12.5   10.49 )-----------( 179      ( 18 Apr 2023 18:30 )             37.8         04-18    144  |  107  |  14  ----------------------------<  101<H>  3.5   |  26  |  0.46<L>    Ca    9.2      18 Apr 2023 18:30    TPro  6.8  /  Alb  3.4  /  TBili  0.1<L>  /  DBili  x   /  AST  32  /  ALT  24  /  AlkPhos  97  04-18              PLAN:  -NPO after midnight for OR tomorrow  -AM labs with coags and type and screen  -serial abd exams  -pain control  -IVFs with LR

## 2023-04-18 NOTE — H&P ADULT - ENMT
neck Diet: Renal restrictions  DVT: Heparin subQ    Polo Collins MD PGY-2  Department of Internal Medicine  Pager: [phone] (NS) /[phone] (JACK) Diet: Renal restrictions  DVT: SCD, no Heparin due to recent blood loss anemia req. likely 5 units of blood   Polo Collins MD PGY-2  Department of Internal Medicine  Pager: [phone] (NS) /[phone] (JACK)

## 2023-04-18 NOTE — ED PROVIDER NOTE - CLINICAL SUMMARY MEDICAL DECISION MAKING FREE TEXT BOX
37-year-old female with history of appendectomy, tubal ligation, gastric sleeve presents with abdominal pain, epigastric area associated with nausea today.  No vomiting no diarrhea.  No black or bloody stools.  No fever or chills.  No chest pain shortness of breath.  Gastric sleeve done December 2022 37-year-old female with history of appendectomy, tubal ligation, gastric sleeve presents with abdominal pain, epigastric area associated with nausea today.  No vomiting no diarrhea.  No black or bloody stools.  No fever or chills.  No chest pain shortness of breath.  Gastric sleeve done December 2022  had sx scheduled may 1st but pain is worsening  discussed with dr. sol. will admit. npo after midnight. sx in am. zosyn

## 2023-04-18 NOTE — CHART NOTE - NSCHARTNOTEFT_GEN_A_CORE
called from ED  patient has symptomatic cholelithiasis    for patients' convenience I told ED to admit    I will put on OR schedule tomorrow for laparoscopic cholecystectomy.

## 2023-04-18 NOTE — H&P ADULT - NSHPPHYSICALEXAM_GEN_ALL_CORE
Vital Signs (24 Hrs):  T(C): 36.7 (04-18-23 @ 17:46), Max: 36.7 (04-18-23 @ 17:46)  HR: 78 (04-18-23 @ 17:46) (78 - 78)  BP: 114/78 (04-18-23 @ 17:46) (114/78 - 114/78)  RR: 24 (04-18-23 @ 17:46) (24 - 24)  SpO2: 100% (04-18-23 @ 17:46) (100% - 100%)-  Daily Height in cm: 157.48 (18 Apr 2023 17:46)

## 2023-04-18 NOTE — ED ADULT NURSE NOTE - OBJECTIVE STATEMENT
38 yr old female to ED for complaints of abdominal pain. Patient states she ha started having RUQ abdominal pain x 4 hrs  and nausea. Pt states she has a history of gallstones. Denies vomiting and diarrhea.

## 2023-04-19 ENCOUNTER — TRANSCRIPTION ENCOUNTER (OUTPATIENT)
Age: 38
End: 2023-04-19

## 2023-04-19 PROCEDURE — 99233 SBSQ HOSP IP/OBS HIGH 50: CPT

## 2023-04-19 RX ORDER — OXYCODONE HYDROCHLORIDE 5 MG/1
1 TABLET ORAL
Qty: 0 | Refills: 0 | DISCHARGE

## 2023-04-19 RX ORDER — ACETAMINOPHEN 500 MG
30 TABLET ORAL
Qty: 0 | Refills: 0 | DISCHARGE

## 2023-04-19 RX ORDER — FAMOTIDINE 10 MG/ML
20 INJECTION INTRAVENOUS
Refills: 0 | Status: DISCONTINUED | OUTPATIENT
Start: 2023-04-19 | End: 2023-04-20

## 2023-04-19 RX ORDER — ONDANSETRON 8 MG/1
1 TABLET, FILM COATED ORAL
Qty: 0 | Refills: 0 | DISCHARGE

## 2023-04-19 RX ORDER — SODIUM CHLORIDE 9 MG/ML
1000 INJECTION, SOLUTION INTRAVENOUS
Refills: 0 | Status: DISCONTINUED | OUTPATIENT
Start: 2023-04-19 | End: 2023-04-20

## 2023-04-19 RX ORDER — PANTOPRAZOLE SODIUM 20 MG/1
40 TABLET, DELAYED RELEASE ORAL ONCE
Refills: 0 | Status: COMPLETED | OUTPATIENT
Start: 2023-04-19 | End: 2023-04-19

## 2023-04-19 RX ORDER — ACETAMINOPHEN 500 MG
1000 TABLET ORAL ONCE
Refills: 0 | Status: COMPLETED | OUTPATIENT
Start: 2023-04-19 | End: 2023-04-19

## 2023-04-19 RX ADMIN — Medication 30 MILLILITER(S): at 15:37

## 2023-04-19 RX ADMIN — Medication 650 MILLIGRAM(S): at 07:53

## 2023-04-19 RX ADMIN — PIPERACILLIN AND TAZOBACTAM 200 GRAM(S): 4; .5 INJECTION, POWDER, LYOPHILIZED, FOR SOLUTION INTRAVENOUS at 00:21

## 2023-04-19 RX ADMIN — MORPHINE SULFATE 2 MILLIGRAM(S): 50 CAPSULE, EXTENDED RELEASE ORAL at 09:00

## 2023-04-19 RX ADMIN — PANTOPRAZOLE SODIUM 40 MILLIGRAM(S): 20 TABLET, DELAYED RELEASE ORAL at 16:46

## 2023-04-19 RX ADMIN — Medication 650 MILLIGRAM(S): at 20:22

## 2023-04-19 RX ADMIN — Medication 1000 MILLIGRAM(S): at 11:56

## 2023-04-19 RX ADMIN — SODIUM CHLORIDE 125 MILLILITER(S): 9 INJECTION, SOLUTION INTRAVENOUS at 13:32

## 2023-04-19 RX ADMIN — Medication 650 MILLIGRAM(S): at 21:22

## 2023-04-19 RX ADMIN — MORPHINE SULFATE 4 MILLIGRAM(S): 50 CAPSULE, EXTENDED RELEASE ORAL at 00:29

## 2023-04-19 RX ADMIN — Medication 650 MILLIGRAM(S): at 06:04

## 2023-04-19 RX ADMIN — MORPHINE SULFATE 2 MILLIGRAM(S): 50 CAPSULE, EXTENDED RELEASE ORAL at 16:01

## 2023-04-19 RX ADMIN — MORPHINE SULFATE 2 MILLIGRAM(S): 50 CAPSULE, EXTENDED RELEASE ORAL at 08:35

## 2023-04-19 RX ADMIN — FAMOTIDINE 100 MILLIGRAM(S): 10 INJECTION INTRAVENOUS at 06:03

## 2023-04-19 RX ADMIN — MORPHINE SULFATE 2 MILLIGRAM(S): 50 CAPSULE, EXTENDED RELEASE ORAL at 03:10

## 2023-04-19 RX ADMIN — FAMOTIDINE 100 MILLIGRAM(S): 10 INJECTION INTRAVENOUS at 17:13

## 2023-04-19 RX ADMIN — MORPHINE SULFATE 2 MILLIGRAM(S): 50 CAPSULE, EXTENDED RELEASE ORAL at 15:33

## 2023-04-19 RX ADMIN — SODIUM CHLORIDE 125 MILLILITER(S): 9 INJECTION, SOLUTION INTRAVENOUS at 09:13

## 2023-04-19 RX ADMIN — MORPHINE SULFATE 2 MILLIGRAM(S): 50 CAPSULE, EXTENDED RELEASE ORAL at 04:10

## 2023-04-19 RX ADMIN — Medication 400 MILLIGRAM(S): at 11:33

## 2023-04-19 RX ADMIN — ONDANSETRON 4 MILLIGRAM(S): 8 TABLET, FILM COATED ORAL at 09:26

## 2023-04-19 NOTE — CONSULT NOTE ADULT - ASSESSMENT
a/p    Symptomatic cholelithiasis without cholecystitis.    I tried to get patient on OR schedule today but we have a full OR and the earliest we could get in is about 8pm.  There is not urgency to do thsi case as there are no signs of acuity.  However, I want to make it convenient for patient rather than having her be scheduled as outpatient.    I have scheduled OR time tomorrow at noon and patient appreciates and agrees to that.  In interim, I put her back on diet and told her to ambulate.    Will keep NPO past midnight tonight.    Thank you.    Dr. Ru Martins  cell#[phone]

## 2023-04-19 NOTE — CONSULT NOTE ADULT - SUBJECTIVE AND OBJECTIVE BOX
Hospitalist note:    Reason for Admission: RUQ, epigastic pain- cholelithiasis, cholecystitis  History of Present Illness:   38 year old F w/PCOS, Obesity, recent laparoscopic Sleeve Gastrectomy with hiatal hernia repair 12/5/2022, presents with sudden RUQ and epigastric pain associated with nausea today.  She was apparently in the ED 3 weeks (3/23/23) ago w/ same complaints, but milder, had CT abd/pelvis, which showed pericholecystic fluid but no calculi visualized. She did improve after pain meds and was d/jenn home. Pt was okay, has mild intermittent epigastric discomfort, so she followed up with GI last week and a RUQ sono was ordered. RUQ sono 4/12/23 reported cholelithiasis, without sonographic evidence of acute cholecystitis. So then she a followed up w/ bariatric MD, and she was sched to have lap jacqueline 5/01/23.   Pt was fine until late in the afternoon had severe RUQ pain and epigastric pain, ass w/ nausea. No vomiting.  Her last meal was a late breakfast around 11am.  Had nml BM this AM. Pt denies fever, chills, chest pain, cough, dyspnea, diarrhea.     Past Medical, Past Surgical, and Family History:  PAST MEDICAL HISTORY:  Asthma Albuterol Inhaler as needed- last use was over a year ago    Endometriosis     History of prediabetes     Hyperlipidemia No medications at this time    Obesity (BMI 30-39.9)     Other ovarian cyst, right side     PCOS (polycystic ovarian syndrome)     Pelvic and perineal pain     Torsion of ovary, ovarian pedicle and fallopian tube.     PAST SURGICAL HISTORY:  Elective surgery Diagnostic Laparoscopy with FABRICIO and Cystoscopy 2017      patient interviewed and examined in 2surg 14    feels a little better    looks ok    afebrile    vitals stable    Heent-sclera anicteric    abdomen-benign    extrem-ok    labs:    wbc 10  h/h  12/37  plt 179 49% meutrophils    lipase 186    k+ 3.5    creat 0.46    bili 0.1    lfts normal    imaging(I reviewed remotely last evening):      < from: US Abdomen Upper Quadrant Right (04.12.23 @ 14:48) >  PRESSION:  Cholelithiasis, without sonographic evidence of acute cholecystitis.      < end of copied text >

## 2023-04-19 NOTE — PROGRESS NOTE ADULT - SUBJECTIVE AND OBJECTIVE BOX
CC: Patient is a 38y old  Female who presents with a chief complaint of RUQ, epigastic pain- cholelithiasis, cholecystitis (2023 06:58)      Interval History:  Patient seen and examined at bedside.  No overnight events  Still having upper abdominal pain    ROS:  CONSTITUTIONAL: No fever, weight loss, or fatigue  RESPIRATORY: No cough, wheezing, chills or hemoptysis; No shortness of breath  GASTROINTESTINAL: + epigastric pain    ALLERGIES:  No Known Allergies    MEDICATIONS  (STANDING):  lactated ringers. 1000 milliLiter(s) (125 mL/Hr) IV Continuous <Continuous>  piperacillin/tazobactam IVPB.. 3.375 Gram(s) IV Intermittent every 8 hours    MEDICATIONS  (PRN):  acetaminophen     Tablet .. 650 milliGRAM(s) Oral every 6 hours PRN Temp greater or equal to 38C (100.4F), Mild Pain (1 - 3)  morphine  - Injectable 2 milliGRAM(s) IV Push every 4 hours PRN Moderate Pain (4 - 6)  morphine  - Injectable 4 milliGRAM(s) IV Push every 3 hours PRN Severe Pain (7 - 10)  ondansetron Injectable 4 milliGRAM(s) IV Push every 6 hours PRN Nausea and/or Vomiting    Vital Signs Last 24 Hrs  T(F): 98.3 (2023 06:08), Max: 98.5 (2023 19:20)  HR: 66 (2023 07:52) (51 - 91)  BP: 90/68 (2023 07:52) (90/68 - 114/78)  RR: 18 (2023 06:08) (18 - 24)  SpO2: 100% (2023 06:08) (99% - 100%)  I&O's Summary    BMI (kg/m2): 32.7 (23 @ 06:08)    PHYSICAL EXAM:  GENERAL: NAD, uncomfortable  HENT:  Atraumatic, Normocephalic; No tonsillar erythema, exudates, or enlargement; Moist mucous membranes  EYES: EOMI, PERRLA, conjunctiva and sclera clear, no lid-lag; moist conjunctivae  NECK: Supple, No JVD, Normal thyroid, FROM of neck  NERVOUS SYSTEM:  CN II - XII intact; Sensation intact; follows commands  CHEST/LUNG: Clear to percussion bilaterally; No rales, rhonchi, wheezing, or rubs; normal respiratory effort, no intercostal retractions  HEART: Regular rate and rhythm; No murmurs, rubs, or gallops; No pitting edema  ABDOMEN: Soft, Nontender, Nondistended; Bowel sounds present; No HSM or masses  MUSCULOSKELETAL/EXTREMITIES:  2+ Peripheral Pulses, No clubbing or digital cyanosis; FROM of extremities  PSYCH: Appropriate affect, Alert & Oriented x 3, Good Memory; Good insight    LABS:                        12.5   10.49 )-----------( 179      ( 2023 18:30 )             37.8           144  |  107  |  14  ----------------------------<  101  3.5   |  26  |  0.46    Ca    9.2      2023 18:30    TPro  6.8  /  Alb  3.4  /  TBili  0.1  /  DBili  x   /  AST  32  /  ALT  24  /  AlkPhos  97       Urinalysis Basic - ( 2023 18:44 )    Color: Yellow / Appearance: Slightly Turbid / S.015 / pH: x  Gluc: x / Ketone: Negative  / Bili: Negative / Urobili: Negative   Blood: x / Protein: Negative / Nitrite: Negative   Leuk Esterase: Negative / RBC: Negative /HPF / WBC Negative /HPF   Sq Epi: x / Non Sq Epi: x / Bacteria: Few /HPF        COVID-19 PCR: NotDetec (23 @ 18:30)      Care Discussed with Consultants/Other Providers: Yes

## 2023-04-19 NOTE — PROGRESS NOTE ADULT - ASSESSMENT
38 year old F w/PCOS, Obesity, recent laparoscopic Sleeve Gastrectomy with hiatal hernia repair 12/5/2022 admitted for intractable abdominal pain.    #Intractable abdominal pain likely due to cholelithiasis w/acute cholecystitis  Keep NPO with IV hydration (increased LR to 125ml/hr)  Continue IV Zosyn  Analgesics prn  Antieemetics prn  Surgery consult- Dr Martins- lissette phillips later today or tomorrow    #History of PCOS,   #Obesisty, s/p recent bariatric gastric sleeve  #Hiatal hernia repair  Chronic  outpatient f/u with private providers    #GI with pepcid IV BID x 2 days    #DVT prophylaxis not indicated. Encourage patient to ambulate 38 year old F w/PCOS, Obesity, recent laparoscopic Sleeve Gastrectomy with hiatal hernia repair 12/5/2022 admitted for intractable abdominal pain.    #Intractable abdominal pain likely due to cholelithiasis w/acute cholecystitis  Keep NPO with IV hydration (increased LR to 125ml/hr)  IV Zosyn as per surgery. Likely D/C abx  Analgesics prn  Antieemetics prn  Surgery consult- Dr Martins- lissette zheng jacqueline later today or tomorrow    #History of PCOS,   #Obesisty, s/p recent bariatric gastric sleeve  #Hiatal hernia repair  Chronic  outpatient f/u with private providers    #GI with pepcid IV BID x 2 days    #DVT prophylaxis not indicated. Encourage patient to ambulate

## 2023-04-20 ENCOUNTER — TRANSCRIPTION ENCOUNTER (OUTPATIENT)
Age: 38
End: 2023-04-20

## 2023-04-20 ENCOUNTER — RESULT REVIEW (OUTPATIENT)
Age: 38
End: 2023-04-20

## 2023-04-20 LAB
ANION GAP SERPL CALC-SCNC: 10 MMOL/L — SIGNIFICANT CHANGE UP (ref 5–17)
BASOPHILS # BLD AUTO: 0.04 K/UL — SIGNIFICANT CHANGE UP (ref 0–0.2)
BASOPHILS NFR BLD AUTO: 0.6 % — SIGNIFICANT CHANGE UP (ref 0–2)
BUN SERPL-MCNC: 5 MG/DL — LOW (ref 7–23)
CALCIUM SERPL-MCNC: 9.1 MG/DL — SIGNIFICANT CHANGE UP (ref 8.4–10.5)
CHLORIDE SERPL-SCNC: 110 MMOL/L — HIGH (ref 96–108)
CO2 SERPL-SCNC: 24 MMOL/L — SIGNIFICANT CHANGE UP (ref 22–31)
CREAT SERPL-MCNC: 0.53 MG/DL — SIGNIFICANT CHANGE UP (ref 0.5–1.3)
CULTURE RESULTS: SIGNIFICANT CHANGE UP
EGFR: 122 ML/MIN/1.73M2 — SIGNIFICANT CHANGE UP
EOSINOPHIL # BLD AUTO: 0.21 K/UL — SIGNIFICANT CHANGE UP (ref 0–0.5)
EOSINOPHIL NFR BLD AUTO: 3.2 % — SIGNIFICANT CHANGE UP (ref 0–6)
GLUCOSE BLDC GLUCOMTR-MCNC: 152 MG/DL — HIGH (ref 70–99)
GLUCOSE BLDC GLUCOMTR-MCNC: 163 MG/DL — HIGH (ref 70–99)
GLUCOSE BLDC GLUCOMTR-MCNC: 83 MG/DL — SIGNIFICANT CHANGE UP (ref 70–99)
GLUCOSE SERPL-MCNC: 91 MG/DL — SIGNIFICANT CHANGE UP (ref 70–99)
HCT VFR BLD CALC: 40.7 % — SIGNIFICANT CHANGE UP (ref 34.5–45)
HGB BLD-MCNC: 13.1 G/DL — SIGNIFICANT CHANGE UP (ref 11.5–15.5)
IMM GRANULOCYTES NFR BLD AUTO: 0.3 % — SIGNIFICANT CHANGE UP (ref 0–0.9)
LYMPHOCYTES # BLD AUTO: 2.1 K/UL — SIGNIFICANT CHANGE UP (ref 1–3.3)
LYMPHOCYTES # BLD AUTO: 31.5 % — SIGNIFICANT CHANGE UP (ref 13–44)
MCHC RBC-ENTMCNC: 30.1 PG — SIGNIFICANT CHANGE UP (ref 27–34)
MCHC RBC-ENTMCNC: 32.2 GM/DL — SIGNIFICANT CHANGE UP (ref 32–36)
MCV RBC AUTO: 93.6 FL — SIGNIFICANT CHANGE UP (ref 80–100)
MONOCYTES # BLD AUTO: 0.39 K/UL — SIGNIFICANT CHANGE UP (ref 0–0.9)
MONOCYTES NFR BLD AUTO: 5.9 % — SIGNIFICANT CHANGE UP (ref 2–14)
NEUTROPHILS # BLD AUTO: 3.9 K/UL — SIGNIFICANT CHANGE UP (ref 1.8–7.4)
NEUTROPHILS NFR BLD AUTO: 58.5 % — SIGNIFICANT CHANGE UP (ref 43–77)
NRBC # BLD: 0 /100 WBCS — SIGNIFICANT CHANGE UP (ref 0–0)
PLATELET # BLD AUTO: 167 K/UL — SIGNIFICANT CHANGE UP (ref 150–400)
POTASSIUM SERPL-MCNC: 3.9 MMOL/L — SIGNIFICANT CHANGE UP (ref 3.5–5.3)
POTASSIUM SERPL-SCNC: 3.9 MMOL/L — SIGNIFICANT CHANGE UP (ref 3.5–5.3)
RBC # BLD: 4.35 M/UL — SIGNIFICANT CHANGE UP (ref 3.8–5.2)
RBC # FLD: 12.8 % — SIGNIFICANT CHANGE UP (ref 10.3–14.5)
SODIUM SERPL-SCNC: 144 MMOL/L — SIGNIFICANT CHANGE UP (ref 135–145)
SPECIMEN SOURCE: SIGNIFICANT CHANGE UP
WBC # BLD: 6.66 K/UL — SIGNIFICANT CHANGE UP (ref 3.8–10.5)
WBC # FLD AUTO: 6.66 K/UL — SIGNIFICANT CHANGE UP (ref 3.8–10.5)

## 2023-04-20 PROCEDURE — 47562 LAPAROSCOPIC CHOLECYSTECTOMY: CPT

## 2023-04-20 PROCEDURE — 88304 TISSUE EXAM BY PATHOLOGIST: CPT | Mod: 26

## 2023-04-20 DEVICE — CLIP APPLIER COVIDIEN ENDOCLIP II 10MM MED/LG: Type: IMPLANTABLE DEVICE | Status: FUNCTIONAL

## 2023-04-20 RX ORDER — DEXTROSE 50 % IN WATER 50 %
12.5 SYRINGE (ML) INTRAVENOUS ONCE
Refills: 0 | Status: DISCONTINUED | OUTPATIENT
Start: 2023-04-20 | End: 2023-04-20

## 2023-04-20 RX ORDER — SODIUM CHLORIDE 9 MG/ML
1000 INJECTION, SOLUTION INTRAVENOUS
Refills: 0 | Status: DISCONTINUED | OUTPATIENT
Start: 2023-04-20 | End: 2023-04-20

## 2023-04-20 RX ORDER — SODIUM CHLORIDE 9 MG/ML
1000 INJECTION, SOLUTION INTRAVENOUS
Refills: 0 | Status: DISCONTINUED | OUTPATIENT
Start: 2023-04-20 | End: 2023-04-21

## 2023-04-20 RX ORDER — DEXTROSE 50 % IN WATER 50 %
15 SYRINGE (ML) INTRAVENOUS ONCE
Refills: 0 | Status: DISCONTINUED | OUTPATIENT
Start: 2023-04-20 | End: 2023-04-20

## 2023-04-20 RX ORDER — ONDANSETRON 8 MG/1
4 TABLET, FILM COATED ORAL EVERY 6 HOURS
Refills: 0 | Status: DISCONTINUED | OUTPATIENT
Start: 2023-04-20 | End: 2023-04-21

## 2023-04-20 RX ORDER — OXYCODONE HYDROCHLORIDE 5 MG/1
1 TABLET ORAL
Qty: 12 | Refills: 0
Start: 2023-04-20

## 2023-04-20 RX ORDER — INSULIN LISPRO 100/ML
VIAL (ML) SUBCUTANEOUS
Refills: 0 | Status: DISCONTINUED | OUTPATIENT
Start: 2023-04-20 | End: 2023-04-20

## 2023-04-20 RX ORDER — PANTOPRAZOLE SODIUM 20 MG/1
40 TABLET, DELAYED RELEASE ORAL ONCE
Refills: 0 | Status: COMPLETED | OUTPATIENT
Start: 2023-04-20 | End: 2023-04-20

## 2023-04-20 RX ORDER — DEXTROSE 50 % IN WATER 50 %
15 SYRINGE (ML) INTRAVENOUS ONCE
Refills: 0 | Status: DISCONTINUED | OUTPATIENT
Start: 2023-04-20 | End: 2023-04-21

## 2023-04-20 RX ORDER — GLUCAGON INJECTION, SOLUTION 0.5 MG/.1ML
1 INJECTION, SOLUTION SUBCUTANEOUS ONCE
Refills: 0 | Status: DISCONTINUED | OUTPATIENT
Start: 2023-04-20 | End: 2023-04-20

## 2023-04-20 RX ORDER — OXYCODONE HYDROCHLORIDE 5 MG/1
5 TABLET ORAL EVERY 6 HOURS
Refills: 0 | Status: DISCONTINUED | OUTPATIENT
Start: 2023-04-20 | End: 2023-04-20

## 2023-04-20 RX ORDER — INSULIN LISPRO 100/ML
VIAL (ML) SUBCUTANEOUS
Refills: 0 | Status: DISCONTINUED | OUTPATIENT
Start: 2023-04-20 | End: 2023-04-21

## 2023-04-20 RX ORDER — KETOROLAC TROMETHAMINE 30 MG/ML
30 SYRINGE (ML) INJECTION ONCE
Refills: 0 | Status: DISCONTINUED | OUTPATIENT
Start: 2023-04-20 | End: 2023-04-20

## 2023-04-20 RX ORDER — ONDANSETRON 8 MG/1
8 TABLET, FILM COATED ORAL EVERY 8 HOURS
Refills: 0 | Status: DISCONTINUED | OUTPATIENT
Start: 2023-04-20 | End: 2023-04-20

## 2023-04-20 RX ORDER — DEXTROSE 50 % IN WATER 50 %
25 SYRINGE (ML) INTRAVENOUS ONCE
Refills: 0 | Status: DISCONTINUED | OUTPATIENT
Start: 2023-04-20 | End: 2023-04-20

## 2023-04-20 RX ORDER — SODIUM CHLORIDE 9 MG/ML
500 INJECTION INTRAMUSCULAR; INTRAVENOUS; SUBCUTANEOUS ONCE
Refills: 0 | Status: COMPLETED | OUTPATIENT
Start: 2023-04-20 | End: 2023-04-20

## 2023-04-20 RX ORDER — DEXTROSE 50 % IN WATER 50 %
25 SYRINGE (ML) INTRAVENOUS ONCE
Refills: 0 | Status: DISCONTINUED | OUTPATIENT
Start: 2023-04-20 | End: 2023-04-21

## 2023-04-20 RX ORDER — ACETAMINOPHEN 500 MG
1000 TABLET ORAL ONCE
Refills: 0 | Status: COMPLETED | OUTPATIENT
Start: 2023-04-20 | End: 2023-04-20

## 2023-04-20 RX ORDER — HYDROMORPHONE HYDROCHLORIDE 2 MG/ML
0.5 INJECTION INTRAMUSCULAR; INTRAVENOUS; SUBCUTANEOUS EVERY 4 HOURS
Refills: 0 | Status: DISCONTINUED | OUTPATIENT
Start: 2023-04-20 | End: 2023-04-21

## 2023-04-20 RX ORDER — HYDROMORPHONE HYDROCHLORIDE 2 MG/ML
0.5 INJECTION INTRAMUSCULAR; INTRAVENOUS; SUBCUTANEOUS
Refills: 0 | Status: DISCONTINUED | OUTPATIENT
Start: 2023-04-20 | End: 2023-04-20

## 2023-04-20 RX ORDER — HYDROMORPHONE HYDROCHLORIDE 2 MG/ML
1 INJECTION INTRAMUSCULAR; INTRAVENOUS; SUBCUTANEOUS EVERY 4 HOURS
Refills: 0 | Status: DISCONTINUED | OUTPATIENT
Start: 2023-04-20 | End: 2023-04-21

## 2023-04-20 RX ORDER — ONDANSETRON 8 MG/1
4 TABLET, FILM COATED ORAL ONCE
Refills: 0 | Status: COMPLETED | OUTPATIENT
Start: 2023-04-20 | End: 2023-04-20

## 2023-04-20 RX ORDER — GLUCAGON INJECTION, SOLUTION 0.5 MG/.1ML
1 INJECTION, SOLUTION SUBCUTANEOUS ONCE
Refills: 0 | Status: DISCONTINUED | OUTPATIENT
Start: 2023-04-20 | End: 2023-04-21

## 2023-04-20 RX ADMIN — ONDANSETRON 4 MILLIGRAM(S): 8 TABLET, FILM COATED ORAL at 14:31

## 2023-04-20 RX ADMIN — ONDANSETRON 4 MILLIGRAM(S): 8 TABLET, FILM COATED ORAL at 18:05

## 2023-04-20 RX ADMIN — SODIUM CHLORIDE 1000 MILLILITER(S): 9 INJECTION INTRAMUSCULAR; INTRAVENOUS; SUBCUTANEOUS at 09:01

## 2023-04-20 RX ADMIN — FAMOTIDINE 100 MILLIGRAM(S): 10 INJECTION INTRAVENOUS at 05:27

## 2023-04-20 RX ADMIN — HYDROMORPHONE HYDROCHLORIDE 0.5 MILLIGRAM(S): 2 INJECTION INTRAMUSCULAR; INTRAVENOUS; SUBCUTANEOUS at 22:15

## 2023-04-20 RX ADMIN — Medication 30 MILLIGRAM(S): at 23:07

## 2023-04-20 RX ADMIN — Medication 2: at 22:52

## 2023-04-20 RX ADMIN — HYDROMORPHONE HYDROCHLORIDE 0.5 MILLIGRAM(S): 2 INJECTION INTRAMUSCULAR; INTRAVENOUS; SUBCUTANEOUS at 14:12

## 2023-04-20 RX ADMIN — MORPHINE SULFATE 2 MILLIGRAM(S): 50 CAPSULE, EXTENDED RELEASE ORAL at 10:14

## 2023-04-20 RX ADMIN — Medication 650 MILLIGRAM(S): at 01:34

## 2023-04-20 RX ADMIN — PANTOPRAZOLE SODIUM 40 MILLIGRAM(S): 20 TABLET, DELAYED RELEASE ORAL at 20:54

## 2023-04-20 RX ADMIN — Medication 400 MILLIGRAM(S): at 19:26

## 2023-04-20 RX ADMIN — HYDROMORPHONE HYDROCHLORIDE 0.5 MILLIGRAM(S): 2 INJECTION INTRAMUSCULAR; INTRAVENOUS; SUBCUTANEOUS at 22:39

## 2023-04-20 RX ADMIN — Medication 30 MILLILITER(S): at 20:37

## 2023-04-20 RX ADMIN — Medication 30 MILLIGRAM(S): at 23:40

## 2023-04-20 RX ADMIN — HYDROMORPHONE HYDROCHLORIDE 0.5 MILLIGRAM(S): 2 INJECTION INTRAMUSCULAR; INTRAVENOUS; SUBCUTANEOUS at 14:28

## 2023-04-20 RX ADMIN — HYDROMORPHONE HYDROCHLORIDE 0.5 MILLIGRAM(S): 2 INJECTION INTRAMUSCULAR; INTRAVENOUS; SUBCUTANEOUS at 14:22

## 2023-04-20 RX ADMIN — HYDROMORPHONE HYDROCHLORIDE 0.5 MILLIGRAM(S): 2 INJECTION INTRAMUSCULAR; INTRAVENOUS; SUBCUTANEOUS at 14:54

## 2023-04-20 RX ADMIN — Medication 650 MILLIGRAM(S): at 02:34

## 2023-04-20 RX ADMIN — ONDANSETRON 4 MILLIGRAM(S): 8 TABLET, FILM COATED ORAL at 09:03

## 2023-04-20 RX ADMIN — Medication 1000 MILLIGRAM(S): at 22:39

## 2023-04-20 NOTE — PROGRESS NOTE ADULT - SUBJECTIVE AND OBJECTIVE BOX
aluminum hydroxide/magnesium hydroxide/simethicone Suspension 30 milliLiter(s) Oral every 4 hours PRN  dextrose 5%. 1000 milliLiter(s) IV Continuous <Continuous>  dextrose 50% Injectable 25 Gram(s) IV Push once  dextrose 50% Injectable 25 Gram(s) IV Push once  dextrose Oral Gel 15 Gram(s) Oral once PRN  glucagon  Injectable 1 milliGRAM(s) IntraMuscular once  HYDROmorphone  Injectable 0.5 milliGRAM(s) IV Push every 4 hours PRN  HYDROmorphone  Injectable 1 milliGRAM(s) IV Push every 4 hours PRN  insulin lispro (ADMELOG) corrective regimen sliding scale   SubCutaneous three times a day before meals  lactated ringers. 1000 milliLiter(s) IV Continuous <Continuous>  ondansetron Injectable 4 milliGRAM(s) IV Push every 6 hours PRN                            13.1   6.66  )-----------( 167      ( 20 Apr 2023 07:50 )             40.7     pt seen and examined,   pt s/p lap jacqueline, POD 0   pt assessed for 9/10 abd pain , with retractable Nausea.       Hemoglobin: 13.1 g/dL (04-20 @ 07:50)  Hemoglobin: 12.5 g/dL (04-18 @ 18:30)      04-20    144  |  110<H>  |  5<L>  ----------------------------<  91  3.9   |  24  |  0.53    Ca    9.1      20 Apr 2023 07:50      Creatinine Trend: 0.53<--, 0.46<--, 0.75<--    COAGS:           T(C): 36.7 (04-20-23 @ 20:28), Max: 36.9 (04-20-23 @ 05:27)  HR: 85 (04-20-23 @ 20:28) (53 - 85)  BP: 96/60 (04-20-23 @ 20:28) (90/50 - 110/73)  RR: 17 (04-20-23 @ 20:28) (14 - 20)  SpO2: 99% (04-20-23 @ 20:28) (96% - 100%)  Wt(kg): --    I&O's Summary    20 Apr 2023 07:01  -  20 Apr 2023 21:03  --------------------------------------------------------  IN: 100 mL / OUT: 0 mL / NET: 100 mL      PHYSICAL EXAM:  General: NAD, A/O x 3  ENT: No gross hearing impairment, Moist mucous membranes, no thrush  Neck: Supple, No JVD  Lungs: Clear to auscultation bilaterally, good air entry, non-labored breathing  Cardio: RRR, S1/S2, No murmur  Abdomen: Soft, Nondistended; very tender at RUQ, Bowel sounds present  Extremities: No calf tenderness, No cyanosis, No pitting edema  Psych: Appropriate mood and affect    A/P 38 yr female no sig hx, now cholecystitis , s/p lap jacqueline POD 0     - IV hydration   - Enc PO intake.   - pain management   - surgical site c/d/i  - prn zofran for antiemetics   - strict I/O   - D/C planning.   D/W Bedside nursing.

## 2023-04-20 NOTE — PROGRESS NOTE ADULT - ASSESSMENT
38 year old F w/PCOS, Obesity, recent laparoscopic Sleeve Gastrectomy with hiatal hernia repair 12/5/2022 admitted for intractable abdominal pain.    #Intractable abdominal pain  #Cholelithiasis w/acute cholecystitis  -NPO with IVF  -Analgesics prn  -Surgery consult:  Dr Martins- for lap jacqueline today    #borderline hypotension  -cont IVF, 500 cc bolus this am  -monitor vitals    #hx of pre-DM  -pt on Metformin at home, held here  -accuchecks, ISS  -check HgbA1c    #History of PCOS,   #Obesisty, s/p recent bariatric gastric sleeve  #hx of Hiatal hernia repair  -outpatient f/u     #GI ppx - pepcid IV BID    #DVT prophylaxis not indicated. Encourage patient to ambulate    Dispo:  pt states she will update her family

## 2023-04-20 NOTE — DISCHARGE NOTE PROVIDER - PROVIDER TOKENS
Patient c/o increased pain in abdomen and leaking around catheter site.  Manually irrigated per orders.  Several large clots removed.  Patient's comfort greatly improved.    PROVIDER:[TOKEN:[[id number]],FOLLOWUP:[2 weeks]]

## 2023-04-20 NOTE — DISCHARGE NOTE PROVIDER - NSDCFUADDINST_GEN_ALL_CORE_FT
Can shower in 24 hours  Do not remove Steri strips  No heavy lifting until cleared by surgeon  Can alternate between Tylenol and motrin every 3 hours for pain.

## 2023-04-20 NOTE — DISCHARGE NOTE PROVIDER - HOSPITAL COURSE
38 yof w/PCOS, Obesity, recent laparoscopic Sleeve Gastrectomy with hiatal hernia repair 12/5/2022, presents with sudden RUQ and epigastric pain associated with nausea.  She was apparently in the ED 3 weeks (3/23/23) ago w/ same complaints, but milder, had CT abd/pelvis, which showed pericholecystic fluid but no calculi visualized. She did improve after pain meds and was d/jenn home. Pt was okay, has mild intermittent epigastric discomfort, so she followed up with GI last week and a RUQ sono was ordered. RUQ sono 4/12/23 reported cholelithiasis, without sonographic evidence of acute cholecystitis. Pt with pain admission and plan was to take pt for surgical intervention. S/P Laparoscopic Cholecystectomy 4/20/23. Pt tolerated procedure. Case discussed with attending; agreed with discharging patient home.   38 yof w/PCOS, Obesity, recent laparoscopic Sleeve Gastrectomy with hiatal hernia repair 12/5/2022, presents with sudden RUQ and epigastric pain associated with nausea.  She was apparently in the ED 3 weeks (3/23/23) ago w/ same complaints, but milder, had CT abd/pelvis, which showed pericholecystic fluid but no calculi visualized. She did improve after pain meds and was d/jenn home. Pt was okay, has mild intermittent epigastric discomfort, so she followed up with GI last week and a RUQ sono was ordered. RUQ sono 4/12/23 reported cholelithiasis, without sonographic evidence of acute cholecystitis. Pt with pain admission and plan was to take pt for surgical intervention. S/P Laparoscopic Cholecystectomy 4/20/23. Pt tolerated procedure. Monitored overnight due to post-operative pain and nausea. Pt seen and examined in AM 4/21 and pain/nausea improved. Case discussed with attending; pt HD stable and agreed with discharging patient home.

## 2023-04-20 NOTE — DISCHARGE NOTE PROVIDER - CARE PROVIDER_API CALL
Ru Martins)  Surgery  [address], [address]  [place] [address]  Phone: [phone]  Fax: [phone]  Follow Up Time: 2 weeks

## 2023-04-20 NOTE — DISCHARGE NOTE PROVIDER - NSDCFUSCHEDAPPT_GEN_ALL_CORE_FT
Pelon Ramirez  [facility] Physician [facility] [address]  Scheduled Appointment: 04/26/2023    [facility]  WEIGHTMGMT [address]  Scheduled Appointment: 05/09/2023    César Rdz  [facility] Physician [facility]  GASTRO [address]  Scheduled Appointment: 05/10/2023    Demarco Bates  [facility] [address]  Scheduled Appointment: 06/21/2023

## 2023-04-20 NOTE — PROGRESS NOTE ADULT - SUBJECTIVE AND OBJECTIVE BOX
Patient is a 38y old  Female who presents with a chief complaint of RUQ, epigastic pain- cholelithiasis, cholecystitis (2023 10:27)      Patient seen and examined at bedside. No overnight events reported.   Pt c/o pain at right upper abd, no N/V/D.      ALLERGIES:  No Known Allergies    MEDICATIONS  (STANDING):  famotidine  IVPB 20 milliGRAM(s) IV Intermittent two times a day  lactated ringers. 1000 milliLiter(s) (125 mL/Hr) IV Continuous <Continuous>  sodium chloride 0.9% Bolus 500 milliLiter(s) IV Bolus once    MEDICATIONS  (PRN):  acetaminophen     Tablet .. 650 milliGRAM(s) Oral every 6 hours PRN Temp greater or equal to 38C (100.4F), Mild Pain (1 - 3)  aluminum hydroxide/magnesium hydroxide/simethicone Suspension 30 milliLiter(s) Oral every 6 hours PRN Dyspepsia  morphine  - Injectable 2 milliGRAM(s) IV Push every 4 hours PRN Moderate Pain (4 - 6)  morphine  - Injectable 4 milliGRAM(s) IV Push every 3 hours PRN Severe Pain (7 - 10)  ondansetron Injectable 4 milliGRAM(s) IV Push every 6 hours PRN Nausea and/or Vomiting    Vital Signs Last 24 Hrs  T(F): 98.4 (2023 05:27), Max: 98.4 (2023 05:27)  HR: 56 (2023 08:52) (51 - 62)  BP: 101/56 (2023 08:52) (91/52 - 105/64)  RR: 18 (2023 05:27) (17 - 19)  SpO2: 98% (2023 05:27) (98% - 99%)  I&O's Summary    PHYSICAL EXAM:  General: NAD, A/O x 3  ENT: No gross hearing impairment, Moist mucous membranes, no thrush  Neck: Supple, No JVD  Lungs: Clear to auscultation bilaterally, good air entry, non-labored breathing  Cardio: RRR, S1/S2, No murmur  Abdomen: Soft, Nondistended; very tender at RUQ, Bowel sounds present  Extremities: No calf tenderness, No cyanosis, No pitting edema  Psych: Appropriate mood and affect    LABS:                        13.1   6.66  )-----------( 167      ( 2023 07:50 )             40.7         144  |  110  |  5   ----------------------------<  91  3.9   |  24  |  0.53    Ca    9.1      2023 07:50    TPro  6.8  /  Alb  3.4  /  TBili  0.1  /  DBili  x   /  AST  32  /  ALT  24  /  AlkPhos  97        Lipase, Serum: 186 U/L (23 @ 18:30)      Urinalysis Basic - ( 2023 18:44 )    Color: Yellow / Appearance: Slightly Turbid / S.015 / pH: x  Gluc: x / Ketone: Negative  / Bili: Negative / Urobili: Negative   Blood: x / Protein: Negative / Nitrite: Negative   Leuk Esterase: Negative / RBC: Negative /HPF / WBC Negative /HPF   Sq Epi: x / Non Sq Epi: x / Bacteria: Few /HPF        COVID-19 PCR: NotDetec (23 @ 18:30)    RADIOLOGY & ADDITIONAL TESTS:  < from: US Abdomen Upper Quadrant Right (23 @ 14:48) >    IMPRESSION:  Cholelithiasis, without sonographic evidence of acute cholecystitis.      < end of copied text >  < from: Xray Chest 1 View- PORTABLE-Urgent (Xray Chest 1 View- PORTABLE-Urgent .) (23 @ 20:07) >    FINDINGS: The lungs are clear. The cardiomediastinal silhouette is   normal. The visualized osseous structures are unremarkable.    IMPRESSION: Clear lungs, unchanged.      < end of copied text >    Care Discussed with Consultants/Other Providers:

## 2023-04-20 NOTE — DISCHARGE NOTE PROVIDER - NSDCMRMEDTOKEN_GEN_ALL_CORE_FT
ergocalciferol 1.25 mg (50,000 intl units) oral capsule: 1 cap(s) orally every 7 days wednesday  metFORMIN 500 mg oral tablet: 1 tab(s) orally 2 times a day  omeprazole 20 mg oral delayed release capsule: 1 cap(s) orally once a day, open capsule and sprinkle on spoon of yogurt or pudding  oxyCODONE 5 mg oral tablet: 1 tab(s) orally every 6 hours as needed for  severe pain MDD: 4   ergocalciferol 1.25 mg (50,000 intl units) oral capsule: 1 cap(s) orally every 7 days wednesday  metFORMIN 500 mg oral tablet: 1 tab(s) orally 2 times a day  omeprazole 20 mg oral delayed release capsule: 1 cap(s) orally once a day, open capsule and sprinkle on spoon of yogurt or pudding  ondansetron 4 mg oral tablet, disintegratin tab(s) orally every 6 hours as needed for  nausea  oxyCODONE 5 mg oral tablet: 1 tab(s) orally every 6 hours as needed for  severe pain MDD: 4

## 2023-04-21 ENCOUNTER — TRANSCRIPTION ENCOUNTER (OUTPATIENT)
Age: 38
End: 2023-04-21

## 2023-04-21 VITALS
SYSTOLIC BLOOD PRESSURE: 96 MMHG | TEMPERATURE: 99 F | RESPIRATION RATE: 18 BRPM | HEART RATE: 72 BPM | OXYGEN SATURATION: 100 % | DIASTOLIC BLOOD PRESSURE: 57 MMHG

## 2023-04-21 DIAGNOSIS — K80.20 CALCULUS OF GALLBLADDER WITHOUT CHOLECYSTITIS WITHOUT OBSTRUCTION: ICD-10-CM

## 2023-04-21 LAB
A1C WITH ESTIMATED AVERAGE GLUCOSE RESULT: 5.8 % — HIGH (ref 4–5.6)
ANION GAP SERPL CALC-SCNC: 9 MMOL/L — SIGNIFICANT CHANGE UP (ref 5–17)
BUN SERPL-MCNC: 9 MG/DL — SIGNIFICANT CHANGE UP (ref 7–23)
CALCIUM SERPL-MCNC: 8.7 MG/DL — SIGNIFICANT CHANGE UP (ref 8.4–10.5)
CHLORIDE SERPL-SCNC: 108 MMOL/L — SIGNIFICANT CHANGE UP (ref 96–108)
CO2 SERPL-SCNC: 25 MMOL/L — SIGNIFICANT CHANGE UP (ref 22–31)
CREAT SERPL-MCNC: 0.6 MG/DL — SIGNIFICANT CHANGE UP (ref 0.5–1.3)
EGFR: 118 ML/MIN/1.73M2 — SIGNIFICANT CHANGE UP
ESTIMATED AVERAGE GLUCOSE: 120 MG/DL — HIGH (ref 68–114)
GLUCOSE BLDC GLUCOMTR-MCNC: 112 MG/DL — HIGH (ref 70–99)
GLUCOSE BLDC GLUCOMTR-MCNC: 98 MG/DL — SIGNIFICANT CHANGE UP (ref 70–99)
GLUCOSE SERPL-MCNC: 111 MG/DL — HIGH (ref 70–99)
HCT VFR BLD CALC: 33.5 % — LOW (ref 34.5–45)
HGB BLD-MCNC: 11.1 G/DL — LOW (ref 11.5–15.5)
MCHC RBC-ENTMCNC: 30.2 PG — SIGNIFICANT CHANGE UP (ref 27–34)
MCHC RBC-ENTMCNC: 33.1 GM/DL — SIGNIFICANT CHANGE UP (ref 32–36)
MCV RBC AUTO: 91.3 FL — SIGNIFICANT CHANGE UP (ref 80–100)
NRBC # BLD: 0 /100 WBCS — SIGNIFICANT CHANGE UP (ref 0–0)
PLATELET # BLD AUTO: 146 K/UL — LOW (ref 150–400)
POTASSIUM SERPL-MCNC: 3.7 MMOL/L — SIGNIFICANT CHANGE UP (ref 3.5–5.3)
POTASSIUM SERPL-SCNC: 3.7 MMOL/L — SIGNIFICANT CHANGE UP (ref 3.5–5.3)
RBC # BLD: 3.67 M/UL — LOW (ref 3.8–5.2)
RBC # FLD: 12.7 % — SIGNIFICANT CHANGE UP (ref 10.3–14.5)
SODIUM SERPL-SCNC: 142 MMOL/L — SIGNIFICANT CHANGE UP (ref 135–145)
WBC # BLD: 9.14 K/UL — SIGNIFICANT CHANGE UP (ref 3.8–10.5)
WBC # FLD AUTO: 9.14 K/UL — SIGNIFICANT CHANGE UP (ref 3.8–10.5)

## 2023-04-21 PROCEDURE — 82962 GLUCOSE BLOOD TEST: CPT

## 2023-04-21 PROCEDURE — 86850 RBC ANTIBODY SCREEN: CPT

## 2023-04-21 PROCEDURE — 86901 BLOOD TYPING SEROLOGIC RH(D): CPT

## 2023-04-21 PROCEDURE — 87086 URINE CULTURE/COLONY COUNT: CPT

## 2023-04-21 PROCEDURE — 86900 BLOOD TYPING SEROLOGIC ABO: CPT

## 2023-04-21 PROCEDURE — 81001 URINALYSIS AUTO W/SCOPE: CPT

## 2023-04-21 PROCEDURE — 83690 ASSAY OF LIPASE: CPT

## 2023-04-21 PROCEDURE — 83036 HEMOGLOBIN GLYCOSYLATED A1C: CPT

## 2023-04-21 PROCEDURE — 80053 COMPREHEN METABOLIC PANEL: CPT

## 2023-04-21 PROCEDURE — 80048 BASIC METABOLIC PNL TOTAL CA: CPT

## 2023-04-21 PROCEDURE — 99285 EMERGENCY DEPT VISIT HI MDM: CPT

## 2023-04-21 PROCEDURE — 87635 SARS-COV-2 COVID-19 AMP PRB: CPT

## 2023-04-21 PROCEDURE — 71045 X-RAY EXAM CHEST 1 VIEW: CPT

## 2023-04-21 PROCEDURE — C1889: CPT

## 2023-04-21 PROCEDURE — 88304 TISSUE EXAM BY PATHOLOGIST: CPT

## 2023-04-21 PROCEDURE — 93005 ELECTROCARDIOGRAM TRACING: CPT

## 2023-04-21 PROCEDURE — 85025 COMPLETE CBC W/AUTO DIFF WBC: CPT

## 2023-04-21 PROCEDURE — 96365 THER/PROPH/DIAG IV INF INIT: CPT

## 2023-04-21 PROCEDURE — 36415 COLL VENOUS BLD VENIPUNCTURE: CPT

## 2023-04-21 PROCEDURE — 85027 COMPLETE CBC AUTOMATED: CPT

## 2023-04-21 PROCEDURE — 84702 CHORIONIC GONADOTROPIN TEST: CPT

## 2023-04-21 RX ORDER — OXYCODONE HYDROCHLORIDE 5 MG/1
5 TABLET ORAL ONCE
Refills: 0 | Status: DISCONTINUED | OUTPATIENT
Start: 2023-04-21 | End: 2023-04-21

## 2023-04-21 RX ORDER — ONDANSETRON 8 MG/1
1 TABLET, FILM COATED ORAL
Qty: 10 | Refills: 0
Start: 2023-04-21

## 2023-04-21 RX ORDER — ONDANSETRON 8 MG/1
1 TABLET, FILM COATED ORAL
Qty: 1 | Refills: 0
Start: 2023-04-21

## 2023-04-21 RX ADMIN — OXYCODONE HYDROCHLORIDE 5 MILLIGRAM(S): 5 TABLET ORAL at 09:21

## 2023-04-21 RX ADMIN — Medication 30 MILLILITER(S): at 10:43

## 2023-04-21 RX ADMIN — OXYCODONE HYDROCHLORIDE 5 MILLIGRAM(S): 5 TABLET ORAL at 08:21

## 2023-04-21 RX ADMIN — HYDROMORPHONE HYDROCHLORIDE 0.5 MILLIGRAM(S): 2 INJECTION INTRAMUSCULAR; INTRAVENOUS; SUBCUTANEOUS at 05:49

## 2023-04-21 RX ADMIN — HYDROMORPHONE HYDROCHLORIDE 0.5 MILLIGRAM(S): 2 INJECTION INTRAMUSCULAR; INTRAVENOUS; SUBCUTANEOUS at 04:05

## 2023-04-21 NOTE — PROGRESS NOTE ADULT - REASON FOR ADMISSION
RUQ, epigastic pain- cholelithiasis, cholecystitis

## 2023-04-21 NOTE — PROGRESS NOTE ADULT - SUBJECTIVE AND OBJECTIVE BOX
Patient is a 38y old  Female who presents with a chief complaint of RUQ, epigastic pain- cholelithiasis, cholecystitis (2023 21:02)      Patient seen and examined at bedside.  Pt c/o abd pain and had episode of N/V last night after eating.  ALLERGIES:  No Known Allergies    MEDICATIONS  (STANDING):  dextrose 5%. 1000 milliLiter(s) (50 mL/Hr) IV Continuous <Continuous>  dextrose 50% Injectable 25 Gram(s) IV Push once  dextrose 50% Injectable 25 Gram(s) IV Push once  glucagon  Injectable 1 milliGRAM(s) IntraMuscular once  insulin lispro (ADMELOG) corrective regimen sliding scale   SubCutaneous three times a day before meals  lactated ringers. 1000 milliLiter(s) (75 mL/Hr) IV Continuous <Continuous>    MEDICATIONS  (PRN):  aluminum hydroxide/magnesium hydroxide/simethicone Suspension 30 milliLiter(s) Oral every 4 hours PRN Dyspepsia  dextrose Oral Gel 15 Gram(s) Oral once PRN Blood Glucose LESS THAN 70 milliGRAM(s)/deciliter  HYDROmorphone  Injectable 0.5 milliGRAM(s) IV Push every 4 hours PRN Moderate Pain (4 - 6)  HYDROmorphone  Injectable 1 milliGRAM(s) IV Push every 4 hours PRN Severe Pain (7 - 10)  ondansetron Injectable 4 milliGRAM(s) IV Push every 6 hours PRN Nausea and/or Vomiting    Vital Signs Last 24 Hrs  T(F): 98.7 (2023 07:44), Max: 98.7 (2023 07:44)  HR: 52 (2023 07:44) (52 - 85)  BP: 96/53 (2023 07:44) (90/50 - 110/73)  RR: 18 (2023 05:00) (14 - 20)  SpO2: 100% (2023 07:44) (96% - 100%)  I&O's Summary    2023 07:01  -  2023 07:00  --------------------------------------------------------  IN: 100 mL / OUT: 0 mL / NET: 100 mL      PHYSICAL EXAM:  General: NAD, A/O x 3  ENT: No gross hearing impairment, Moist mucous membranes, no thrush  Neck: Supple, No JVD  Lungs: Clear to auscultation bilaterally, good air entry, non-labored breathing  Cardio: RRR, S1/S2, No murmur  Abdomen: Soft, Nondistended; tender diffusely, +steri-strips in place, Bowel sounds present  Extremities: No calf tenderness, No cyanosis, No pitting edema  Psych: Appropriate mood and affect    LABS:                        11.1   9.14  )-----------( 146      ( 2023 06:40 )             33.5         142  |  108  |  9   ----------------------------<  111  3.7   |  25  |  0.60    Ca    8.7      2023 06:40    TPro  6.8  /  Alb  3.4  /  TBili  0.1  /  DBili  x   /  AST  32  /  ALT  24  /  AlkPhos  97        Lipase, Serum: 186 U/L (23 @ 18:30)                              POCT Blood Glucose.: 98 mg/dL (2023 08:30)  POCT Blood Glucose.: 163 mg/dL (2023 22:49)  POCT Blood Glucose.: 152 mg/dL (2023 16:53)  POCT Blood Glucose.: 83 mg/dL (2023 10:46)      Urinalysis Basic - ( 2023 18:44 )    Color: Yellow / Appearance: Slightly Turbid / S.015 / pH: x  Gluc: x / Ketone: Negative  / Bili: Negative / Urobili: Negative   Blood: x / Protein: Negative / Nitrite: Negative   Leuk Esterase: Negative / RBC: Negative /HPF / WBC Negative /HPF   Sq Epi: x / Non Sq Epi: x / Bacteria: Few /HPF        Culture - Urine (collected 2023 18:44)  Source: Clean Catch Clean Catch (Midstream)  Final Report (2023 13:41):    <10,000 CFU/mL Normal Urogenital Kim      COVID-19 PCR: NotDetec (23 @ 18:30)    RADIOLOGY & ADDITIONAL TESTS:    Care Discussed with Consultants/Other Providers:

## 2023-04-21 NOTE — PROGRESS NOTE ADULT - NS ATTEND AMEND GEN_ALL_CORE FT
37 y/o F w/PCOS, Obesity, recent laparoscopic Sleeve Gastrectomy with hiatal hernia repair 12/5/2022 admitted for intractable abdominal pain, surgery consulted, anticipate lap jacqueline.
37 y/o F w/PCOS, obesity, recent laparoscopic Sleeve Gastrectomy with hiatal hernia repair 12/5/2022 admitted for intractable abdominal pain s/p lap jacqueline, stayed overnight for persistent pain, nausea, now improved. dispo planning.

## 2023-04-21 NOTE — PROGRESS NOTE ADULT - SUBJECTIVE AND OBJECTIVE BOX
POD 1 Lap jacqueline, this morning pt admits to mild abd pain, tolerating clears, having flatus no bm, denies cp/sob/n/v    PAST MEDICAL & SURGICAL HISTORY:  Endometriosis      PCOS (polycystic ovarian syndrome)      Obesity (BMI 30-39.9)      Asthma  Albuterol Inhaler as needed- last use was over a year ago      Torsion of ovary, ovarian pedicle and fallopian tube      Other ovarian cyst, right side      Pelvic and perineal pain      History of prediabetes      Hyperlipidemia  No medications at this time      S/P   , - tubal ligation also      S/P appendectomy      S/P hemorrhoidectomy      Elective surgery  Diagnostic Laparoscopy with FABRICIO and Cystoscopy       MEDICATIONS  (STANDING):  dextrose 5%. 1000 milliLiter(s) (50 mL/Hr) IV Continuous <Continuous>  dextrose 50% Injectable 25 Gram(s) IV Push once  dextrose 50% Injectable 25 Gram(s) IV Push once  glucagon  Injectable 1 milliGRAM(s) IntraMuscular once  insulin lispro (ADMELOG) corrective regimen sliding scale   SubCutaneous three times a day before meals  lactated ringers. 1000 milliLiter(s) (75 mL/Hr) IV Continuous <Continuous>    MEDICATIONS  (PRN):  aluminum hydroxide/magnesium hydroxide/simethicone Suspension 30 milliLiter(s) Oral every 4 hours PRN Dyspepsia  dextrose Oral Gel 15 Gram(s) Oral once PRN Blood Glucose LESS THAN 70 milliGRAM(s)/deciliter  HYDROmorphone  Injectable 0.5 milliGRAM(s) IV Push every 4 hours PRN Moderate Pain (4 - 6)  HYDROmorphone  Injectable 1 milliGRAM(s) IV Push every 4 hours PRN Severe Pain (7 - 10)  ondansetron Injectable 4 milliGRAM(s) IV Push every 6 hours PRN Nausea and/or Vomiting    PEVital Signs Last 24 Hrs  T(C): 36.8 (2023 10:47), Max: 37.1 (2023 07:44)  T(F): 98.3 (2023 10:47), Max: 98.7 (2023 07:44)  HR: 65 (2023 10:47) (52 - 85)  BP: 115/66 (2023 10:47) (90/50 - 115/66)  BP(mean): 72 (2023 15:01) (65 - 72)  RR: 18 (2023 10:47) (14 - 20)  SpO2: 100% (2023 10:47) (96% - 100%)    Parameters below as of 2023 10:47  Patient On (Oxygen Delivery Method): room air    Gen: A&O x3 nad  abd: abd soft, tender on incision, incisions clean dry, no guarding  : voiding  LE: calfs soft, nontender, no swelling                           11.1   9.14  )-----------( 146      ( 2023 06:40 )             33.5

## 2023-04-21 NOTE — DISCHARGE NOTE NURSING/CASE MANAGEMENT/SOCIAL WORK - PATIENT PORTAL LINK FT
You can access the FollowMyHealth Patient Portal offered by [facility] by registering at the following website: [email or web address]. By joining [email or web address] [email or web address] portal, you will also be able to view your health information using other applications (apps) compatible with our system.

## 2023-04-21 NOTE — DISCHARGE NOTE NURSING/CASE MANAGEMENT/SOCIAL WORK - NSDCPEFALRISK_GEN_ALL_CORE
For information on Fall & Injury Prevention, visit: [email or web address] OR  [email or web address] OR  https://www.cdc.gov/steadi/patient.html

## 2023-04-21 NOTE — PROGRESS NOTE ADULT - ASSESSMENT
38 year old F w/PCOS, Obesity, recent laparoscopic Sleeve Gastrectomy with hiatal hernia repair 12/5/2022 admitted for intractable abdominal pain.    #Cholelithiasis w/acute cholecystitis  -s/p Lap [place], POD #1, Dr. Martins Surgeon  -Analgesics prn  -probably D/C home today if tolerates breakfast    #borderline hypotension  -BP stable  -monitor vitals    #hx of pre-DM  -pt on Metformin at home, held here  -accuchecks, ISS  -pending HgbA1c    #History of PCOS,   #Obesity, s/p recent bariatric gastric sleeve  #hx of Hiatal hernia repair  -outpatient f/u     #DVT prophylaxis not indicated. Encourage patient to ambulate    Dispo: probable D/C home today; pt states she will update her family

## 2023-04-26 ENCOUNTER — NON-APPOINTMENT (OUTPATIENT)
Age: 38
End: 2023-04-26

## 2023-04-26 ENCOUNTER — APPOINTMENT (OUTPATIENT)
Dept: FAMILY MEDICINE | Facility: CLINIC | Age: 38
End: 2023-04-26

## 2023-04-26 NOTE — PHYSICAL EXAM
[Normal] : affect appropriate [de-identified] : soft; RLQ abdominal tenderness, ND, no evidence of hernia or diastasis, incisions well-healed

## 2023-04-26 NOTE — REASON FOR VISIT
[Follow-Up Visit] : a follow-up visit for [S/P Bariatric Surgery] : s/p bariatric surgery [Family Member] : family member [Ad Hoc ] : provided by an ad hoc  [Interpreters_Relationshiptopatient] : daughter [TWNoteComboBox1] : German

## 2023-04-26 NOTE — HISTORY OF PRESENT ILLNESS
[Procedure: ___] : Procedure performed: [unfilled]  [Date of Surgery: ___] : Date of Surgery:   [unfilled] [Surgeon Name:   ___] : Surgeon Name: Dr. GASTELUM [Pre-Op Weight ___] : Pre-op weight was [unfilled] lbs [de-identified] : 38 year old woman presents ~4 months s/p Laparoscopic Sleeve Gastrectomy with hiatal hernia repair.  No nausea/vomiting, constipation, diarrhea;  reflux/heartburn controlled with Omeprazole; reports intermittent postprandial abdominal pain (diagnosed with gallstones via abdominal US by gastroenterologist 4/12/23; went to ER at [facility] for RLQ pain ~3/23/23 where had abdominal CT). C/O hair loss; and takes Biotin. Patient eating 3 protein-rich meals/day (protein shake for lunch; and after-dinner snacking on carbohydrates, like cereal), is drinking adequate zero-calorie fluids/day (but states dark urine;  drinking 2 cups water per day; patient is pre-diabetic), taking bariatric vitamins, and goes to the gym 2 x/week and ambulates, for exercise.\par \par Next nutritionist appointment 5/9/23

## 2023-04-26 NOTE — REVIEW OF SYSTEMS
[Patient Intake Form Reviewed] : Patient intake form was reviewed [Negative] : Allergic/Immunologic [Abdominal Pain] : abdominal pain [Vomiting] : no vomiting [Constipation] : no constipation [Diarrhea] : no diarrhea [Reflux/Heartburn] : no reflux/ heartburn [Hernia] : no hernia [FreeTextEntry7] : RLQ abdominal pain

## 2023-05-01 LAB — SURGICAL PATHOLOGY STUDY: SIGNIFICANT CHANGE UP

## 2023-05-04 ENCOUNTER — APPOINTMENT (OUTPATIENT)
Dept: SURGERY | Facility: CLINIC | Age: 38
End: 2023-05-04
Payer: MEDICAID

## 2023-05-04 VITALS
HEIGHT: 62 IN | RESPIRATION RATE: 16 BRPM | BODY MASS INDEX: 31.28 KG/M2 | WEIGHT: 170 LBS | HEART RATE: 77 BPM | SYSTOLIC BLOOD PRESSURE: 101 MMHG | DIASTOLIC BLOOD PRESSURE: 62 MMHG | TEMPERATURE: 97.5 F

## 2023-05-04 PROCEDURE — 99024 POSTOP FOLLOW-UP VISIT: CPT

## 2023-05-09 ENCOUNTER — APPOINTMENT (OUTPATIENT)
Dept: BARIATRICS/WEIGHT MGMT | Facility: CLINIC | Age: 38
End: 2023-05-09
Payer: MEDICAID

## 2023-05-09 VITALS — BODY MASS INDEX: 31.28 KG/M2 | WEIGHT: 170 LBS | HEIGHT: 62 IN

## 2023-05-09 PROCEDURE — 97803 MED NUTRITION INDIV SUBSEQ: CPT | Mod: 95

## 2023-05-10 ENCOUNTER — APPOINTMENT (OUTPATIENT)
Dept: GASTROENTEROLOGY | Facility: CLINIC | Age: 38
End: 2023-05-10
Payer: MEDICAID

## 2023-05-10 VITALS
TEMPERATURE: 98 F | DIASTOLIC BLOOD PRESSURE: 71 MMHG | WEIGHT: 170 LBS | RESPIRATION RATE: 16 BRPM | OXYGEN SATURATION: 99 % | HEART RATE: 73 BPM | HEIGHT: 62 IN | SYSTOLIC BLOOD PRESSURE: 103 MMHG | BODY MASS INDEX: 31.28 KG/M2

## 2023-05-10 DIAGNOSIS — Z87.898 PERSONAL HISTORY OF OTHER SPECIFIED CONDITIONS: ICD-10-CM

## 2023-05-10 DIAGNOSIS — R10.11 RIGHT UPPER QUADRANT PAIN: ICD-10-CM

## 2023-05-10 DIAGNOSIS — K59.09 OTHER CONSTIPATION: ICD-10-CM

## 2023-05-10 DIAGNOSIS — R63.4 ABNORMAL WEIGHT LOSS: ICD-10-CM

## 2023-05-10 DIAGNOSIS — K59.00 CONSTIPATION, UNSPECIFIED: ICD-10-CM

## 2023-05-10 DIAGNOSIS — K81.0 ACUTE CHOLECYSTITIS: ICD-10-CM

## 2023-05-10 DIAGNOSIS — K80.20 CALCULUS OF GALLBLADDER W/OUT CHOLECYSTITIS W/OUT OBSTRUCTION: ICD-10-CM

## 2023-05-10 PROCEDURE — 99213 OFFICE O/P EST LOW 20 MIN: CPT

## 2023-05-10 RX ORDER — OMEPRAZOLE 40 MG/1
40 CAPSULE, DELAYED RELEASE ORAL DAILY
Qty: 30 | Refills: 0 | Status: DISCONTINUED | COMMUNITY
Start: 2023-04-12 | End: 2023-05-10

## 2023-05-10 NOTE — HISTORY OF PRESENT ILLNESS
[FreeTextEntry1] : Follow up visit after cholecystectomy. Feels better. Reviewed Dr. Martins's note. Path showed cholelithiasis and chronic cholecystitis.\par Since procedure patient reports feeling better. Denies abdominal pain, n/v at present. Denies constipation at present. Taking omeprazole 40 mg daily for GERD.\par \par  [de-identified] : \par  \par \par                                  \par YAMILETH GALVAN \par \par F \par  Female\par  1985 \par  38 Yrs \par  \par \par    \par                                   \par   \par \par \par \par \par \par \par \par \par     \par     \par     \par     \par     \par     \par     \par  \par \par \par \par \par \par \par \par \par \par \par \par \par \par \par \par \par \par        \par        \par \par        \par        \par \par        \par \par        \par \par \par \par        \par        \par \par        \par \par        \par        \par        \par        \par        \par        \par        \par        \par        \par \par        \par        \par        \par        \par        \par        \par        \par        \par        \par  \par \par \par \par \par \par \par \par \par \par \par    \par    \par    \par    \par    \par    \par    \par    \par    \par    \par    \par    \par    \par    \par    \par    \par    \par    \par    \par    \par    \par    \par    \par    \par    \par    \par  \par \par \par \par \par \par \par \par \par \par \par \par     \par   \par       \par    \par \par                                                                               \par      \par Operative Report\par       \par \par \par     Status  Complete: Signed  Document Date  04- 00:00 \par     Facility  [facility]  Encounter Date  04- 19:19 \par     Clinician  FLASH MEJIA  Last Update Date  04- 00:00 \par     Doc Type    Operative report  Finalized Date  04- 08:31 \par       \par \par     \par Wrap Text: On\par  [facility]\par \par  [facility] \par  [address], [place] [id number] [phone] \par \par PATIENT NAME: YAMILETH GALVAN\par  DATE OF OPERATION: 04/20/2023\par  MEDICAL RECORD #: [id number]\par  ENCOUNTER #: [id number]\par  SURGEON: FLASH MEJIA [id number]\par YOB: 1985\par \par OPERATIVE REPORT\par  ASSISTANT: MEGAN CASTANEDA\par ANESTHESIOLOGIST: BORIS AUSTIN MD ([id number])\par ANESTHESIA: GET plus local (1% lidocaine and 0.5% Marcaine)\par \par PREOPERATIVE FINDINGS AND INDICATION FOR THIS PROCEDURE: A 38-year-old lady who presented to the emergency room with symptomatic cholelithiasis. Sonogram did not show acute cholecystitis. However, in the past the patient had been seen in the emergency room and a CT scan showed some fluid. The patient has had a sleeve gastrectomy by Dr. Barby Win in 12/2022. The patient had a normal white count preoperatively, normal liver function tests. Intraoperative findings included chronically inflamed gallbladder with some pericholecystic fluid and a stone in the cystic duct, which was milked back into the gallbladder.\par \par PROCEDURE: The patient received perioperative fluids and antibiotics, brought to the operating room, placed in the supine position, underwent satisfactory endotracheal intubation anesthesia. Sequential compression devices were placed, and the patient was prepped and draped in a sterile fashion, nipples to midthigh.\par \par  An infraumbilical incision was made. This was carried down under direct vision into the abdominal cavity. A 0 Vicryl figure-of-eight suture was placed and a Kar trocar was placed in the abdominal cavity. It was insufflated to 15 mmHg pressure. A subxiphoid 11-mm port was placed under direct transillumination and two right lateral 5-mm ports were placed under direct transillumination. The patient was placed in reverse Trendelenburg and tilted to the left.\par \par  There were some flimsy adhesions against the abdominal wall, which were taken down sharply and with the electrocautery unit staying clear away from the duodenum. An endoscopic grasper was placed on the fundus and another on the infundibulum. The peritoneum was opened anteriorly and posteriorly. The cystic duct was identified and plane of dissection was created between the cystic duct and the cystic artery. There was another structure, which initially was questionable for duct or vessel, but as I dissected it away, this was an oblong lymph node over the cystic artery. I dissected it all the way up to where it tapered as an end, so it was not a tubular structure. The lower third of the cystic plate was completely freed up. There were only two structures going into the gallbladder, both from the view anteriorly and posteriorly. At this point, I doubly clipped the cystic duct and divided it at its junction with the gallbladder after milking back the stone and then doubly clipped the cystic artery and divided it. The gallbladder was then moved removed off the liver bed with electrocautery dissection. There was one small vessel high up where the gallbladder was intrahepatic, which was clipped.\par \par  Prior to completely removing the gallbladder, the liver bed was examined and it was dry and the clips were intact. The gallbladder was removed off the liver bed. The camera was placed in the subxiphoid port and an Endobag was placed in the umbilical port. Gallbladder was placed in the Endobag, brought out, submitted to pathology for exam.\par \par  The camera was placed back in the umbilical port. We then irrigated above and below the gallbladder. It was a clear effluent. Reexamined the liver bed, it was dry, and the clips on the cystic duct and cystic artery were intact. We then suctioned out all the pneumoperitoneum over the right lobe of the liver after turning off the air supply and then removed the trocars, which were intact under direct vision. The midline was closed with the previously placed 0 Vicryl suture.\par \par  The wounds were irrigated, infiltrated with 0.5% Marcaine and 1% lidocaine and closed with 4-0 Monocryl.\par \par  Steri-Strips applied.\par \par  Estimated blood loss, nil.\par \par  Case, class II.\par \par  Counts, correct.\par \par  The patient was extubated, returned to recovery in stable condition.\par \par  I called the daughter, Tonia at [phone] and apprised her of our findings and the patient's condition.\par \par \par  _______________________\par \par  DATE:\par \par  DICT:	\par FLASH MEJIA MD ([id number]) 04/20/2023 03:01 PM\par  TRANS:	\par [id number] 04/20/2023 03:04 PM\par  JOB:	\par [id number]\par \par Electronically Signed by: FLASH MEJIA 04/25/2023 08:31:44 AM   \par       \par       \par

## 2023-05-10 NOTE — PHYSICAL EXAM
[Alert] : alert [Normal Voice/Communication] : normal voice/communication [Healthy Appearing] : healthy appearing [No Acute Distress] : no acute distress [Sclera] : the sclera and conjunctiva were normal [Hearing Threshold Finger Rub Not Gates] : hearing was normal [Normal Lips/Gums] : the lips and gums were normal [Oropharynx] : the oropharynx was normal [Normal Appearance] : the appearance of the neck was normal [No Neck Mass] : no neck mass was observed [No Respiratory Distress] : no respiratory distress [No Acc Muscle Use] : no accessory muscle use [Respiration, Rhythm And Depth] : normal respiratory rhythm and effort [Auscultation Breath Sounds / Voice Sounds] : lungs were clear to auscultation bilaterally [Heart Rate And Rhythm] : heart rate was normal and rhythm regular [Normal S1, S2] : normal S1 and S2 [Murmurs] : no murmurs [Bowel Sounds] : normal bowel sounds [Abdomen Tenderness] : non-tender [No Masses] : no abdominal mass palpated [Abdomen Soft] : soft [No CVA Tenderness] : no CVA  tenderness [Involuntary Movements] : no involuntary movements were seen [Normal Color / Pigmentation] : normal skin color and pigmentation [] : no rash [Oriented To Time, Place, And Person] : oriented to person, place, and time [de-identified] : healed lap surgery wounds

## 2023-05-10 NOTE — ASSESSMENT
[FreeTextEntry1] : Feels better after cholecystectomy. Dr. Martins's follow-up appreciated.\par \par I recommended low fat diet.\par \par Follow up with bariatric team for weight management.\par \par Fibroscan recommended in 6 months. Advised patient that weight loss is expected to improve the fatty liver.\par \par Lower omeprazole to 20 mg daily.\par \par Fiber in diet for constipation.\par \par Follow up in 6 months.

## 2023-05-30 DIAGNOSIS — K91.2 POSTSURGICAL MALABSORPTION, NOT ELSEWHERE CLASSIFIED: ICD-10-CM

## 2023-05-30 DIAGNOSIS — Z90.3 POSTSURGICAL MALABSORPTION, NOT ELSEWHERE CLASSIFIED: ICD-10-CM

## 2023-05-30 DIAGNOSIS — R79.9 ABNORMAL FINDING OF BLOOD CHEMISTRY, UNSPECIFIED: ICD-10-CM

## 2023-06-01 NOTE — H&P ADULT - HISTORY OF PRESENT ILLNESS
4t year old F Laparoscopic Sleeve Gastrectomy with hiatal hernia repair 12/5/2022, 4t year old F w/PCOS, Cholelithiasis, Obesity, recent laparoscopic Sleeve Gastrectomy with hiatal hernia repair 12/5/2022, presents with abdominal pain, epigastric area associated with nausea today.  No vomiting no diarrhea.  No black or bloody stools.  No fever or chills. 4t year old F w/PCOS, Cholelithiasis, Obesity, recent laparoscopic Sleeve Gastrectomy with hiatal hernia repair 12/5/2022, presents with abdominal pain, epigastric area associated with nausea today.  No vomiting no diarrhea.  No black or bloody stools.  No fever or chills.  She was apparently in the ED 3 weeks ago w/ same complaints, had CT abd/pelvis, which showed pericholecystic fluid but no calculi visualized. 4t year old F w/PCOS, Cholelithiasis, Obesity, recent laparoscopic Sleeve Gastrectomy with hiatal hernia repair 12/5/2022, presents with abdominal pain, epigastric area associated with nausea today.  No vomiting no diarrhea.  No black or bloody stools.  No fever or chills.  She was apparently in the ED 3 weeks ago w/ same complaints, had CT abd/pelvis, which showed pericholecystic fluid but no calculi visualized. She did improve after pain meds and was d/jenn home. She followed up with GI last week and a RUQ sono was ordered.    RUQ sono 4/12 reported cholelithiasis, without sonographic evidence of acute cholecystitis.  She a followed up w/ bariatric MD, and she was shed to have lap jacqueline 5/01/23.   Pt has been doing okay until late in the afternoon had severe RUQ pain and epigastric pain, ass w/ nausea. No vomiting.  Her last meal was a late breakfast around 11am.  Had nml BM this AM.  38 year old F w/PCOS, Obesity, recent laparoscopic Sleeve Gastrectomy with hiatal hernia repair 12/5/2022, presents with sudden RUQ and epigastric pain associated with nausea today.  She was apparently in the ED 3 weeks (3/23/23) ago w/ same complaints, but milder, had CT abd/pelvis, which showed pericholecystic fluid but no calculi visualized. She did improve after pain meds and was d/jenn home. Pt was okay, has mild intermittent epigastric discomfort, so she followed up with GI last week and a RUQ sono was ordered. RUQ sono 4/12/23 reported cholelithiasis, without sonographic evidence of acute cholecystitis. So then she a followed up w/ bariatric MD, and she was sched to have lap jacqueline 5/01/23.   Pt was fine until late in the afternoon had severe RUQ pain and epigastric pain, ass w/ nausea. No vomiting.  Her last meal was a late breakfast around 11am.  Had nml BM this AM. Pt denies fever, chills, chest pain, cough, dyspnea, diarrhea.  Humira Counseling:  I discussed with the patient the risks of adalimumab including but not limited to myelosuppression, immunosuppression, autoimmune hepatitis, demyelinating diseases, lymphoma, and serious infections.  The patient understands that monitoring is required including a PPD at baseline and must alert us or the primary physician if symptoms of infection or other concerning signs are noted.

## 2023-06-06 ENCOUNTER — APPOINTMENT (OUTPATIENT)
Dept: BARIATRICS | Facility: CLINIC | Age: 38
End: 2023-06-06
Payer: MEDICAID

## 2023-06-06 VITALS
SYSTOLIC BLOOD PRESSURE: 100 MMHG | DIASTOLIC BLOOD PRESSURE: 68 MMHG | TEMPERATURE: 96.6 F | WEIGHT: 169.53 LBS | HEIGHT: 62 IN | HEART RATE: 71 BPM | OXYGEN SATURATION: 99 % | BODY MASS INDEX: 31.2 KG/M2

## 2023-06-06 PROCEDURE — 99214 OFFICE O/P EST MOD 30 MIN: CPT

## 2023-06-06 PROCEDURE — 99024 POSTOP FOLLOW-UP VISIT: CPT

## 2023-06-06 RX ORDER — BACILLUS COAGULANS/INULIN 1B-250 MG
CAPSULE ORAL DAILY
Refills: 0 | Status: ACTIVE | COMMUNITY

## 2023-06-06 RX ORDER — MULTIVIT-MIN/IRON/FOLIC ACID/K 45-800-120
CAPSULE ORAL
Refills: 0 | Status: ACTIVE | COMMUNITY

## 2023-06-06 RX ORDER — OMEGA-3S/DHA/EPA/FISH OIL 1000-1400
CAPSULE,DELAYED RELEASE (ENTERIC COATED) ORAL DAILY
Refills: 0 | Status: ACTIVE | COMMUNITY

## 2023-06-06 RX ORDER — CALCIUM CARBONATE/VITAMIN D3 500 MG-2.5
TABLET,CHEWABLE ORAL DAILY
Refills: 0 | Status: ACTIVE | COMMUNITY

## 2023-06-08 NOTE — PHYSICAL EXAM
[Normal] : affect appropriate [de-identified] : soft, ND, no evidence of hernia or diastasis, incisions well-healed; epigastric and RUQ pain

## 2023-06-08 NOTE — REVIEW OF SYSTEMS
[Patient Intake Form Reviewed] : Patient intake form was reviewed [Abdominal Pain] : abdominal pain [Reflux/Heartburn] : reflux/heartburn [Negative] : Allergic/Immunologic [Dizziness] : dizziness [Vomiting] : no vomiting [Constipation] : no constipation [Diarrhea] : no diarrhea [Hernia] : no hernia [Confused] : no confusion [Fainting] : no fainting [Difficulty Walking] : no difficulty walking [FreeTextEntry7] : nausea [FreeTextEntry9] : reports swollen hands (since cholecystectomy)

## 2023-06-08 NOTE — HISTORY OF PRESENT ILLNESS
[Procedure: ___] : Procedure performed: [unfilled]  [Date of Surgery: ___] : Date of Surgery:   [unfilled] [Surgeon Name:   ___] : Surgeon Name: Dr. GASTELUM [Pre-Op Weight ___] : Pre-op weight was [unfilled] lbs [de-identified] : 38 year old woman ~6 months s/p Laparoscopic Sleeve Gastrectomy with hiatal hernia repair (and ~6 weeks s/p cholecystectomy with Dr. Martins). Reports feels faint upon standing, nausea, and has abdominal pain since 1 week ago; reports postprandial reflux (takes Omeprazole 20 mg/day); reports no constipation nor diarrhea. Reports regurgitates fluids. Patient eating 3 protein-rich meals/day, drinking adequate zero-calorie fluids/day, taking bariatric vitamins, and walks for exercise. \par \par Last nutritionist appointment 5/9/23

## 2023-06-08 NOTE — ASSESSMENT
[FreeTextEntry1] : 38 year old woman ~6 months s/p Laparoscopic Sleeve Gastrectomy with hiatal hernia repair (and ~6 weeks s/p cholecystectomy with Dr. Martins). Patient doing well. Nutriton and exercise guidelines reviewed with the patient. \par \par Continue 3 protein-focused meals/day (aim for 60 g - 70 g protein/day); follow low-fat diet\par Encourage zero calorie fluid intake (64 oz/day)\par Avoid midday heat; drink plenty of water when spending time outside in postoperative period \par Continue bariatric vitamins \par Start Pepcid every night, for reflux\par Exercise with cardio (aim for 8k-10k steps/day), and strength training 2x/week\par Use step counter\par Weigh yourself 1x-2x/week\par If dizziness worsens, pain worsens, or reflux worsens call the office\par Obtain blood work \par Follow-up with nutritionist \par Follow-up in 1 month (will call with lab results)\par All questions answered\par \par Call with any questions or concerns\par \par Time before and after visit spent reviewing chart\par

## 2023-06-09 ENCOUNTER — NON-APPOINTMENT (OUTPATIENT)
Age: 38
End: 2023-06-09

## 2023-06-09 LAB
25(OH)D3 SERPL-MCNC: 36.1 NG/ML
ALBUMIN SERPL ELPH-MCNC: 4.3 G/DL
ALP BLD-CCNC: 87 U/L
ALT SERPL-CCNC: 15 U/L
ANION GAP SERPL CALC-SCNC: 12 MMOL/L
AST SERPL-CCNC: 17 U/L
BILIRUB SERPL-MCNC: 0.2 MG/DL
BUN SERPL-MCNC: 9 MG/DL
CALCIUM SERPL-MCNC: 9.2 MG/DL
CHLORIDE SERPL-SCNC: 106 MMOL/L
CO2 SERPL-SCNC: 22 MMOL/L
CREAT SERPL-MCNC: 0.53 MG/DL
EGFR: 122 ML/MIN/1.73M2
ESTIMATED AVERAGE GLUCOSE: 117 MG/DL
FERRITIN SERPL-MCNC: 66 NG/ML
FOLATE SERPL-MCNC: >20 NG/ML
GLUCOSE SERPL-MCNC: 96 MG/DL
HBA1C MFR BLD HPLC: 5.7 %
IRON SERPL-MCNC: 80 UG/DL
POTASSIUM SERPL-SCNC: 4.1 MMOL/L
PROT SERPL-MCNC: 6.9 G/DL
SODIUM SERPL-SCNC: 140 MMOL/L
TSH SERPL-ACNC: 0.58 UIU/ML
VIT B12 SERPL-MCNC: 1595 PG/ML

## 2023-06-12 LAB
VIT A SERPL-MCNC: 31.3 UG/DL
VIT B1 SERPL-MCNC: 144.8 NMOL/L
ZINC SERPL-MCNC: 83 UG/DL

## 2023-06-21 ENCOUNTER — APPOINTMENT (OUTPATIENT)
Dept: FAMILY MEDICINE | Facility: CLINIC | Age: 38
End: 2023-06-21
Payer: MEDICAID

## 2023-06-21 ENCOUNTER — NON-APPOINTMENT (OUTPATIENT)
Age: 38
End: 2023-06-21

## 2023-06-21 VITALS
OXYGEN SATURATION: 99 % | HEART RATE: 69 BPM | DIASTOLIC BLOOD PRESSURE: 60 MMHG | HEIGHT: 62 IN | WEIGHT: 171 LBS | BODY MASS INDEX: 31.47 KG/M2 | SYSTOLIC BLOOD PRESSURE: 102 MMHG | RESPIRATION RATE: 16 BRPM

## 2023-06-21 DIAGNOSIS — Z00.00 ENCOUNTER FOR GENERAL ADULT MEDICAL EXAMINATION W/OUT ABNORMAL FINDINGS: ICD-10-CM

## 2023-06-21 DIAGNOSIS — K80.12 CALCULUS OF GALLBLADDER WITH ACUTE AND CHRONIC CHOLECYSTITIS W/OUT OBSTRUCTION: ICD-10-CM

## 2023-06-21 PROCEDURE — 99214 OFFICE O/P EST MOD 30 MIN: CPT

## 2023-06-21 NOTE — REVIEW OF SYSTEMS
[Fever] : no fever [Recent Change In Weight] : ~T recent weight change [Abdominal Pain] : abdominal pain [Nausea] : no nausea [Constipation] : no constipation [Diarrhea] : diarrhea [Vomiting] : no vomiting [Joint Pain] : no joint pain [Joint Stiffness] : no joint stiffness [Muscle Pain] : no muscle pain [Back Pain] : no back pain [Negative] : Heme/Lymph [FreeTextEntry2] : weight loss

## 2023-06-21 NOTE — PHYSICAL EXAM
[No Acute Distress] : no acute distress [EOMI] : extraocular movements intact [Supple] : supple [Clear to Auscultation] : lungs were clear to auscultation bilaterally [Normal S1, S2] : normal S1 and S2 [No Edema] : there was no peripheral edema [Soft] : abdomen soft [Non-distended] : non-distended [Normal Bowel Sounds] : normal bowel sounds [No Rash] : no rash [Normal Gait] : normal gait [Normal Affect] : the affect was normal [de-identified] : surgical site healed well

## 2023-06-21 NOTE — ASSESSMENT
[FreeTextEntry1] : Approximately  30 min of face to face time spent, reviewed chart, old labwork results, addressed health concerns, ordered necessary new labs, advised follow up with specialist team. Answered all pt questions, coordinated care for patient.\par

## 2023-06-21 NOTE — HISTORY OF PRESENT ILLNESS
[FreeTextEntry1] : follow up [de-identified] : Ms Lydia Smith is a 39 yo female presents today for routine follow up. Pt reports has been following up with bariatric team, s/p lap gastric sleeve. Pt also reported RUQ discomfort, found to have cholelithiasis, now s/p lap cholecystis. Recent labwork from the bariatric team reviewed, advised further low fat diet and weight loss. Pt still complains of RUQ pain occasionally on hunching down over the surgical site, and sometimes right upper shoulder pain. Pt advised possible in light of recovering from surgery, possible adhesions. Advised to follow up with GI and general surgeon accordingly.

## 2023-06-22 ENCOUNTER — APPOINTMENT (OUTPATIENT)
Dept: SURGERY | Facility: CLINIC | Age: 38
End: 2023-06-22
Payer: MEDICAID

## 2023-06-22 ENCOUNTER — RESULT REVIEW (OUTPATIENT)
Age: 38
End: 2023-06-22

## 2023-06-22 VITALS
HEART RATE: 66 BPM | WEIGHT: 171 LBS | SYSTOLIC BLOOD PRESSURE: 103 MMHG | BODY MASS INDEX: 31.47 KG/M2 | RESPIRATION RATE: 15 BRPM | HEIGHT: 62 IN | DIASTOLIC BLOOD PRESSURE: 60 MMHG | TEMPERATURE: 97.3 F | OXYGEN SATURATION: 98 %

## 2023-06-22 PROCEDURE — 99024 POSTOP FOLLOW-UP VISIT: CPT

## 2023-07-05 ENCOUNTER — OUTPATIENT (OUTPATIENT)
Dept: OUTPATIENT SERVICES | Facility: HOSPITAL | Age: 38
LOS: 1 days | End: 2023-07-05
Payer: MEDICAID

## 2023-07-05 ENCOUNTER — APPOINTMENT (OUTPATIENT)
Dept: CT IMAGING | Facility: HOSPITAL | Age: 38
End: 2023-07-05
Payer: MEDICAID

## 2023-07-05 DIAGNOSIS — Z98.89 OTHER SPECIFIED POSTPROCEDURAL STATES: Chronic | ICD-10-CM

## 2023-07-05 DIAGNOSIS — R10.11 RIGHT UPPER QUADRANT PAIN: ICD-10-CM

## 2023-07-05 DIAGNOSIS — Z41.9 ENCOUNTER FOR PROCEDURE FOR PURPOSES OTHER THAN REMEDYING HEALTH STATE, UNSPECIFIED: Chronic | ICD-10-CM

## 2023-07-05 PROCEDURE — 74177 CT ABD & PELVIS W/CONTRAST: CPT | Mod: 26

## 2023-07-05 PROCEDURE — 74177 CT ABD & PELVIS W/CONTRAST: CPT

## 2023-07-11 ENCOUNTER — APPOINTMENT (OUTPATIENT)
Dept: BARIATRICS/WEIGHT MGMT | Facility: CLINIC | Age: 38
End: 2023-07-11

## 2023-07-11 NOTE — PRE-OP CHECKLIST - MEDICAL/PEDIATRIC CLEARANCE ON MEDICAL RECORD
Additional Notes: Fiona tasked to call in two weeks to make sure area is getting better. Detail Level: Simple Render Risk Assessment In Note?: no yes

## 2023-07-18 ENCOUNTER — APPOINTMENT (OUTPATIENT)
Dept: BARIATRICS | Facility: CLINIC | Age: 38
End: 2023-07-18
Payer: MEDICAID

## 2023-07-18 VITALS
HEART RATE: 81 BPM | SYSTOLIC BLOOD PRESSURE: 110 MMHG | OXYGEN SATURATION: 99 % | BODY MASS INDEX: 31.1 KG/M2 | DIASTOLIC BLOOD PRESSURE: 68 MMHG | TEMPERATURE: 97 F | WEIGHT: 169 LBS | HEIGHT: 62 IN

## 2023-07-18 DIAGNOSIS — Z87.19 OTHER SPECIFIED POSTPROCEDURAL STATES: ICD-10-CM

## 2023-07-18 DIAGNOSIS — Z98.890 OTHER SPECIFIED POSTPROCEDURAL STATES: ICD-10-CM

## 2023-07-18 DIAGNOSIS — R63.4 ABNORMAL WEIGHT LOSS: ICD-10-CM

## 2023-07-18 PROCEDURE — T1013A: CUSTOM

## 2023-07-18 PROCEDURE — 99401 PREV MED CNSL INDIV APPRX 15: CPT | Mod: 25

## 2023-07-18 PROCEDURE — 99214 OFFICE O/P EST MOD 30 MIN: CPT | Mod: 25

## 2023-07-18 RX ORDER — FAMOTIDINE 20 MG/1
20 TABLET, FILM COATED ORAL
Qty: 30 | Refills: 3 | Status: DISCONTINUED | COMMUNITY
Start: 2023-06-06 | End: 2023-07-18

## 2023-07-18 RX ORDER — ERGOCALCIFEROL 1.25 MG/1
1.25 MG CAPSULE, LIQUID FILLED ORAL
Qty: 8 | Refills: 3 | Status: DISCONTINUED | COMMUNITY
Start: 2023-03-07 | End: 2023-07-18

## 2023-07-20 NOTE — REVIEW OF SYSTEMS
[Patient Intake Form Reviewed] : Patient intake form was reviewed [Abdominal Pain] : abdominal pain [Negative] : Allergic/Immunologic [Reflux/Heartburn] : no reflux/ heartburn [FreeTextEntry7] : see HPI

## 2023-07-20 NOTE — ASSESSMENT
[FreeTextEntry1] : 38 year old F is 7 months s/p Laparoscopic Sleeve Gastrectomy with hiatal hernia repair. Very happy with recovery progress, feels great. Weight stable since last visit. Last nutritionist visit 5/9/2023.\par Doing well overall.\par \par CT abd/pel performed 7/5/2023 normal \par \par Greater than 15 minutes spent with pt discussing importance of health maintenance principles including dietary and lifestyle changes as well as long-term weight maintenance\par Reviewed guidelines about nutrition and snacking - protein focus diet with 3 meals/day\par Maintain better food choices\par Continue daily Bariatric MVI with iron and calcium\par Maintain daily zero calorie liquid intake goal of 64 oz/day\par Encouraged to participate in a daily exercise regimen incorporating cardio and strength training\par Track steps - goal approximately 8-10k steps per day\par Exercises reviewed with pt\par Advised pt to stop famotidine, and take omeprazole for another month and then stop\par \par Return to office in Sept for 9-month postop visit\par Follow up with nutritionist\par Follow up with PCP for continuity of care\par Follow up with Dr. Martins as scheduled for next week\par All questions answered\par \par Pt seen with Dr. Win \par \par Additional time spent before and after visit reviewing chart

## 2023-07-20 NOTE — REASON FOR VISIT
[Follow-Up Visit] : a follow-up visit for [S/P Bariatric Surgery] : s/p bariatric surgery [Other: ______] : provided by ORIANA [Time Spent: ____ minutes] : Total time spent using  services: [unfilled] minutes. The patient's primary language is not English thus required  services. [Interpreters_IDNumber] : [id number] [Interpreters_FullName] : Starla [TWNoteComboBox1] : Bulgarian

## 2023-07-20 NOTE — PHYSICAL EXAM
[Normal] : affect appropriate [de-identified] : EOMI, no conjunctival injection, anicteric  [de-identified] : Equal chest rise, non-labored respirations, no audible wheezing  [de-identified] : No visualized JVD or audible bruits  [de-identified] : Regular rate, no audible carotid bruits or JVD appreciated  [de-identified] : soft, NT, ND, no diastasis or hernias appreciated, incisions well healed  [de-identified] : CONSTANZA

## 2023-07-20 NOTE — HISTORY OF PRESENT ILLNESS
[Procedure: ___] : Procedure performed: [unfilled]  [Date of Surgery: ___] : Date of Surgery:   [unfilled] [Surgeon Name:   ___] : Surgeon Name: Dr. GASTELUM [Pre-Op Weight ___] : Pre-op weight was [unfilled] lbs [de-identified] : 38 year old F is 7 months s/p Laparoscopic Sleeve Gastrectomy with hiatal hernia repair. Very happy with recovery progress, feels great. Weight stable since last visit. Last nutritionist visit 5/9/2023. Consuming an adequate protein intake with 3 meals/day and taking 20-30min to consume a meal. No reflux, but continues to take omeprazole and famotidine. Drinking adequate zero calorie liquid, 64oz/day. Taking daily Bariatric vitamins caps with iron and calcum. Ambulating daily for exercise. Sleeping well. No nausea, vomiting, constipation or diarrhea.\par \par Patient underwent laparoscopic cholecystectomy by Dr. Martins April 20, 2023 at [facility].  Pt  saw Dr. Martins for intermittent RUQ abdominal pain last month - follow up appointment next week. CT abd/pel performed 7/5/2023\par

## 2023-07-25 ENCOUNTER — APPOINTMENT (OUTPATIENT)
Dept: BARIATRICS/WEIGHT MGMT | Facility: CLINIC | Age: 38
End: 2023-07-25
Payer: MEDICAID

## 2023-07-25 VITALS — WEIGHT: 169 LBS | HEIGHT: 62 IN | BODY MASS INDEX: 31.1 KG/M2

## 2023-07-25 PROCEDURE — 97803 MED NUTRITION INDIV SUBSEQ: CPT | Mod: 95

## 2023-07-27 ENCOUNTER — APPOINTMENT (OUTPATIENT)
Dept: SURGERY | Facility: CLINIC | Age: 38
End: 2023-07-27

## 2023-08-09 NOTE — ED ADULT NURSE NOTE - PAIN RATING/NUMBER SCALE (0-10): REST
You can access the FollowMyHealth Patient Portal offered by [facility] by registering at the following website: [email or web address]. By joining [employer]’s FollowMyHealth portal, you will also be able to view your health information using other applications (apps) compatible with our system.
2

## 2023-08-22 NOTE — H&P PST ADULT - ENDOCRINE
The form was sent to the Physician's Nurse MSG Pool via In-Basket for review and signature.    Unable to release the form until a signed Authorization is received.       negative

## 2023-09-15 LAB
25(OH)D3 SERPL-MCNC: 30.8 NG/ML
ALBUMIN SERPL ELPH-MCNC: 4.5 G/DL
ALP BLD-CCNC: 82 U/L
ALT SERPL-CCNC: 11 U/L
ANION GAP SERPL CALC-SCNC: 11 MMOL/L
APPEARANCE: CLEAR
AST SERPL-CCNC: 13 U/L
BASOPHILS # BLD AUTO: 0.04 K/UL
BASOPHILS NFR BLD AUTO: 0.6 %
BILIRUB SERPL-MCNC: 0.3 MG/DL
BILIRUBIN URINE: NEGATIVE
BLOOD URINE: NEGATIVE
BUN SERPL-MCNC: 14 MG/DL
CALCIUM SERPL-MCNC: 9.5 MG/DL
CHLORIDE SERPL-SCNC: 105 MMOL/L
CHOLEST SERPL-MCNC: 175 MG/DL
CO2 SERPL-SCNC: 24 MMOL/L
COLOR: YELLOW
CREAT SERPL-MCNC: 0.58 MG/DL
EGFR: 119 ML/MIN/1.73M2
EOSINOPHIL # BLD AUTO: 0.47 K/UL
EOSINOPHIL NFR BLD AUTO: 6.6 %
ESTIMATED AVERAGE GLUCOSE: 120 MG/DL
FOLATE SERPL-MCNC: >20 NG/ML
GLUCOSE QUALITATIVE U: NEGATIVE MG/DL
GLUCOSE SERPL-MCNC: 103 MG/DL
HBA1C MFR BLD HPLC: 5.8 %
HCT VFR BLD CALC: 38.4 %
HDLC SERPL-MCNC: 59 MG/DL
HGB BLD-MCNC: 12.7 G/DL
IMM GRANULOCYTES NFR BLD AUTO: 0.1 %
KETONES URINE: NEGATIVE MG/DL
LDLC SERPL CALC-MCNC: 101 MG/DL
LEUKOCYTE ESTERASE URINE: NEGATIVE
LYMPHOCYTES # BLD AUTO: 2.99 K/UL
LYMPHOCYTES NFR BLD AUTO: 41.8 %
MAN DIFF?: NORMAL
MCHC RBC-ENTMCNC: 30.3 PG
MCHC RBC-ENTMCNC: 33.1 GM/DL
MCV RBC AUTO: 91.6 FL
MONOCYTES # BLD AUTO: 0.61 K/UL
MONOCYTES NFR BLD AUTO: 8.5 %
NEUTROPHILS # BLD AUTO: 3.04 K/UL
NEUTROPHILS NFR BLD AUTO: 42.4 %
NITRITE URINE: NEGATIVE
NONHDLC SERPL-MCNC: 116 MG/DL
PH URINE: 6.5
PLATELET # BLD AUTO: 190 K/UL
POTASSIUM SERPL-SCNC: 4.1 MMOL/L
PROT SERPL-MCNC: 6.8 G/DL
PROTEIN URINE: NEGATIVE MG/DL
RBC # BLD: 4.19 M/UL
RBC # FLD: 12.8 %
SODIUM SERPL-SCNC: 140 MMOL/L
SPECIFIC GRAVITY URINE: 1.02
TRIGL SERPL-MCNC: 82 MG/DL
TSH SERPL-ACNC: 0.55 UIU/ML
UROBILINOGEN URINE: 0.2 MG/DL
VIT B12 SERPL-MCNC: 1529 PG/ML
WBC # FLD AUTO: 7.16 K/UL

## 2023-09-19 ENCOUNTER — APPOINTMENT (OUTPATIENT)
Dept: BARIATRICS | Facility: CLINIC | Age: 38
End: 2023-09-19
Payer: MEDICAID

## 2023-09-19 VITALS
DIASTOLIC BLOOD PRESSURE: 72 MMHG | SYSTOLIC BLOOD PRESSURE: 114 MMHG | HEART RATE: 82 BPM | HEIGHT: 62 IN | BODY MASS INDEX: 31.1 KG/M2 | WEIGHT: 169 LBS | OXYGEN SATURATION: 99 % | TEMPERATURE: 96.7 F

## 2023-09-19 PROCEDURE — 99214 OFFICE O/P EST MOD 30 MIN: CPT

## 2023-09-19 RX ORDER — OMEPRAZOLE 20 MG/1
20 CAPSULE, DELAYED RELEASE ORAL DAILY
Qty: 30 | Refills: 6 | Status: DISCONTINUED | COMMUNITY
Start: 2023-05-10 | End: 2023-09-19

## 2023-09-27 ENCOUNTER — NON-APPOINTMENT (OUTPATIENT)
Age: 38
End: 2023-09-27

## 2023-09-27 ENCOUNTER — APPOINTMENT (OUTPATIENT)
Dept: FAMILY MEDICINE | Facility: CLINIC | Age: 38
End: 2023-09-27
Payer: MEDICAID

## 2023-09-27 VITALS
RESPIRATION RATE: 16 BRPM | SYSTOLIC BLOOD PRESSURE: 122 MMHG | HEART RATE: 72 BPM | BODY MASS INDEX: 31.28 KG/M2 | OXYGEN SATURATION: 99 % | WEIGHT: 170 LBS | DIASTOLIC BLOOD PRESSURE: 70 MMHG | HEIGHT: 62 IN

## 2023-09-27 DIAGNOSIS — Z00.00 ENCOUNTER FOR GENERAL ADULT MEDICAL EXAMINATION W/OUT ABNORMAL FINDINGS: ICD-10-CM

## 2023-09-27 DIAGNOSIS — E55.9 VITAMIN D DEFICIENCY, UNSPECIFIED: ICD-10-CM

## 2023-09-27 DIAGNOSIS — R73.03 PREDIABETES.: ICD-10-CM

## 2023-09-27 PROCEDURE — 99395 PREV VISIT EST AGE 18-39: CPT | Mod: 25

## 2023-09-27 PROCEDURE — 93000 ELECTROCARDIOGRAM COMPLETE: CPT

## 2023-09-28 ENCOUNTER — APPOINTMENT (OUTPATIENT)
Dept: FAMILY MEDICINE | Facility: CLINIC | Age: 38
End: 2023-09-28

## 2023-10-04 ENCOUNTER — APPOINTMENT (OUTPATIENT)
Dept: BARIATRICS/WEIGHT MGMT | Facility: CLINIC | Age: 38
End: 2023-10-04
Payer: MEDICAID

## 2023-10-04 PROCEDURE — 97803 MED NUTRITION INDIV SUBSEQ: CPT

## 2023-10-31 ENCOUNTER — EMERGENCY (EMERGENCY)
Facility: HOSPITAL | Age: 38
LOS: 1 days | Discharge: HOME | End: 2023-10-31
Attending: STUDENT IN AN ORGANIZED HEALTH CARE EDUCATION/TRAINING PROGRAM | Admitting: STUDENT IN AN ORGANIZED HEALTH CARE EDUCATION/TRAINING PROGRAM
Payer: MEDICAID

## 2023-10-31 VITALS
RESPIRATION RATE: 18 BRPM | OXYGEN SATURATION: 100 % | DIASTOLIC BLOOD PRESSURE: 80 MMHG | SYSTOLIC BLOOD PRESSURE: 125 MMHG | HEART RATE: 64 BPM | TEMPERATURE: 98 F

## 2023-10-31 VITALS
DIASTOLIC BLOOD PRESSURE: 87 MMHG | RESPIRATION RATE: 18 BRPM | HEART RATE: 76 BPM | OXYGEN SATURATION: 100 % | WEIGHT: 160.06 LBS | TEMPERATURE: 98 F | SYSTOLIC BLOOD PRESSURE: 135 MMHG

## 2023-10-31 DIAGNOSIS — Z98.89 OTHER SPECIFIED POSTPROCEDURAL STATES: Chronic | ICD-10-CM

## 2023-10-31 DIAGNOSIS — Z41.9 ENCOUNTER FOR PROCEDURE FOR PURPOSES OTHER THAN REMEDYING HEALTH STATE, UNSPECIFIED: Chronic | ICD-10-CM

## 2023-10-31 LAB
ALBUMIN SERPL ELPH-MCNC: 3.9 G/DL — SIGNIFICANT CHANGE UP (ref 3.3–5)
ALBUMIN SERPL ELPH-MCNC: 3.9 G/DL — SIGNIFICANT CHANGE UP (ref 3.3–5)
ALP SERPL-CCNC: 88 U/L — SIGNIFICANT CHANGE UP (ref 40–120)
ALP SERPL-CCNC: 88 U/L — SIGNIFICANT CHANGE UP (ref 40–120)
ALT FLD-CCNC: 22 U/L — SIGNIFICANT CHANGE UP (ref 10–45)
ALT FLD-CCNC: 22 U/L — SIGNIFICANT CHANGE UP (ref 10–45)
ANION GAP SERPL CALC-SCNC: 6 MMOL/L — SIGNIFICANT CHANGE UP (ref 5–17)
ANION GAP SERPL CALC-SCNC: 6 MMOL/L — SIGNIFICANT CHANGE UP (ref 5–17)
AST SERPL-CCNC: 15 U/L — SIGNIFICANT CHANGE UP (ref 10–40)
AST SERPL-CCNC: 15 U/L — SIGNIFICANT CHANGE UP (ref 10–40)
BASOPHILS # BLD AUTO: 0.04 K/UL — SIGNIFICANT CHANGE UP (ref 0–0.2)
BASOPHILS # BLD AUTO: 0.04 K/UL — SIGNIFICANT CHANGE UP (ref 0–0.2)
BASOPHILS NFR BLD AUTO: 0.4 % — SIGNIFICANT CHANGE UP (ref 0–2)
BASOPHILS NFR BLD AUTO: 0.4 % — SIGNIFICANT CHANGE UP (ref 0–2)
BILIRUB SERPL-MCNC: 0.2 MG/DL — SIGNIFICANT CHANGE UP (ref 0.2–1.2)
BILIRUB SERPL-MCNC: 0.2 MG/DL — SIGNIFICANT CHANGE UP (ref 0.2–1.2)
BUN SERPL-MCNC: 14 MG/DL — SIGNIFICANT CHANGE UP (ref 7–23)
BUN SERPL-MCNC: 14 MG/DL — SIGNIFICANT CHANGE UP (ref 7–23)
CALCIUM SERPL-MCNC: 9.4 MG/DL — SIGNIFICANT CHANGE UP (ref 8.4–10.5)
CALCIUM SERPL-MCNC: 9.4 MG/DL — SIGNIFICANT CHANGE UP (ref 8.4–10.5)
CHLORIDE SERPL-SCNC: 101 MMOL/L — SIGNIFICANT CHANGE UP (ref 96–108)
CHLORIDE SERPL-SCNC: 101 MMOL/L — SIGNIFICANT CHANGE UP (ref 96–108)
CO2 SERPL-SCNC: 30 MMOL/L — SIGNIFICANT CHANGE UP (ref 22–31)
CO2 SERPL-SCNC: 30 MMOL/L — SIGNIFICANT CHANGE UP (ref 22–31)
CREAT SERPL-MCNC: 0.58 MG/DL — SIGNIFICANT CHANGE UP (ref 0.5–1.3)
CREAT SERPL-MCNC: 0.58 MG/DL — SIGNIFICANT CHANGE UP (ref 0.5–1.3)
EGFR: 119 ML/MIN/1.73M2 — SIGNIFICANT CHANGE UP
EGFR: 119 ML/MIN/1.73M2 — SIGNIFICANT CHANGE UP
EOSINOPHIL # BLD AUTO: 0.46 K/UL — SIGNIFICANT CHANGE UP (ref 0–0.5)
EOSINOPHIL # BLD AUTO: 0.46 K/UL — SIGNIFICANT CHANGE UP (ref 0–0.5)
EOSINOPHIL NFR BLD AUTO: 4.6 % — SIGNIFICANT CHANGE UP (ref 0–6)
EOSINOPHIL NFR BLD AUTO: 4.6 % — SIGNIFICANT CHANGE UP (ref 0–6)
GLUCOSE SERPL-MCNC: 110 MG/DL — HIGH (ref 70–99)
GLUCOSE SERPL-MCNC: 110 MG/DL — HIGH (ref 70–99)
HCG SERPL-ACNC: <1 MIU/ML — SIGNIFICANT CHANGE UP
HCG SERPL-ACNC: <1 MIU/ML — SIGNIFICANT CHANGE UP
HCT VFR BLD CALC: 40.5 % — SIGNIFICANT CHANGE UP (ref 34.5–45)
HCT VFR BLD CALC: 40.5 % — SIGNIFICANT CHANGE UP (ref 34.5–45)
HGB BLD-MCNC: 13.7 G/DL — SIGNIFICANT CHANGE UP (ref 11.5–15.5)
HGB BLD-MCNC: 13.7 G/DL — SIGNIFICANT CHANGE UP (ref 11.5–15.5)
IMM GRANULOCYTES NFR BLD AUTO: 0.3 % — SIGNIFICANT CHANGE UP (ref 0–0.9)
IMM GRANULOCYTES NFR BLD AUTO: 0.3 % — SIGNIFICANT CHANGE UP (ref 0–0.9)
LIDOCAIN IGE QN: 54 U/L — SIGNIFICANT CHANGE UP (ref 16–77)
LIDOCAIN IGE QN: 54 U/L — SIGNIFICANT CHANGE UP (ref 16–77)
LYMPHOCYTES # BLD AUTO: 3.55 K/UL — HIGH (ref 1–3.3)
LYMPHOCYTES # BLD AUTO: 3.55 K/UL — HIGH (ref 1–3.3)
LYMPHOCYTES # BLD AUTO: 35.5 % — SIGNIFICANT CHANGE UP (ref 13–44)
LYMPHOCYTES # BLD AUTO: 35.5 % — SIGNIFICANT CHANGE UP (ref 13–44)
MCHC RBC-ENTMCNC: 30.6 PG — SIGNIFICANT CHANGE UP (ref 27–34)
MCHC RBC-ENTMCNC: 30.6 PG — SIGNIFICANT CHANGE UP (ref 27–34)
MCHC RBC-ENTMCNC: 33.8 GM/DL — SIGNIFICANT CHANGE UP (ref 32–36)
MCHC RBC-ENTMCNC: 33.8 GM/DL — SIGNIFICANT CHANGE UP (ref 32–36)
MCV RBC AUTO: 90.6 FL — SIGNIFICANT CHANGE UP (ref 80–100)
MCV RBC AUTO: 90.6 FL — SIGNIFICANT CHANGE UP (ref 80–100)
MONOCYTES # BLD AUTO: 0.66 K/UL — SIGNIFICANT CHANGE UP (ref 0–0.9)
MONOCYTES # BLD AUTO: 0.66 K/UL — SIGNIFICANT CHANGE UP (ref 0–0.9)
MONOCYTES NFR BLD AUTO: 6.6 % — SIGNIFICANT CHANGE UP (ref 2–14)
MONOCYTES NFR BLD AUTO: 6.6 % — SIGNIFICANT CHANGE UP (ref 2–14)
NEUTROPHILS # BLD AUTO: 5.26 K/UL — SIGNIFICANT CHANGE UP (ref 1.8–7.4)
NEUTROPHILS # BLD AUTO: 5.26 K/UL — SIGNIFICANT CHANGE UP (ref 1.8–7.4)
NEUTROPHILS NFR BLD AUTO: 52.6 % — SIGNIFICANT CHANGE UP (ref 43–77)
NEUTROPHILS NFR BLD AUTO: 52.6 % — SIGNIFICANT CHANGE UP (ref 43–77)
NRBC # BLD: 0 /100 WBCS — SIGNIFICANT CHANGE UP (ref 0–0)
NRBC # BLD: 0 /100 WBCS — SIGNIFICANT CHANGE UP (ref 0–0)
PLATELET # BLD AUTO: 231 K/UL — SIGNIFICANT CHANGE UP (ref 150–400)
PLATELET # BLD AUTO: 231 K/UL — SIGNIFICANT CHANGE UP (ref 150–400)
POTASSIUM SERPL-MCNC: 3.9 MMOL/L — SIGNIFICANT CHANGE UP (ref 3.5–5.3)
POTASSIUM SERPL-MCNC: 3.9 MMOL/L — SIGNIFICANT CHANGE UP (ref 3.5–5.3)
POTASSIUM SERPL-SCNC: 3.9 MMOL/L — SIGNIFICANT CHANGE UP (ref 3.5–5.3)
POTASSIUM SERPL-SCNC: 3.9 MMOL/L — SIGNIFICANT CHANGE UP (ref 3.5–5.3)
PROT SERPL-MCNC: 7.7 G/DL — SIGNIFICANT CHANGE UP (ref 6–8.3)
PROT SERPL-MCNC: 7.7 G/DL — SIGNIFICANT CHANGE UP (ref 6–8.3)
RBC # BLD: 4.47 M/UL — SIGNIFICANT CHANGE UP (ref 3.8–5.2)
RBC # BLD: 4.47 M/UL — SIGNIFICANT CHANGE UP (ref 3.8–5.2)
RBC # FLD: 12.2 % — SIGNIFICANT CHANGE UP (ref 10.3–14.5)
RBC # FLD: 12.2 % — SIGNIFICANT CHANGE UP (ref 10.3–14.5)
SODIUM SERPL-SCNC: 137 MMOL/L — SIGNIFICANT CHANGE UP (ref 135–145)
SODIUM SERPL-SCNC: 137 MMOL/L — SIGNIFICANT CHANGE UP (ref 135–145)
WBC # BLD: 10 K/UL — SIGNIFICANT CHANGE UP (ref 3.8–10.5)
WBC # BLD: 10 K/UL — SIGNIFICANT CHANGE UP (ref 3.8–10.5)
WBC # FLD AUTO: 10 K/UL — SIGNIFICANT CHANGE UP (ref 3.8–10.5)
WBC # FLD AUTO: 10 K/UL — SIGNIFICANT CHANGE UP (ref 3.8–10.5)

## 2023-10-31 PROCEDURE — 99284 EMERGENCY DEPT VISIT MOD MDM: CPT | Mod: 25

## 2023-10-31 PROCEDURE — 85025 COMPLETE CBC W/AUTO DIFF WBC: CPT

## 2023-10-31 PROCEDURE — 36415 COLL VENOUS BLD VENIPUNCTURE: CPT

## 2023-10-31 PROCEDURE — 74177 CT ABD & PELVIS W/CONTRAST: CPT | Mod: 26,MA

## 2023-10-31 PROCEDURE — 99285 EMERGENCY DEPT VISIT HI MDM: CPT

## 2023-10-31 PROCEDURE — 74177 CT ABD & PELVIS W/CONTRAST: CPT | Mod: MA

## 2023-10-31 PROCEDURE — 80053 COMPREHEN METABOLIC PANEL: CPT

## 2023-10-31 PROCEDURE — 96375 TX/PRO/DX INJ NEW DRUG ADDON: CPT

## 2023-10-31 PROCEDURE — 96365 THER/PROPH/DIAG IV INF INIT: CPT | Mod: 25

## 2023-10-31 PROCEDURE — 84702 CHORIONIC GONADOTROPIN TEST: CPT

## 2023-10-31 PROCEDURE — 83690 ASSAY OF LIPASE: CPT

## 2023-10-31 PROCEDURE — 96361 HYDRATE IV INFUSION ADD-ON: CPT

## 2023-10-31 RX ORDER — SUCRALFATE 1 G
10 TABLET ORAL
Qty: 210 | Refills: 0
Start: 2023-10-31 | End: 2023-11-06

## 2023-10-31 RX ORDER — FAMOTIDINE 10 MG/ML
20 INJECTION INTRAVENOUS ONCE
Refills: 0 | Status: COMPLETED | OUTPATIENT
Start: 2023-10-31 | End: 2023-10-31

## 2023-10-31 RX ORDER — SODIUM CHLORIDE 9 MG/ML
1000 INJECTION INTRAMUSCULAR; INTRAVENOUS; SUBCUTANEOUS ONCE
Refills: 0 | Status: COMPLETED | OUTPATIENT
Start: 2023-10-31 | End: 2023-10-31

## 2023-10-31 RX ORDER — ONDANSETRON 8 MG/1
4 TABLET, FILM COATED ORAL ONCE
Refills: 0 | Status: COMPLETED | OUTPATIENT
Start: 2023-10-31 | End: 2023-10-31

## 2023-10-31 RX ORDER — OMEPRAZOLE 10 MG/1
1 CAPSULE, DELAYED RELEASE ORAL
Qty: 30 | Refills: 0
Start: 2023-10-31 | End: 2023-11-29

## 2023-10-31 RX ORDER — MORPHINE SULFATE 50 MG/1
4 CAPSULE, EXTENDED RELEASE ORAL ONCE
Refills: 0 | Status: DISCONTINUED | OUTPATIENT
Start: 2023-10-31 | End: 2023-10-31

## 2023-10-31 RX ADMIN — FAMOTIDINE 20 MILLIGRAM(S): 10 INJECTION INTRAVENOUS at 19:39

## 2023-10-31 RX ADMIN — Medication 30 MILLILITER(S): at 19:08

## 2023-10-31 RX ADMIN — SODIUM CHLORIDE 1000 MILLILITER(S): 9 INJECTION INTRAMUSCULAR; INTRAVENOUS; SUBCUTANEOUS at 19:08

## 2023-10-31 RX ADMIN — MORPHINE SULFATE 4 MILLIGRAM(S): 50 CAPSULE, EXTENDED RELEASE ORAL at 19:54

## 2023-10-31 RX ADMIN — SODIUM CHLORIDE 1000 MILLILITER(S): 9 INJECTION INTRAMUSCULAR; INTRAVENOUS; SUBCUTANEOUS at 20:48

## 2023-10-31 RX ADMIN — ONDANSETRON 4 MILLIGRAM(S): 8 TABLET, FILM COATED ORAL at 19:08

## 2023-10-31 RX ADMIN — MORPHINE SULFATE 4 MILLIGRAM(S): 50 CAPSULE, EXTENDED RELEASE ORAL at 19:39

## 2023-10-31 RX ADMIN — FAMOTIDINE 100 MILLIGRAM(S): 10 INJECTION INTRAVENOUS at 19:09

## 2023-10-31 NOTE — ED ADULT NURSE NOTE - OBJECTIVE STATEMENT
Patient presented to the ED c/o RUQ pain x1 day. PSH of of Cholecystectomy (04/2023)/gastric sleeve surgery (2022)/ appendectomy. States she felt nauseous but denies vomiting/ fevers/ diarrhea. Called her bariatric surgeon who advised her to come to the ER for further workup.

## 2023-10-31 NOTE — ED ADULT TRIAGE NOTE - PATIENT ON (OXYGEN DELIVERY METHOD)
Results are as follows   -Liver tests (ALT,AST, Alk phos) are normal but gall bladder tests ( total bilirubin ) is  elevated. ADVISE: recheck bilirubin non fasting to assess this further. Can make a lab only apt for this. further recommendations will follow based on this result. I will leave the order in the computer   -Kidney function (GFR) is decreased.  ADVISE: stable to prior  -Sodium is normal.  -Potassium is normal.  -Glucose is slight elevated and may be sign of early diabetes (prediabetes). ADVISE:: low carbohydrate diet, exercise, try to lose weight (if necessary) and recheck glucose in 12 months. (GLU,A1C, DX: prediabetes)  -LDL(bad) cholesterol level is elevated,  A diet high in fat and simple carbohydrates, genetics and being overweight can contribute to this. ADVISE: Exercise, a low fat, low carbohydrate diet and weight control are helpful to improve this.  Rechecking your fasting cholesterol panel in 12 months is recommended (Lipid w/ LDL reflex, DX:hyperlipidemia)  The 10-year ASCVD risk score ([place] CIERRA Jr, et al., 2013) is: 3.4%    Values used to calculate the score:      Age: 56 years      Sex: Female      Is Non- : No      Diabetic: No      Tobacco smoker: No      Systolic Blood Pressure: 136 mmHg      Is BP treated: Yes      HDL Cholesterol: 53 mg/dL      Total Cholesterol: 219 mg/dL  -TSH (thyroid stimulating hormone) level is normal which indicates normal thyroid function.. room air

## 2023-10-31 NOTE — ED ADULT TRIAGE NOTE - CHIEF COMPLAINT QUOTE
Patient complaints of RUQ abdominal pain and nausea since yesterday. Patient complaints of RUQ abdominal pain and nausea since yesterday. PMHX: Cholecystectomy, Gastric Sleeve Surgery. Patient complaints of RUQ abdominal pain and nausea since yesterday. PMHX: Cholecystectomy 04/2023, Gastric Sleeve Surgery 2022.

## 2023-10-31 NOTE — ED PROVIDER NOTE - PATIENT PORTAL LINK FT
You can access the FollowMyHealth Patient Portal offered by [facility] by registering at the following website: [email or web address]. By joining [employer]’s FollowMyHealth portal, you will also be able to view your health information using other applications (apps) compatible with our system.

## 2023-10-31 NOTE — ED PROVIDER NOTE - PHYSICAL EXAMINATION
VITAL SIGNS: I have reviewed nursing notes and confirm.  CONSTITUTIONAL: well-appearing, non-toxic, NAD  SKIN: Warm dry, normal skin turgor  HEAD: NCAT  CARD: RRR, no murmurs, rubs or gallops  RESP: clear to ausculation b/l.  No rales, rhonchi, or wheezing.  ABD: soft, + BS, epigastric ttp non-distended, no rebound or guarding. No CVA tenderness  EXT: Full ROM, no bony tenderness, no pedal edema, no calf tenderness  PSYCH: Cooperative, appropriate.

## 2023-10-31 NOTE — ED PROVIDER NOTE - CLINICAL SUMMARY MEDICAL DECISION MAKING FREE TEXT BOX
38-year-old female with history of appendectomy, gastric sleeve, tubal ligation, cholecystectomy presented to the ED with complaints of epigastric pain with associated right upper quadrant pain with nausea denies any reported vomiting, pain is worse after eating, pain has been there since yesterday.  Denies any associate fever chills diarrhea, no other reported complaints. 38-year-old female with history of appendectomy, gastric sleeve, tubal ligation, cholecystectomy presented to the ED with complaints of epigastric pain with associated right upper quadrant pain with nausea denies any reported vomiting, pain is worse after eating, pain has been there since yesterday.  Denies any associate fever chills diarrhea, no other reported complaints.    Patient with worsening abdominal pain after eating, still has persistent pain but reports will follow-up with outpatient gastroenterologist and would like to be discharged.  Patient tolerating p.o., likely peptic ulcer disease we will send Carafate and patient advised to restart omeprazole.  Patient reports she has stopped taking it secondary to surgeon telling her to stop.  Return precautions discussed verbalizes and agree with discharge plan.

## 2023-10-31 NOTE — ED PROVIDER NOTE - OBJECTIVE STATEMENT
38-year-old female with history of appendectomy, gastric sleeve, tubal ligation, cholecystectomy presented to the ED with complaints of epigastric pain with associated right upper quadrant pain with nausea denies any reported vomiting, pain is worse after eating, pain has been there since yesterday.  Denies any associate fever chills diarrhea, no other reported complaints.

## 2023-10-31 NOTE — ED ADULT NURSE NOTE - CHIEF COMPLAINT QUOTE
Patient complaints of RUQ abdominal pain and nausea since yesterday. PMHX: Cholecystectomy 04/2023, Gastric Sleeve Surgery 2022.

## 2023-11-01 ENCOUNTER — OUTPATIENT (OUTPATIENT)
Dept: OUTPATIENT SERVICES | Facility: HOSPITAL | Age: 38
LOS: 1 days | End: 2023-11-01
Payer: MEDICAID

## 2023-11-01 ENCOUNTER — APPOINTMENT (OUTPATIENT)
Dept: RADIOLOGY | Facility: HOSPITAL | Age: 38
End: 2023-11-01
Payer: MEDICAID

## 2023-11-01 DIAGNOSIS — M75.51 BURSITIS OF RIGHT SHOULDER: ICD-10-CM

## 2023-11-01 DIAGNOSIS — Z98.89 OTHER SPECIFIED POSTPROCEDURAL STATES: Chronic | ICD-10-CM

## 2023-11-01 DIAGNOSIS — Z41.9 ENCOUNTER FOR PROCEDURE FOR PURPOSES OTHER THAN REMEDYING HEALTH STATE, UNSPECIFIED: Chronic | ICD-10-CM

## 2023-11-01 DIAGNOSIS — M25.511 PAIN IN RIGHT SHOULDER: ICD-10-CM

## 2023-11-01 DIAGNOSIS — S43.431A SUPERIOR GLENOID LABRUM LESION OF RIGHT SHOULDER, INITIAL ENCOUNTER: ICD-10-CM

## 2023-11-01 PROCEDURE — 73030 X-RAY EXAM OF SHOULDER: CPT | Mod: 26,RT

## 2023-11-01 PROCEDURE — 73030 X-RAY EXAM OF SHOULDER: CPT

## 2023-11-06 ENCOUNTER — APPOINTMENT (OUTPATIENT)
Dept: FAMILY MEDICINE | Facility: CLINIC | Age: 38
End: 2023-11-06

## 2023-11-08 ENCOUNTER — APPOINTMENT (OUTPATIENT)
Dept: GASTROENTEROLOGY | Facility: CLINIC | Age: 38
End: 2023-11-08
Payer: MEDICAID

## 2023-11-08 ENCOUNTER — APPOINTMENT (OUTPATIENT)
Dept: FAMILY MEDICINE | Facility: CLINIC | Age: 38
End: 2023-11-08
Payer: MEDICAID

## 2023-11-08 VITALS
WEIGHT: 160 LBS | SYSTOLIC BLOOD PRESSURE: 108 MMHG | RESPIRATION RATE: 15 BRPM | OXYGEN SATURATION: 94 % | HEART RATE: 65 BPM | DIASTOLIC BLOOD PRESSURE: 69 MMHG | TEMPERATURE: 98 F | HEIGHT: 62 IN | BODY MASS INDEX: 29.44 KG/M2

## 2023-11-08 VITALS
HEART RATE: 72 BPM | RESPIRATION RATE: 16 BRPM | HEIGHT: 62 IN | BODY MASS INDEX: 29.26 KG/M2 | TEMPERATURE: 97.3 F | OXYGEN SATURATION: 99 % | SYSTOLIC BLOOD PRESSURE: 108 MMHG | DIASTOLIC BLOOD PRESSURE: 65 MMHG

## 2023-11-08 DIAGNOSIS — R10.11 RIGHT UPPER QUADRANT PAIN: ICD-10-CM

## 2023-11-08 DIAGNOSIS — R10.13 EPIGASTRIC PAIN: ICD-10-CM

## 2023-11-08 DIAGNOSIS — E66.9 OBESITY, UNSPECIFIED: ICD-10-CM

## 2023-11-08 DIAGNOSIS — E78.00 PURE HYPERCHOLESTEROLEMIA, UNSPECIFIED: ICD-10-CM

## 2023-11-08 PROCEDURE — 99214 OFFICE O/P EST MOD 30 MIN: CPT

## 2023-11-08 PROCEDURE — 99495 TRANSJ CARE MGMT MOD F2F 14D: CPT

## 2023-11-08 RX ORDER — SEMAGLUTIDE 0.25 MG/.5ML
0.25 INJECTION, SOLUTION SUBCUTANEOUS
Qty: 1 | Refills: 0 | Status: DISCONTINUED | COMMUNITY
Start: 2023-09-19 | End: 2023-11-08

## 2023-11-09 ENCOUNTER — APPOINTMENT (OUTPATIENT)
Dept: GASTROENTEROLOGY | Facility: HOSPITAL | Age: 38
End: 2023-11-09

## 2023-11-09 ENCOUNTER — RESULT REVIEW (OUTPATIENT)
Age: 38
End: 2023-11-09

## 2023-11-09 ENCOUNTER — OUTPATIENT (OUTPATIENT)
Dept: OUTPATIENT SERVICES | Facility: HOSPITAL | Age: 38
LOS: 1 days | End: 2023-11-09
Payer: MEDICAID

## 2023-11-09 DIAGNOSIS — Z41.9 ENCOUNTER FOR PROCEDURE FOR PURPOSES OTHER THAN REMEDYING HEALTH STATE, UNSPECIFIED: Chronic | ICD-10-CM

## 2023-11-09 DIAGNOSIS — Z98.89 OTHER SPECIFIED POSTPROCEDURAL STATES: Chronic | ICD-10-CM

## 2023-11-09 DIAGNOSIS — R10.11 RIGHT UPPER QUADRANT PAIN: ICD-10-CM

## 2023-11-09 PROCEDURE — 88312 SPECIAL STAINS GROUP 1: CPT

## 2023-11-09 PROCEDURE — 88305 TISSUE EXAM BY PATHOLOGIST: CPT

## 2023-11-09 PROCEDURE — 43239 EGD BIOPSY SINGLE/MULTIPLE: CPT

## 2023-11-09 PROCEDURE — 88312 SPECIAL STAINS GROUP 1: CPT | Mod: 26

## 2023-11-09 PROCEDURE — 88305 TISSUE EXAM BY PATHOLOGIST: CPT | Mod: 26

## 2023-11-09 RX ORDER — SODIUM CHLORIDE 9 MG/ML
500 INJECTION INTRAMUSCULAR; INTRAVENOUS; SUBCUTANEOUS
Refills: 0 | Status: COMPLETED | OUTPATIENT
Start: 2023-11-09 | End: 2023-11-09

## 2023-11-09 RX ORDER — FAMOTIDINE 20 MG/1
20 TABLET, FILM COATED ORAL TWICE DAILY
Qty: 60 | Refills: 1 | Status: COMPLETED | COMMUNITY
Start: 2023-11-09 | End: 2024-01-08

## 2023-11-09 RX ADMIN — SODIUM CHLORIDE 75 MILLILITER(S): 9 INJECTION INTRAMUSCULAR; INTRAVENOUS; SUBCUTANEOUS at 09:44

## 2023-11-13 LAB
SURGICAL PATHOLOGY STUDY: SIGNIFICANT CHANGE UP
SURGICAL PATHOLOGY STUDY: SIGNIFICANT CHANGE UP

## 2023-11-15 ENCOUNTER — APPOINTMENT (OUTPATIENT)
Dept: HEPATOLOGY | Facility: CLINIC | Age: 38
End: 2023-11-15
Payer: MEDICAID

## 2023-11-15 PROCEDURE — 76981 USE PARENCHYMA: CPT

## 2023-11-27 ENCOUNTER — NON-APPOINTMENT (OUTPATIENT)
Age: 38
End: 2023-11-27

## 2024-02-08 ENCOUNTER — NON-APPOINTMENT (OUTPATIENT)
Age: 39
End: 2024-02-08

## 2024-03-05 ENCOUNTER — APPOINTMENT (OUTPATIENT)
Dept: GASTROENTEROLOGY | Facility: CLINIC | Age: 39
End: 2024-03-05

## 2024-04-30 ENCOUNTER — EMERGENCY (EMERGENCY)
Facility: HOSPITAL | Age: 39
LOS: 1 days | Discharge: ANOTHER HOSPITAL | End: 2024-04-30
Attending: EMERGENCY MEDICINE | Admitting: EMERGENCY MEDICINE
Payer: COMMERCIAL

## 2024-04-30 VITALS
DIASTOLIC BLOOD PRESSURE: 72 MMHG | HEART RATE: 89 BPM | SYSTOLIC BLOOD PRESSURE: 110 MMHG | RESPIRATION RATE: 18 BRPM | TEMPERATURE: 98 F | WEIGHT: 164.91 LBS | OXYGEN SATURATION: 98 %

## 2024-04-30 DIAGNOSIS — Z90.3 ACQUIRED ABSENCE OF STOMACH [PART OF]: Chronic | ICD-10-CM

## 2024-04-30 DIAGNOSIS — Z41.9 ENCOUNTER FOR PROCEDURE FOR PURPOSES OTHER THAN REMEDYING HEALTH STATE, UNSPECIFIED: Chronic | ICD-10-CM

## 2024-04-30 DIAGNOSIS — Z98.89 OTHER SPECIFIED POSTPROCEDURAL STATES: Chronic | ICD-10-CM

## 2024-04-30 LAB
ALBUMIN SERPL ELPH-MCNC: 3.2 G/DL — LOW (ref 3.3–5)
ALP SERPL-CCNC: 81 U/L — SIGNIFICANT CHANGE UP (ref 40–120)
ALT FLD-CCNC: 18 U/L — SIGNIFICANT CHANGE UP (ref 10–45)
ANION GAP SERPL CALC-SCNC: 10 MMOL/L — SIGNIFICANT CHANGE UP (ref 5–17)
APTT BLD: 28.9 SEC — SIGNIFICANT CHANGE UP (ref 24.5–35.6)
AST SERPL-CCNC: 28 U/L — SIGNIFICANT CHANGE UP (ref 10–40)
BASOPHILS # BLD AUTO: 0.05 K/UL — SIGNIFICANT CHANGE UP (ref 0–0.2)
BASOPHILS NFR BLD AUTO: 0.5 % — SIGNIFICANT CHANGE UP (ref 0–2)
BILIRUB SERPL-MCNC: 0.2 MG/DL — SIGNIFICANT CHANGE UP (ref 0.2–1.2)
BUN SERPL-MCNC: 13 MG/DL — SIGNIFICANT CHANGE UP (ref 7–23)
CALCIUM SERPL-MCNC: 8.7 MG/DL — SIGNIFICANT CHANGE UP (ref 8.4–10.5)
CHLORIDE SERPL-SCNC: 106 MMOL/L — SIGNIFICANT CHANGE UP (ref 96–108)
CK SERPL-CCNC: 112 U/L — SIGNIFICANT CHANGE UP (ref 25–170)
CO2 SERPL-SCNC: 24 MMOL/L — SIGNIFICANT CHANGE UP (ref 22–31)
CREAT SERPL-MCNC: 0.58 MG/DL — SIGNIFICANT CHANGE UP (ref 0.5–1.3)
EGFR: 119 ML/MIN/1.73M2 — SIGNIFICANT CHANGE UP
EOSINOPHIL # BLD AUTO: 0.6 K/UL — HIGH (ref 0–0.5)
EOSINOPHIL NFR BLD AUTO: 6.2 % — HIGH (ref 0–6)
ETHANOL SERPL-MCNC: <3 MG/DL — SIGNIFICANT CHANGE UP (ref 0–3)
GLUCOSE SERPL-MCNC: 100 MG/DL — HIGH (ref 70–99)
HCG SERPL-ACNC: <1 MIU/ML — SIGNIFICANT CHANGE UP
HCT VFR BLD CALC: 39 % — SIGNIFICANT CHANGE UP (ref 34.5–45)
HGB BLD-MCNC: 13.1 G/DL — SIGNIFICANT CHANGE UP (ref 11.5–15.5)
IMM GRANULOCYTES NFR BLD AUTO: 0.2 % — SIGNIFICANT CHANGE UP (ref 0–0.9)
INR BLD: 0.85 RATIO — SIGNIFICANT CHANGE UP (ref 0.85–1.18)
LACTATE SERPL-SCNC: 2.4 MMOL/L — HIGH (ref 0.7–2)
LYMPHOCYTES # BLD AUTO: 3.4 K/UL — HIGH (ref 1–3.3)
LYMPHOCYTES # BLD AUTO: 35.2 % — SIGNIFICANT CHANGE UP (ref 13–44)
MAGNESIUM SERPL-MCNC: 1.7 MG/DL — SIGNIFICANT CHANGE UP (ref 1.6–2.6)
MCHC RBC-ENTMCNC: 31.1 PG — SIGNIFICANT CHANGE UP (ref 27–34)
MCHC RBC-ENTMCNC: 33.6 GM/DL — SIGNIFICANT CHANGE UP (ref 32–36)
MCV RBC AUTO: 92.6 FL — SIGNIFICANT CHANGE UP (ref 80–100)
MONOCYTES # BLD AUTO: 0.67 K/UL — SIGNIFICANT CHANGE UP (ref 0–0.9)
MONOCYTES NFR BLD AUTO: 6.9 % — SIGNIFICANT CHANGE UP (ref 2–14)
NEUTROPHILS # BLD AUTO: 4.92 K/UL — SIGNIFICANT CHANGE UP (ref 1.8–7.4)
NEUTROPHILS NFR BLD AUTO: 51 % — SIGNIFICANT CHANGE UP (ref 43–77)
NRBC # BLD: 0 /100 WBCS — SIGNIFICANT CHANGE UP (ref 0–0)
PLATELET # BLD AUTO: 207 K/UL — SIGNIFICANT CHANGE UP (ref 150–400)
POTASSIUM SERPL-MCNC: 4.1 MMOL/L — SIGNIFICANT CHANGE UP (ref 3.5–5.3)
POTASSIUM SERPL-SCNC: 4.1 MMOL/L — SIGNIFICANT CHANGE UP (ref 3.5–5.3)
PROT SERPL-MCNC: 6.7 G/DL — SIGNIFICANT CHANGE UP (ref 6–8.3)
PROTHROM AB SERPL-ACNC: 9.7 SEC — SIGNIFICANT CHANGE UP (ref 9.5–13)
RBC # BLD: 4.21 M/UL — SIGNIFICANT CHANGE UP (ref 3.8–5.2)
RBC # FLD: 13.1 % — SIGNIFICANT CHANGE UP (ref 10.3–14.5)
SODIUM SERPL-SCNC: 140 MMOL/L — SIGNIFICANT CHANGE UP (ref 135–145)
TROPONIN I, HIGH SENSITIVITY RESULT: <4 NG/L — SIGNIFICANT CHANGE UP
WBC # BLD: 9.66 K/UL — SIGNIFICANT CHANGE UP (ref 3.8–10.5)
WBC # FLD AUTO: 9.66 K/UL — SIGNIFICANT CHANGE UP (ref 3.8–10.5)

## 2024-04-30 PROCEDURE — 72125 CT NECK SPINE W/O DYE: CPT | Mod: 26,MC

## 2024-04-30 PROCEDURE — 70450 CT HEAD/BRAIN W/O DYE: CPT | Mod: 26,MC

## 2024-04-30 PROCEDURE — 99291 CRITICAL CARE FIRST HOUR: CPT

## 2024-04-30 PROCEDURE — 71045 X-RAY EXAM CHEST 1 VIEW: CPT | Mod: 26

## 2024-04-30 PROCEDURE — 93010 ELECTROCARDIOGRAM REPORT: CPT

## 2024-04-30 RX ORDER — LEVETIRACETAM 250 MG/1
1000 TABLET, FILM COATED ORAL ONCE
Refills: 0 | Status: COMPLETED | OUTPATIENT
Start: 2024-04-30 | End: 2024-04-30

## 2024-04-30 RX ORDER — ACETAMINOPHEN 500 MG
1000 TABLET ORAL ONCE
Refills: 0 | Status: COMPLETED | OUTPATIENT
Start: 2024-04-30 | End: 2024-04-30

## 2024-04-30 RX ORDER — SODIUM CHLORIDE 9 MG/ML
1000 INJECTION INTRAMUSCULAR; INTRAVENOUS; SUBCUTANEOUS ONCE
Refills: 0 | Status: COMPLETED | OUTPATIENT
Start: 2024-04-30 | End: 2024-04-30

## 2024-04-30 RX ADMIN — SODIUM CHLORIDE 1000 MILLILITER(S): 9 INJECTION INTRAMUSCULAR; INTRAVENOUS; SUBCUTANEOUS at 22:11

## 2024-04-30 RX ADMIN — SODIUM CHLORIDE 1000 MILLILITER(S): 9 INJECTION INTRAMUSCULAR; INTRAVENOUS; SUBCUTANEOUS at 23:11

## 2024-04-30 RX ADMIN — Medication 2 MILLIGRAM(S): at 22:11

## 2024-04-30 RX ADMIN — LEVETIRACETAM 1000 MILLIGRAM(S): 250 TABLET, FILM COATED ORAL at 23:55

## 2024-04-30 RX ADMIN — Medication 1 MILLIGRAM(S): at 23:28

## 2024-04-30 RX ADMIN — LEVETIRACETAM 400 MILLIGRAM(S): 250 TABLET, FILM COATED ORAL at 23:40

## 2024-04-30 NOTE — ED ADULT NURSE NOTE - OBJECTIVE STATEMENT
Pt presents to the ER after having a seizure at home. As per pts  the pt was walking and collapsed and hit the back of her head and was passed out for 15 seconds. Once EMS arrived, the pt had a seizure and was given 5MG of IM Versed. As per the pts  the pt has a molar removed earlier in the day. Pt has a history of epilepsy but has not had a seizure in over ten years. Pt denies SOB, chest pain, nausea, vomiting, headache or vision changes.

## 2024-04-30 NOTE — ED PROVIDER NOTE - OBJECTIVE STATEMENT
39-year-old female with history of gastric sleeve surgery, prediabetes, epilepsy, brought in by EMS from home after seizure at about 8 PM tonight.  Per  patient had seizures for several years, last time was over 10 years ago.  Patient states she used to take medications for it but has not for a long time.  Patient had a right upper molar tooth extraction today at 11 AM.  Was walking with her  to get Motrin for pain when  states she suddenly collapsed to the ground, hit the back of her head and was shaking all over for about 15 seconds, after which patient was nonresponsive.  EMS found patient "postictal", not responsive.  About 5 minutes afterwards EMS reported patient had a grand mal seizure for about a minute which was terminated with 5 mg of IM Versed.  Her  patient was not complaining of anything in particular or had any other symptoms that he noted earlier today or yesterday.  No reported fever or chills nausea vomiting diarrhea.  No chest pain shortness of breath.  No headache.  No focal weakness numbness speech or vision changes reported

## 2024-04-30 NOTE — ED ADULT NURSE NOTE - NSICDXPASTMEDICALHX_GEN_ALL_CORE_FT
PAST MEDICAL HISTORY:  Asthma Albuterol Inhaler as needed- last use was over a year ago    Endometriosis     History of prediabetes     Hyperlipidemia No medications at this time    Obesity (BMI 30-39.9)     Other ovarian cyst, right side     PCOS (polycystic ovarian syndrome)     Pelvic and perineal pain     Seizure     Torsion of ovary, ovarian pedicle and fallopian tube

## 2024-04-30 NOTE — ED PROVIDER NOTE - PHYSICAL EXAMINATION
exam:   General: Drowsy, lethargic, will open her eyes, will mutter her name when asked, follows simple instructions to move extremities.  No distress.  Not able to converse normally  HEENT: eyes perrl, nose normal, OP no erythema/exudate/swelling. s/p R upper molar extraction, blood at site. no active bleeding or signif swelling  cor: RRR, s1s2, 2+rad pulses.   lungs: ctabl, no resp distress.   abd: soft, ntnd.   neuro: lethargic, oriented x 1, No facial asymmetry, extraocular motions intact, no gaze deviation, Moves all extremities weakly, equally, Lifts hands and forearms off bed.  Able to raise heel off bed about an inch bilaterally  MSK: no extremity swelling.  Skin: normal, no rash

## 2024-04-30 NOTE — ED ADULT NURSE NOTE - NSFALLHARMRISKINTERV_ED_ALL_ED
Assistance OOB with selected safe patient handling equipment if applicable/Communicate risk of Fall with Harm to all staff, patient, and family/Provide visual cue: red socks, yellow wristband, yellow gown, etc/Reinforce activity limits and safety measures with patient and family/Bed in lowest position, wheels locked, appropriate side rails in place/Call bell, personal items and telephone in reach/Instruct patient to call for assistance before getting out of bed/chair/stretcher/Non-slip footwear applied when patient is off stretcher/[place] to call system/Physically safe environment - no spills, clutter or unnecessary equipment/Purposeful Proactive Rounding/Room/bathroom lighting operational, light cord in reach

## 2024-04-30 NOTE — ED ADULT NURSE NOTE - NSICDXPASTSURGICALHX_GEN_ALL_CORE_FT
PAST SURGICAL HISTORY:  Elective surgery Diagnostic Laparoscopy with FABRICIO and Cystoscopy     H/O gastric sleeve     S/P appendectomy     S/P  , - tubal ligation also    S/P hemorrhoidectomy

## 2024-04-30 NOTE — ED PROVIDER NOTE - CLINICAL SUMMARY MEDICAL DECISION MAKING FREE TEXT BOX
39-year-old female with history of gastric sleeve surgery, prediabetes, epilepsy, brought in by EMS from home after seizure at about 8 PM tonight.  Per  patient had seizures for several years, last time was over 10 years ago.  Patient states she used to take medications for it but has not for a long time.  Patient had a right upper molar tooth extraction today at 11 AM.  Was walking with her  to get Motrin for pain when  states she suddenly collapsed to the ground, hit the back of her head and was shaking all over for about 15 seconds, after which patient was nonresponsive.  EMS found patient "postictal", not responsive.  About 5 minutes afterwards EMS reported patient had a grand mal seizure for about a minute which was terminated with 5 mg of IM Versed.  Her  patient was not complaining of anything in particular or had any other symptoms that he noted earlier today or yesterday.  No reported fever or chills nausea vomiting diarrhea.  No chest pain shortness of breath.  No headache.  No focal weakness numbness speech or vision changes reported 39-year-old female with history of gastric sleeve surgery, prediabetes, epilepsy, brought in by EMS from home after seizure at about 8 PM tonight.  Per  patient had seizures for several years, last time was over 10 years ago.  Patient states she used to take medications for it but has not for a long time.  Patient had a right upper molar tooth extraction today at 11 AM.  Was walking with her  to get Motrin for pain when  states she suddenly collapsed to the ground, hit the back of her head and was shaking all over for about 15 seconds, after which patient was nonresponsive.  EMS found patient "postictal", not responsive.  About 5 minutes afterwards EMS reported patient had a grand mal seizure for about a minute which was terminated with 5 mg of IM Versed.  Her  patient was not complaining of anything in particular or had any other symptoms that he noted earlier today or yesterday.  No reported fever or chills nausea vomiting diarrhea.  No chest pain shortness of breath.  No headache.  No focal weakness numbness speech or vision changes reported    Patient was unresponsive episode with reported generalized shaking.  Partial DDx: Seizure, pseudoseizure, ICH,  brain mass, syncope, arrhythmia/ACS, metabolic abnormality.  Arrived drowsy in no distress, moving all extremities spontaneously.  May be due to postictal state versus Versed administration.  Fingerstick was normal.  Will check labs, CT head, EKG.  Neuro consult    2145 Patient with a seizure-like episode again witnessed here.  Started moaning, writhing then seen arching her back then with some generalized nonrhythmic generalized shaking, unresponsive after.  Given Ativan 2 mg IV push    2255 Patient with another seizure-like episode.  Started moaning, eyes closed, would not follow commands, started arching back, and writhing around.  No shaking or rhythmic jerking.    2305 Case discussed with neurologist Dr. Leena Ballard.  She recommended patient be transferred to [facility] for continuous EEG, which is unavailable here.  She recommends starting patient on 1 g of Keppra and starting ceribell monitoring 39-year-old female with history of gastric sleeve surgery, prediabetes, epilepsy, brought in by EMS from home after seizure at about 8 PM tonight.  Per  patient had seizures for several years, last time was over 10 years ago.  Patient states she used to take medications for it but has not for a long time.  Patient had a right upper molar tooth extraction today at 11 AM.  Was walking with her  to get Motrin for pain when  states she suddenly collapsed to the ground, hit the back of her head and was shaking all over for about 15 seconds, after which patient was nonresponsive.  EMS found patient "postictal", not responsive.  About 5 minutes afterwards EMS reported patient had a grand mal seizure for about a minute which was terminated with 5 mg of IM Versed.  Her  patient was not complaining of anything in particular or had any other symptoms that he noted earlier today or yesterday.  No reported fever or chills nausea vomiting diarrhea.  No chest pain shortness of breath.  No headache.  No focal weakness numbness speech or vision changes reported    Patient with unresponsive episode with reported generalized shaking.  Partial DDx: Seizure, pseudoseizure, ICH,  brain mass, syncope, arrhythmia/ACS, metabolic abnormality.  Arrived drowsy in no distress, moving all extremities spontaneously.  May be due to postictal state versus Versed administration.  Fingerstick was normal.  Will check labs, CT head, EKG.  Neuro consult    2145 Patient with a seizure-like episode again witnessed here.  Started moaning, writhing, then seen arching her back then with some generalized nonrhythmic generalized shaking, unresponsive after.  Given Ativan 2 mg IV push    2255 Patient with another seizure-like episode.  Started moaning, eyes closed, would not follow commands, started arching back, and writhing around.  No shaking or rhythmic jerking.    2305 Case discussed with neurologist Dr. Leena Ballard.  She recommended patient be transferred to [facility] for continuous EEG, which is unavailable here.  She recommends starting patient on 1 g of Keppra and starting ceribell monitoring    2330 d/w [facility] neuro Dr Rodriguez via transfer center, accepting xfer to [facility]

## 2024-05-01 ENCOUNTER — INPATIENT (INPATIENT)
Facility: HOSPITAL | Age: 39
LOS: 2 days | Discharge: HOME | End: 2024-05-04
Attending: STUDENT IN AN ORGANIZED HEALTH CARE EDUCATION/TRAINING PROGRAM | Admitting: STUDENT IN AN ORGANIZED HEALTH CARE EDUCATION/TRAINING PROGRAM
Payer: COMMERCIAL

## 2024-05-01 VITALS
RESPIRATION RATE: 16 BRPM | DIASTOLIC BLOOD PRESSURE: 65 MMHG | OXYGEN SATURATION: 100 % | SYSTOLIC BLOOD PRESSURE: 96 MMHG | TEMPERATURE: 98 F | HEART RATE: 72 BPM

## 2024-05-01 VITALS
SYSTOLIC BLOOD PRESSURE: 99 MMHG | RESPIRATION RATE: 18 BRPM | TEMPERATURE: 98 F | HEART RATE: 65 BPM | OXYGEN SATURATION: 99 % | DIASTOLIC BLOOD PRESSURE: 69 MMHG

## 2024-05-01 DIAGNOSIS — Z98.89 OTHER SPECIFIED POSTPROCEDURAL STATES: Chronic | ICD-10-CM

## 2024-05-01 DIAGNOSIS — J45.909 UNSPECIFIED ASTHMA, UNCOMPLICATED: ICD-10-CM

## 2024-05-01 DIAGNOSIS — R56.9 UNSPECIFIED CONVULSIONS: ICD-10-CM

## 2024-05-01 DIAGNOSIS — Z90.3 ACQUIRED ABSENCE OF STOMACH [PART OF]: Chronic | ICD-10-CM

## 2024-05-01 DIAGNOSIS — Z41.9 ENCOUNTER FOR PROCEDURE FOR PURPOSES OTHER THAN REMEDYING HEALTH STATE, UNSPECIFIED: Chronic | ICD-10-CM

## 2024-05-01 DIAGNOSIS — M54.9 DORSALGIA, UNSPECIFIED: ICD-10-CM

## 2024-05-01 DIAGNOSIS — Z29.9 ENCOUNTER FOR PROPHYLACTIC MEASURES, UNSPECIFIED: ICD-10-CM

## 2024-05-01 DIAGNOSIS — R51.9 HEADACHE, UNSPECIFIED: ICD-10-CM

## 2024-05-01 DIAGNOSIS — E28.2 POLYCYSTIC OVARIAN SYNDROME: ICD-10-CM

## 2024-05-01 LAB
A1C WITH ESTIMATED AVERAGE GLUCOSE RESULT: 5.6 % — SIGNIFICANT CHANGE UP (ref 4–5.6)
AMPHET UR-MCNC: NEGATIVE — SIGNIFICANT CHANGE UP
ANION GAP SERPL CALC-SCNC: 8 MMOL/L — SIGNIFICANT CHANGE UP (ref 7–14)
APPEARANCE UR: CLEAR — SIGNIFICANT CHANGE UP
BACTERIA # UR AUTO: NEGATIVE /HPF — SIGNIFICANT CHANGE UP
BARBITURATES UR SCN-MCNC: NEGATIVE — SIGNIFICANT CHANGE UP
BENZODIAZ UR-MCNC: POSITIVE
BILIRUB UR-MCNC: NEGATIVE — SIGNIFICANT CHANGE UP
BUN SERPL-MCNC: 10 MG/DL — SIGNIFICANT CHANGE UP (ref 7–23)
CALCIUM SERPL-MCNC: 8.4 MG/DL — SIGNIFICANT CHANGE UP (ref 8.4–10.5)
CAST: 0 /LPF — SIGNIFICANT CHANGE UP (ref 0–4)
CHLORIDE SERPL-SCNC: 108 MMOL/L — HIGH (ref 98–107)
CO2 SERPL-SCNC: 25 MMOL/L — SIGNIFICANT CHANGE UP (ref 22–31)
COCAINE METAB.OTHER UR-MCNC: NEGATIVE — SIGNIFICANT CHANGE UP
COLOR SPEC: YELLOW — SIGNIFICANT CHANGE UP
CREAT SERPL-MCNC: 0.39 MG/DL — LOW (ref 0.5–1.3)
CREATININE URINE RESULT, DAU: 46 MG/DL — SIGNIFICANT CHANGE UP
DIFF PNL FLD: NEGATIVE — SIGNIFICANT CHANGE UP
EGFR: 131 ML/MIN/1.73M2 — SIGNIFICANT CHANGE UP
ESTIMATED AVERAGE GLUCOSE: 114 — SIGNIFICANT CHANGE UP
GLUCOSE BLDC GLUCOMTR-MCNC: 88 MG/DL — SIGNIFICANT CHANGE UP (ref 70–99)
GLUCOSE BLDC GLUCOMTR-MCNC: 90 MG/DL — SIGNIFICANT CHANGE UP (ref 70–99)
GLUCOSE BLDC GLUCOMTR-MCNC: 96 MG/DL — SIGNIFICANT CHANGE UP (ref 70–99)
GLUCOSE BLDC GLUCOMTR-MCNC: 98 MG/DL — SIGNIFICANT CHANGE UP (ref 70–99)
GLUCOSE SERPL-MCNC: 79 MG/DL — SIGNIFICANT CHANGE UP (ref 70–99)
GLUCOSE UR QL: NEGATIVE MG/DL — SIGNIFICANT CHANGE UP
HCT VFR BLD CALC: 40.4 % — SIGNIFICANT CHANGE UP (ref 34.5–45)
HGB BLD-MCNC: 12.8 G/DL — SIGNIFICANT CHANGE UP (ref 11.5–15.5)
KETONES UR-MCNC: NEGATIVE MG/DL — SIGNIFICANT CHANGE UP
LACTATE SERPL-SCNC: 1.8 MMOL/L — SIGNIFICANT CHANGE UP (ref 0.5–2)
LEUKOCYTE ESTERASE UR-ACNC: NEGATIVE — SIGNIFICANT CHANGE UP
MAGNESIUM SERPL-MCNC: 1.9 MG/DL — SIGNIFICANT CHANGE UP (ref 1.6–2.6)
MCHC RBC-ENTMCNC: 30.4 PG — SIGNIFICANT CHANGE UP (ref 27–34)
MCHC RBC-ENTMCNC: 31.7 GM/DL — LOW (ref 32–36)
MCV RBC AUTO: 96 FL — SIGNIFICANT CHANGE UP (ref 80–100)
METHADONE UR-MCNC: NEGATIVE — SIGNIFICANT CHANGE UP
NITRITE UR-MCNC: NEGATIVE — SIGNIFICANT CHANGE UP
NRBC # BLD: 0 /100 WBCS — SIGNIFICANT CHANGE UP (ref 0–0)
NRBC # FLD: 0 K/UL — SIGNIFICANT CHANGE UP (ref 0–0)
OPIATES UR-MCNC: NEGATIVE — SIGNIFICANT CHANGE UP
OXYCODONE UR-MCNC: NEGATIVE — SIGNIFICANT CHANGE UP
PCP SPEC-MCNC: SIGNIFICANT CHANGE UP
PCP UR-MCNC: NEGATIVE — SIGNIFICANT CHANGE UP
PH UR: 7 — SIGNIFICANT CHANGE UP (ref 5–8)
PHOSPHATE SERPL-MCNC: 2.9 MG/DL — SIGNIFICANT CHANGE UP (ref 2.5–4.5)
PLATELET # BLD AUTO: 182 K/UL — SIGNIFICANT CHANGE UP (ref 150–400)
POTASSIUM SERPL-MCNC: 3.8 MMOL/L — SIGNIFICANT CHANGE UP (ref 3.5–5.3)
POTASSIUM SERPL-SCNC: 3.8 MMOL/L — SIGNIFICANT CHANGE UP (ref 3.5–5.3)
PROCALCITONIN SERPL-MCNC: 0.02 NG/ML — SIGNIFICANT CHANGE UP (ref 0.02–0.1)
PROT UR-MCNC: NEGATIVE MG/DL — SIGNIFICANT CHANGE UP
RBC # BLD: 4.21 M/UL — SIGNIFICANT CHANGE UP (ref 3.8–5.2)
RBC # FLD: 12.9 % — SIGNIFICANT CHANGE UP (ref 10.3–14.5)
RBC CASTS # UR COMP ASSIST: 0 /HPF — SIGNIFICANT CHANGE UP (ref 0–4)
SODIUM SERPL-SCNC: 141 MMOL/L — SIGNIFICANT CHANGE UP (ref 135–145)
SP GR SPEC: 1.01 — SIGNIFICANT CHANGE UP (ref 1–1.03)
SQUAMOUS # UR AUTO: 1 /HPF — SIGNIFICANT CHANGE UP (ref 0–5)
THC UR QL: NEGATIVE — SIGNIFICANT CHANGE UP
UROBILINOGEN FLD QL: 0.2 MG/DL — SIGNIFICANT CHANGE UP (ref 0.2–1)
WBC # BLD: 7.77 K/UL — SIGNIFICANT CHANGE UP (ref 3.8–10.5)
WBC # FLD AUTO: 7.77 K/UL — SIGNIFICANT CHANGE UP (ref 3.8–10.5)
WBC UR QL: 0 /HPF — SIGNIFICANT CHANGE UP (ref 0–5)

## 2024-05-01 PROCEDURE — 72125 CT NECK SPINE W/O DYE: CPT | Mod: MC

## 2024-05-01 PROCEDURE — 84702 CHORIONIC GONADOTROPIN TEST: CPT

## 2024-05-01 PROCEDURE — 70450 CT HEAD/BRAIN W/O DYE: CPT | Mod: MC

## 2024-05-01 PROCEDURE — 36415 COLL VENOUS BLD VENIPUNCTURE: CPT

## 2024-05-01 PROCEDURE — 99291 CRITICAL CARE FIRST HOUR: CPT | Mod: 25

## 2024-05-01 PROCEDURE — 85025 COMPLETE CBC W/AUTO DIFF WBC: CPT

## 2024-05-01 PROCEDURE — 80053 COMPREHEN METABOLIC PANEL: CPT

## 2024-05-01 PROCEDURE — 99223 1ST HOSP IP/OBS HIGH 75: CPT

## 2024-05-01 PROCEDURE — 96375 TX/PRO/DX INJ NEW DRUG ADDON: CPT

## 2024-05-01 PROCEDURE — 93005 ELECTROCARDIOGRAM TRACING: CPT

## 2024-05-01 PROCEDURE — 82962 GLUCOSE BLOOD TEST: CPT

## 2024-05-01 PROCEDURE — 96376 TX/PRO/DX INJ SAME DRUG ADON: CPT

## 2024-05-01 PROCEDURE — 84484 ASSAY OF TROPONIN QUANT: CPT

## 2024-05-01 PROCEDURE — 99222 1ST HOSP IP/OBS MODERATE 55: CPT

## 2024-05-01 PROCEDURE — 83605 ASSAY OF LACTIC ACID: CPT

## 2024-05-01 PROCEDURE — 82550 ASSAY OF CK (CPK): CPT

## 2024-05-01 PROCEDURE — 96374 THER/PROPH/DIAG INJ IV PUSH: CPT

## 2024-05-01 PROCEDURE — 80307 DRUG TEST PRSMV CHEM ANLYZR: CPT

## 2024-05-01 PROCEDURE — 71045 X-RAY EXAM CHEST 1 VIEW: CPT

## 2024-05-01 PROCEDURE — 99285 EMERGENCY DEPT VISIT HI MDM: CPT | Mod: 25

## 2024-05-01 PROCEDURE — 95718 EEG PHYS/QHP 2-12 HR W/VEEG: CPT

## 2024-05-01 PROCEDURE — 85730 THROMBOPLASTIN TIME PARTIAL: CPT

## 2024-05-01 PROCEDURE — 85610 PROTHROMBIN TIME: CPT

## 2024-05-01 PROCEDURE — 96361 HYDRATE IV INFUSION ADD-ON: CPT

## 2024-05-01 PROCEDURE — 93010 ELECTROCARDIOGRAM REPORT: CPT

## 2024-05-01 PROCEDURE — 83735 ASSAY OF MAGNESIUM: CPT

## 2024-05-01 RX ORDER — ERGOCALCIFEROL 1.25 MG/1
1 CAPSULE ORAL
Refills: 0 | DISCHARGE

## 2024-05-01 RX ORDER — LANOLIN ALCOHOL/MO/W.PET/CERES
3 CREAM (GRAM) TOPICAL AT BEDTIME
Refills: 0 | Status: DISCONTINUED | OUTPATIENT
Start: 2024-05-01 | End: 2024-05-04

## 2024-05-01 RX ORDER — ACETAMINOPHEN 500 MG
650 TABLET ORAL ONCE
Refills: 0 | Status: COMPLETED | OUTPATIENT
Start: 2024-05-01 | End: 2024-05-01

## 2024-05-01 RX ORDER — ONDANSETRON 8 MG/1
4 TABLET, FILM COATED ORAL EVERY 8 HOURS
Refills: 0 | Status: DISCONTINUED | OUTPATIENT
Start: 2024-05-01 | End: 2024-05-04

## 2024-05-01 RX ORDER — METFORMIN HYDROCHLORIDE 850 MG/1
1 TABLET ORAL
Qty: 0 | Refills: 0 | DISCHARGE

## 2024-05-01 RX ORDER — OMEPRAZOLE 10 MG/1
1 CAPSULE, DELAYED RELEASE ORAL
Qty: 0 | Refills: 0 | DISCHARGE

## 2024-05-01 RX ORDER — ACETAMINOPHEN 500 MG
650 TABLET ORAL EVERY 6 HOURS
Refills: 0 | Status: DISCONTINUED | OUTPATIENT
Start: 2024-05-01 | End: 2024-05-04

## 2024-05-01 RX ORDER — HEPARIN SODIUM 5000 [USP'U]/ML
5000 INJECTION INTRAVENOUS; SUBCUTANEOUS EVERY 8 HOURS
Refills: 0 | Status: DISCONTINUED | OUTPATIENT
Start: 2024-05-01 | End: 2024-05-02

## 2024-05-01 RX ORDER — ONDANSETRON 8 MG/1
4 TABLET, FILM COATED ORAL ONCE
Refills: 0 | Status: COMPLETED | OUTPATIENT
Start: 2024-05-01 | End: 2024-05-01

## 2024-05-01 RX ORDER — ACETAMINOPHEN 500 MG
1000 TABLET ORAL ONCE
Refills: 0 | Status: COMPLETED | OUTPATIENT
Start: 2024-05-01 | End: 2024-05-01

## 2024-05-01 RX ORDER — KETOROLAC TROMETHAMINE 30 MG/ML
15 SYRINGE (ML) INJECTION ONCE
Refills: 0 | Status: DISCONTINUED | OUTPATIENT
Start: 2024-05-01 | End: 2024-05-01

## 2024-05-01 RX ADMIN — Medication 1000 MILLIGRAM(S): at 00:30

## 2024-05-01 RX ADMIN — Medication 15 MILLIGRAM(S): at 06:00

## 2024-05-01 RX ADMIN — Medication 650 MILLIGRAM(S): at 21:48

## 2024-05-01 RX ADMIN — ONDANSETRON 4 MILLIGRAM(S): 8 TABLET, FILM COATED ORAL at 21:48

## 2024-05-01 RX ADMIN — Medication 400 MILLIGRAM(S): at 17:27

## 2024-05-01 RX ADMIN — Medication 400 MILLIGRAM(S): at 00:15

## 2024-05-01 RX ADMIN — Medication 15 MILLIGRAM(S): at 05:17

## 2024-05-01 RX ADMIN — ONDANSETRON 4 MILLIGRAM(S): 8 TABLET, FILM COATED ORAL at 11:10

## 2024-05-01 RX ADMIN — Medication 650 MILLIGRAM(S): at 11:10

## 2024-05-01 RX ADMIN — Medication 1000 MILLIGRAM(S): at 00:45

## 2024-05-01 RX ADMIN — HEPARIN SODIUM 5000 UNIT(S): 5000 INJECTION INTRAVENOUS; SUBCUTANEOUS at 13:52

## 2024-05-01 RX ADMIN — HEPARIN SODIUM 5000 UNIT(S): 5000 INJECTION INTRAVENOUS; SUBCUTANEOUS at 21:48

## 2024-05-01 NOTE — EEG REPORT - NS EEG TEXT BOX
Date: 4/30/24 23:38 - 5/1/24 02:24  Duration: 2 hr 45 min    [employer]  Technical Description:    EEG performed with limited number of EEG electrodes/channels for expedited completion and evaluation for possible seizures.  The [employer] EEG recording device consists of a 10-electrode headband, an EEG recorder with the Brain Stethoscope and Clarity (auto seizure detection) features, and a cloud portal for continuous seizure monitoring, EEG data storing, and remote EEG reviewing. With the Brain Stethoscope and Clarity features, spot-checking and continuous seizure detection were available.    Interpretation:  Background: diffuse alpha/beta activity during arousals  PDR (Hz): not discerned  Slowing: none  IEDs:  none   Seizures: none  Sleep transients: Drowsiness with diffuse attenuation and slowing of the background. Stage 2 sleep with symmetric sleep spindles and K complexes.  Artifacts: Myogenic and motion artifacts present. Electrodes at times disconnected.    Impression:  Within the technical limitations of the reduced-electrode recording and artifacts:  Normal EEG in the mostly drowsy and asleep states with intermittent arousals.  No seizures.    Consider correlation with CEEG monitoring for further verification of findings if clinically warranted.    China Dunbar MD  Attending Physician, [facility]     [facility] EEG Reading Room Ph#: [phone]  Epilepsy Answering Service after 5PM and before 8:30AM: Ph#: [phone]

## 2024-05-01 NOTE — H&P ADULT - PROBLEM SELECTOR PLAN 1
Seen by neurology. Seen by neurology.  Recommendations:   [] Check urinalysis, drug and toxicology screen, trend lactate to r/o underlying source of seizure trigger--> Done-->as above  [] Video EEG---> ordered  [] Monitor off anti seizure medications for now   [] Tele, continuous pulse ox, orthostatic vitals, TTE    [] Ideally would avoid benzodiazepines given likely nonepileptic etiology. If events occurring and lasting >3-5mins w/ vital derangements or lasting >3-5minutes and highly concerned for epileptic etiolology can consider Lorazepam 1mg . Please call [phone] if any questions.  [] Seizure, fall and aspiration precautions. Avoid sleep deprivation.

## 2024-05-01 NOTE — H&P ADULT - ASSESSMENT
39-year-old female with history of HLD, PCOS, GERD, Asthma,  gastric sleeve surgery, prediabetes, epilepsy, brought in by EMS from home after seizure at about 8 PM tonight.    Per  patient had seizures for several years, last time was over 10 years ago.  Patient  had a right upper molar tooth extraction 4/30/24  at 11 AM.   suddenly collapsed to the ground, hit the back of her head and was shaking all over for about 15 seconds, on 4/30 pm. after which patient was nonresponsive.  She noted  called 911.  Patient was initially seen at [facility] but now Transferred to [facility] for neuro eval. LMP 1week ago. Last f/u pcp 1 week ago     r/o Seizure dz  Ha and Nausea  h/o PCO/ GERD/Asthma/ HLD       39-year-old female with history of HLD, PCOS, GERD, Asthma,  gastric sleeve surgery, prediabetes, epilepsy, brought in by EMS from home after seizure at about 8 PM tonight.    Per  patient had seizures for several years, last time was over 10 years ago.  Patient  had a right upper molar tooth extraction 4/30/24  at 11 AM.   suddenly collapsed to the ground, hit the back of her head and was shaking all over for about 15 seconds, on 4/30 pm. after which patient was nonresponsive.  She noted  called 911.  Patient was initially seen at [facility] but now Transferred to [facility] for neuro eval. LMP 1week ago. Last f/u pcp 1 week ago     r/o Seizure dz, urine tox only positive for Benzo,   Dewey and Nausea  h/o PCO/ GERD/Asthma/ HLD

## 2024-05-01 NOTE — ED ADULT NURSE NOTE - OBJECTIVE STATEMENT
received pt TRA. Pt transferred from [facility] for neurology services and EEG monitoring. per daughter at bedside pt had 3 grand mal seizures today. one at home which was witnessed by  (not at beside, unable to describe) and 2 in the in Hospital which were describes as grand mal seizure-like activity. last seizure prior to this event was ~10 years ago. upon arrival to room pt noted to have an episode of seizure-like activity, beginning with arms turning into patient and twitching, then progressed to leg stiffening and shaking, then progressed to entire body stiffening and shaking, last approx 1min. post episode pt post episode noted to return to baseline mental status, A&OX4 RR even unlabored completing clear sentences. endorsing mild h/a and back pain. MD Chau aware. Past Medical History: Epilepsy, Gastric Sleeve. pt noted to have 20gIV to right wrist and 22gIV to left hand. no signs of infiltration at this time. pt placed on bedpan, cleaned with no skin breakdown noted at this time. per triage noted Pt given 5mg Versed IM@845, 2mg Ativan IV 10pm, 1 mg Ativan IV 1:15pm, Keppra 1GRAM @ 11 45 PM, 1GM Tylenol @ midnight at [place]. safety measures maintained, family at bedside.

## 2024-05-01 NOTE — H&P ADULT - NSHPREVIEWOFSYSTEMS_GEN_ALL_CORE
CONSTITUTIONAL: No fever, weight loss, or fatigue  EYES: No eye pain, visual disturbances, or discharge  ENMT:  No difficulty hearing, tinnitus, vertigo; No sinus or throat pain  NECK: No pain or stiffness  BREASTS: No pain, masses, or nipple discharge  RESPIRATORY: No cough, wheezing, chills or hemoptysis; No shortness of breath  CARDIOVASCULAR: No chest pain, palpitations, dizziness, or leg swelling  GASTROINTESTINAL: No abdominal or epigastric pain. No nausea, vomiting, or hematemesis; No diarrhea or constipation. No melena or hematochezia.  GENITOURINARY: No dysuria, frequency, hematuria, or incontinence  NEUROLOGICAL: POSITIVE 8/10 frontal headaches,   NO memory loss, loss of strength, numbness, or tremors  SKIN: No itching, burning, rashes, or lesions   LYMPH NODES: No enlarged glands  ENDOCRINE: No heat or cold intolerance; No hair loss  MUSCULOSKELETAL: POSITIVE --> low back pain  PSYCHIATRIC: No depression, anxiety, mood swings, or difficulty sleeping  HEME/LYMPH: No easy bruising, or bleeding gums  ALLERGY AND IMMUNOLOGIC: No hives or eczema

## 2024-05-01 NOTE — H&P ADULT - PROBLEM SELECTOR PLAN 2
Tylenol prn HA mild to moderate pain  Iv Tylenol prn severe pain Tylenol prn HA mild to moderate pain  Iv Tylenol prn severe painx 1 now for 8/10 ha

## 2024-05-01 NOTE — H&P ADULT - HISTORY OF PRESENT ILLNESS
39-year-old female with history of HLD, PCPS, GERD, Asthma,  gastric sleeve surgery, prediabetes, epilepsy, brought in by EMS from home after seizure at about 8 PM tonight.    Per  patient had seizures for several years, last time was over 10 years ago.    Patient states she used to take medications for it but has not for a long time.  Patient had a right upper molar tooth extraction today at 11 AM.  Was walking with her  to get Motrin for pain when  states she suddenly collapsed to the ground, hit the back of her head and was shaking all over for about 15 seconds, after which patient was nonresponsive.    EMS found patient "postictal", not responsive.  About 5 minutes afterwards EMS reported patient had a grand mal seizure for about a minute which was terminated with 5 mg of IM Versed. 39-year-old female with history of HLD, PCPS, GERD, Asthma,  gastric sleeve surgery, prediabetes, epilepsy, brought in by EMS from home after seizure at about 8 PM tonight.    Per  patient had seizures for several years, last time was over 10 years ago.    Patient states she used to take medications for it but has not for a long time.  Patient had a right upper molar tooth extraction 4/30/24  at 11 AM.  Was walking with her  to get Motrin for pain when  states she suddenly collapsed to the ground, hit the back of her head and was shaking all over for about 15 seconds, after which patient was nonresponsive.    EMS found patient "postictal", not responsive.  About 5 minutes afterwards EMS reported patient had a grand mal seizure for about a minute which was terminated with 5 mg of IM Versed.    Found unresponsive by EMS. Then, 5 minutes later, had full body shaking which resolved after 5mg of midazolam.  At  ED. VSS /72, HR 89, RR18 satting well on RA, afebrile. Exam was lethargic on arrival. Levetiracetam 1g as given in addition to acetaminophen and fluids after additional events in ED at [facility]    Trans to [facility] 4/30/24 PM, and received 2 mg iv ativan and then 1 mg iv ativan 4/30 pm due to seizure like activity.  Seen by Neurology in Ed with recommendations.   39-year-old female with history of HLD, PCPS, GERD, Asthma,  gastric sleeve surgery, prediabetes, epilepsy, brought in by EMS from home after seizure at about 8 PM tonight.    Per  patient had seizures for several years, last time was over 10 years ago.    Patient  reported had a right upper molar tooth extraction 4/30/24  at 11 AM.  Patient noted she was home in kitchen with her  , about to get Motrin for pain when she suddenly collapsed to the ground, hit the back of her head and was shaking all over for about 15 seconds, after which patient was nonresponsive.  She noted  called 911.  EMS found patient "postictal", not responsive.  About 5 minutes afterwards EMS reported patient had a grand mal seizure for about a minute which was terminated with 5 mg of IM Versed.    Found unresponsive by EMS. Then, 5 minutes later, had full body shaking which resolved after 5mg of midazolam.  At  ED. VSS /72, HR 89, RR18 satting well on RA, afebrile. Exam was lethargic on arrival. Levetiracetam 1g as given in addition to acetaminophen and fluids after additional events in ED at [facility]    Trans to [facility] 4/30/24 PM, and received 2 mg iv ativan and then 1 mg iv ativan 4/30 pm due to seizure like activity.  Seen by Neurology in Ed with recommendations.    TODAY Patient presently in ED room with Daughter Trinidad and  Salty at bedside.  Denies any recent travelling. denies chest pain. no fevers, no abd pain, no pain with urination or frequency  notes POSITIVE Frontal Ha 8/10 with  nausea, no vomiting at this time.  Also notes low back pain , 4/10, not new. No urinary or bowel incontinence or stoppage. no pain with raising legs or numbness to legs.  Patient gives permission for us to use daughter or  to translate for her.   39-year-old female with history of HLD, PCOS, GERD, Asthma,  gastric sleeve surgery, prediabetes, epilepsy, brought in by EMS from home after seizure at about 8 PM tonight.    Per  patient had seizures for several years, last time was over 10 years ago.    Patient  reported had a right upper molar tooth extraction 4/30/24  at 11 AM.  Patient noted she was home in kitchen with her  , about to get Motrin for pain when she suddenly collapsed to the ground, hit the back of her head and was shaking all over for about 15 seconds, after which patient was nonresponsive.  She noted  called 911.  EMS found patient "postictal", not responsive.  About 5 minutes afterwards EMS reported patient had a grand mal seizure for about a minute which was terminated with 5 mg of IM Versed.    Found unresponsive by EMS. Then, 5 minutes later, had full body shaking which resolved after 5mg of midazolam.  At  ED. VSS /72, HR 89, RR18 satting well on RA, afebrile. Exam was lethargic on arrival. Levetiracetam 1g as given in addition to acetaminophen and fluids after additional events in ED at [facility]    Trans to [facility] 4/30/24 PM, and received 2 mg iv ativan and then 1 mg iv ativan 4/30 pm due to seizure like activity.  Seen by Neurology in Ed with recommendations.    TODAY Patient presently in ED room with Daughter Trinidad and  Salty at bedside.  Denies any recent travelling. denies chest pain. no fevers, no abd pain, no pain with urination or frequency  notes POSITIVE Frontal Ha 8/10 with  nausea, no vomiting at this time.  Also notes low back pain , 4/10, not new. No urinary or bowel incontinence or stoppage. no pain with raising legs or numbness to legs.  Patient gives permission for us to use daughter or  to translate for her.

## 2024-05-01 NOTE — ED PROVIDER NOTE - NSICDXPASTMEDICALHX_GEN_ALL_CORE_FT
ID Attending Progress Note      ASSESSMENT  -Status post decortication and parietal left pleurectomy for left hemithorax loculated effusion.  -Operative findings not in favor of an infection op cx Neg  Cultures from outside hospital of pleural fluid with coag negative staph, skin contaminant.  -CXR today with near complete lung whiteout   pulm consulted for bronchoscopy   -Leukocytosis resolved   -ESRD on HD   -DM, HTN   -Neurogenic bladder with recurrent UTI's     PLAN  -Awaiting bronch , continue cefazolin for now  -Dw ICU nurse   --------------------------------------------------------------------------------------------------------  Subjective:   SOB+  No fever   No vomiting or diarrhea     ROS:    A 10 points review of system was done and is negative except for as listed above.    Objective:    Vital signs in last 24 hours:  Temp:  [97.3 °F (36.3 °C)-98.8 °F (37.1 °C)] 97.3 °F (36.3 °C)  Heart Rate:  [] 91  Resp:  [16-21] 20  BP: (105-168)/() 126/89  FiO2 (%):  [40 %] 40 %    Physical Exam:  GENERAL: Normocephalic atraumatic, in NAD  HEENT: Anicteric sclera, well injected conjunctiva  NECK: supple  HEART: RRR, no audible murmur  LUNGS: decreased AE on left , no wheezing rales or rhonchi  Chest tube in place x 2   ABDOMEN: soft non distended , non tender to palpation  LOWER EXTREMITIES: no cyanosis  SKIN: no rash  NEURO: AO X3      MEDICATIONS   • [START ON 8/31/2021] epoetin dimitri-epbx  10,000 Units Intravenous Once per day on Tue Thu Sat   • lidocaine  3 mL Nebulization Once   • ceFAZolin  1,000 mg Intravenous Q24H   • gabapentin  100 mg Oral Nightly   • insulin lispro   Subcutaneous TID WC   • cyclobenzaprine  5 mg Oral TID   • carvedilol  25 mg Oral BID WC   • tamsulosin  0.4 mg Oral Daily   • lidocaine  1 patch Transdermal Daily   • famotidine  20 mg Oral Nightly   • NIFEdipine XL  30 mg Oral Q12H   • cloNIDine  0.1 mg Oral 2 times per day   • sodium chloride (PF)  2 mL Intracatheter 2 times  per day   • [Held by provider] heparin (porcine)  5,000 Units Subcutaneous 3 times per day   • losartan  50 mg Oral Daily   • atorvastatin  80 mg Oral Daily      sodium chloride, sodium chloride, sodium chloride, sodium chloride (PF), fentaNYL, sodium chloride, dextrose, dextrose, glucagon, dextrose, dextrose, HYDROcodone-acetaminophen, HYDROmorphone, sodium chloride, sodium chloride, sodium chloride, sodium chloride, acetaminophen               Laboratory data reviewed    CBC  Recent Labs   Lab 08/30/21  1629 08/30/21  0602 08/29/21  1643   HGB 7.0* 6.4* 6.9*   HCT 21.3* 20.2* 21.4*    247 234   WBC 9.5 11.1* 12.2*   RBC 2.31* 2.13* 2.28*   MCV 92.2 94.8 93.9     Recent Labs     08/30/21  0602   CO2 31   CALCIUM 8.3*   CREATININE 4.26*   BUN 25*     Recent Labs     08/30/21  0602   CALCIUM 8.3*   BUN 25*   CREATININE 4.26*       Micro reviewed     Radiology reviewed     MD Courtney [facility]  [phone]    8/30/2021  7:31 PM   PAST MEDICAL HISTORY:  Asthma Albuterol Inhaler as needed- last use was over a year ago    Endometriosis     History of prediabetes     Hyperlipidemia No medications at this time    Obesity (BMI 30-39.9)     Other ovarian cyst, right side     PCOS (polycystic ovarian syndrome)     Pelvic and perineal pain     Seizure     Torsion of ovary, ovarian pedicle and fallopian tube

## 2024-05-01 NOTE — CONSULT NOTE ADULT - SUBJECTIVE AND OBJECTIVE BOX
~~~~~~~~~~~~~~~~~~~~~~~~~~~~~~~~~~~~~~~~~~~~~~~~~~  Neurology Service   [facility] Consult [phone], Spectra [phone]  [facility] General Neurology- Spectra [id number]  [facility] Epilepsy Monitoring Unit- Spectra   [facility] Stroke Service- spectra [id number]  [facility] Consult Service [id number], [phone]  [facility] Stroke Consult Service , [phone] (7a-7p, otherwise call [phone])  ~~~~~~~~~~~~~~~~~~~~~~~~~~~~~~~~~~~~~~~~~~~~~~~~~~    History:  Interviewed in [facility] in english, occasionally daughter providing Palauan translation. History limited by lethargy    39 woman with PMHx preDM, HLD, Obesity, PCOS, GERD, HSV2, vitamin D deficiency, Gastric sleeve (), Right lumbar radiculopathy and Epilepsy presented to [place]  for seizure like activity. She had a tooth extraction d/t caries  @1100 and was walking afterwards when she suddenly collapsed, +posterior head strike, 15 seconds of shaking. Found unresponsive by EMS. Then, 5 minutes later, had full body shaking which resolved after 5mg of midazolam.  At  ED. VSS /72, HR 89, RR18 satting well on RA, afebrile. Exam was lethargic on arrival. Levetiracetam 1g as given in addition to acetaminophen and fluids after additional events in ED:  -21:45 "Started moaning, writhing, then seen arching her back then with some generalized nonrhythmic generalized shaking, unresponsive after.  Given Ativan 2 mg IV push"  -22:55 "Started moaning, eyes closed, would not follow commands, started arching back, and writhing around.  No shaking or rhythmic jerking." Additional 1mg lorazepam given 23:28.  Daughter notes total 6-7 episodes each lasting ~15 seconds. Some events staring in one limb, then another, and then with arching of her back, some events with more shaking than others. eyes closed. No tongue bite, no incontinence. Daughter said earlier pt reported that she recalled events and felt like she could hear everything but not respond. Now pt denying recollection of events. Notes pt c/o blurry vision and darkening of colors in  ED.     Episode history:  Neurologist: No longer follows, does not recall name of neurologist, no records in [facility]   Episode onset: ~10 years prior when she had a 6month bout of daily episodes similar to today by description but also noting they felt different, unclear how exactly. S  Last event:: Prior to  no episodes in ~ 10 years   Semiology: Full body shaking, arched back, eyes closed  Risk Factors: Denies history of pre/ complications, febrile seizures, TBIs, meningocenphalitis, stroke. No hx of dementia. No family history of seizures   Prior ASMs: PHT, unclear why DCed, no AE  Current ASMs: None  Prior Brain imaging:  [facility] 24 unrevealing  No other imaging available for review. per pt has CTs before, never MRI. Was told CTs were normal  Prior EEGs:  None available for review, per pt had EEGs which were normal    REVIEW OF SYSTEMS:    CONSTITUTIONAL: No weakness, fevers or chills +Grogginess, +vaguely described dizziness  EYES/ENT: No visual changes;  No vertigo or throat pain. +left tooth pain  NECK: No pain or stiffness  RESPIRATORY: No cough, wheezing, hemoptysis; No shortness of breath  CARDIOVASCULAR: No chest pain or palpitations  GASTROINTESTINAL: No abdominal or epigastric pain. No nausea, vomiting, or hematemesis; No diarrhea or constipation. No melena or hematochezia.  GENITOURINARY: No dysuria, frequency or hematuria  NEUROLOGICAL: No numbness or weakness  SKIN: No itching, burning, rashes, or lesions   HEME: no easy bruising or unexplained bleeding  ENDO: no heat intolerance, no cold intolerance  PSYCH: no SI, or depression  All other review of systems is negative unless indicated above.        PMHx:  Endometriosis  PCOS (polycystic ovarian syndrome)  Obesity (BMI 30-39.9)  Asthma  Torsion of ovary, ovarian pedicle and fallopian tube  Other ovarian cyst, right side  Pelvic and perineal pain  History of prediabetes  Hyperlipidemia  Seizure  Right lumbar radiculopathy since   VItamin D deficiency  GERD  Chronic pelvic pain in female (625.9,338.29) (R10.2,G89.29)  Fatty Liver  High cholesterol (272.0) (E78.00)  History of morbid obesity (V13.89) (Z87.898)  History of prediabetes (V12.29) (Z87.898)  HLD (hyperlipidemia) (272.4) (E78.5)  Knee tendinitis (727.09) (M76.899)  Low back pain  Microscopic hematuria (599.72) (R31.29)  Obesity (278.00) (E66.9)  Polycystic ovaries (256.4) (E28.2)  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) (K91.2,Z90.3)  Prediabetes (790.29) (R73.03)  Recurrent genital herpes simplex type 2 infection (054.10) (A60.00)    Surgical history:  S/P cholecystectomy (V45.79) (Z90.49)  S/P laparoscopic sleeve gastrectomy (V45.86) (Z98.84)  History of repair of hiatal hernia (V15.29) (Z98.890,Z87.19)    Social History:    Lives with: Daughter and   Alcohol use: no   Tobacco use:  no  Other drug use: no   Profession:   ADLs: Independent     Medications  Home Medications: documented 2023,   ergocalciferol 1.25 mg (50,000 intl units) oral capsule: 1 cap(s) orally every 7 days wednesday (2023 12:01)  metFORMIN 500 mg oral tablet: 1 tab(s) orally 2 times a day (2023 12:02)  omeprazole 20 mg oral delayed release capsule: 1 cap(s) orally once a day, open capsule and sprinkle on spoon of yogurt or pudding (2023 12:02)    MEDICATIONS  (STANDING):    MEDICATIONS  (PRN):      Exam:  Vitals:  T(C): 36.4 (24 @ 03:29), Max: 36.9 (24 @ 21:33)  T(F): 97.5 (24 @ 03:29), Max: 98.5 (24 @ 21:33)  HR: 72 (24 @ 03:29) (65 - 89)  BP: 96/65 (24 @ 03:29) (96/65 - 113/79)  RR: 16 (24 @ 03:29) (16 - 21)  SpO2: 100% (24 @ 03:29) (98% - 100%)    Physical and Neurological Examination:   - General: Asleep, eyes open to voice, occasionally dozes off requiring brief tactile to awaken.     - Mental Status:   Usually Alert, awake, occasionally more somnolent, oriented to person, place (says hospital but doesnt know which one, she is in amb bay and only just arrived at [facility]), and date   speech is fluent with intact naming, repetition, and follows simple cross commands  mild slurring  Slow to answer questions    - Cranial Nerves:   PERRL, Acuity is 20/20 OS, OD, OU though slow to answer. VFF, No ptosis. EOMI, facial sensation is intact in the V1-V3 distribution bilaterally; face is symmetric with normal smile; hearing is intact to finger rub bilaterally and equally; uvula is midline and soft palate rises symmetrically; shoulder shrug intact; tongue protrudes in the midline with intact lateral movements    - Motor Testing:  Bulk: Normal  Tone: Normal  Strength:  There is no pronator drift b/l  There is no leg drift b/l  Poor effort on confrontational exam, at least 4+ in all extremities at  Movements are slow  - Reflexes:              Right           Left  Triceps                     2+                2+  Biceps                      2+                 2+  Brachioradialis         2+                 2+  Patellar                    2+                 2+  Achilles                    2+                 2+  Plant responses down bilaterally.    - Sensory: Intact throughout to light touch.   - Coordination: Finger-nose-finger intact without dysmetria.        Objective Studies  Labs:                          13.1   9.66  )-----------( 207      ( 2024 22:00 )             39.0       04-30    140  |  106  |  13  ----------------------------<  100<H>  4.1   |  24  |  0.58    Ca    8.7      2024 22:00  Mg     1.7     04-30    TPro  6.7  /  Alb  3.2<L>  /  TBili  0.2  /  DBili  x   /  AST  28  /  ALT  18  /  AlkPhos  81            Urinalysis Basic - ( 2024 22:00 )    Color: x / Appearance: x / SG: x / pH: x  Gluc: 100 mg/dL / Ketone: x  / Bili: x / Urobili: x   Blood: x / Protein: x / Nitrite: x   Leuk Esterase: x / RBC: x / WBC x   Sq Epi: x / Non Sq Epi: x / Bacteria: x        PT/INR - ( 2024 22:00 )   PT: 9.7 sec;   INR: 0.85 ratio         PTT - ( 2024 22:00 )  PTT:28.9 sec    Lactate Trend   @ 22:00 Lactate:2.4       CARDIAC MARKERS ( 2024 22:00 )  x     / x     / 112 U/L / x     / x            CAPILLARY BLOOD GLUCOSE      POCT Blood Glucose.: 97 mg/dL (2024 21:55)            PT/INR - ( 2024 22:00 )   PT: 9.7 sec;   INR: 0.85 ratio         PTT - ( 2024 22:00 )  PTT:28.9 sec  CSF:                  CNS Imaging:  CT Head No Cont:  (2024 22:36)    < from: CT Head No Cont (24 @ 22:36) >    ACC: [id number] EXAM:  CT CERVICAL SPINE   ORDERED BY: DOMENICA BENITEZ     ACC: [id number] EXAM:  CT BRAIN   ORDERED BY: DOMENICA BENITEZ     PROCEDURE DATE:  2024          INTERPRETATION:  CLINICAL INDICATION: seizure tonight, fall, ro mass bleed    TECHNIQUE:CT axial images of the head and cervical spine were obtained   without intravenous contrast. Computer-reconstructed coronal and sagittal   images were obtained.    COMPARISON: 3/31/2017. 3/6/2017.    FINDINGS:    Head CT: Artifact degrades images of the posterior fossa/lower brain.   There is no obvious acute intracranial hemorrhage, mass effect or midline   shift, given the extent of artifact. There is no significant cerebral   volume loss or ventricular dilatation.    There is no depressed skull fracture. There is sinus mucosal thickening.   The tympanomastoid region is unremarkable.    Cervical spine CT: Straightening of the cervical lordosis may be related   to patient positioning and/or muscle spasm. There is no obvious acute   fracture or traumatic malalignment in the cervical spine. The   craniocervical junction, predental and atlantodental space are intact.   There is minimal anterolisthesis of C5 on C6, C6 on C7 and C7 on T1.    There are mild degenerative changes in the cervicalspine without neural   foraminal or spinal canal stenosis.    There is no significant prevertebral soft tissue edema. Evaluation of the   spinal canal content is limited on this study.    Visualized upper chest: No pneumothorax.    IMPRESSION:    Head CT: No obvious acute intracranial hemorrhage or displaced skull   fracture. If clinically indicated, short-term follow-up or MRI may be   obtained for further evaluation.    Cervical spine CT: No obvious acute fracture or traumatic malalignment in   the cervical spine. If there is clinical suspicion for acute fracture or   ligamentous/cord injury, MRI may be obtained for further evaluation.    --- End of Report ---            STACY COLLIER MD; Attending Radiologist  This document has been electronically signed. 2024 11:13PM    < end of copied text >    EEGs:  None available for review    TTE:    NIF/VC:    Other:

## 2024-05-01 NOTE — H&P ADULT - NSICDXFAMILYHX_GEN_ALL_CORE_FT
FAMILY HISTORY:  Father  Still living? Yes, Estimated age: 61-70  FH: type 2 diabetes, Age at diagnosis: Age Unknown    
n/a

## 2024-05-01 NOTE — ED PROVIDER NOTE - OBJECTIVE STATEMENT
39-year-old female with a history of prediabetes hyperlipidemia PCOS GERD history of gastric sleeve history of epilepsy presented initially to [facility] with seizure-like activity.  Per family patient was walking, suddenly collapsed had a posterior head strike and about 15 seconds of shaking.  Patient  given 5 mg of midazolam by EMS and brought to [facility] emergency department.  Received 1 g of Keppra fluids and acetaminophen.  Has had multiple episodes of seizure-like activity since then–writhing moaning back arching generalized shaking.  Patient given lorazepam x 2.  Patient transferred to [facility] for neurology evaluation.  Head imaging at [facility] without acute findings

## 2024-05-01 NOTE — ED ADULT NURSE NOTE - CHIEF COMPLAINT QUOTE
Pt transferred  from [facility] for  neurology services and EEG monitoring. Pt had 3 grand mal  seizures today  one at home and 2 in the in Hospital. Last seizure was about 10 years ago. Past Medical History: Epilepsy, Gastric Sleeve, Pt arrive with 20g in right wrist, 22g in left hand.  Pt alert to self. Pt given 5mg Versed IM@845, 2mg Ativan IV 10pm, 1 mg Ativan IV 1:15pm, Keppra 1GRAM @ 11 45 PM, 1GM Tylenol @ midnight.
02-Dec-2023 04:10

## 2024-05-01 NOTE — ED PROVIDER NOTE - PHYSICAL EXAMINATION
PHYSICAL EXAM:  GEN: NAD   HEAD: Atraumatic  EYES: Clear bilaterally, pupils equal, round and reactive to light.  CARDIAC: Normal rate, regular rhythm. +S1/S2. No murmurs, rubs or gallops.  RESPIRATORY: Breathing unlabored. Breath sounds clear and equal bilaterally.  ABDOMEN:  Soft, nontender, nondistended. No rebound tenderness or guarding.  NEURO: [place] no FND   SKIN: Skin warm and dry. No evidence of rashes or lesions.

## 2024-05-01 NOTE — ED ADULT NURSE REASSESSMENT NOTE - NS ED NURSE REASSESS COMMENT FT1
received pt from previous RN. pt is a&ox4, resting comfortably in ED stretcher in no acute distress, respirations are even and nonlabored on room air. denies chest pain, sob. offering no medical complaints at this time. FS 88. report given to ESSU 1 RN. pt pending EEG.

## 2024-05-01 NOTE — CONSULT NOTE ADULT - ASSESSMENT
Assessment  39W PMHx PMHx preDM, HLD, Obesity, PCOS, GERD, HSV2, vitamin D deficiency, Gastric sleeve (2022), Right lumbar radiculpathy, and Epilepsy off PHT x 10 years p/w 6-7 episodes of abnormal movements described as shaking with arching of back and eyes closed starting after tooth extraction. Also c/o blurry vision and darknieng of colors. S/p Lorazepam 2mg and Levetircateam 1g at [facility] EG. VSS. Exam occasionally somnolent. Poor motor effort but nonfocal. Acuity 20/20 OS/OD/OU. Labs with minimal eosinophilia, glucose 100, mild hypoalbuminemia, and lactate of 2.4 No VBG to assess for acidosis. BAL <3. CTH and C spine noncon unrevealing for acute pathology. Incidental mild mucosal thickening, straightening of cervical lordosis, minimal anterolisthesis at multiple cervical levels and mild degenerative changes noted.     Impression:  Psychogenic nonepileptiform events assess for epileptic seizures/cortical irritability    Recommendations:   [] Check urinalysis, drug and toxicology screen, trend lactate to r/o underlying source of seizure trigger  [] Video EEG  [] Monitor off anti seizure medications for now   [] Tele, continuous pulse ox, orthostatic vitals, TTE  [] Lorazepam 1mg IV PRN for seizure activity lasting >3-5mins w/ vital derangements or lasting >3-5minutes and concerned for epileptic etiolology.  Please call [phone] if any questions.  [] Seizure, fall and aspiration precautions. Avoid sleep deprivation.   [] If clinically possible, avoid Wellbutrin and Tramadol. Carefully weigh risk/benefit ratio when considering the use of other drugs that are also known to lower seizure threshold such as SSRI's, Wellbutrin and Tramadol.     [] Given concern for seizure, advise the patient with regards to risks and driving privileges associated with the [place] Guidelines. Advise patient regarding the risk of seizures and general seizure safety recommendations including not to be bathing alone, climbing to high places and operating heavy machinery, until cleared by follow-up outpatient Neurology. Reinforce the importance of compliance with medications. Discuss sleep hygiene and the risks of sleep disruption. Discuss the risk of death associated with seizures / SUDEP.  [] Please note: if patient has a convulsion, please document length of episode, specifically what patient was doing paying attention to eye opening vs closure, gaze deviation, shaking of extremities, tongue bite, urinary incontinence, any derangement of vital signs. Generalized motor seizures can have dilated and unreactive pupils, absent oculocephalic reflexes (no dolls eyes), and open eyelids (99% of cases). Head turning from sided to side, pelvic thrusting, bicycling movements, hand waving are NOT manifestations of generalized motor seizures.  [] Patient can follow up with epileptology at [address] [place] [address] 1-2 weeks after discharge. Please instruct the patient to call [phone] to schedule this appointment.     Plan NOT YET discussed with attending     Assessment  39W PMHx PMHx preDM, HLD, Obesity, PCOS, GERD, HSV2, vitamin D deficiency, Gastric sleeve (2022), Right lumbar radiculpathy, and Epilepsy off PHT x 10 years p/w 6-7 episodes of abnormal movements described as shaking with arching of back and eyes closed starting after tooth extraction. Also c/o blurry vision and darknieng of colors. S/p Lorazepam 2mg and Levetircateam 1g at [facility] EG. VSS. Exam occasionally somnolent. Poor motor effort but nonfocal. Acuity 20/20 OS/OD/OU. Labs with minimal eosinophilia, glucose 100, mild hypoalbuminemia, and lactate of 2.4 No VBG to assess for acidosis. BAL <3. CTH and C spine noncon unrevealing for acute pathology. Incidental mild mucosal thickening, straightening of cervical lordosis, minimal anterolisthesis at multiple cervical levels and mild degenerative changes noted.     Impression:  Psychogenic nonepileptiform events assess for epileptic seizures/cortical irritability    Recommendations:   [] Check urinalysis, drug and toxicology screen, trend lactate to r/o underlying source of seizure trigger  [] Video EEG  [] Monitor off anti seizure medications for now   [] Tele, continuous pulse ox, orthostatic vitals, TTE    [] Ideally would avoid benzodiazepines given likely nonepileptic etiology. If events occurring and lasting >3-5mins w/ vital derangements or lasting >3-5minutes and highly concerned for epileptic etiolology can consider Lorazepam 1mg . Please call [phone] if any questions.     [] Seizure, fall and aspiration precautions. Avoid sleep deprivation.   [] If clinically possible, avoid Wellbutrin and Tramadol. Carefully weigh risk/benefit ratio when considering the use of other drugs that are also known to lower seizure threshold such as SSRI's, Wellbutrin and Tramadol.     [] Given concern for seizure, advise the patient with regards to risks and driving privileges associated with the [place] Guidelines. Advise patient regarding the risk of seizures and general seizure safety recommendations including not to be bathing alone, climbing to high places and operating heavy machinery, until cleared by follow-up outpatient Neurology. Reinforce the importance of compliance with medications. Discuss sleep hygiene and the risks of sleep disruption. Discuss the risk of death associated with seizures / SUDEP.  [] Please note: if patient has a convulsion, please document length of episode, specifically what patient was doing paying attention to eye opening vs closure, gaze deviation, shaking of extremities, tongue bite, urinary incontinence, any derangement of vital signs. Generalized motor seizures can have dilated and unreactive pupils, absent oculocephalic reflexes (no dolls eyes), and open eyelids (99% of cases). Head turning from sided to side, pelvic thrusting, bicycling movements, hand waving are NOT manifestations of generalized motor seizures.  [] Patient can follow up with epileptology at [address] [place] [address] 1-2 weeks after discharge. Please instruct the patient to call [phone] to schedule this appointment.     Plan NOT YET discussed with attending

## 2024-05-01 NOTE — ED ADULT NURSE REASSESSMENT NOTE - HEART RATE (BEATS/MIN)
History & Physical    Shanna Parker    63 y.o.  female  [id number]  Surgeon:Jeff Graham MD  Date: December 28, 2023    Assessment:  Patient Active Problem List   Diagnosis    Prediabetes    Pure hypercholesterolemia    Hypothyroidism    Menopausal hot flushes    Cervicitis and endocervicitis    Pain, joint, shoulder    Grieving    Elevated blood-pressure reading, without diagnosis of hypertension    Chronic pain of right knee    Primary hypertension     Plan:  Shanna Parker is scheduled for Robotic cholecystectomy    HPI    Historical Information   Past Medical History:   Diagnosis Date    Abnormal glucose     last assessed: 09/08/16    Abnormal weight gain     last assessed: 07/10/13    Arthritis 11/21    Candidal vulvovaginitis     last assessed: 03/11/14    Cervical polyp     last assessed: 03/11/14    COVID 2020    Disease of thyroid gland     hypothyroidism     Eczema     RLE    Hot flashes due to menopause     Spontaneous vaginal delivery     history of two vaginal deliveries    Vitamin D deficiency     last assessed: 09/08/16     Past Surgical History:   Procedure Laterality Date    DENTAL SURGERY      FOOT SURGERY Right 2002    bunion    KS COLONOSCOPY FLX DX W/COLLJ SPEC WHEN PFRMD N/A 5/12/2017    Procedure: COLONOSCOPY;  Surgeon: Don Franco MD;  Location: [facility];  Service: Gastroenterology    ROTATOR CUFF REPAIR  2017    DEANGELO Eddy    TUBAL LIGATION       Social History   Social History     Substance and Sexual Activity   Alcohol Use Not Currently    Comment: I do not indulge     Social History     Substance and Sexual Activity   Drug Use No     Social History     Tobacco Use   Smoking Status Former   Smokeless Tobacco Never     Family History   Problem Relation Age of Onset    Hypertension Mother         benign essential    Cancer Mother 78        pacreas related    Hypertension Father         benign essential    Migraines Sister     No Known Problems Maternal Grandmother     No Known  Pocket formed.  Problems Maternal Grandfather     No Known Problems Paternal Grandmother     No Known Problems Paternal Grandfather     No Known Problems Maternal Aunt     No Known Problems Paternal Aunt     No Known Problems Paternal Aunt     No Known Problems Paternal Aunt         Meds/Allergies   Allergies   Allergen Reactions    Codeine Other (See Comments), GI Intolerance and Vomiting     Headache      Eggs Or Egg-Derived Products - Food Allergy     Kiwi Extract - Food Allergy      Mouth swelling    Latex Other (See Comments) and GI Intolerance     Gagging       Current Facility-Administered Medications:     ceFAZolin (ANCEF) IVPB (premix in dextrose) 1,000 mg 50 mL, 1,000 mg, Intravenous, Once, Katie Chaudhry, PA-C    lactated ringers infusion, 125 mL/hr, Intravenous, Continuous, Katie Audra Grass, PA-C, Last Rate: 125 mL/hr at 12/28/23 0712, Continue from Pre-op at 12/28/23 0712    sodium chloride 0.9 % infusion, 125 mL/hr, Intravenous, Continuous, Delphy Defalcis, DO    Review of Systems    Vitals:    12/28/23 0527   BP: 163/84   Pulse: 83   Resp: 20   Temp: 97.6 °F (36.4 °C)   SpO2: 97%     Physical Exam  GEN: NAD, A+OX3   HEENT: Normocephalic, atraumatic,   NECK: Supple, trachea midline,   CARDIAC: regular rate & rhythm, S1 & S2 normal.   LUNGS: Clear to auscultation, No Wheeze, Rales, or Rhonchi  ABDOMEN:soft, NT  EXTREMITIES: No evidence of cyanosis, clubbing or edema. Pulses +2 B/L LE  NEURO: CN II-XII intact grossly, No sensory or motor deficits    Lab Results: I have personally reviewed pertinent lab results.    Imaging: I have personally reviewed pertinent reports.    EKG, Pathology, and Other Studies:   Lab Results   Component Value Date    CALCIUM 9.3 12/19/2023    K 4.1 12/19/2023    CO2 31 12/19/2023     12/19/2023    BUN 20 12/19/2023    CREATININE 0.79 12/19/2023     Lab Results   Component Value Date    WBC 6.18 12/05/2023    HGB 14.5 12/05/2023    HCT 44.3 12/05/2023    MCV 90 12/05/2023      12/05/2023     Lab Results   Component Value Date    ALT 32 12/19/2023    AST 21 12/19/2023    ALKPHOS 106 (H) 12/19/2023              72

## 2024-05-01 NOTE — ED ADULT TRIAGE NOTE - CHIEF COMPLAINT QUOTE
Pt transferred  from [facility] for  neurology services and EEG monitoring. Pt had 3 grand mal  seizures today  one at home and 2 in the in Hospital. Last seizure was about 10 years ago. Past Medical History: Epilepsy, Gastric Sleeve, Pt arrive with 20g in right wrist, 22g in left hand.  Pt alert to self. Pt given 5mg Versed IM@845, 2mg Ativan IV 10pm, 1 mg Ativan IV 1:15pm, Keppra 1GRAM @ 11 45 PM, 1GM Tylenol @ midnight.

## 2024-05-01 NOTE — ED ADULT NURSE NOTE - NSFALLRISKINTERV_ED_ALL_ED
Assistance OOB with selected safe patient handling equipment if applicable/Assistance with ambulation/Communicate fall risk and risk factors to all staff, patient, and family/Monitor gait and stability/Monitor for mental status changes and reorient to person, place, and time, as needed/Provide visual cue: yellow wristband, yellow gown, etc/Reinforce activity limits and safety measures with patient and family/Toileting schedule using arm’s reach rule for commode and bathroom/Use of alarms - bed, stretcher, chair and/or video monitoring/Call bell, personal items and telephone in reach/Instruct patient to call for assistance before getting out of bed/chair/stretcher/Non-slip footwear applied when patient is off stretcher/[place] to call system/Physically safe environment - no spills, clutter or unnecessary equipment/Purposeful Proactive Rounding/Room/bathroom lighting operational, light cord in reach

## 2024-05-01 NOTE — H&P ADULT - NSHPLABSRESULTS_GEN_ALL_CORE
LABS:                        13.1   9.66  )-----------( 207      ( 30 Apr 2024 22:00 )             39.0     04-30    140  |  106  |  13  ----------------------------<  100<H>  4.1   |  24  |  0.58    Ca    8.7      30 Apr 2024 22:00  Mg     1.7     04-30    TPro  6.7  /  Alb  3.2<L>  /  TBili  0.2  /  DBili  x   /  AST  28  /  ALT  18  /  AlkPhos  81  04-30      PT/INR - ( 30 Apr 2024 22:00 )   PT: 9.7 sec;   INR: 0.85 ratio         PTT - ( 30 Apr 2024 22:00 )  PTT:28.9 sec  CARDIAC MARKERS ( 30 Apr 2024 22:00 )  x     / x     / 112 U/L / x     / x          EKG:    RADIOLOGY & ADDITIONAL TESTS:  rad< from: CT Cervical Spine No Cont (04.30.24 @ 22:36) >  IMPRESSION:  Head CT: No obvious acute intracranial hemorrhage or displaced skull   fracture. If clinically indicated, short-term follow-up or MRI may be   obtained for further evaluation.  Cervical spine CT: No obvious acute fracture or traumatic malalignment in   the cervical spine. If there is clinical suspicion for acute fracture or   ligamentous/cord injury, MRI may be obtained for further evaluation.    < end of copied text > LABS:                        13.1   9.66  )-----------( 207      ( 2024 22:00 )             39.0     04-30    140  |  106  |  13  ----------------------------<  100<H>  4.1   |  24  |  0.58    Ca    8.7      2024 22:00  Mg     1.7     0430    TPro  6.7  /  Alb  3.2<L>  /  TBili  0.2  /  DBili  x   /  AST  28  /  ALT  18  /  AlkPhos  81  04-30      PT/INR - ( 2024 22:00 )   PT: 9.7 sec;   INR: 0.85 ratio         PTT - ( 2024 22:00 )  PTT:28.9 sec  CARDIAC MARKERS ( 2024 22:00 )  x     / x     / 112 U/L / x     / x          EK24 NSR at 60 bpm    RADIOLOGY & ADDITIONAL TESTS:  rad< from: CT Cervical Spine No Cont (24 @ 22:36) >  IMPRESSION:  Head CT: No obvious acute intracranial hemorrhage or displaced skull   fracture. If clinically indicated, short-term follow-up or MRI may be   obtained for further evaluation.  Cervical spine CT: No obvious acute fracture or traumatic malalignment in   the cervical spine. If there is clinical suspicion for acute fracture or   ligamentous/cord injury, MRI may be obtained for further evaluation.    < end of copied text >

## 2024-05-01 NOTE — ED PROVIDER NOTE - ATTENDING CONTRIBUTION TO CARE
38 yo F hx pre-diabetes, gastric sleeve, tubal ligation, cholecystectomy, intially presented to [facility] after witnessed seizure like episode by her . Pt was treated with ativan and keppra, CTH negative for acute pathology, labs significant for lactate 2.4, otherwise labs wnl. Pt transferred to [facility] for continuous EEG monitoring. Pt's daughter is at bedside, denies recent falls/trauma, no known hx seizures.     VITALS: reviewed  GEN: NAD,   HEAD/EYES: NCAT, PERRL EOMI, anicteric sclerae,   ENT: mucus membranes moist, oropharynx WNL, trachea midline,  RESP: lungs CTA with equal breath sounds bilaterally, chest wall nontender and atraumatic  CV: heart with reg rhythm S1, S2, distal pulses intact and symmetric bilaterally  ABDOMEN: soft, nondistended, nontender, no palpable masses  : no CVAT  MSK: extremities atraumatic and nontender, no edema, no asymmetry.  SKIN: warm, dry, no rash, no bruising, no cyanosis. color appropriate for ethnicity  NEURO: somnolent, no facial asymmetry.   PSYCH: Affect appropriate    Pt with nonepileptiform type movements in ED room, noted to have coordinated movements of lower extremities and shaking head back and forth, forcing eyelids closed/difficulty manually opening her lids when trying to assess pupils, episode lasted <1 min. Appreciate neuro c/s. Plan for admission for seizure work up/EEG monitoring.

## 2024-05-01 NOTE — H&P ADULT - NSHPPHYSICALEXAM_GEN_ALL_CORE
CAPILLARY BLOOD GLUCOSE      POCT Blood Glucose.: 88 mg/dL (01 May 2024 08:30)  POCT Blood Glucose.: 97 mg/dL (30 Apr 2024 21:55)    I&O's Summary    Vital Signs Last 24 Hrs  T(C): 36.7 (01 May 2024 09:21), Max: 36.9 (30 Apr 2024 21:33)  T(F): 98 (01 May 2024 09:21), Max: 98.5 (30 Apr 2024 21:33)  HR: 74 (01 May 2024 09:21) (65 - 89)  BP: 94/60 (01 May 2024 09:21) (91/59 - 115/70)  BP(mean): 70 (01 May 2024 06:00) (70 - 70)  RR: 18 (01 May 2024 09:21) (14 - 21)  SpO2: 99% room air      PHYSICAL EXAM:  GENERAL: NAD,   HEAD:  Atraumatic, Normocephalic  EYES: EOMI, PERRLA, conjunctiva and sclera clear  NECK: Supple, no JVD  CHEST/LUNG: Clear to auscultation bilaterally; no wheeze  HEART: Regular rate and rhythm; no murmurs, rubs, or gallops  ABDOMEN: Soft, Nontender, Nondistended; Bowel sounds present  EXTREMITIES:  warm and well perfused, no clubbing, cyanosis, or edema  PSYCH: AAOx3  NEUROLOGY: non-focal  no pain with SLR  SKIN: no rashes or lesions

## 2024-05-02 ENCOUNTER — RESULT REVIEW (OUTPATIENT)
Age: 39
End: 2024-05-02

## 2024-05-02 DIAGNOSIS — I95.9 HYPOTENSION, UNSPECIFIED: ICD-10-CM

## 2024-05-02 DIAGNOSIS — F44.5 CONVERSION DISORDER WITH SEIZURES OR CONVULSIONS: ICD-10-CM

## 2024-05-02 LAB
ANION GAP SERPL CALC-SCNC: 11 MMOL/L — SIGNIFICANT CHANGE UP (ref 7–14)
BASE EXCESS BLDV CALC-SCNC: 0.8 MMOL/L — SIGNIFICANT CHANGE UP (ref -2–3)
BUN SERPL-MCNC: 12 MG/DL — SIGNIFICANT CHANGE UP (ref 7–23)
CA-I SERPL-SCNC: 1.25 MMOL/L — SIGNIFICANT CHANGE UP (ref 1.15–1.33)
CALCIUM SERPL-MCNC: 8.7 MG/DL — SIGNIFICANT CHANGE UP (ref 8.4–10.5)
CHLORIDE BLDV-SCNC: 107 MMOL/L — SIGNIFICANT CHANGE UP (ref 96–108)
CHLORIDE SERPL-SCNC: 106 MMOL/L — SIGNIFICANT CHANGE UP (ref 98–107)
CO2 BLDV-SCNC: 27.3 MMOL/L — HIGH (ref 22–26)
CO2 SERPL-SCNC: 23 MMOL/L — SIGNIFICANT CHANGE UP (ref 22–31)
CREAT SERPL-MCNC: 0.42 MG/DL — LOW (ref 0.5–1.3)
CULTURE RESULTS: SIGNIFICANT CHANGE UP
EGFR: 128 ML/MIN/1.73M2 — SIGNIFICANT CHANGE UP
GAS PNL BLDV: 137 MMOL/L — SIGNIFICANT CHANGE UP (ref 136–145)
GAS PNL BLDV: SIGNIFICANT CHANGE UP
GLUCOSE BLDC GLUCOMTR-MCNC: 108 MG/DL — HIGH (ref 70–99)
GLUCOSE BLDV-MCNC: 95 MG/DL — SIGNIFICANT CHANGE UP (ref 70–99)
GLUCOSE SERPL-MCNC: 85 MG/DL — SIGNIFICANT CHANGE UP (ref 70–99)
HCO3 BLDV-SCNC: 26 MMOL/L — SIGNIFICANT CHANGE UP (ref 22–29)
HCT VFR BLD CALC: 37.3 % — SIGNIFICANT CHANGE UP (ref 34.5–45)
HCT VFR BLDA CALC: 41 % — SIGNIFICANT CHANGE UP (ref 34.5–46.5)
HCV AB S/CO SERPL IA: 0.08 S/CO — SIGNIFICANT CHANGE UP (ref 0–0.99)
HCV AB SERPL-IMP: SIGNIFICANT CHANGE UP
HGB BLD CALC-MCNC: 13.5 G/DL — SIGNIFICANT CHANGE UP (ref 11.7–16.1)
HGB BLD-MCNC: 12.5 G/DL — SIGNIFICANT CHANGE UP (ref 11.5–15.5)
HIV 1+2 AB+HIV1 P24 AG SERPL QL IA: SIGNIFICANT CHANGE UP
LACTATE BLDV-MCNC: 1 MMOL/L — SIGNIFICANT CHANGE UP (ref 0.5–2)
LACTATE SERPL-SCNC: 0.9 MMOL/L — SIGNIFICANT CHANGE UP (ref 0.5–2)
LACTATE SERPL-SCNC: 0.9 MMOL/L — SIGNIFICANT CHANGE UP (ref 0.5–2)
LACTATE SERPL-SCNC: 2.4 MMOL/L — HIGH (ref 0.5–2)
MAGNESIUM SERPL-MCNC: 1.9 MG/DL — SIGNIFICANT CHANGE UP (ref 1.6–2.6)
MCHC RBC-ENTMCNC: 30.9 PG — SIGNIFICANT CHANGE UP (ref 27–34)
MCHC RBC-ENTMCNC: 33.5 GM/DL — SIGNIFICANT CHANGE UP (ref 32–36)
MCV RBC AUTO: 92.3 FL — SIGNIFICANT CHANGE UP (ref 80–100)
MRSA PCR RESULT.: SIGNIFICANT CHANGE UP
NRBC # BLD: 0 /100 WBCS — SIGNIFICANT CHANGE UP (ref 0–0)
NRBC # FLD: 0 K/UL — SIGNIFICANT CHANGE UP (ref 0–0)
PCO2 BLDV: 43 MMHG — SIGNIFICANT CHANGE UP (ref 39–52)
PH BLDV: 7.39 — SIGNIFICANT CHANGE UP (ref 7.32–7.43)
PHOSPHATE SERPL-MCNC: 3.9 MG/DL — SIGNIFICANT CHANGE UP (ref 2.5–4.5)
PLATELET # BLD AUTO: 188 K/UL — SIGNIFICANT CHANGE UP (ref 150–400)
PO2 BLDV: 61 MMHG — HIGH (ref 25–45)
POTASSIUM BLDV-SCNC: 3.5 MMOL/L — SIGNIFICANT CHANGE UP (ref 3.5–5.1)
POTASSIUM SERPL-MCNC: 3.6 MMOL/L — SIGNIFICANT CHANGE UP (ref 3.5–5.3)
POTASSIUM SERPL-SCNC: 3.6 MMOL/L — SIGNIFICANT CHANGE UP (ref 3.5–5.3)
PROLACTIN SERPL-MCNC: 12.5 NG/ML — SIGNIFICANT CHANGE UP (ref 3.4–24.1)
RBC # BLD: 4.04 M/UL — SIGNIFICANT CHANGE UP (ref 3.8–5.2)
RBC # FLD: 13 % — SIGNIFICANT CHANGE UP (ref 10.3–14.5)
S AUREUS DNA NOSE QL NAA+PROBE: SIGNIFICANT CHANGE UP
SAO2 % BLDV: 91.1 % — HIGH (ref 67–88)
SODIUM SERPL-SCNC: 140 MMOL/L — SIGNIFICANT CHANGE UP (ref 135–145)
SPECIMEN SOURCE: SIGNIFICANT CHANGE UP
WBC # BLD: 6.62 K/UL — SIGNIFICANT CHANGE UP (ref 3.8–10.5)
WBC # FLD AUTO: 6.62 K/UL — SIGNIFICANT CHANGE UP (ref 3.8–10.5)

## 2024-05-02 PROCEDURE — 99233 SBSQ HOSP IP/OBS HIGH 50: CPT

## 2024-05-02 PROCEDURE — 90792 PSYCH DIAG EVAL W/MED SRVCS: CPT

## 2024-05-02 PROCEDURE — 93306 TTE W/DOPPLER COMPLETE: CPT | Mod: 26

## 2024-05-02 PROCEDURE — 95718 EEG PHYS/QHP 2-12 HR W/VEEG: CPT

## 2024-05-02 RX ORDER — ACETAMINOPHEN 500 MG
1000 TABLET ORAL ONCE
Refills: 0 | Status: DISCONTINUED | OUTPATIENT
Start: 2024-05-02 | End: 2024-05-02

## 2024-05-02 RX ORDER — CHLORHEXIDINE GLUCONATE 213 G/1000ML
1 SOLUTION TOPICAL DAILY
Refills: 0 | Status: DISCONTINUED | OUTPATIENT
Start: 2024-05-02 | End: 2024-05-04

## 2024-05-02 RX ORDER — SODIUM CHLORIDE 9 MG/ML
500 INJECTION INTRAMUSCULAR; INTRAVENOUS; SUBCUTANEOUS ONCE
Refills: 0 | Status: COMPLETED | OUTPATIENT
Start: 2024-05-02 | End: 2024-05-02

## 2024-05-02 RX ORDER — ENOXAPARIN SODIUM 100 MG/ML
40 INJECTION SUBCUTANEOUS EVERY 24 HOURS
Refills: 0 | Status: DISCONTINUED | OUTPATIENT
Start: 2024-05-02 | End: 2024-05-04

## 2024-05-02 RX ORDER — ACETAMINOPHEN 500 MG
1000 TABLET ORAL ONCE
Refills: 0 | Status: COMPLETED | OUTPATIENT
Start: 2024-05-02 | End: 2024-05-02

## 2024-05-02 RX ADMIN — SODIUM CHLORIDE 500 MILLILITER(S): 9 INJECTION INTRAMUSCULAR; INTRAVENOUS; SUBCUTANEOUS at 15:16

## 2024-05-02 RX ADMIN — CHLORHEXIDINE GLUCONATE 1 APPLICATION(S): 213 SOLUTION TOPICAL at 13:38

## 2024-05-02 RX ADMIN — HEPARIN SODIUM 5000 UNIT(S): 5000 INJECTION INTRAVENOUS; SUBCUTANEOUS at 05:44

## 2024-05-02 RX ADMIN — Medication 1000 MILLIGRAM(S): at 18:53

## 2024-05-02 RX ADMIN — Medication 400 MILLIGRAM(S): at 18:17

## 2024-05-02 RX ADMIN — Medication 650 MILLIGRAM(S): at 10:41

## 2024-05-02 RX ADMIN — Medication 650 MILLIGRAM(S): at 09:19

## 2024-05-02 RX ADMIN — ENOXAPARIN SODIUM 40 MILLIGRAM(S): 100 INJECTION SUBCUTANEOUS at 18:17

## 2024-05-02 NOTE — BH CONSULTATION LIAISON ASSESSMENT NOTE - NSBHCONSULTFOLLOWAFTERCARE_PSY_A_CORE FT
Aftercare Recommendations	follow up with PCP    Additional referrals:    For acute crisis: [facility]  [phone] [place] [address], [address] Floor, [place] [address]  [phone]  [email or web address]

## 2024-05-02 NOTE — PROGRESS NOTE ADULT - PROBLEM SELECTOR PLAN 1
RRT this AM with 2 shaking episodes: First one with some slow leg shaking/head bobbing, side to side turning, heavy breathing and eventual more vigorous whole body shaking, chest/body thrusting; duration 2-3 mins. Second one with brief immediate body shaking for about 5 secs shortly after the first episode.  Appreciate neurology eval, per neurology patient has psychogenic nonepileptiform seizures, shaking episodes lack correlating epileptiform abnormalities on EEG. Neuro recommended to discontinue EEG.  CTH without acute changes  f/u Psych eval.  UA negative, UTOX +benzos after versed at OSH  Lactic acid 2.4, ordered IVF bolus, f/u repeat lactic acid  Per neuro monitor off antiepileptic medications, avoid benzos  Maintain seizure, fall and aspiration precautions RRT this AM with 2 shaking episodes: First one with some slow leg shaking/head bobbing, side to side turning, heavy breathing and eventual more vigorous whole body shaking, chest/body thrusting; duration 2-3 mins. Second one with brief immediate body shaking for about 5 secs shortly after the first episode.  Appreciate neurology eval, per neurology patient has psychogenic nonepileptiform seizures, shaking episodes lack correlating epileptiform abnormalities on EEG. Neuro recommended to discontinue EEG.  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

## 2024-05-02 NOTE — BH CONSULTATION LIAISON ASSESSMENT NOTE - HPI (INCLUDE ILLNESS QUALITY, SEVERITY, DURATION, TIMING, CONTEXT, MODIFYING FACTORS, ASSOCIATED SIGNS AND SYMPTOMS)
Patient is a 39 years old female, domicile with family, , employed, without past psych hx, no hx of TYLER, no hx of SA/ violence, medical hx of HLD, PCOS, GERD, Asthma,  gastric sleeve surgery, prediabetes, epilepsy, brought in by EMS from home after seizure at about 8 PM tonight.    As per ED note" Per  patient had seizures for several years, last time was over 10 years ago.    Patient  reported had a right upper molar tooth extraction 4/30/24  at 11 AM.  Patient noted she was home in kitchen with her  , about to get Motrin for pain when she suddenly collapsed to the ground, hit the back of her head and was shaking all over for about 15 seconds, after which patient was nonresponsive.  She noted  called 911.  EMS found patient "postictal", not responsive.  About 5 minutes afterwards EMS reported patient had a grand mal seizure for about a minute which was terminated with 5 mg of IM Versed."  Patient denied any ongoing stressors, reported feeling safe at home, having good relationship with family members. Denied substance use, denied psychotic symptoms, denied depressive or manic symptoms. Patient denied suicidal or homicidal ideations, intent or a plan. Denied self -injurious behavior.  Reported that she had episodes of shaking and LOC since childhood, but her mother never took her to the hospital. Currently estranged from her mother, who lives in [place] and is difficult to get ahold of. Then she had period without seizures and started having them again when she was 8 moths pregnant with her second child, was started on medication: Dilantin and took it for 2 years, then stopped 15 years ago. Was seizure free until now.    Neurology team diagnosed PNES.

## 2024-05-02 NOTE — EEG REPORT - NS EEG TEXT BOX
YAMILETH GALVAN [id number]     Study Date: 5/2/24 08:00-12:23  Duration: 4 hr 21 min  --------------------------------------------------------------------------------------------------  History:  CC/ HPI Patient is a 39y old  Female who presents with a chief complaint of seizure, transferred from [facility] (01 May 2024 10:11)    MEDICATIONS  (STANDING):  chlorhexidine 2% Cloths 1 Application(s) Topical daily  heparin   Injectable 5000 Unit(s) SubCutaneous every 8 hours    --------------------------------------------------------------------------------------------------  Study Interpretation:    [[[Abbreviation Key:  PDR=alpha rhythm/posterior dominant rhythm. A-P=anterior posterior.  Amplitude: ‘very low’:<20; ‘low’:20-49; ‘medium’:; ‘high’:>150uV.  Persistence for periodic/rhythmic patterns (% of epoch) ‘rare’:<1%; ‘occasional’:1-10%; ‘frequent’:10-50%; ‘abundant’:50-90%; ‘continuous’:>90%.  Persistence for sporadic discharges: ‘rare’:<1/hr; ‘occasional’:1/min-1/hr; ‘frequent’:>1/min; ‘abundant’:>1/10 sec.  RPP=rhythmic and periodic patterns; GRDA=generalized rhythmic delta activity; FIRDA=frontal intermittent GRDA; LRDA=lateralized rhythmic delta activity; TIRDA=temporal intermittent rhythmic delta activity;  LPD=PLED=lateralized periodic discharges; GPD=generalized periodic discharges; BIPDs =bilateral independent periodic discharges; Mf=multifocal; SIRPDs=stimulus induced rhythmic, periodic, or ictal appearing discharges; BIRDs=brief potentially ictal rhythmic discharges >4 Hz, lasting .5-10s; PFA (paroxysmal bursts >13 Hz or =8 Hz <10s).  Modifiers: +F=with fast component; +S=with spike component; +R=with rhythmic component.  S-B=burst suppression pattern.  Max=maximal. N1-drowsy; N2-stage II sleep; N3-slow wave sleep. SSS/BETS=small sharp spikes/benign epileptiform transients of sleep. HV=hyperventilation; PS=photic stimulation]]]    Daily EEG Visual Analysis    FINDINGS:      Background:  Continuity: Continuous  Symmetry: Symmetric  Posterior dominant rhythm (PDR): 9 Hz, reactive to eye closure. Symmetric low-amplitude frontal beta in wakefulness.  Voltage: Normal  Anterior-Posterior Gradient: Present  Other background findings: Intermittent bursts of diffuse theta activity  Breach: Absent    Background Slowing:  Generalized slowing: None  Focal slowing: None    State Changes:   Drowsiness is characterized by fragmentation, attenuation, and slowing of the background activity.  Stage 2 sleep is characterized by symmetric K complexes and sleep spindles.     Interictal Findings:  None    Electrographic and Electroclinical seizures:  None    Other Clinical Events:  10:47: Patient seen on video lying on left side in bed. There are intermittent RLE movements becoming more frequent and higher in amplitude, then with addition of head nodding movements, then turns to lie on her back, intermittently turning to the left and the right and arching her back, moving BLE restlessly and asynchronously antigravity (hip and knee flexion) while grimacing. There is then violent repetitive truncal/pelvic movement, jumping or throwing her body backward against the bed with BLE movement, then sliding down in the bed, intermittent alternating left and right shoulder movements. Staff turns her onto her right side, and truncal/pelvic flexion stops, with flailing movements of the body turning left and right. Movements stop, then there are truncal/pelvic flexion movements briefly, then movements stop again by 10:49.    10:54: Staff are attending to the patient after the first event. There is LUE movement as if patting the bed, and patient lifts her head, then has slight head shuddering movement followed by right facial contraction, head turning partly to right and backward, jaw opening and closing movements intermittently. Then there is trembling of the body and BLE increasing in amplitude, briefly flailing the head to left and right. Movements stop rather suddenly.    During the above events, EEG is mostly obscured by movement and muscle artifact. There is normal awake background immediately before and after the events, and during periods where there is less artifact.    Activation Procedures:   Hyperventilation is not performed.    Photic stimulation is not performed.    Artifacts:  Intermittent myogenic and movement artifacts are present.    EKG:  Single-lead EKG is not connected.    EEG Classification / Summary:  Normal video-EEG in the awake, drowsy, and asleep states.   -No focal or epileptiform abnormalities are captured.   -Captured event of back arching, asynchronous BLE movements, and violent body movements; and another event with right facial contraction followed by whole body movement. During these events, EEG is mostly obscured by movement and muscle artifact. There is normal awake background immediately before and after the events, and during periods where there is less artifact.    Clinical Impression:  -Normal video-EEG.  -The captured events as described above are likely not epileptic in nature. During these events, EEG is mostly obscured by artifact. No seizure seen on EEG during interpretable portions.        -------------------------------------------------------------------------------------------------------    China Dunbar MD  Attending Physician, [facility]    -------------------------------------------------------------------------------------------------------    To reach EEG technologist:  Please use the pager number for the appropriate hospital or contact the .  At [facility] - Pager #: [phone]    To reach EEG-reading physician:  [facility] EEG Reading Room Phone #: [phone]  Epilepsy Answering Service after 5PM and before 8:30AM: Phone #: [phone]

## 2024-05-02 NOTE — EEG REPORT - NS EEG TEXT BOX
YAMILETH GALVAN [id number]     Study Date: 5/1/24 12:07 - 23:01  Duration: 10 hr 32 min  --------------------------------------------------------------------------------------------------  History:  CC/ HPI Patient is a 39y old  Female who presents with a chief complaint of seizure, transferred from [facility] (01 May 2024 10:11)    MEDICATIONS  (STANDING):  heparin   Injectable 5000 Unit(s) SubCutaneous every 8 hours    --------------------------------------------------------------------------------------------------  Study Interpretation:    [[[Abbreviation Key:  PDR=alpha rhythm/posterior dominant rhythm. A-P=anterior posterior.  Amplitude: ‘very low’:<20; ‘low’:20-49; ‘medium’:; ‘high’:>150uV.  Persistence for periodic/rhythmic patterns (% of epoch) ‘rare’:<1%; ‘occasional’:1-10%; ‘frequent’:10-50%; ‘abundant’:50-90%; ‘continuous’:>90%.  Persistence for sporadic discharges: ‘rare’:<1/hr; ‘occasional’:1/min-1/hr; ‘frequent’:>1/min; ‘abundant’:>1/10 sec.  RPP=rhythmic and periodic patterns; GRDA=generalized rhythmic delta activity; FIRDA=frontal intermittent GRDA; LRDA=lateralized rhythmic delta activity; TIRDA=temporal intermittent rhythmic delta activity;  LPD=PLED=lateralized periodic discharges; GPD=generalized periodic discharges; BIPDs =bilateral independent periodic discharges; Mf=multifocal; SIRPDs=stimulus induced rhythmic, periodic, or ictal appearing discharges; BIRDs=brief potentially ictal rhythmic discharges >4 Hz, lasting .5-10s; PFA (paroxysmal bursts >13 Hz or =8 Hz <10s).  Modifiers: +F=with fast component; +S=with spike component; +R=with rhythmic component.  S-B=burst suppression pattern.  Max=maximal. N1-drowsy; N2-stage II sleep; N3-slow wave sleep. SSS/BETS=small sharp spikes/benign epileptiform transients of sleep. HV=hyperventilation; PS=photic stimulation]]]    Daily EEG Visual Analysis    FINDINGS:      Background:  Continuity: Continuous  Symmetry: Symmetric  Posterior dominant rhythm (PDR): 10-10.5 Hz, reactive to eye closure. Symmetric low-amplitude frontal beta in wakefulness.  Voltage: Normal  Anterior-Posterior Gradient: Present  Other background findings: Occasional bursts and runs of frontocentrally predominant theta activity, at times (but not always) when looking at smartphone, looking for something next to the bed, or in transition to drowsiness.  Breach: Absent    Background Slowing:  Generalized slowing: None   Focal slowing: None    State Changes:   Drowsiness is characterized by fragmentation, attenuation, and slowing of the background activity.  Stage 2 sleep is characterized by symmetric K complexes and sleep spindles.     Interictal Findings:  None    Electrographic and Electroclinical seizures:  None    Other Clinical Events:  None    Activation Procedures:   Hyperventilation is not performed.    Photic stimulation is not performed.    Artifacts:  Intermittent myogenic and movement artifacts are present.    EKG:  Single-lead EKG is mostly disconnected. Regular rhythm during readable portions.    EEG Classification / Summary:  Normal video-EEG in the awake, drowsy, and asleep states.   No focal or epileptiform abnormalities are captured.     Clinical Impression:  Normal video-EEG.          -------------------------------------------------------------------------------------------------------    China Dunbar MD  Attending Physician, [facility]    -------------------------------------------------------------------------------------------------------    To reach EEG technologist:  Please use the pager number for the appropriate hospital or contact the .  At [facility] - Pager #: [phone]    To reach EEG-reading physician:  [facility] EEG Reading Room Phone #: [phone]  Epilepsy Answering Service after 5PM and before 8:30AM: Phone #: [phone]

## 2024-05-02 NOTE — BH CONSULTATION LIAISON ASSESSMENT NOTE - NSBHCHARTREVIEWVS_PSY_A_CORE FT
Vital Signs Last 24 Hrs  T(C): 36.7 (02 May 2024 15:30), Max: 36.9 (02 May 2024 13:30)  T(F): 98.1 (02 May 2024 15:30), Max: 98.5 (02 May 2024 13:30)  HR: 69 (02 May 2024 15:30) (62 - 89)  BP: 91/55 (02 May 2024 15:30) (91/55 - 114/74)  BP(mean): --  RR: 18 (02 May 2024 15:30) (17 - 18)  SpO2: 97% (02 May 2024 15:30) (97% - 100%)    Parameters below as of 02 May 2024 15:30  Patient On (Oxygen Delivery Method): room air

## 2024-05-02 NOTE — PROGRESS NOTE ADULT - PROBLEM SELECTOR PLAN 4
DVT ppx: lovenox  Dispo: f/u PT/OT eval 92/54 this AM->105/72  Per family patient's BP usually runs soft in systolic 90s  Patient with otherwise afebrile without leukocytosis, doubt infectious etiology  Patient has elevated lactic acid 2.4, ordered IVF bolus, f/u repeat lactic acid  Monitor BP

## 2024-05-02 NOTE — BH CONSULTATION LIAISON ASSESSMENT NOTE - CURRENT MEDICATION
MEDICATIONS  (STANDING):  chlorhexidine 2% Cloths 1 Application(s) Topical daily  enoxaparin Injectable 40 milliGRAM(s) SubCutaneous every 24 hours    MEDICATIONS  (PRN):  acetaminophen     Tablet .. 650 milliGRAM(s) Oral every 6 hours PRN Mild Pain (1 - 3), Moderate Pain (4 - 6)  aluminum hydroxide/magnesium hydroxide/simethicone Suspension 30 milliLiter(s) Oral every 4 hours PRN Dyspepsia  melatonin 3 milliGRAM(s) Oral at bedtime PRN Insomnia  ondansetron Injectable 4 milliGRAM(s) IV Push every 8 hours PRN Nausea and/or Vomiting

## 2024-05-02 NOTE — CHART NOTE - NSCHARTNOTEFT_GEN_A_CORE
EEG preliminary read (not final) on the reported seizure like events this morning:    Two events noted aroun 1049 am and 1054 am with various semiology.    First one with some slow leg shaking/head bobbing, side to side turning, heavy breathing and eventual more vigorous whole body shaking, chest/body thrusting; duration 2-3 mins.  Second one with brief immediate body hsaking for about 5 secs shortly after the first episode.  No correlating EEG abnormalities for these events. Portions of the recording obscured by movement artifact.    Final report to follow after completion of study.    [facility] EEG Reading Room Ph#: [phone]  Epilepsy Answering Service after 5PM and before 8:30AM: Ph#: [phone]

## 2024-05-02 NOTE — BH CONSULTATION LIAISON ASSESSMENT NOTE - NSBHATTESTCOMMENTATTENDFT_PSY_A_CORE
Patient seen and examined with Dr. Lanier. I agree with the above.  In addition, patient is alert, attentive. Denies any acute stressors, though there may be some stressor related to relationship with mom. Denies SI/HI. She is linear. Her affect appears euthymic.    While PNES is a diagnosis of exclusion, it appears that neurology captured a PNEs event. At this time, would recommend outpatient neurology with a PNES therapist.   Plan per above.

## 2024-05-02 NOTE — CHART NOTE - NSCHARTNOTEFT_GEN_A_CORE
Patient was noted to have an episode this AM consisting of whole body shaking for which RRT was called. EEG reviewed and discussed with epilepsy fellow. Episode was noted to consist of "slow leg shaking/head bobbing, side to side turning, heavy breathing and eventual more vigorous whole body shaking, chest/body thrusting" without correlating epileptiform abnormalities.     Impression: Psychogenic nonepileptic spells.    Recommendations:   - Okay to stop EEG  - Please do not administer benzodiazepines for psychogenic nonepileptic spells as these are not true seizures  - Recommend behavioral health evaluation  - Patient can also follow up with Rachelle Frausto Psychologist, with [facility]. PNES Program. Located at [address] [facility], [place] [address]. To schedule an appointment, email [email or web address] and/or call [phone]: ask for Sheryl and for an appointment with their epilepsy doctors for a psychogenic non epileptic seizure evaluation. Website: [email or web address]   - No additional acute inpatient neurologic work-up indicated at this time    Discussed with primary team.  Neurology signing off. Please call Art Sumo at [phone] with any questions.     Thank you. Patient was noted to have an episode this AM consisting of whole body shaking for which RRT was called. EEG reviewed and discussed with epilepsy fellow. Episode was noted to consist of "slow leg shaking/head bobbing, side to side turning, heavy breathing and eventual more vigorous whole body shaking, chest/body thrusting" without correlating epileptiform abnormalities.     Impression:Psychogenic nonepileptic spells.    Recommendations:   - Okay to stop EEG  - Please do not administer benzodiazepines for psychogenic nonepileptic spells as these are not true seizures  - Recommend behavioral health evaluation  - Patient can also follow up with Rachelle Frausto Psychologist, with [facility]. PNES Program. Located at [address] [facility], [place] [address]. To schedule an appointment, email [email or web address] and/or call [phone]: ask for Sheryl and for an appointment with their epilepsy doctors for a psychogenic non epileptic seizure evaluation. Website: [email or web address]   - No additional acute inpatient neurologic work-up indicated at this time    Discussed with primary team.  Neurology signing off. Please call LilaKutu at [phone] with any questions.     Thank you. Patient was noted to have an episode this AM consisting of whole body shaking for which RRT was called. EEG reviewed and discussed with epilepsy fellow. Episode was noted to consist of "slow leg shaking/head bobbing, side to side turning, heavy breathing and eventual more vigorous whole body shaking, chest/body thrusting" without correlating epileptiform abnormalities.     Impression:Psychogenic nonepileptic spells.    Recommendations:   - Okay to stop EEG  - Please do not administer benzodiazepines for psychogenic nonepileptic spells as these are not epileptic seizures  - Recommend behavioral health evaluation  - Patient can also follow up with Rachelle Frausto Psychologist, with [facility]. PNES Program. Located at [address] [place] [address], [place] [address]. To schedule an appointment, email [email or web address] and/or call [phone]: ask for Sheryl and for an appointment with their epilepsy doctors for a psychogenic non epileptic seizure evaluation. Website: [email or web address]   - No additional acute inpatient neurologic work-up indicated at this time    Discussed with primary team.  Neurology signing off. Please call SeatKarma at [phone] with any questions.     Thank you.

## 2024-05-02 NOTE — RAPID RESPONSE TEAM SUMMARY - NSADDTLFINDINGSRRT_GEN_ALL_CORE
RRT called for syncopal event/seizure like activity. Per RN at bedside, pt was using the commode and then had a syncopal event. Pt was moved back to the bed, where she had seizure-like activity with whole body shaking. Per neurology at bedside, these events are psychogenic nonepileptiform events and to avoid benzodiazepines as they are not seizures. Pt vital signs stable, BP 110s/70s, HR 70s, satting 100%. 
RRT called for seizure like activity. Per RN at bedside, pt had seizure-like activity with whole body shaking. Vital signs stable, BP 110s/70s, HR 70s, satting 100%. Pt had another seizure like event with rapid team arrival, had generalized shaking for about 10 seconds, with tongue biting and no urinary incontinence. Pt had corneal reflex to saline flush.

## 2024-05-02 NOTE — CHART NOTE - NSCHARTNOTEFT_GEN_A_CORE
Pt had a witnessed seizure like activity lasting approximately 2-3 minutes with full body convulsions. Pt at the time was connected to a EEG machine recording the entire event. Neurology, already following, was called and stated not to give any antiseizure medication as these seizure like activity is likely not true seizures. RRT came to bedside and ensured patent airway and when the seizure like activity ended pt's VS was stable and neurology examined pt at bedside. Prolactin was ordered for seizure workup. MAR ended RRT and will continue monitor pt.     Ru lomeli [facility] [id number]

## 2024-05-03 DIAGNOSIS — R55 SYNCOPE AND COLLAPSE: ICD-10-CM

## 2024-05-03 LAB
ALBUMIN SERPL ELPH-MCNC: 4.2 G/DL — SIGNIFICANT CHANGE UP (ref 3.3–5)
ALP SERPL-CCNC: 75 U/L — SIGNIFICANT CHANGE UP (ref 40–120)
ALT FLD-CCNC: 15 U/L — SIGNIFICANT CHANGE UP (ref 4–33)
ANION GAP SERPL CALC-SCNC: 13 MMOL/L — SIGNIFICANT CHANGE UP (ref 7–14)
AST SERPL-CCNC: 18 U/L — SIGNIFICANT CHANGE UP (ref 4–32)
BILIRUB SERPL-MCNC: 0.3 MG/DL — SIGNIFICANT CHANGE UP (ref 0.2–1.2)
BUN SERPL-MCNC: 11 MG/DL — SIGNIFICANT CHANGE UP (ref 7–23)
CALCIUM SERPL-MCNC: 9.1 MG/DL — SIGNIFICANT CHANGE UP (ref 8.4–10.5)
CHLORIDE SERPL-SCNC: 104 MMOL/L — SIGNIFICANT CHANGE UP (ref 98–107)
CO2 SERPL-SCNC: 23 MMOL/L — SIGNIFICANT CHANGE UP (ref 22–31)
CREAT SERPL-MCNC: 0.51 MG/DL — SIGNIFICANT CHANGE UP (ref 0.5–1.3)
EGFR: 122 ML/MIN/1.73M2 — SIGNIFICANT CHANGE UP
GLUCOSE BLDC GLUCOMTR-MCNC: 94 MG/DL — SIGNIFICANT CHANGE UP (ref 70–99)
GLUCOSE SERPL-MCNC: 92 MG/DL — SIGNIFICANT CHANGE UP (ref 70–99)
HCT VFR BLD CALC: 43.3 % — SIGNIFICANT CHANGE UP (ref 34.5–45)
HGB BLD-MCNC: 14.4 G/DL — SIGNIFICANT CHANGE UP (ref 11.5–15.5)
MAGNESIUM SERPL-MCNC: 1.9 MG/DL — SIGNIFICANT CHANGE UP (ref 1.6–2.6)
MCHC RBC-ENTMCNC: 30.8 PG — SIGNIFICANT CHANGE UP (ref 27–34)
MCHC RBC-ENTMCNC: 33.3 GM/DL — SIGNIFICANT CHANGE UP (ref 32–36)
MCV RBC AUTO: 92.7 FL — SIGNIFICANT CHANGE UP (ref 80–100)
NRBC # BLD: 0 /100 WBCS — SIGNIFICANT CHANGE UP (ref 0–0)
NRBC # FLD: 0 K/UL — SIGNIFICANT CHANGE UP (ref 0–0)
PHOSPHATE SERPL-MCNC: 3.6 MG/DL — SIGNIFICANT CHANGE UP (ref 2.5–4.5)
PLATELET # BLD AUTO: 210 K/UL — SIGNIFICANT CHANGE UP (ref 150–400)
POTASSIUM SERPL-MCNC: 3.9 MMOL/L — SIGNIFICANT CHANGE UP (ref 3.5–5.3)
POTASSIUM SERPL-SCNC: 3.9 MMOL/L — SIGNIFICANT CHANGE UP (ref 3.5–5.3)
PROT SERPL-MCNC: 7 G/DL — SIGNIFICANT CHANGE UP (ref 6–8.3)
RBC # BLD: 4.67 M/UL — SIGNIFICANT CHANGE UP (ref 3.8–5.2)
RBC # FLD: 12.9 % — SIGNIFICANT CHANGE UP (ref 10.3–14.5)
SODIUM SERPL-SCNC: 140 MMOL/L — SIGNIFICANT CHANGE UP (ref 135–145)
WBC # BLD: 7.94 K/UL — SIGNIFICANT CHANGE UP (ref 3.8–10.5)
WBC # FLD AUTO: 7.94 K/UL — SIGNIFICANT CHANGE UP (ref 3.8–10.5)

## 2024-05-03 PROCEDURE — 93010 ELECTROCARDIOGRAM REPORT: CPT

## 2024-05-03 PROCEDURE — 99222 1ST HOSP IP/OBS MODERATE 55: CPT

## 2024-05-03 PROCEDURE — 99233 SBSQ HOSP IP/OBS HIGH 50: CPT

## 2024-05-03 PROCEDURE — 99231 SBSQ HOSP IP/OBS SF/LOW 25: CPT

## 2024-05-03 RX ADMIN — CHLORHEXIDINE GLUCONATE 1 APPLICATION(S): 213 SOLUTION TOPICAL at 17:35

## 2024-05-03 RX ADMIN — Medication 650 MILLIGRAM(S): at 18:40

## 2024-05-03 RX ADMIN — ENOXAPARIN SODIUM 40 MILLIGRAM(S): 100 INJECTION SUBCUTANEOUS at 17:35

## 2024-05-03 RX ADMIN — Medication 650 MILLIGRAM(S): at 11:59

## 2024-05-03 RX ADMIN — Medication 650 MILLIGRAM(S): at 11:29

## 2024-05-03 NOTE — RAPID RESPONSE TEAM SUMMARY - NSOTHERINTERVENTIONSRRT_GEN_ALL_CORE
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 Pt put on EEG leads and neurology at bedside. Primary team to follow up neuro recs, EEG report, syncope workup.
RRT for seizure like activity. Pt admitted for PNES, no benzodiazepine given and shaking episodes terminated on its own after about 10 seconds. EEG reports so far did not note any seizures. Advised primary team and nurse at bedside to press the record button on the EEG machine when this recurs, and seizure precautions.
RRT for syncopal event, possibly related to PNES. TTE done during this admission with normal EF, EKG NSR, EEG negative for seizures, CTH negative for masses or hemorrhage. By end of rapid, pt back to baseline. Discussed with primary team to follow up neurology recs, CBC, CMP, and to obtain orthostatics.

## 2024-05-03 NOTE — PROGRESS NOTE ADULT - PROBLEM SELECTOR PLAN 2
Patient was previously on metformin, since discontinued RRT 5/3: "called for pt collapsing/syncopal event while participating in PT. No prodromal symptoms, and no seizure like activity at that time. Pt initially lethargic and not responding, however with sternal rub and corneal response test pt became alert"  Patient evaluated after episode A&Ox3 without focal deficits, will not obtain further imaging at this time  TTE EF 60% mild TR  CTH without acute changes  Neurology contacted  f/u orthostatic vitals  Maintain neuro checks q4h

## 2024-05-03 NOTE — CONSULT NOTE ADULT - SUBJECTIVE AND OBJECTIVE BOX
[facility] DEPARTMENT OF OPHTHALMOLOGY - INITIAL ADULT CONSULT  -----------------------------------------------------------------------------------------------------------------  Juarez Diane MD  PGY 3  Contact on Teams  -----------------------------------------------------------------------------------------------------------------    HPI:  39-year-old female with history of HLD, PCOS, GERD, Asthma,  gastric sleeve surgery, prediabetes, epilepsy, brought in by EMS from home after seizure at about 8 PM tonight.    Per  patient had seizures for several years, last time was over 10 years ago.    Patient  reported had a right upper molar tooth extraction 24  at 11 AM.  Patient noted she was home in kitchen with her  , about to get Motrin for pain when she suddenly collapsed to the ground, hit the back of her head and was shaking all over for about 15 seconds, after which patient was nonresponsive.  She noted  called 911.  EMS found patient "postictal", not responsive.  About 5 minutes afterwards EMS reported patient had a grand mal seizure for about a minute which was terminated with 5 mg of IM Versed.        Interval History: Consult blurred vision after seizure-like event.  Patient reports that she has difficulty seeing at near but can see at distance ok.     PAST MEDICAL & SURGICAL HISTORY:  Endometriosis      PCOS (polycystic ovarian syndrome)      Obesity (BMI 30-39.9)      Asthma  Albuterol Inhaler as needed- last use was over a year ago      Torsion of ovary, ovarian pedicle and fallopian tube      Other ovarian cyst, right side      Pelvic and perineal pain      History of prediabetes      Hyperlipidemia  No medications at this time      Seizure      S/P   , - tubal ligation also      S/P appendectomy      S/P hemorrhoidectomy      Elective surgery  Diagnostic Laparoscopy with FABRICIO and Cystoscopy       H/O gastric sleeve        Past Ocular History: None   Ophthalmic Medications: NOne  FAMILY HISTORY:  FH: type 2 diabetes (Father)        MEDICATIONS  (STANDING):  chlorhexidine 2% Cloths 1 Application(s) Topical daily  enoxaparin Injectable 40 milliGRAM(s) SubCutaneous every 24 hours    MEDICATIONS  (PRN):  acetaminophen     Tablet .. 650 milliGRAM(s) Oral every 6 hours PRN Mild Pain (1 - 3), Moderate Pain (4 - 6)  aluminum hydroxide/magnesium hydroxide/simethicone Suspension 30 milliLiter(s) Oral every 4 hours PRN Dyspepsia  melatonin 3 milliGRAM(s) Oral at bedtime PRN Insomnia  ondansetron Injectable 4 milliGRAM(s) IV Push every 8 hours PRN Nausea and/or Vomiting    Allergies & Intolerances:   No Known Allergies    Review of Systems:  Constitutional: No fever, chills  Eyes: No blurry vision, flashes, floaters, FBS, erythema, discharge, double vision, OU  Neuro: No tremors  Cardiovascular: No chest pain, palpitations  Respiratory: No SOB, no cough  GI: No nausea, vomiting, abdominal pain  : No dysuria  Skin: no rash  Psych: no depression  Endocrine: no polyuria, polydipsia  Heme/lymph: no swelling    VITALS: T(C): 36.6 (24 @ 17:11)  T(F): 97.9 (24 @ 17:11), Max: 98.5 (24 @ 10:30)  HR: 78 (24 @ 17:11) (66 - 90)  BP: 90/56 (24 @ 17:11) (90/56 - 145/96)  RR:  (18 - 18)  SpO2:  (98% - 100%)  Wt(kg): --  General: AAO x 3, appropriate mood and affect    Ophthalmology Exam:  Visual acuity (cc): 20/20 OD 20/20 OS  Pupils: PERRL OU, no APD  Ttono: 14 OU  Extraocular movements (EOMs): Full OU, no pain, no diplopia  Confrontational Visual Field (CVF): Full OU    Pen Light Exam (PLE)  External: Flat OU  Lids/Lashes/Lacrimal Ducts: Flat OU    Sclera/Conjunctiva: W+Q OU  Cornea: Cl OU  Anterior Chamber: D+F OU    Iris: Flat OU  Lens: Cl OU    Fundus Exam: dilated with 1% tropicamide and 2.5% phenylephrine  Approval obtained from primary team for dilation  Patient aware that pupils can remained dilated for at least 4-6 hours  Exam performed with 20D lens    Vitreous: wnl OU  Disc, cup/disc: sharp and pink, 0.4 OU  Macula: wnl OU  Vessels: wnl OU  Periphery: wnl OU         [facility] DEPARTMENT OF OPHTHALMOLOGY - INITIAL ADULT CONSULT  -----------------------------------------------------------------------------------------------------------------  Juarez Diane MD  PGY 3  Contact on Teams  -----------------------------------------------------------------------------------------------------------------    HPI:  39-year-old female with history of HLD, PCOS, GERD, Asthma,  gastric sleeve surgery, prediabetes, epilepsy, brought in by EMS from home after seizure at about 8 PM tonight.    Per  patient had seizures for several years, last time was over 10 years ago.    Patient  reported had a right upper molar tooth extraction 24  at 11 AM.  Patient noted she was home in kitchen with her  , about to get Motrin for pain when she suddenly collapsed to the ground, hit the back of her head and was shaking all over for about 15 seconds, after which patient was nonresponsive.  She noted  called 911.  EMS found patient "postictal", not responsive.  About 5 minutes afterwards EMS reported patient had a grand mal seizure for about a minute which was terminated with 5 mg of IM Versed.    Interval History: Consult blurred vision after seizure-like event.  Patient reports that she has difficulty seeing at near but can see at distance ok.     PAST MEDICAL & SURGICAL HISTORY:  Endometriosis      PCOS (polycystic ovarian syndrome)      Obesity (BMI 30-39.9)      Asthma  Albuterol Inhaler as needed- last use was over a year ago      Torsion of ovary, ovarian pedicle and fallopian tube      Other ovarian cyst, right side      Pelvic and perineal pain      History of prediabetes      Hyperlipidemia  No medications at this time      Seizure      S/P   , - tubal ligation also      S/P appendectomy      S/P hemorrhoidectomy      Elective surgery  Diagnostic Laparoscopy with FABRICIO and Cystoscopy       H/O gastric sleeve        Past Ocular History: None   Ophthalmic Medications: NOne  FAMILY HISTORY:  FH: type 2 diabetes (Father)        MEDICATIONS  (STANDING):  chlorhexidine 2% Cloths 1 Application(s) Topical daily  enoxaparin Injectable 40 milliGRAM(s) SubCutaneous every 24 hours    MEDICATIONS  (PRN):  acetaminophen     Tablet .. 650 milliGRAM(s) Oral every 6 hours PRN Mild Pain (1 - 3), Moderate Pain (4 - 6)  aluminum hydroxide/magnesium hydroxide/simethicone Suspension 30 milliLiter(s) Oral every 4 hours PRN Dyspepsia  melatonin 3 milliGRAM(s) Oral at bedtime PRN Insomnia  ondansetron Injectable 4 milliGRAM(s) IV Push every 8 hours PRN Nausea and/or Vomiting    Allergies & Intolerances:   No Known Allergies    Review of Systems:  Constitutional: No fever, chills  Eyes: No blurry vision, flashes, floaters, FBS, erythema, discharge, double vision, OU  Neuro: No tremors  Cardiovascular: No chest pain, palpitations  Respiratory: No SOB, no cough  GI: No nausea, vomiting, abdominal pain  : No dysuria  Skin: no rash  Psych: no depression  Endocrine: no polyuria, polydipsia  Heme/lymph: no swelling    VITALS: T(C): 36.6 (24 @ 17:11)  T(F): 97.9 (24 @ 17:11), Max: 98.5 (24 @ 10:30)  HR: 78 (24 @ 17:11) (66 - 90)  BP: 90/56 (24 @ 17:11) (90/56 - 145/96)  RR:  (18 - 18)  SpO2:  (98% - 100%)  Wt(kg): --  General: AAO x 3, appropriate mood and affect    Ophthalmology Exam:  Visual acuity (cc): 20/20 OD 20/20 OS  Pupils: PERRL OU, no APD  Ttono: 14 OU  Extraocular movements (EOMs): Full OU, no pain, no diplopia  Confrontational Visual Field (CVF): Full OU    Pen Light Exam (PLE)  External: Flat OU  Lids/Lashes/Lacrimal Ducts: Flat OU    Sclera/Conjunctiva: W+Q OU  Cornea: Cl OU  Anterior Chamber: D+F OU    Iris: Flat OU  Lens: Cl OU    Fundus Exam: dilated with 1% tropicamide and 2.5% phenylephrine  Approval obtained from primary team for dilation  Patient aware that pupils can remained dilated for at least 4-6 hours  Exam performed with 20D lens    Vitreous: wnl OU  Disc, cup/disc: sharp and pink, 0.4 OU  Macula: wnl OU  Vessels: wnl OU  Periphery: wnl OU

## 2024-05-03 NOTE — PROGRESS NOTE ADULT - PROBLEM SELECTOR PLAN 4
98/68 this AM  Per family patient's BP usually runs soft in systolic 90s  Patient with otherwise afebrile without leukocytosis, doubt infectious etiology  Lactic acid 2.4->1.0 s/p IVF  Monitor BP Per family patient does not use inhalers at home  Monitor respiratory status

## 2024-05-03 NOTE — PHYSICAL THERAPY INITIAL EVALUATION ADULT - PASSIVE RANGE OF MOTION EXAMINATION, REHAB EVAL
Alfonzo
bilateral upper extremity Passive ROM was WFL (within functional limits)/bilateral lower extremity Passive ROM was WFL (within functional limits)

## 2024-05-03 NOTE — PHYSICAL THERAPY INITIAL EVALUATION ADULT - PATIENT PROFILE REVIEW, REHAB EVAL
yes ACTIVITY ORDER: Ambulate as Tolerated; Spoke with JOSIE Griffith prior to PT evaluation-->Pt OK for PT consult/OOB activity./yes

## 2024-05-03 NOTE — PHYSICAL THERAPY INITIAL EVALUATION ADULT - ADDITIONAL COMMENTS
Pt lives in an apartment with her , daughter, and son with no step to enter; and a flight of stairs to negotiate to the 2nd floor where her bedroom is; (+)1 handrail. Prior to hospital admission, pt was completely independent and used no assistive device with ambulation. Pt denies any recent falls.    Pt left comfortable in bed, Medical team and RN at bedside for rapid response, pt now responsive (it took several minutes for pt to become responsive), NAD, all lines intact, all precautions maintained, with call bell in reach.

## 2024-05-03 NOTE — PHYSICAL THERAPY INITIAL EVALUATION ADULT - GENERAL OBSERVATIONS, REHAB EVAL
Pt encountered in semisupine position, no distress, AxOx4, with +IV, +cardiac monitor, +pulse oximeter, and RN in room. Pt agreeable to participate in PT evaluation. Vitals taken; /84mmHg, heart rate 75bpm

## 2024-05-03 NOTE — OCCUPATIONAL THERAPY INITIAL EVALUATION ADULT - IMPAIRED TRANSFERS: SIT/STAND, REHAB EVAL
1. Start Ranexa 500 mg by mouth twice daily. Please call office if medication is too expensive and insurance does not cover it.     2. Take sublingual nitroglycerin as needed when you have chest pain.     3. Follow-up with Dr. Randolph in 2 weeks.    Patient sat at edge of bed, reporting dizziness which resolved after ~15 seconds in sitting. Patient stood up with supervision. After ~5 seconds, patient suddenly collapsed, eyes rolled back and closed, became unresponsive. JOSIE Perez at bedside. RRT called and medical team came at bedside. /96 mmHg, HR 86 bpm, SpO2 100% on room air. Patient became responsive after a few minutes. Patient left with medical team at bedside in NAD. Patient sat at edge of bed, reporting dizziness which resolved after ~15 seconds in sitting. Patient stood up with supervision. After ~5 seconds, patient suddenly collapsed onto bed, eyes rolled back and closed, became unresponsive. JOSIE Perez at bedside. RRT called and medical team came at bedside. /96 mmHg, HR 86 bpm, SpO2 100% on room air. Patient became responsive after a few minutes. Patient left with medical team at bedside in NAD.

## 2024-05-03 NOTE — PROGRESS NOTE ADULT - TIME BILLING
50 minutes of total time dedicated to this patient visit today including preparing to see the patient (eg. review of tests), obtaining and/or reviewing separately obtained history, obtaining/reviewing vitals, performing a medically appropriate examination and/or evaluation, counseling and educating the patient/family/caregiver, reviewing previous notes and test results, and procedures, communicating with other health professionals (when not separately reported), and documenting clinical information in the electronic health record).
50 minutes of total time dedicated to this patient visit today including preparing to see the patient (eg. review of tests), obtaining and/or reviewing separately obtained history, obtaining/reviewing vitals, performing a medically appropriate examination and/or evaluation, counseling and educating the patient/family/caregiver, reviewing previous notes and test results, and procedures, communicating with other health professionals (when not separately reported), and documenting clinical information in the electronic health record).

## 2024-05-03 NOTE — PROGRESS NOTE ADULT - PROBLEM SELECTOR PLAN 1
RRT 5/2 with 2 shaking episodes: First one with some slow leg shaking/head bobbing, side to side turning, heavy breathing and eventual more vigorous whole body shaking, chest/body thrusting; duration 2-3 mins. Second one with brief immediate body shaking for about 5 secs shortly after the first episode.  RRT 5/3 with shaking episode followed by blurry vision, patient endorses blurry vision for 3-4 hours post episodes, ophtho consulted f/u recommendations   Appreciate neurology eval, per neurology patient has psychogenic nonepileptiform seizures, shaking episodes lack correlating epileptiform abnormalities on EEG. Neuro recommended to discontinue EEG.  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

## 2024-05-03 NOTE — PROGRESS NOTE ADULT - REASON FOR ADMISSION
seizure, transferred from [facility]
seizure, transferred from [facility]
seizure, transferred from [facility]

## 2024-05-03 NOTE — PROGRESS NOTE ADULT - ASSESSMENT
Ms. Lynn is a 38 yo woman with psychogenic nonepileptic spells.  I agree with work up and management as in our Chart Note from 5/2/24.  D/W patient and 2 daughters the diagnosis and treatment plan.  Neurology signing off. Please reconsult PRN or call [place] [phone] with any questions.   Thank you 
39-year-old female with history of ?seizures (last 10 years ago, not on AEDs), HLD, PCOS, GERD, Asthma,  gastric sleeve surgery, prediabetes, epilepsy, recent tooth extraction 4/30 BIBEMS to OSH after shaking. Patient collapsed +HS shaking 15 seconds on 4/30.  Patient was administered keppra and versed at OSH ED. Patient was transferred to [facility] for neuro eval. Per neuology patient has psychogenic nonepileptiform seizures, shaking episodes lack correlating epileptiform abnormalities on EEG. f/u Psych eval.      
39-year-old female with history of ?seizures (last 10 years ago, not on AEDs), HLD, PCOS, GERD, Asthma,  gastric sleeve surgery, prediabetes, epilepsy, recent tooth extraction 4/30 BIBEMS to OSH after shaking. Patient collapsed +HS shaking 15 seconds.  Patient was administered keppra and versed at OSH ED. Patient was transferred to [facility] for neuro eval. 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 f/u Psych eval.

## 2024-05-03 NOTE — PHYSICAL THERAPY INITIAL EVALUATION ADULT - PERTINENT HX OF CURRENT PROBLEM, REHAB EVAL
39-year-old female with history of HLD, PCOS, GERD, Asthma,  gastric sleeve surgery, prediabetes, epilepsy, brought in by EMS from home after seizure at about 8 PM tonight.    Per  patient had seizures for several years, last time was over 10 years ago.  Patient  had a right upper molar tooth extraction 4/30/24  at 11 AM.   suddenly collapsed to the ground, hit the back of her head and was shaking all over for about 15 seconds, on 4/30 pm. after which patient was nonresponsive.  She noted  called 911.  Patient was initially seen at [facility] but now Transferred to [facility] for neuro eval 39-year-old female with history of HLD, PCOS, GERD, Asthma,  gastric sleeve surgery, prediabetes, epilepsy, brought in by EMS from home after seizure.Per  patient had seizures for several years, last time was over 10 years ago. Patient  had a right upper molar tooth extraction 4/30/24  at 11 AM.   suddenly collapsed to the ground, hit the back of her head and was shaking all over for about 15 seconds, on 4/30 pm. after which patient was nonresponsive.  She noted  called 911.Patient was initially seen at [facility] but now Transferred to [facility] for neuro eval. Head CT: No obvious acute intracranial hemorrhage or displaced skull fracture. Cervical spine CT: No obvious acute fracture or traumatic malalignment in the cervical spine. If there is clinical suspicion for acute fracture or ligamentous/cord injury.

## 2024-05-03 NOTE — PROGRESS NOTE ADULT - PROBLEM SELECTOR PLAN 3
Per family patient does not use inhalers at home  Monitor respiratory status Patient was previously on metformin, since discontinued

## 2024-05-03 NOTE — CONSULT NOTE ADULT - ATTENDING COMMENTS
39y female with a past medical history of HLD, PCOS, GERD, Asthma,  gastric sleeve surgery, prediabetes, epilepsy consulted for blurry vision after seizure. Pt with normal eye exam. Subjective blurred vision likely secondary to recent seizure. Follow-up as an outpatient.
A few minutes into our exam she had an episode:   Eyes remained closed, random saccadic eye movements noted when eyes opened by examiner.  Random movements of all four extremities, head and trunk side to side.  Not clonic, not myoclonic, not rhythmic, not synchronous, some moaning.  When asked to "show me two fingers" - she slightly extended her fingers on the right hand.  Lasted 1-2 minutes.      Previous episodes were 17 years ago when she was pregnant.     A/P  Ms. Lynn is a 38 yo woman with episodes most consistent with a diagnosis of psychogenic nonepileptic spells.  I agree with work up and management as above - do not treat episodes with antiseizure medication or benzodiazepines.   Admit to EMU.   Thank you.

## 2024-05-03 NOTE — PHYSICAL THERAPY INITIAL EVALUATION ADULT - LEVEL OF INDEPENDENCE: SIT/STAND, REHAB EVAL
During transfer pt synopsized/collapsed with PT/OT catching pt; pt did not fall or hit head. Pt returned to bed, rapid response called for unresponsiveness. vitals taken; /96mmHg, heart rate 88bpm, SpO2 100%, Blood sugar 94/contact guard During transfer pt synopsized/collapsed with PT/OT catching pt; pt did not fall or hit head. Pt returned to bed, rapid response called for unresponsiveness. vitals taken; /96mmHg, heart rate 88bpm, SpO2 100%, Blood sugar 94, no seizure like activity noted/contact guard

## 2024-05-03 NOTE — PROGRESS NOTE ADULT - PROBLEM SELECTOR PLAN 5
DVT ppx: lovenox  Dispo: f/u PT eval  f/u labs today DVT ppx: lovenox  Dispo: f/u PT dispo recommendations pending furhter gait assessment  f/u labs today 98/68 this AM  Per family patient's BP usually runs soft in systolic 90s  Patient with otherwise afebrile without leukocytosis, doubt infectious etiology  Lactic acid 2.4->1.0 s/p IVF  Monitor BP

## 2024-05-03 NOTE — CHART NOTE - NSCHARTNOTEFT_GEN_A_CORE
Pt is s/p RRT for unresponsiveness.    Earlier this AM pt was working with PT when she suddenly became unresponsive. Pt was helped into bed by nursing and PT.  Pt unresponsive to sternal rub. Blinked when NS was sprayed into eyes. Pt avoiding face during arm drop test.   EKG NSR. BMP, CBC, and VBG sent.   Discussed with neurology. This is consistent with prior PNES. No further inpatient work-up indicated.  Discussed with Dr. Felix. Pt is s/p RRT for unresponsiveness.    Earlier this AM pt was working with PT when she suddenly became unresponsive. Pt was helped into bed by nursing and PT.  Pt unresponsive to sternal rub. Blinked when NS was sprayed into eyes. Pt avoiding face during arm drop test.   EKG NSR. BMP, CBC, and VBG sent.   Discussed with neurology. This is consistent with prior PNES. No further inpatient work-up indicated.  Discussed with Dr. Felix.       Addendum:   Pt had another episode of unresponsiveness while being seen by opthalmology this afternoon.   Pt seen and examined bedside. Similar presentation to prior RRT. Unresponsiveness to sternal rub but has corneal reflex. Pt returned to baseline, no focal neurological deficits on exam.  Discussed with daughters bedside pt likely has PNES and needs neurological and psychiatric f/u.

## 2024-05-03 NOTE — OCCUPATIONAL THERAPY INITIAL EVALUATION ADULT - GENERAL OBSERVATIONS, REHAB EVAL
Patient received semisupine in bed in NAD; agreeable to participate in OT evaluation. +telemetry monitor. /84 mmHg.

## 2024-05-03 NOTE — PROVIDER CONTACT NOTE (OTHER) - ACTION/TREATMENT ORDERED:
Provider made aware- as per neuro and psych, pt cleared and ready for DC 5/3. Seizure like activities noted to be pseudoseizures. RRT called earlier in shift for similar event. Continue to monitor.
Provider made aware- no new interventions at this time. VSQ4 continued.

## 2024-05-03 NOTE — PROVIDER CONTACT NOTE (OTHER) - BACKGROUND
Pt admitted for convulsions. EEG DC'd by neuro 5/2, TTE and EEG negative.
Pt admitted for convulsions

## 2024-05-03 NOTE — RAPID RESPONSE TEAM SUMMARY - NSSITUATIONBACKGROUNDRRT_GEN_ALL_CORE
39F with history of HLD, PCOS, GERD, Asthma,  gastric sleeve surgery, prediabetes, epilepsy, brought in by EMS from home after seizure like activity. RRT called for pt collapsing/syncopal event while participating in PT. No prodromal symptoms, and no seizure like activity at that time. Pt initially lethargic and not responding, however with sternal rub and corneal response test pt became alert and stated that her head hurts. No head strike during syncopal event. Afebrile, FSG >100, BP 140s/90s, satting 100% on RA. CTH at admission negative for hemorrhage or masses, EEG report x2 negative for seizures, current presentation similar to her admission presentation for syncopal event and seizure like activity. Neurology on board, pt likely with PNES.
39F with history of HLD, PCOS, GERD, Asthma,  gastric sleeve surgery, prediabetes, epilepsy, brought in by EMS from home after seizure like activity.
39F with history of HLD, PCOS, GERD, Asthma,  gastric sleeve surgery, prediabetes, epilepsy, brought in by EMS from home after seizure like activity.

## 2024-05-03 NOTE — PROVIDER CONTACT NOTE (OTHER) - ASSESSMENT
Pt AOX4, Pt BP 94/60 0045, 95/64 0445, HR stable 67-71 as per flow sheet. Pt asymptomatic, sleeping comfortably. No s/s of distress noted.
Pt AOX4, upon handoff, pt was seen having seizure like activity- HR elevated to 160's, pt continued to sat above 90% during event. Extremities shaking vigorously in bed. Event lasted approx 2 minutes and pt HR back WNL to 70-90's. /89, satting 98% on RA. Post event, pt c/o of hot and cold sensations.

## 2024-05-03 NOTE — OCCUPATIONAL THERAPY INITIAL EVALUATION ADULT - PERTINENT HX OF CURRENT PROBLEM, REHAB EVAL
39 year old female with history of ?seizures (last 10 years ago, not on AEDs), HLD, PCOS, GERD, Asthma,  gastric sleeve surgery, prediabetes, epilepsy, recent tooth extraction 4/30 admitted to outside hospital after episode of shaking. Patient was transferred to [facility] for neuro eval. Per neurology, patient has psychogenic nonepileptiform seizures, shaking episodes lack correlating epileptiform abnormalities on EEG.

## 2024-05-03 NOTE — PROVIDER CONTACT NOTE (OTHER) - SITUATION
Pt BP 94/60 0045, 95/64 0445- provider notified 0501
Pt had seizure like activity noted during change of shift/handoff, lasting approximately 2 minutes.

## 2024-05-03 NOTE — CONSULT NOTE ADULT - ASSESSMENT
39y female with a past medical history/ocular history of HLD, PCOS, GERD, Asthma,  gastric sleeve surgery, prediabetes, epilepsy consulted for blurry vision after seizure-like  found to have normal eye exam and 20/20 vision in each eye with reading glasses.    #Blurry vision OU   - VA 20/20 OU, no rAPD, color vision full, IOP wnl  EOMs full and without pain. Dilated fundus exam WNL.    - Please reconsult ophthalmology with any acute vision changes    Seen and discussed with Dr. Gurrola    Outpatient Follow-up: Patient should follow-up with his/her ophthalmologist or with [facility] Department of Ophthalmology within 1 week of after discharge at:    [address]  [place] [address]  [phone]    Juarez Diane MD, PGY-3  Also available on Microsoft Teams       39y female with a past medical history/ocular history of HLD, PCOS, GERD, Asthma,  gastric sleeve surgery, prediabetes, epilepsy consulted for blurry vision after seizure-like  found to have normal eye exam and 20/20 vision in each eye with reading glasses.    #Blurry vision OU   - VA 20/20 OU, no rAPD, color vision full, IOP wnl  EOMs full and without pain. Dilated fundus exam WNL.    - Please reconsult ophthalmology with any acute vision changes    Seen and discussed with Dr. Gurrola    Outpatient Follow-up: Patient should follow-up with his/her ophthalmologist or with [facility] Department of Ophthalmology within 1 week of after discharge at:    [address]  [place] [address]  [phone]    Juarez Diane MD, PGY-3  Also available on [employer] Teams

## 2024-05-04 ENCOUNTER — TRANSCRIPTION ENCOUNTER (OUTPATIENT)
Age: 39
End: 2024-05-04

## 2024-05-04 VITALS
HEART RATE: 72 BPM | SYSTOLIC BLOOD PRESSURE: 92 MMHG | RESPIRATION RATE: 19 BRPM | DIASTOLIC BLOOD PRESSURE: 59 MMHG | TEMPERATURE: 98 F | OXYGEN SATURATION: 98 %

## 2024-05-04 LAB
ADD ON TEST-SPECIMEN IN LAB: SIGNIFICANT CHANGE UP
ANION GAP SERPL CALC-SCNC: 12 MMOL/L — SIGNIFICANT CHANGE UP (ref 7–14)
BUN SERPL-MCNC: 10 MG/DL — SIGNIFICANT CHANGE UP (ref 7–23)
CALCIUM SERPL-MCNC: 9.1 MG/DL — SIGNIFICANT CHANGE UP (ref 8.4–10.5)
CHLORIDE SERPL-SCNC: 104 MMOL/L — SIGNIFICANT CHANGE UP (ref 98–107)
CO2 SERPL-SCNC: 23 MMOL/L — SIGNIFICANT CHANGE UP (ref 22–31)
CORTIS AM PEAK SERPL-MCNC: 10 UG/DL — SIGNIFICANT CHANGE UP (ref 6–18.4)
CREAT SERPL-MCNC: 0.41 MG/DL — LOW (ref 0.5–1.3)
EGFR: 129 ML/MIN/1.73M2 — SIGNIFICANT CHANGE UP
GLUCOSE SERPL-MCNC: 104 MG/DL — HIGH (ref 70–99)
HCT VFR BLD CALC: 38.1 % — SIGNIFICANT CHANGE UP (ref 34.5–45)
HGB BLD-MCNC: 13.2 G/DL — SIGNIFICANT CHANGE UP (ref 11.5–15.5)
MAGNESIUM SERPL-MCNC: 1.8 MG/DL — SIGNIFICANT CHANGE UP (ref 1.6–2.6)
MCHC RBC-ENTMCNC: 31.1 PG — SIGNIFICANT CHANGE UP (ref 27–34)
MCHC RBC-ENTMCNC: 34.6 GM/DL — SIGNIFICANT CHANGE UP (ref 32–36)
MCV RBC AUTO: 89.9 FL — SIGNIFICANT CHANGE UP (ref 80–100)
NRBC # BLD: 0 /100 WBCS — SIGNIFICANT CHANGE UP (ref 0–0)
NRBC # FLD: 0 K/UL — SIGNIFICANT CHANGE UP (ref 0–0)
PHOSPHATE SERPL-MCNC: 3.8 MG/DL — SIGNIFICANT CHANGE UP (ref 2.5–4.5)
PLATELET # BLD AUTO: 219 K/UL — SIGNIFICANT CHANGE UP (ref 150–400)
POTASSIUM SERPL-MCNC: 3.7 MMOL/L — SIGNIFICANT CHANGE UP (ref 3.5–5.3)
POTASSIUM SERPL-SCNC: 3.7 MMOL/L — SIGNIFICANT CHANGE UP (ref 3.5–5.3)
RBC # BLD: 4.24 M/UL — SIGNIFICANT CHANGE UP (ref 3.8–5.2)
RBC # FLD: 12.9 % — SIGNIFICANT CHANGE UP (ref 10.3–14.5)
SODIUM SERPL-SCNC: 139 MMOL/L — SIGNIFICANT CHANGE UP (ref 135–145)
WBC # BLD: 6.05 K/UL — SIGNIFICANT CHANGE UP (ref 3.8–10.5)
WBC # FLD AUTO: 6.05 K/UL — SIGNIFICANT CHANGE UP (ref 3.8–10.5)

## 2024-05-04 PROCEDURE — 99239 HOSP IP/OBS DSCHRG MGMT >30: CPT

## 2024-05-04 RX ORDER — IBUPROFEN 200 MG
1 TABLET ORAL
Refills: 0 | DISCHARGE

## 2024-05-04 RX ORDER — GABAPENTIN 400 MG/1
1 CAPSULE ORAL
Qty: 90 | Refills: 0
Start: 2024-05-04 | End: 2024-06-02

## 2024-05-04 RX ORDER — GABAPENTIN 400 MG/1
100 CAPSULE ORAL THREE TIMES A DAY
Refills: 0 | Status: DISCONTINUED | OUTPATIENT
Start: 2024-05-04 | End: 2024-05-04

## 2024-05-04 RX ORDER — ACETAMINOPHEN 500 MG
2 TABLET ORAL
Refills: 0 | DISCHARGE

## 2024-05-04 RX ADMIN — GABAPENTIN 100 MILLIGRAM(S): 400 CAPSULE ORAL at 13:56

## 2024-05-04 RX ADMIN — Medication 650 MILLIGRAM(S): at 01:36

## 2024-05-04 RX ADMIN — Medication 650 MILLIGRAM(S): at 02:36

## 2024-05-04 NOTE — DISCHARGE NOTE NURSING/CASE MANAGEMENT/SOCIAL WORK - PATIENT PORTAL LINK FT
You can access the FollowMyHealth Patient Portal offered by [facility] by registering at the following website: [email or web address]. By joining [facility]’s FollowMyHealth portal, you will also be able to view your health information using other applications (apps) compatible with our system.

## 2024-05-04 NOTE — DISCHARGE NOTE PROVIDER - ATTENDING DISCHARGE PHYSICAL EXAMINATION:
Vital Signs Last 24 Hrs  T(C): 36.6 (04 May 2024 09:20), Max: 36.9 (04 May 2024 01:00)  T(F): 97.9 (04 May 2024 09:20), Max: 98.4 (04 May 2024 01:00)  HR: 75 (04 May 2024 09:20) (70 - 78)  BP: 96/67 (04 May 2024 09:20) (90/56 - 102/67)  BP(mean): --  RR: 18 (04 May 2024 09:20) (18 - 18)  SpO2: 98% (04 May 2024 09:20) (98% - 100%)    Parameters below as of 04 May 2024 09:20  Patient On (Oxygen Delivery Method): room air        CONSTITUTIONAL: Well-groomed, in no apparent distress  EYES: No conjunctival or scleral injection, non-icteric;   ENMT: No external nasal lesions; MMM  NECK: Trachea midline without palpable neck mass; thyroid not enlarged and non-tender  RESPIRATORY: Breathing comfortably; no dullness to percussion; lungs CTA without wheeze/rhonchi/rales  CARDIOVASCULAR: +S1S2, RRR, no M/G/R; pedal pulses full and symmetric; no lower extremity edema  GASTROINTESTINAL: No palpable masses or tenderness, +BS throughout, no rebound/guarding; no hepatosplenomegaly; no hernia palpated  LYMPHATIC: No cervical LAD or tenderness  SKIN: No rashes or ulcers noted  NEUROLOGIC: CN II-XII intact; sensation intact in LEs b/l to light touch  PSYCHIATRIC: A+O x 3; mood and affect appropriate; appropriate insight and judgment

## 2024-05-04 NOTE — DISCHARGE NOTE PROVIDER - DISCHARGING ATTENDING PHYSICIAN:
I spoke with pt's daughter Cass that stated she had not scheduled her Colonoscopy yet. She did take down Procedure 's phone number [phone]. She was instructed if no one answered to leave her name and phone number for someone to return her call this is per Procedure 's instructions. I will ask Dr Negron to re-enter order.       Kai Felix

## 2024-05-04 NOTE — DISCHARGE NOTE PROVIDER - NSDCCPCAREPLAN_GEN_ALL_CORE_FT
PRINCIPAL DISCHARGE DIAGNOSIS  Diagnosis: Psychogenic nonepileptic seizure  Assessment and Plan of Treatment: EEG was negative even during shaking episodes  CT head did not show any acute findings  You were evaluated by neurology recommending no further inpatient workup  You were evaluated by psychiatry recommending follow-up as outpatient with PNES specialist/therapist      SECONDARY DISCHARGE DIAGNOSES  Diagnosis: Hypotension  Assessment and Plan of Treatment: Unlikely infectious - you don't have a white count or fever  Family says your blood pressure is soft at baseline  Please follow-up with your PCP within a week for a BP check     PRINCIPAL DISCHARGE DIAGNOSIS  Diagnosis: Psychogenic nonepileptic seizure  Assessment and Plan of Treatment: EEG was negative even during shaking episodes  CT head did not show any acute findings  You were evaluated by neurology recommending no further inpatient workup  You were evaluated by psychiatry recommending follow-up as outpatient with PNES specialist and therapist  Follow-up with epileptology at [address] [place] [address] 1-2 weeks after discharge. Call [phone] to schedule this appointment.         SECONDARY DISCHARGE DIAGNOSES  Diagnosis: Hypotension  Assessment and Plan of Treatment: Unlikely infectious - you don't have a white count or fever  Family says your blood pressure is soft at baseline  Please follow-up with your PCP within a week for a BP check     PRINCIPAL DISCHARGE DIAGNOSIS  Diagnosis: Psychogenic nonepileptic seizure  Assessment and Plan of Treatment: EEG was negative even during shaking episodes  CT head did not show any acute findings  You were evaluated by neurology recommending no further inpatient workup  You were evaluated by psychiatry recommending follow-up as outpatient with PNES specialist and therapist  Follow-up with epileptology at [address] [place] [address] 1-2 weeks after discharge. Call [phone] to schedule this appointment.         SECONDARY DISCHARGE DIAGNOSES  Diagnosis: Hypotension  Assessment and Plan of Treatment: Unlikely infectious - you don't have a white count or fever  Family says your blood pressure is soft at baseline  Please follow-up with your PCP within a week for a BP check    Diagnosis: Sciatica  Assessment and Plan of Treatment: Patient describes chronic sciatica with pain radiating down b/l legs. Denies numbness or tingling. Will start on gabapentin 100mg TID. Please follow-up with PCP for further monitoring within 1-2 weeks.

## 2024-05-04 NOTE — DISCHARGE NOTE PROVIDER - NSDCFUADDAPPT_GEN_ALL_CORE_FT
For acute crisis: [facility]  [address], [facility], [address], [place] [address] [phone] - [email or web address]    You can also follow up with Rachelle Frausto, Psychologist, with [facility]. PNES Program. Located at [address], [address] [address]. To schedule an appointment, email [email or web address] and/or call [phone]: ask for Sheryl and for an appointment with their epilepsy doctors for a psychogenic non epileptic seizure evaluation. Website: [email or web address]     Please follow-up with your ophthalmologist or with [facility] Department of Ophthalmology within 1 week of after discharge at:  [address]. [address]  [place] [address]  [phone]

## 2024-05-04 NOTE — DISCHARGE NOTE PROVIDER - HOSPITAL COURSE
39-year-old female with history of ?seizures (last 10 years ago, not on AEDs), HLD, PCOS, GERD, Asthma,  gastric sleeve surgery, prediabetes, epilepsy, recent tooth extraction 4/30 BIBEMS to OSH after shaking. Patient collapsed +HS shaking 15 seconds. Patient was administered keppra and versed at OSH ED. Patient was transferred to [facility] for neuro eval. CTH and CT C spine did not show any acute changes. Course c/b RRT 5/2 with 2 shaking episodes: First one with some slow leg shaking/head bobbing, side to side turning, heavy breathing and eventual more vigorous whole body shaking, chest/body thrusting; duration 2-3 mins. Second one with brief immediate body shaking for about 5 secs shortly after the first episode. RRT 5/3 with shaking episode followed by blurry vision, patient endorses blurry vision for 3-4 hours post episodes, ophtho consulted and performed eye exam wnl recommending no acute intervention. 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 Neurology recommended no further inpatient eval and to discontinue EEG. Recommended to hold further AEDs or benzos. Psych evaluated recommending outpatient f/u with PNES specialist as outpatient Rachelle Frausto. Course c/b elevated lactic acid 2.4 which resolved s/p IVF. Course c/b hypotension to 90s however family says patient's BP runs low usually - patient otherwise afebrile without leukocytosis and otherwise hemodynamically stable - BP systolic 100s on day of discharge. PT evaluated recommending discharge home with no needs - repeated gait assessment 5/4.

## 2024-05-04 NOTE — DISCHARGE NOTE NURSING/CASE MANAGEMENT/SOCIAL WORK - NSDCPEFALRISK_GEN_ALL_CORE
For information on Fall & Injury Prevention, visit: [email or web address] OR  [email or web address] OR  https://www.cdc.gov/steadi/patient.html

## 2024-05-06 PROBLEM — R56.9 UNSPECIFIED CONVULSIONS: Chronic | Status: ACTIVE | Noted: 2024-04-30

## 2024-05-07 ENCOUNTER — APPOINTMENT (OUTPATIENT)
Dept: FAMILY MEDICINE | Facility: CLINIC | Age: 39
End: 2024-05-07

## 2024-05-07 VITALS
BODY MASS INDEX: 33.86 KG/M2 | DIASTOLIC BLOOD PRESSURE: 65 MMHG | SYSTOLIC BLOOD PRESSURE: 105 MMHG | WEIGHT: 184 LBS | HEIGHT: 62 IN | HEART RATE: 84 BPM | TEMPERATURE: 97.5 F | OXYGEN SATURATION: 99 % | RESPIRATION RATE: 16 BRPM

## 2024-05-07 DIAGNOSIS — G40.909 EPILEPSY, UNSPECIFIED, NOT INTRACTABLE, W/OUT STATUS EPILEPTICUS: ICD-10-CM

## 2024-05-09 ENCOUNTER — APPOINTMENT (OUTPATIENT)
Dept: FAMILY MEDICINE | Facility: CLINIC | Age: 39
End: 2024-05-09
Payer: COMMERCIAL

## 2024-05-09 VITALS
HEIGHT: 62 IN | DIASTOLIC BLOOD PRESSURE: 70 MMHG | SYSTOLIC BLOOD PRESSURE: 110 MMHG | OXYGEN SATURATION: 99 % | BODY MASS INDEX: 33.86 KG/M2 | WEIGHT: 184 LBS | RESPIRATION RATE: 16 BRPM | HEART RATE: 78 BPM

## 2024-05-09 DIAGNOSIS — K21.9 GASTRO-ESOPHAGEAL REFLUX DISEASE W/OUT ESOPHAGITIS: ICD-10-CM

## 2024-05-09 DIAGNOSIS — E78.5 HYPERLIPIDEMIA, UNSPECIFIED: ICD-10-CM

## 2024-05-09 DIAGNOSIS — F44.5 CONVERSION DISORDER WITH SEIZURES OR CONVULSIONS: ICD-10-CM

## 2024-05-09 DIAGNOSIS — K76.0 FATTY (CHANGE OF) LIVER, NOT ELSEWHERE CLASSIFIED: ICD-10-CM

## 2024-05-09 PROCEDURE — 99496 TRANSJ CARE MGMT HIGH F2F 7D: CPT

## 2024-05-09 NOTE — HISTORY OF PRESENT ILLNESS
[Post-hospitalization from ___ Hospital] : Post-hospitalization from [unfilled] Hospital [Admitted on: ___] : The patient was admitted on [unfilled] [Discharged on ___] : discharged on [unfilled] [Discharge Summary] : discharge summary [Pertinent Labs] : pertinent labs [Radiology Findings] : radiology findings [Discharge Med List] : discharge medication list [FreeTextEntry2] : Ms. Lydia Smith is a 38 yo female presents today with daughter for post discharge visit follow up. History obtained from pt ,pt's daughter and from [facility] as below: Reason for Admission	seizure, transferred from [facility] Hospital Course	 39-year-old female with history of ?seizures (last 10 years ago, not on AEDs), HLD, PCOS, GERD, Asthma,  gastric sleeve surgery, prediabetes, epilepsy, recent tooth extraction 4/30 BIBEMS to OSH after shaking. Patient collapsed +HS shaking 15 seconds. Patient was administered keppra and versed at OSH ED. Patient was transferred to [facility] for neuro eval. CTH and CT C spine did not show any acute changes. Course c/b RRT 5/2 with 2 shaking episodes: First one with some slow leg shaking/head bobbing, side to side turning, heavy breathing and eventual more vigorous whole body shaking, chest/body thrusting; duration 2-3 mins. Second one with brief immediate body shaking for about 5 secs shortly after the first episode. RRT 5/3 with shaking episode followed by blurry vision, patient endorses blurry vision for 3-4 hours post episodes, ophtho consulted and performed eye exam wnl recommending no acute intervention. 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 Neurology recommended no further inpatient eval and to discontinue EEG. Recommended to hold further AEDs or benzos. Psych evaluated recommending outpatient f/u with PNES specialist as outpatient Rachelle Frausto. Course c/b elevated lactic acid 2.4 which resolved s/p IVF. Course c/b hypotension to 90s however family says patient's BP runs low usually - patient otherwise afebrile without leukocytosis and otherwise hemodynamically stable - BP systolic 100s on day of discharge. PT evaluated recommending discharge home with no needs - repeated gait assessment 5/4.  Interval Change: Pt reports no new epilepsy episode since discharge, however, feels the sensation that they may come. Pt has upcoming appointment with neurology and also emphasized importance to follow up with psychologist. Pt declines any hx of psychiatric disorder. Pt advised will need to follow up with both psychologist and neurology to either confirm or refute diagnosis of psychogenic non-epileptiform seizures. Advised to follow up back in office after follow up with neurologist and psychologist.

## 2024-05-09 NOTE — REVIEW OF SYSTEMS
[Fever] : no fever [Recent Change In Weight] : ~T recent weight change [Abdominal Pain] : abdominal pain [Nausea] : no nausea [Constipation] : no constipation [Diarrhea] : diarrhea [Vomiting] : no vomiting [Dysuria] : no dysuria [Joint Pain] : no joint pain [Joint Stiffness] : no joint stiffness [Muscle Pain] : no muscle pain [Back Pain] : no back pain [Headache] : no headache [Dizziness] : dizziness [Fainting] : fainting [Confusion] : no confusion [Memory Loss] : memory loss [Unsteady Walking] : no ataxia [Suicidal] : not suicidal [Insomnia] : no insomnia [Anxiety] : no anxiety [Depression] : no depression [Negative] : Heme/Lymph

## 2024-05-09 NOTE — ASSESSMENT
[FreeTextEntry1] : Greater than 55 min of face to face time, reviewed discharge notes, hospital labwork, addressed various health concerns, reviewed necessary labs, imaging, advised to follow up with referral.  Answered all pt questions, coordinated care for patient. Stable since discharge from Hospital. Advised importance to follow up with psychologist and neurologist follow up after seeing specialist.

## 2024-05-09 NOTE — PHYSICAL EXAM
[No Acute Distress] : no acute distress [EOMI] : extraocular movements intact [Supple] : supple [Clear to Auscultation] : lungs were clear to auscultation bilaterally [Normal S1, S2] : normal S1 and S2 [No Edema] : there was no peripheral edema [Soft] : abdomen soft [Normal Bowel Sounds] : normal bowel sounds [No Rash] : no rash [Normal Gait] : normal gait [Normal Affect] : the affect was normal [de-identified] : pain in epigastric region, deep palpation

## 2024-05-14 ENCOUNTER — APPOINTMENT (OUTPATIENT)
Dept: NEUROLOGY | Facility: CLINIC | Age: 39
End: 2024-05-14
Payer: COMMERCIAL

## 2024-05-14 VITALS
BODY MASS INDEX: 33.86 KG/M2 | SYSTOLIC BLOOD PRESSURE: 110 MMHG | WEIGHT: 184 LBS | HEART RATE: 80 BPM | HEIGHT: 62 IN | DIASTOLIC BLOOD PRESSURE: 73 MMHG

## 2024-05-14 PROCEDURE — G2211 COMPLEX E/M VISIT ADD ON: CPT | Mod: NC,1L

## 2024-05-14 PROCEDURE — 99203 OFFICE O/P NEW LOW 30 MIN: CPT

## 2024-05-14 PROCEDURE — 99213 OFFICE O/P EST LOW 20 MIN: CPT

## 2024-05-17 NOTE — HISTORY OF PRESENT ILLNESS
[FreeTextEntry1] :   Admission Date	01-May-2024 06:01 Reason for Admission	seizure, transferred from [facility] Hospital Course	 39-year-old female with history of ?seizures (last 10 years ago, not on AEDs), HLD, PCOS, GERD, Asthma,  gastric sleeve surgery, prediabetes, epilepsy, recent tooth extraction 4/30 BIBEMS to OSH after shaking. Patient collapsed +HS shaking 15 seconds. Patient was administered keppra and versed at OSH ED. Patient was transferred to [facility] for neuro eval. CTH and CT C spine did not show any acute changes. Course c/b RRT 5/2 with 2 shaking episodes: First one with some slow leg shaking/head bobbing, side to side turning, heavy breathing and eventual more vigorous whole body shaking, chest/body thrusting; duration 2-3 mins. Second one with brief immediate body shaking for about 5 secs shortly after the first episode. RRT 5/3 with shaking episode followed by blurry vision, patient endorses blurry vision for 3-4 hours post episodes, ophtho consulted and performed eye exam wnl recommending no acute intervention. 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 Neurology recommended no further inpatient eval and to discontinue EEG. Recommended to hold further AEDs or benzos. Psych evaluated recommending outpatient f/u with PNES specialist as outpatient Rachelle Frausto. Course c/b elevated lactic acid 2.4 which resolved s/p IVF. Course c/b hypotension to 90s however family says patient's BP runs low usually - patient otherwise afebrile without leukocytosis and otherwise hemodynamically stable - BP systolic 100s on day of discharge. PT evaluated recommending discharge home with no needs - repeated gait assessment 5/4.   Ten years ago First seizure she was 8 months pregnant described as SOB with shaking, bit tongue she had many early c section  started on Dilantin but still had 1-2 seizures a day and was in and out of hospital seizures stopped after 4 years and ASM was stopped no further episodes until recently. Daughter stated she has been having staring episodes since hospitalization 1 x per day  but also has feelings "weird"  She is amnestic of episodes The day of her seizure she had a tooth extraction

## 2024-05-24 ENCOUNTER — APPOINTMENT (OUTPATIENT)
Dept: MRI IMAGING | Facility: HOSPITAL | Age: 39
End: 2024-05-24

## 2024-05-25 ENCOUNTER — APPOINTMENT (OUTPATIENT)
Dept: MRI IMAGING | Facility: HOSPITAL | Age: 39
End: 2024-05-25
Payer: COMMERCIAL

## 2024-05-25 ENCOUNTER — OUTPATIENT (OUTPATIENT)
Dept: OUTPATIENT SERVICES | Facility: HOSPITAL | Age: 39
LOS: 1 days | End: 2024-05-25
Payer: COMMERCIAL

## 2024-05-25 DIAGNOSIS — Z90.3 ACQUIRED ABSENCE OF STOMACH [PART OF]: Chronic | ICD-10-CM

## 2024-05-25 DIAGNOSIS — Z98.89 OTHER SPECIFIED POSTPROCEDURAL STATES: Chronic | ICD-10-CM

## 2024-05-25 DIAGNOSIS — Z41.9 ENCOUNTER FOR PROCEDURE FOR PURPOSES OTHER THAN REMEDYING HEALTH STATE, UNSPECIFIED: Chronic | ICD-10-CM

## 2024-05-25 DIAGNOSIS — Z00.00 ENCOUNTER FOR GENERAL ADULT MEDICAL EXAMINATION WITHOUT ABNORMAL FINDINGS: ICD-10-CM

## 2024-05-25 PROCEDURE — 70551 MRI BRAIN STEM W/O DYE: CPT | Mod: 26

## 2024-05-25 PROCEDURE — 76377 3D RENDER W/INTRP POSTPROCES: CPT | Mod: 26

## 2024-05-25 PROCEDURE — 76377 3D RENDER W/INTRP POSTPROCES: CPT

## 2024-05-25 PROCEDURE — 70551 MRI BRAIN STEM W/O DYE: CPT

## 2024-06-01 ENCOUNTER — EMERGENCY (EMERGENCY)
Facility: HOSPITAL | Age: 39
LOS: 1 days | Discharge: HOME | End: 2024-06-01
Attending: EMERGENCY MEDICINE | Admitting: STUDENT IN AN ORGANIZED HEALTH CARE EDUCATION/TRAINING PROGRAM
Payer: COMMERCIAL

## 2024-06-01 VITALS
OXYGEN SATURATION: 99 % | WEIGHT: 160.06 LBS | SYSTOLIC BLOOD PRESSURE: 166 MMHG | HEART RATE: 84 BPM | DIASTOLIC BLOOD PRESSURE: 82 MMHG | TEMPERATURE: 99 F | HEIGHT: 62 IN | RESPIRATION RATE: 18 BRPM

## 2024-06-01 DIAGNOSIS — Z98.89 OTHER SPECIFIED POSTPROCEDURAL STATES: Chronic | ICD-10-CM

## 2024-06-01 DIAGNOSIS — Z41.9 ENCOUNTER FOR PROCEDURE FOR PURPOSES OTHER THAN REMEDYING HEALTH STATE, UNSPECIFIED: Chronic | ICD-10-CM

## 2024-06-01 DIAGNOSIS — Z90.3 ACQUIRED ABSENCE OF STOMACH [PART OF]: Chronic | ICD-10-CM

## 2024-06-01 LAB
ALBUMIN SERPL ELPH-MCNC: 3.7 G/DL — SIGNIFICANT CHANGE UP (ref 3.3–5)
ALP SERPL-CCNC: 84 U/L — SIGNIFICANT CHANGE UP (ref 40–120)
ALT FLD-CCNC: 23 U/L — SIGNIFICANT CHANGE UP (ref 10–45)
ANION GAP SERPL CALC-SCNC: 9 MMOL/L — SIGNIFICANT CHANGE UP (ref 5–17)
APPEARANCE UR: CLEAR — SIGNIFICANT CHANGE UP
AST SERPL-CCNC: 22 U/L — SIGNIFICANT CHANGE UP (ref 10–40)
BASOPHILS # BLD AUTO: 0.04 K/UL — SIGNIFICANT CHANGE UP (ref 0–0.2)
BASOPHILS NFR BLD AUTO: 0.5 % — SIGNIFICANT CHANGE UP (ref 0–2)
BILIRUB SERPL-MCNC: 0.3 MG/DL — SIGNIFICANT CHANGE UP (ref 0.2–1.2)
BILIRUB UR-MCNC: NEGATIVE — SIGNIFICANT CHANGE UP
BUN SERPL-MCNC: 10 MG/DL — SIGNIFICANT CHANGE UP (ref 7–23)
CALCIUM SERPL-MCNC: 8.5 MG/DL — SIGNIFICANT CHANGE UP (ref 8.4–10.5)
CHLORIDE SERPL-SCNC: 107 MMOL/L — SIGNIFICANT CHANGE UP (ref 96–108)
CO2 SERPL-SCNC: 27 MMOL/L — SIGNIFICANT CHANGE UP (ref 22–31)
COLOR SPEC: YELLOW — SIGNIFICANT CHANGE UP
CREAT SERPL-MCNC: 0.54 MG/DL — SIGNIFICANT CHANGE UP (ref 0.5–1.3)
DIFF PNL FLD: NEGATIVE — SIGNIFICANT CHANGE UP
EGFR: 121 ML/MIN/1.73M2 — SIGNIFICANT CHANGE UP
EOSINOPHIL # BLD AUTO: 0.47 K/UL — SIGNIFICANT CHANGE UP (ref 0–0.5)
EOSINOPHIL NFR BLD AUTO: 5.9 % — SIGNIFICANT CHANGE UP (ref 0–6)
GLUCOSE SERPL-MCNC: 102 MG/DL — HIGH (ref 70–99)
GLUCOSE UR QL: NEGATIVE MG/DL — SIGNIFICANT CHANGE UP
HCT VFR BLD CALC: 40.5 % — SIGNIFICANT CHANGE UP (ref 34.5–45)
HGB BLD-MCNC: 13.9 G/DL — SIGNIFICANT CHANGE UP (ref 11.5–15.5)
IMM GRANULOCYTES NFR BLD AUTO: 0.3 % — SIGNIFICANT CHANGE UP (ref 0–0.9)
KETONES UR-MCNC: NEGATIVE MG/DL — SIGNIFICANT CHANGE UP
LEUKOCYTE ESTERASE UR-ACNC: NEGATIVE — SIGNIFICANT CHANGE UP
LIDOCAIN IGE QN: 49 U/L — SIGNIFICANT CHANGE UP (ref 16–77)
LYMPHOCYTES # BLD AUTO: 1.77 K/UL — SIGNIFICANT CHANGE UP (ref 1–3.3)
LYMPHOCYTES # BLD AUTO: 22.1 % — SIGNIFICANT CHANGE UP (ref 13–44)
MCHC RBC-ENTMCNC: 31.3 PG — SIGNIFICANT CHANGE UP (ref 27–34)
MCHC RBC-ENTMCNC: 34.3 GM/DL — SIGNIFICANT CHANGE UP (ref 32–36)
MCV RBC AUTO: 91.2 FL — SIGNIFICANT CHANGE UP (ref 80–100)
MONOCYTES # BLD AUTO: 0.73 K/UL — SIGNIFICANT CHANGE UP (ref 0–0.9)
MONOCYTES NFR BLD AUTO: 9.1 % — SIGNIFICANT CHANGE UP (ref 2–14)
NEUTROPHILS # BLD AUTO: 4.97 K/UL — SIGNIFICANT CHANGE UP (ref 1.8–7.4)
NEUTROPHILS NFR BLD AUTO: 62.1 % — SIGNIFICANT CHANGE UP (ref 43–77)
NITRITE UR-MCNC: NEGATIVE — SIGNIFICANT CHANGE UP
NRBC # BLD: 0 /100 WBCS — SIGNIFICANT CHANGE UP (ref 0–0)
PH UR: 7 — SIGNIFICANT CHANGE UP (ref 5–8)
PLATELET # BLD AUTO: 195 K/UL — SIGNIFICANT CHANGE UP (ref 150–400)
POTASSIUM SERPL-MCNC: 4 MMOL/L — SIGNIFICANT CHANGE UP (ref 3.5–5.3)
POTASSIUM SERPL-SCNC: 4 MMOL/L — SIGNIFICANT CHANGE UP (ref 3.5–5.3)
PROT SERPL-MCNC: 7.3 G/DL — SIGNIFICANT CHANGE UP (ref 6–8.3)
PROT UR-MCNC: NEGATIVE MG/DL — SIGNIFICANT CHANGE UP
RBC # BLD: 4.44 M/UL — SIGNIFICANT CHANGE UP (ref 3.8–5.2)
RBC # FLD: 12.5 % — SIGNIFICANT CHANGE UP (ref 10.3–14.5)
SODIUM SERPL-SCNC: 143 MMOL/L — SIGNIFICANT CHANGE UP (ref 135–145)
SP GR SPEC: 1.01 — SIGNIFICANT CHANGE UP (ref 1–1.03)
TROPONIN I, HIGH SENSITIVITY RESULT: <4 NG/L — SIGNIFICANT CHANGE UP
UROBILINOGEN FLD QL: 0.2 MG/DL — SIGNIFICANT CHANGE UP (ref 0.2–1)
WBC # BLD: 8 K/UL — SIGNIFICANT CHANGE UP (ref 3.8–10.5)
WBC # FLD AUTO: 8 K/UL — SIGNIFICANT CHANGE UP (ref 3.8–10.5)

## 2024-06-01 PROCEDURE — 93010 ELECTROCARDIOGRAM REPORT: CPT

## 2024-06-01 PROCEDURE — 80053 COMPREHEN METABOLIC PANEL: CPT

## 2024-06-01 PROCEDURE — 85025 COMPLETE CBC W/AUTO DIFF WBC: CPT

## 2024-06-01 PROCEDURE — 99284 EMERGENCY DEPT VISIT MOD MDM: CPT | Mod: 25

## 2024-06-01 PROCEDURE — 93005 ELECTROCARDIOGRAM TRACING: CPT

## 2024-06-01 PROCEDURE — 99285 EMERGENCY DEPT VISIT HI MDM: CPT

## 2024-06-01 PROCEDURE — 83690 ASSAY OF LIPASE: CPT

## 2024-06-01 PROCEDURE — 84484 ASSAY OF TROPONIN QUANT: CPT

## 2024-06-01 PROCEDURE — 36415 COLL VENOUS BLD VENIPUNCTURE: CPT

## 2024-06-01 RX ORDER — ACETAMINOPHEN 500 MG
1000 TABLET ORAL ONCE
Refills: 0 | Status: COMPLETED | OUTPATIENT
Start: 2024-06-01 | End: 2024-06-01

## 2024-06-01 NOTE — ED ADULT TRIAGE NOTE - CHIEF COMPLAINT QUOTE
Patient complaint of headache, dizziness and nausea since yesterday. Denies any falls or hitting her head.

## 2024-06-01 NOTE — ED PROVIDER NOTE - OBJECTIVE STATEMENT
39-year-old female with dizziness, seizure-like activities. Patient was  recently discharged with diagnosis of psychogenic nonepileptic seizure, was seen by neurology in mid May, had MRI done-  Result reviewed–unremarkable.  Denies fever, nausea vomiting diarrhea, cough, shortness of breath, chest/back/neck/abdominal pain, focal weakness/numbness.  Denies any other symptoms.

## 2024-06-01 NOTE — ED PROVIDER NOTE - PATIENT PORTAL LINK FT
You can access the FollowMyHealth Patient Portal offered by [facility] by registering at the following website: [email or web address]. By joining [employer]’s FollowMyHealth portal, you will also be able to view your health information using other applications (apps) compatible with our system.

## 2024-06-01 NOTE — ED PROVIDER NOTE - CLINICAL SUMMARY MEDICAL DECISION MAKING FREE TEXT BOX
39-year-old female with history of psychogenic nonepileptic seizure, labs reviewed acceptable, EKG no ST changes patient complained of headache, received ofirmev, pain improved, will dc with neuro FU as scheduled

## 2024-06-01 NOTE — ED ADULT NURSE NOTE - HISTORY OF COVID-19 VACCINATION
Patient Specific Counseling (Will Not Stick From Patient To Patient): - seb derm on face and scalp improved\\n- will continue Fluocinonide solution to scalp QHS x 7-10 days and hydrocortisone 2.5% cream QD-BID x 7 days to face \\n- will start clotramizole solution QD-BID to face and scalp- will use as maintenance \\nRTC 2-3 months
Detail Level: Zone
Vaccine status unknown

## 2024-06-01 NOTE — ED ADULT TRIAGE NOTE - PATIENT'S PREFERRED PRONOUN
1.  DM Type II (Diet Controlled) without sign of diabetic retinopathy and no blot heme on dilated retinal examination today OU No Macular Edema:  Discussed the pathophysiology of diabetes and its effect on the eye and risk of blindness. Stressed the importance of strong glucose control. Advised of importance of at least yearly dilated examinations but to contact us immediately for any problems or concerns. 2. JOSELUIS w/ PEK OU- Increase ATs to TID OU Routinely. 3.  PVD w/o Tear OD - RD precautions. 4.  Cataract OU: Observe for now without intervention. The patient was advised to contact us if any change or worsening of vision5. H/o Allergic Conjunctivitis- controlled on Zaditor BID OU. Letter to [facility] for an appointment in 1 yr 30 glare with Dr. Christina Joseph.
JOSELUIS w/ PEK OU-The use/continuation of artificial tears were recommended.
Her/She

## 2024-06-01 NOTE — ED ADULT NURSE REASSESSMENT NOTE - NS ED NURSE REASSESS COMMENT FT1
Dr Lee spoke with patient, explained to patient that waiting on urine results and if urine is negative, patient can go home. Patient states she still has a headache and Dr Lee ordered IV Tylenol.

## 2024-06-01 NOTE — ED PROVIDER NOTE - WET READ LAUNCH FT
"SUBJECTIVE:  Raemsh is a 21 year old male, here for follow-up of developmental-behavioral problems.    Interim History:    will work at [employer] in [place] about 14 hours/wk (on days he has class) which leaves 2 days/wk plus weekends free for study and fun    he's got 15 credits this semester, as he restarts school     he's never really used an time management and organization strategies consistently, he's found that written to-dos and set times for \"work\" has worked before, and maybe upcoming semester having time set aside in a general way for work on weekends    he's tried to do self-hypnosis at home but finds it's not as deep as he'd prefer and wonders if that means it won't \"work\"; most of this has included listening to guided tracks on Spotify; he said he feels that more indirect and metaphoric suggestions often work well for him    I also talked by phone with Mom for 10 minutes regarding her and his worry that his parents do too much for him or in their words \"enable\" somehow, and her worries that he's not moving forward in spite of engaging well in therapy with  and/or that he's not being forthcoming enough about problems he has when he talks with Dr. Vitale or me      Objective:  There were no vitals taken for this visit.   EXAM:  Examination deferred    DATA:  The following standardized developmental-behavioral assessments were scored and interpreted today with them, distinct from the rest of the evaluation and management that took place:  1. n/a    As described below, today's Diagnostic ASSESSMENT and Diagnostic/Therapeutic PLAN were discussed with the patient and family, and I provided them with extensive counseling and eduction as follows:  1. Generalized anxiety disorder    2. Attention deficit hyperactivity disorder (ADHD), predominantly inattentive type    3. Primary insomnia    4. Chronic motor tic disorder        Overall, stable.    Diagnostic Plan:    deferred     Counseled " There are no Wet Read(s) to document. regarding:    self-efficacy    ego-strengthening suggestions    self-hypnosis -- practiced alert, active focus suggestions and recommend daily practice and then use for studying, school, work, etc.; also recommend Fidel Chris's Focus audio series    time management and organization strategies including to-do lists and time-blocking    Therapeutic Interventions:    deferred     Current Outpatient Prescriptions   Medication Sig Dispense Refill     ORDER FOR DME Equipment being ordered:emWave ([employer] Del[employer]) personal stress reliever and/or PC-desktop for heart rate variability biofeedback training. 2 each 0     There are no discontinued medications.      Psychotropic medication deferred     Follow-up -- 1 month     40 minutes and More than 50% of the time spent on counseling / coordinating care    Bin Perrin MD, MPH  , [facility]  Developmental-Behavioral Pediatrics  __________________________________________________________

## 2024-06-01 NOTE — ED PROVIDER NOTE - NSFOLLOWUPINSTRUCTIONS_ED_ALL_ED_FT
return to the nearest emergency department immediately if any new/worsening symptoms.      please follow-up with your physician in 1 to 3 days to reassess symptoms.

## 2024-06-01 NOTE — ED ADULT NURSE NOTE - OBJECTIVE STATEMENT
Patient presents to ED complaining of a headache, dizziness, and nausea since yesterday. Patient denies falls, denies chest pain, denies seizures, denies difficulty breathing Patient denies trauma. Patient states history of seizures but did not have seizure to knowledge Patient denies sick contacts

## 2024-06-01 NOTE — ED ADULT NURSE REASSESSMENT NOTE - NS ED NURSE REASSESS COMMENT FT1
Patient offered IV tylenol for headache patient states she is angry that she came here "just for tylenol" Patient ripped out own IV and refused to sign discharge paperwork. Patient announced leaving, ANM informed.

## 2024-06-01 NOTE — ED ADULT NURSE REASSESSMENT NOTE - NS ED NURSE REASSESS COMMENT FT1
Patient resting comfortably in bed, patient denies history of vertigo, patient states headache and dizziness started today.

## 2024-06-25 ENCOUNTER — APPOINTMENT (OUTPATIENT)
Dept: BARIATRICS | Facility: CLINIC | Age: 39
End: 2024-06-25
Payer: COMMERCIAL

## 2024-06-25 ENCOUNTER — NON-APPOINTMENT (OUTPATIENT)
Age: 39
End: 2024-06-25

## 2024-06-25 VITALS
HEART RATE: 90 BPM | BODY MASS INDEX: 31.12 KG/M2 | WEIGHT: 169.09 LBS | DIASTOLIC BLOOD PRESSURE: 64 MMHG | OXYGEN SATURATION: 99 % | SYSTOLIC BLOOD PRESSURE: 112 MMHG | HEIGHT: 62 IN | TEMPERATURE: 96.6 F

## 2024-06-25 DIAGNOSIS — K21.9 GASTRO-ESOPHAGEAL REFLUX DISEASE W/OUT ESOPHAGITIS: ICD-10-CM

## 2024-06-25 DIAGNOSIS — Z90.49 ACQUIRED ABSENCE OF OTHER SPECIFIED PARTS OF DIGESTIVE TRACT: ICD-10-CM

## 2024-06-25 DIAGNOSIS — E66.9 OBESITY, UNSPECIFIED: ICD-10-CM

## 2024-06-25 DIAGNOSIS — E56.9 VITAMIN DEFICIENCY, UNSPECIFIED: ICD-10-CM

## 2024-06-25 DIAGNOSIS — E46 UNSPECIFIED PROTEIN-CALORIE MALNUTRITION: ICD-10-CM

## 2024-06-25 DIAGNOSIS — Z86.39 PERSONAL HISTORY OF OTHER ENDOCRINE, NUTRITIONAL AND METABOLIC DISEASE: ICD-10-CM

## 2024-06-25 DIAGNOSIS — Z98.84 BARIATRIC SURGERY STATUS: ICD-10-CM

## 2024-06-25 PROCEDURE — 99214 OFFICE O/P EST MOD 30 MIN: CPT

## 2024-06-25 PROCEDURE — G2211 COMPLEX E/M VISIT ADD ON: CPT | Mod: NC

## 2024-06-25 RX ORDER — OMEPRAZOLE 20 MG/1
20 CAPSULE, DELAYED RELEASE ORAL DAILY
Qty: 30 | Refills: 2 | Status: ACTIVE | COMMUNITY
Start: 2024-06-25 | End: 1900-01-01

## 2024-06-25 RX ORDER — GABAPENTIN 100 MG/1
100 CAPSULE ORAL
Qty: 180 | Refills: 5 | Status: DISCONTINUED | COMMUNITY
Start: 2024-05-07 | End: 2024-06-25

## 2024-07-25 ENCOUNTER — NON-APPOINTMENT (OUTPATIENT)
Age: 39
End: 2024-07-25

## 2024-08-13 ENCOUNTER — APPOINTMENT (OUTPATIENT)
Dept: NEUROLOGY | Facility: CLINIC | Age: 39
End: 2024-08-13

## 2024-08-16 ENCOUNTER — APPOINTMENT (OUTPATIENT)
Dept: BARIATRICS | Facility: CLINIC | Age: 39
End: 2024-08-16

## 2024-08-16 VITALS
TEMPERATURE: 97.9 F | HEIGHT: 62 IN | OXYGEN SATURATION: 99 % | HEART RATE: 71 BPM | DIASTOLIC BLOOD PRESSURE: 70 MMHG | SYSTOLIC BLOOD PRESSURE: 115 MMHG | BODY MASS INDEX: 31.93 KG/M2 | WEIGHT: 173.5 LBS

## 2024-08-16 DIAGNOSIS — R79.9 ABNORMAL FINDING OF BLOOD CHEMISTRY, UNSPECIFIED: ICD-10-CM

## 2024-08-16 DIAGNOSIS — E66.9 OBESITY, UNSPECIFIED: ICD-10-CM

## 2024-08-16 DIAGNOSIS — Z00.00 ENCOUNTER FOR GENERAL ADULT MEDICAL EXAMINATION W/OUT ABNORMAL FINDINGS: ICD-10-CM

## 2024-08-16 DIAGNOSIS — Z86.39 PERSONAL HISTORY OF OTHER ENDOCRINE, NUTRITIONAL AND METABOLIC DISEASE: ICD-10-CM

## 2024-08-16 DIAGNOSIS — R63.5 ABNORMAL WEIGHT GAIN: ICD-10-CM

## 2024-08-16 DIAGNOSIS — E46 UNSPECIFIED PROTEIN-CALORIE MALNUTRITION: ICD-10-CM

## 2024-08-16 DIAGNOSIS — Z01.812 ENCOUNTER FOR PREPROCEDURAL LABORATORY EXAMINATION: ICD-10-CM

## 2024-08-16 DIAGNOSIS — Z98.84 BARIATRIC SURGERY STATUS: ICD-10-CM

## 2024-08-16 DIAGNOSIS — R63.2 POLYPHAGIA: ICD-10-CM

## 2024-08-16 DIAGNOSIS — R63.8 OTHER SYMPTOMS AND SIGNS CONCERNING FOOD AND FLUID INTAKE: ICD-10-CM

## 2024-08-16 DIAGNOSIS — E55.9 VITAMIN D DEFICIENCY, UNSPECIFIED: ICD-10-CM

## 2024-08-16 PROCEDURE — 99214 OFFICE O/P EST MOD 30 MIN: CPT

## 2024-08-16 NOTE — REASON FOR VISIT
[Follow-Up Visit] : a follow-up visit for [S/P Bariatric Surgery] : s/p bariatric surgery [Ad Hoc ] : provided by an ad hoc  [Time Spent: ____ minutes] : Total time spent using  services: [unfilled] minutes. The patient's primary language is not English thus required  services. [Interpreters_IDNumber] : [id number] [Interpreters_FullName] : Wanda [TWNoteComboBox1] : Nepalese

## 2024-08-16 NOTE — REVIEW OF SYSTEMS
[Abdominal Pain] : abdominal pain [Reflux/Heartburn] : reflux/heartburn [Negative] : Allergic/Immunologic [Vomiting] : no vomiting [Constipation] : no constipation [Diarrhea] : no diarrhea [Hernia] : no hernia

## 2024-08-16 NOTE — ASSESSMENT
[FreeTextEntry1] : 39-year-old F s/p laparoscopic sleeve gastrectomy presents for follow-up. 4 lb weight gain since last visit over 1 month ago. Nutrition and exercise guidelines reviewed with the patient.   Last labs (8/5/24) reviewed with the pt: Low Vitamin D (26.3 ng/mL); A1C elevated/unchanged (5.8%)  Counseled pt for 15 minutes about health maintenance principles including diet, healthy lifestyles, and exercise Reviewed guidelines about nutrition and snacking - protein focused diet. Consume 60-70g of protein daily; consider adding 1-2 eggs or yogurt to breakfast on days when going to the gym Try to increase vegetable portions with each meal to increase satiety  Eliminate any after dinner snacking (consider drinking water instead; can add fruit like Watermelon, Cucumber, Lemon to water)  Hold Omeprazole 20 mg to see if still need for acid reflux Start Vitamin D 2000 IU/day Continue bariatric MVI  Continue daily zero calorie liquid intake 64 oz/day Encouraged to participate in a daily exercise regimen incorporating cardio and strength training Track steps - goal approximately 8-10k steps per day Start Phentermine 18.75 mg/morning pending labs (ordered). Currently there are no contraindications for the use of Phentermine after reviewing the patient's medical history and labs, including CAD, arrhythmias, severe anxiety, Hx of substance abuse or glaucoma. Possible side effects were discussed with the pt including increased anxiety, tachycardia, elevated BP, paresthesia, and birth defects in the case of pregnancy.    Return to office in 2 months; call the office right away with any side effects All questions answered   Additional time spent before and after visit reviewing chart

## 2024-08-16 NOTE — HISTORY OF PRESENT ILLNESS
[Procedure: ___] : Procedure performed: [unfilled]  [Date of Surgery: ___] : Date of Surgery:   [unfilled] [Surgeon Name:   ___] : Surgeon Name: Dr. GASTELUM [Pre-Op Weight ___] : Pre-op weight was [unfilled] lbs [de-identified] : 39-year-old F s/p laparoscopic sleeve gastrectomy presents for follow-up. 4 lb weight gain since last visit over 1 month ago. Pt is interested in starting weight loss medication. Reports no abdominal pain, nausea/vomiting, constipation, diarrhea, reflux/heartburn (well-managed on Omeprazole 20 mg/day). Patient eating 3 protein-rich meals/day, drinking adequate zero-calorie fluids/day, taking bariatric vitamins with Calcium, and exercising (i.e. weightlifting at the gym 5 days/week); sleeping 8 hrs/night.  PMH and FH of: pancreatitis: no gallstones: no kidney stones: no MEN syndrome: no Medullary thyroid cancer: no Anxiety: no Glaucoma: no Ophthalmological Contraindications: no Currently taking narcotic pain medication(s) or suboxone: no

## 2024-08-16 NOTE — PHYSICAL EXAM
[Normal] : affect appropriate [de-identified] : Well, healthy-appearing adult [de-identified] :  Soft, NT, ND, no diastasis or hernias appreciated [de-identified] : CONSTANZA

## 2024-08-19 LAB — HCG UR QL: NEGATIVE

## 2024-08-19 RX ORDER — PHENTERMINE HYDROCHLORIDE 37.5 MG/1
37.5 TABLET ORAL DAILY
Qty: 15 | Refills: 0 | Status: ACTIVE | COMMUNITY
Start: 2024-08-19 | End: 2024-09-18

## 2024-09-05 ENCOUNTER — APPOINTMENT (OUTPATIENT)
Dept: BARIATRICS | Facility: CLINIC | Age: 39
End: 2024-09-05

## 2024-09-10 ENCOUNTER — APPOINTMENT (OUTPATIENT)
Dept: NEUROLOGY | Facility: CLINIC | Age: 39
End: 2024-09-10
Payer: COMMERCIAL

## 2024-09-10 VITALS
HEIGHT: 62 IN | BODY MASS INDEX: 31.83 KG/M2 | DIASTOLIC BLOOD PRESSURE: 78 MMHG | SYSTOLIC BLOOD PRESSURE: 116 MMHG | HEART RATE: 73 BPM | WEIGHT: 173 LBS

## 2024-09-10 DIAGNOSIS — F44.5 CONVERSION DISORDER WITH SEIZURES OR CONVULSIONS: ICD-10-CM

## 2024-09-10 DIAGNOSIS — G40.909 EPILEPSY, UNSPECIFIED, NOT INTRACTABLE, W/OUT STATUS EPILEPTICUS: ICD-10-CM

## 2024-09-10 DIAGNOSIS — K21.9 GASTRO-ESOPHAGEAL REFLUX DISEASE W/OUT ESOPHAGITIS: ICD-10-CM

## 2024-09-10 PROCEDURE — 99205 OFFICE O/P NEW HI 60 MIN: CPT

## 2024-09-10 NOTE — ASSESSMENT
[FreeTextEntry1] : Ms. 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    Plan 1. amb EEG x 72h to capture typical event. 2. f/u with me with TEB Nov 18 @ 2:30p 3. if seizure on amb EEG - will start lacosamide; if event captured with no seizure, will refer to  for referral to mental health providers.  4. plan explained and reviewed with patient who expressed understanding.   I have spent 40  minutes or longer reviewing patient data or discussing with the patient  the cause of seizures or seizure-like events and comorbid conditions, assessing the risk of recurrence, educating the patient or family to recognize seizures, discussing possible treatment options for seizures and comorbid conditions and documenting encounter and plan. More than 50% of time spent counseling and educating patient about epilepsy including safety issues, AED side effects and interactions, alcohol consumption, sleep deprivation, risks and driving privileges associated with the [facility]. Greater than 50% of the encounter time was spent on counseling and coordination of care for reviewing records in [employer], discussion with patient regarding plan.   
No

## 2024-09-10 NOTE — HISTORY OF PRESENT ILLNESS
[FreeTextEntry1] : *** 09/10/2024  *** Ms. 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  She was evaluated at [facility] with vEEG that recorded PNEE.  She was discharge on no ASM.  Ms. GALVAN reports that in weeks after DC she had frequent PNEE, often several per day, but over time the frequency has dropped - so that now they occur once a month or so.  However, Ms. GALVAN is concerned that she has a new event that is different from PNEE. These events are transient GI upset lasting a few seconds, often improved by regulating her breathing. During the event, Ms. GALVAN endorses feeling strange.  The attacks are occurring 1-2 times a week.  She is not taking any medications. She has no medication allergies.  She has been evaluated by GI specialist and told that there is no GI cause for her symptoms, though GERD is listed in her EMR diagoses.    *** from PAMELA Maravilla note 5/14/2024 *** From hospital DC Summary  Hospital Course  39-year-old female with history of ?seizures (last 10 years ago, not on AEDs), HLD, PCOS, GERD, Asthma, gastric sleeve surgery, prediabetes, epilepsy, recent tooth extraction 4/30 BIBEMS to OSH after shaking. Patient collapsed +HS shaking 15 seconds. Patient was administered keppra and versed at OSH ED. Patient was transferred to [facility] for neuro eval. CTH and CT C spine did not show any acute changes. Course c/b RRT 5/2 with 2 shaking episodes: First one with some slow leg shaking/head bobbing, side to side turning, heavy breathing and eventual more vigorous whole body shaking, chest/body thrusting; duration 2-3 mins. Second one with brief immediate body shaking for about 5 secs shortly after the first episode. RRT 5/3 with shaking episode followed by blurry vision, patient endorses blurry vision for 3-4 hours post episodes, ophtho consulted and performed eye exam wnl recommending no acute intervention. 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 Neurology recommended no further inpatient eval and to discontinue EEG. Recommended to hold further AEDs or benzos. Psych evaluated recommending outpatient f/u with PNES specialist as outpatient Rachelle Frausto. Course c/b elevated lactic acid 2.4 which resolved s/p IVF. Course c/b hypotension to 90s however family says patient's BP runs low usually - patient otherwise afebrile without leukocytosis and otherwise hemodynamically stable - BP systolic 100s on day of discharge. PT evaluated recommending discharge home with no needs - repeated gait assessment 5/4.  Ten years ago First seizure she was 8 months pregnant described as SOB with shaking, bit tongue she had many early c section started on Dilantin but still had 1-2 seizures a day and was in and out of hospital seizures stopped after 4 years and ASM was stopped no further episodes until recently. Daughter stated she has been having staring episodes since hospitalization 1 x per day but also has feelings "weird" She is amnestic of episodes The day of her seizure she had a tooth extraction

## 2024-09-10 NOTE — PHYSICAL EXAM
[FreeTextEntry1] : MS: alert & oriented to person, place time, good fund of knowledge and recall for recent and remote events.  CN: VFF to confrontation, EOM full without nystagmus, PERRL, V1-V3 intact to light touch, face symmetrical, hears finger rub bilaterally, palate elevates symmetrically, shoulders elevate symmetrically, tongue midline MOTOR: normal tone x 4 extremities, Power 5/5 proximally and distally bilaterally, no drift, normal rapid alternating movements.  SENSORY: intact LT x 4 extremities REFLEXES: biceps 2+ bilaterally, triceps 2+ bilaterally, brachioradialis 2+ bilaterally, patella 2+ bilaterally, ankle 2+ bilaterally COORD: normal FNF, no tremor or dysmetria GAIT: normal base, Romberg negative, normal gait, able to walk on toes, heels and tandem.  Constitutional: alert and in no acute distress.  Psychiatric: oriented to person, place, and time, insight and judgment were intact and the affect was normal.  Eyes: extraocular movements were intact.  ENT: hearing was normal.  Neck: the appearance of the neck was normal.  Pulmonary: no respiratory distress.  Vascular:. there was no peripheral edema.  Abdomen: soft.  Musculoskeletal: normal gait.  Skin: normal skin color and pigmentation.

## 2024-09-27 ENCOUNTER — APPOINTMENT (OUTPATIENT)
Dept: BARIATRICS/WEIGHT MGMT | Facility: CLINIC | Age: 39
End: 2024-09-27
Payer: COMMERCIAL

## 2024-09-27 VITALS — WEIGHT: 174 LBS | BODY MASS INDEX: 32.02 KG/M2 | HEIGHT: 62 IN

## 2024-09-27 DIAGNOSIS — Z86.39 PERSONAL HISTORY OF OTHER ENDOCRINE, NUTRITIONAL AND METABOLIC DISEASE: ICD-10-CM

## 2024-09-27 DIAGNOSIS — E66.9 OBESITY, UNSPECIFIED: ICD-10-CM

## 2024-09-27 DIAGNOSIS — Z98.84 BARIATRIC SURGERY STATUS: ICD-10-CM

## 2024-09-27 PROCEDURE — 97803 MED NUTRITION INDIV SUBSEQ: CPT | Mod: 95

## 2024-10-12 ENCOUNTER — NON-APPOINTMENT (OUTPATIENT)
Age: 39
End: 2024-10-12

## 2024-10-12 ENCOUNTER — EMERGENCY (EMERGENCY)
Facility: HOSPITAL | Age: 39
LOS: 1 days | Discharge: HOME | End: 2024-10-12
Attending: STUDENT IN AN ORGANIZED HEALTH CARE EDUCATION/TRAINING PROGRAM | Admitting: STUDENT IN AN ORGANIZED HEALTH CARE EDUCATION/TRAINING PROGRAM
Payer: COMMERCIAL

## 2024-10-12 VITALS
RESPIRATION RATE: 17 BRPM | WEIGHT: 169.98 LBS | OXYGEN SATURATION: 100 % | HEART RATE: 75 BPM | TEMPERATURE: 98 F | HEIGHT: 62 IN | DIASTOLIC BLOOD PRESSURE: 72 MMHG | SYSTOLIC BLOOD PRESSURE: 117 MMHG

## 2024-10-12 VITALS
OXYGEN SATURATION: 100 % | HEART RATE: 72 BPM | DIASTOLIC BLOOD PRESSURE: 65 MMHG | SYSTOLIC BLOOD PRESSURE: 127 MMHG | RESPIRATION RATE: 18 BRPM

## 2024-10-12 DIAGNOSIS — Z98.89 OTHER SPECIFIED POSTPROCEDURAL STATES: Chronic | ICD-10-CM

## 2024-10-12 DIAGNOSIS — Z41.9 ENCOUNTER FOR PROCEDURE FOR PURPOSES OTHER THAN REMEDYING HEALTH STATE, UNSPECIFIED: Chronic | ICD-10-CM

## 2024-10-12 DIAGNOSIS — Z90.3 ACQUIRED ABSENCE OF STOMACH [PART OF]: Chronic | ICD-10-CM

## 2024-10-12 LAB
ALBUMIN SERPL ELPH-MCNC: 4 G/DL — SIGNIFICANT CHANGE UP (ref 3.3–5)
ALP SERPL-CCNC: 80 U/L — SIGNIFICANT CHANGE UP (ref 40–120)
ALT FLD-CCNC: 20 U/L — SIGNIFICANT CHANGE UP (ref 10–45)
ANION GAP SERPL CALC-SCNC: 11 MMOL/L — SIGNIFICANT CHANGE UP (ref 5–17)
APPEARANCE UR: CLEAR — SIGNIFICANT CHANGE UP
AST SERPL-CCNC: 22 U/L — SIGNIFICANT CHANGE UP (ref 10–40)
BASOPHILS # BLD AUTO: 0.02 K/UL — SIGNIFICANT CHANGE UP (ref 0–0.2)
BASOPHILS NFR BLD AUTO: 0.2 % — SIGNIFICANT CHANGE UP (ref 0–2)
BILIRUB SERPL-MCNC: 0.3 MG/DL — SIGNIFICANT CHANGE UP (ref 0.2–1.2)
BILIRUB UR-MCNC: NEGATIVE — SIGNIFICANT CHANGE UP
BUN SERPL-MCNC: 22 MG/DL — SIGNIFICANT CHANGE UP (ref 7–23)
CALCIUM SERPL-MCNC: 9.8 MG/DL — SIGNIFICANT CHANGE UP (ref 8.4–10.5)
CHLORIDE SERPL-SCNC: 102 MMOL/L — SIGNIFICANT CHANGE UP (ref 96–108)
CO2 SERPL-SCNC: 25 MMOL/L — SIGNIFICANT CHANGE UP (ref 22–31)
COLOR SPEC: YELLOW — SIGNIFICANT CHANGE UP
CREAT SERPL-MCNC: 0.72 MG/DL — SIGNIFICANT CHANGE UP (ref 0.5–1.3)
DIFF PNL FLD: NEGATIVE — SIGNIFICANT CHANGE UP
EGFR: 109 ML/MIN/1.73M2 — SIGNIFICANT CHANGE UP
EOSINOPHIL # BLD AUTO: 0 K/UL — SIGNIFICANT CHANGE UP (ref 0–0.5)
EOSINOPHIL NFR BLD AUTO: 0 % — SIGNIFICANT CHANGE UP (ref 0–6)
GLUCOSE SERPL-MCNC: 134 MG/DL — HIGH (ref 70–99)
GLUCOSE UR QL: NEGATIVE MG/DL — SIGNIFICANT CHANGE UP
HCG SERPL-ACNC: <1 MIU/ML — SIGNIFICANT CHANGE UP
HCT VFR BLD CALC: 39.1 % — SIGNIFICANT CHANGE UP (ref 34.5–45)
HGB BLD-MCNC: 13.5 G/DL — SIGNIFICANT CHANGE UP (ref 11.5–15.5)
IMM GRANULOCYTES NFR BLD AUTO: 0.4 % — SIGNIFICANT CHANGE UP (ref 0–0.9)
KETONES UR-MCNC: NEGATIVE MG/DL — SIGNIFICANT CHANGE UP
LEUKOCYTE ESTERASE UR-ACNC: NEGATIVE — SIGNIFICANT CHANGE UP
LIDOCAIN IGE QN: 40 U/L — SIGNIFICANT CHANGE UP (ref 16–77)
LYMPHOCYTES # BLD AUTO: 18.4 % — SIGNIFICANT CHANGE UP (ref 13–44)
LYMPHOCYTES # BLD AUTO: 2.18 K/UL — SIGNIFICANT CHANGE UP (ref 1–3.3)
MCHC RBC-ENTMCNC: 30.5 PG — SIGNIFICANT CHANGE UP (ref 27–34)
MCHC RBC-ENTMCNC: 34.5 GM/DL — SIGNIFICANT CHANGE UP (ref 32–36)
MCV RBC AUTO: 88.3 FL — SIGNIFICANT CHANGE UP (ref 80–100)
MONOCYTES # BLD AUTO: 0.68 K/UL — SIGNIFICANT CHANGE UP (ref 0–0.9)
MONOCYTES NFR BLD AUTO: 5.7 % — SIGNIFICANT CHANGE UP (ref 2–14)
NEUTROPHILS # BLD AUTO: 8.9 K/UL — HIGH (ref 1.8–7.4)
NEUTROPHILS NFR BLD AUTO: 75.3 % — SIGNIFICANT CHANGE UP (ref 43–77)
NITRITE UR-MCNC: NEGATIVE — SIGNIFICANT CHANGE UP
NRBC # BLD: 0 /100 WBCS — SIGNIFICANT CHANGE UP (ref 0–0)
PH UR: 5.5 — SIGNIFICANT CHANGE UP (ref 5–8)
PLATELET # BLD AUTO: 204 K/UL — SIGNIFICANT CHANGE UP (ref 150–400)
POTASSIUM SERPL-MCNC: 4.3 MMOL/L — SIGNIFICANT CHANGE UP (ref 3.5–5.3)
POTASSIUM SERPL-SCNC: 4.3 MMOL/L — SIGNIFICANT CHANGE UP (ref 3.5–5.3)
PROT SERPL-MCNC: 7.7 G/DL — SIGNIFICANT CHANGE UP (ref 6–8.3)
PROT UR-MCNC: NEGATIVE MG/DL — SIGNIFICANT CHANGE UP
RBC # BLD: 4.43 M/UL — SIGNIFICANT CHANGE UP (ref 3.8–5.2)
RBC # FLD: 12.6 % — SIGNIFICANT CHANGE UP (ref 10.3–14.5)
SODIUM SERPL-SCNC: 138 MMOL/L — SIGNIFICANT CHANGE UP (ref 135–145)
SP GR SPEC: 1.01 — SIGNIFICANT CHANGE UP (ref 1–1.03)
UROBILINOGEN FLD QL: 0.2 MG/DL — SIGNIFICANT CHANGE UP (ref 0.2–1)
WBC # BLD: 11.83 K/UL — HIGH (ref 3.8–10.5)
WBC # FLD AUTO: 11.83 K/UL — HIGH (ref 3.8–10.5)

## 2024-10-12 PROCEDURE — 96374 THER/PROPH/DIAG INJ IV PUSH: CPT | Mod: XU

## 2024-10-12 PROCEDURE — 83690 ASSAY OF LIPASE: CPT

## 2024-10-12 PROCEDURE — 84702 CHORIONIC GONADOTROPIN TEST: CPT

## 2024-10-12 PROCEDURE — 81003 URINALYSIS AUTO W/O SCOPE: CPT

## 2024-10-12 PROCEDURE — 80053 COMPREHEN METABOLIC PANEL: CPT

## 2024-10-12 PROCEDURE — 85025 COMPLETE CBC W/AUTO DIFF WBC: CPT

## 2024-10-12 PROCEDURE — 99284 EMERGENCY DEPT VISIT MOD MDM: CPT | Mod: 25

## 2024-10-12 PROCEDURE — 74177 CT ABD & PELVIS W/CONTRAST: CPT | Mod: MC

## 2024-10-12 PROCEDURE — 74177 CT ABD & PELVIS W/CONTRAST: CPT | Mod: 26,MC

## 2024-10-12 PROCEDURE — 96376 TX/PRO/DX INJ SAME DRUG ADON: CPT

## 2024-10-12 PROCEDURE — 99285 EMERGENCY DEPT VISIT HI MDM: CPT

## 2024-10-12 PROCEDURE — 36415 COLL VENOUS BLD VENIPUNCTURE: CPT

## 2024-10-12 RX ORDER — OXYCODONE HYDROCHLORIDE 30 MG/1
1 TABLET, FILM COATED, EXTENDED RELEASE ORAL
Qty: 9 | Refills: 0
Start: 2024-10-12 | End: 2024-10-14

## 2024-10-12 RX ORDER — MORPHINE SULFATE 30 MG/1
4 TABLET, FILM COATED, EXTENDED RELEASE ORAL ONCE
Refills: 0 | Status: DISCONTINUED | OUTPATIENT
Start: 2024-10-12 | End: 2024-10-12

## 2024-10-12 RX ORDER — OXYCODONE HYDROCHLORIDE 30 MG/1
5 TABLET, FILM COATED, EXTENDED RELEASE ORAL ONCE
Refills: 0 | Status: DISCONTINUED | OUTPATIENT
Start: 2024-10-12 | End: 2024-10-12

## 2024-10-12 RX ADMIN — MORPHINE SULFATE 4 MILLIGRAM(S): 30 TABLET, FILM COATED, EXTENDED RELEASE ORAL at 20:36

## 2024-10-12 RX ADMIN — MORPHINE SULFATE 4 MILLIGRAM(S): 30 TABLET, FILM COATED, EXTENDED RELEASE ORAL at 21:06

## 2024-10-12 RX ADMIN — OXYCODONE HYDROCHLORIDE 5 MILLIGRAM(S): 30 TABLET, FILM COATED, EXTENDED RELEASE ORAL at 21:52

## 2024-10-12 RX ADMIN — MORPHINE SULFATE 4 MILLIGRAM(S): 30 TABLET, FILM COATED, EXTENDED RELEASE ORAL at 19:36

## 2024-10-12 RX ADMIN — MORPHINE SULFATE 4 MILLIGRAM(S): 30 TABLET, FILM COATED, EXTENDED RELEASE ORAL at 19:06

## 2024-10-12 NOTE — ED PROVIDER NOTE - PATIENT PORTAL LINK FT
You can access the FollowMyHealth Patient Portal offered by [facility] by registering at the following website: [email or web address]. By joining [employer]’s FollowMyHealth portal, you will also be able to view your health information using other applications (apps) compatible with our system.

## 2024-10-12 NOTE — ED PROVIDER NOTE - PHYSICAL EXAMINATION
PHYSICAL EXAM:  GENERAL: NAD, well-developed  HEAD:  Atraumatic, Normocephalic  EYES: EOMI, PERRLA, conjunctiva and sclera clear  NECK: Supple, No JVD  CHEST/LUNG: Clear to auscultation bilaterally; No wheeze  HEART: Regular rate and rhythm; No murmurs, rubs, or gallops  ABDOMEN: Soft, tender in RLQ. Tender over right lower back as well.  EXTREMITIES:  2+ Peripheral Pulses, No clubbing, cyanosis, or edema  PSYCH: AAOx3  NEUROLOGY: non-focal  SKIN: No rashes or lesions

## 2024-10-12 NOTE — ED ADULT NURSE NOTE - OBJECTIVE STATEMENT
Patient presents to ED with complaint of  right lower back pain radiating to right lower abdomen x3 days. Denies n/v/d. Alert and oriented x 4.

## 2024-10-12 NOTE — ED ADULT NURSE NOTE - NSFALLUNIVINTERV_ED_ALL_ED
Bed/Stretcher in lowest position, wheels locked, appropriate side rails in place/Call bell, personal items and telephone in reach/Instruct patient to call for assistance before getting out of bed/chair/stretcher/Non-slip footwear applied when patient is off stretcher/[place] to call system/Physically safe environment - no spills, clutter or unnecessary equipment/Purposeful proactive rounding/Room/bathroom lighting operational, light cord in reach

## 2024-10-12 NOTE — ED PROVIDER NOTE - CLINICAL SUMMARY MEDICAL DECISION MAKING FREE TEXT BOX
40yo F with previous surgery p/w abdominal pain/back pain and urinary symptoms. Possible pyelonephritis vs renal stone? Possible appendicitis vs. ovarian pathology. Will obtain labwork, urine and imaging and re-evaluate.

## 2024-10-12 NOTE — ED PROVIDER NOTE - NSFOLLOWUPINSTRUCTIONS_ED_ALL_ED_FT
Rest, drink plenty of fluids.  Advance activity as tolerated.  Continue all previously prescribed medications as directed.  Follow up with your PMD 2-3 days and bring copies of your results.  Return to the ER for worsening symptoms, fevers, chest pain, shortness of breath, abdominal pain or new concerning symptoms.    Take acetaminophen 650 mg orally every 6-8 hours for pain control as needed. Please do not exceed 4,000 mg of acetaminophen during a 24 hours period. Acetaminophen can be found in many over-the-counter cold medications as well as opioid medications that may be given for pain.    Take ibuprofen (also known as MOTRIN or ADVIL) 400 mg orally every 6-8 hours for pain control as needed with food to avoid an upset stomach. Ibuprofen can be found in many over-the-counter medications. Please do not take ibuprofen if you have a bleeding disorder, stomach or gastrointestinal ulcer, or liver disease.    If needed, you can alternate these medications so that you can take one medication every 3 hours. For example, at noon take ibuprofen, then at 3PM take acetaminophen, then at 6PM take ibuprofen.

## 2024-10-12 NOTE — ED PROVIDER NOTE - OBJECTIVE STATEMENT
38yo F with PMHx of laporascopic surgery (patient does not know what surgery) p/w RLQ and RL back pain. Pain started 3 days ago; colicky in nature; with urinary urgency. Denies any fevers, chills, vomiting, diarrhea or infectious symptoms. Tolerating diet. Not on any medications at baseline.

## 2024-10-12 NOTE — ED ADULT NURSE NOTE - SUICIDE SCREENING QUESTION 1
General: non-toxic, NAD  HEENT: NCAT, PERRL  Cardiac: RRR, no murmurs, 2+ peripheral pulses  Chest: CTA  Abdomen: soft, non-distended, bowel sounds present, no ttp, no rebound or guarding  Extremities: no peripheral edema, calf tenderness, or leg size discrepancies  Skin: no rashes  Neuro: AAOx4, 5+motor, sensory grossly intact  Psych: mood and affect appropriate No

## 2024-10-14 ENCOUNTER — APPOINTMENT (OUTPATIENT)
Dept: OBGYN | Facility: CLINIC | Age: 39
End: 2024-10-14
Payer: COMMERCIAL

## 2024-10-14 VITALS
TEMPERATURE: 97.7 F | SYSTOLIC BLOOD PRESSURE: 112 MMHG | BODY MASS INDEX: 31.28 KG/M2 | HEART RATE: 64 BPM | HEIGHT: 62 IN | OXYGEN SATURATION: 99 % | WEIGHT: 170 LBS | DIASTOLIC BLOOD PRESSURE: 70 MMHG

## 2024-10-14 DIAGNOSIS — R39.11 HESITANCY OF MICTURITION: ICD-10-CM

## 2024-10-14 DIAGNOSIS — M54.50 LOW BACK PAIN, UNSPECIFIED: ICD-10-CM

## 2024-10-14 DIAGNOSIS — R35.0 FREQUENCY OF MICTURITION: ICD-10-CM

## 2024-10-14 DIAGNOSIS — Z01.419 ENCOUNTER FOR GYNECOLOGICAL EXAMINATION (GENERAL) (ROUTINE) W/OUT ABNORMAL FINDINGS: ICD-10-CM

## 2024-10-14 PROBLEM — R10.9 ABDOMINAL PAIN, ACUTE: Status: ACTIVE | Noted: 2024-10-14

## 2024-10-14 PROCEDURE — 99204 OFFICE O/P NEW MOD 45 MIN: CPT

## 2024-10-18 LAB
A VAGINAE DNA VAG QL NAA+PROBE: NORMAL
APPEARANCE: CLEAR
BACTERIA UR CULT: NORMAL
BILIRUBIN URINE: NEGATIVE
BLOOD URINE: NEGATIVE
BVAB2 DNA VAG QL NAA+PROBE: NORMAL
C KRUSEI DNA VAG QL NAA+PROBE: NEGATIVE
C KRUSEI DNA VAG QL NAA+PROBE: NEGATIVE
C TRACH RRNA SPEC QL NAA+PROBE: NOT DETECTED
COLOR: YELLOW
GLUCOSE QUALITATIVE U: NEGATIVE MG/DL
HPV HIGH+LOW RISK DNA PNL CVX: NOT DETECTED
KETONES URINE: NEGATIVE MG/DL
LEUKOCYTE ESTERASE URINE: NEGATIVE
M GENITALIUM DNA SPEC QL NAA+PROBE: NEGATIVE
M HOMINIS DNA SPEC QL NAA+PROBE: NEGATIVE
MEGA1 DNA VAG QL NAA+PROBE: NORMAL
N GONORRHOEA RRNA SPEC QL NAA+PROBE: NOT DETECTED
NITRITE URINE: NEGATIVE
PH URINE: 6
PROTEIN URINE: NEGATIVE MG/DL
SOURCE AMPLIFICATION: NORMAL
SPECIFIC GRAVITY URINE: 1.01
T VAGINALIS RRNA SPEC QL NAA+PROBE: NEGATIVE
UREAPLASMA DNA SPEC QL NAA+PROBE: NEGATIVE
UROBILINOGEN URINE: 0.2 MG/DL

## 2024-10-21 ENCOUNTER — APPOINTMENT (OUTPATIENT)
Dept: OBGYN | Facility: CLINIC | Age: 39
End: 2024-10-21

## 2024-10-21 LAB — CYTOLOGY CVX/VAG DOC THIN PREP: NORMAL

## 2024-10-24 ENCOUNTER — APPOINTMENT (OUTPATIENT)
Dept: BARIATRICS | Facility: CLINIC | Age: 39
End: 2024-10-24
Payer: COMMERCIAL

## 2024-10-24 VITALS
WEIGHT: 174.6 LBS | OXYGEN SATURATION: 99 % | BODY MASS INDEX: 32.13 KG/M2 | HEART RATE: 66 BPM | HEIGHT: 62 IN | SYSTOLIC BLOOD PRESSURE: 101 MMHG | DIASTOLIC BLOOD PRESSURE: 73 MMHG | TEMPERATURE: 97.3 F

## 2024-10-24 DIAGNOSIS — Z98.890 OTHER SPECIFIED POSTPROCEDURAL STATES: ICD-10-CM

## 2024-10-24 DIAGNOSIS — Z87.19 OTHER SPECIFIED POSTPROCEDURAL STATES: ICD-10-CM

## 2024-10-24 DIAGNOSIS — E66.9 OBESITY, UNSPECIFIED: ICD-10-CM

## 2024-10-24 DIAGNOSIS — Z98.84 BARIATRIC SURGERY STATUS: ICD-10-CM

## 2024-10-24 DIAGNOSIS — R10.9 UNSPECIFIED ABDOMINAL PAIN: ICD-10-CM

## 2024-10-24 PROCEDURE — 99214 OFFICE O/P EST MOD 30 MIN: CPT

## 2024-10-24 RX ORDER — NAPROXEN SODIUM 500 MG/1
500 TABLET, FILM COATED, EXTENDED RELEASE ORAL
Refills: 0 | Status: ACTIVE | COMMUNITY

## 2024-10-24 RX ORDER — CIPROFLOXACIN HYDROCHLORIDE 500 MG/1
500 TABLET, FILM COATED ORAL TWICE DAILY
Qty: 20 | Refills: 0 | Status: DISCONTINUED | COMMUNITY
Start: 2024-10-14 | End: 2024-10-24

## 2024-10-25 ENCOUNTER — APPOINTMENT (OUTPATIENT)
Dept: NEUROLOGY | Facility: CLINIC | Age: 39
End: 2024-10-25

## 2024-10-25 PROCEDURE — 95816 EEG AWAKE AND DROWSY: CPT

## 2024-10-26 PROCEDURE — 95708 EEG WO VID EA 12-26HR UNMNTR: CPT

## 2024-10-27 PROCEDURE — 95708 EEG WO VID EA 12-26HR UNMNTR: CPT

## 2024-10-28 ENCOUNTER — APPOINTMENT (OUTPATIENT)
Dept: GASTROENTEROLOGY | Facility: CLINIC | Age: 39
End: 2024-10-28
Payer: COMMERCIAL

## 2024-10-28 VITALS
RESPIRATION RATE: 16 BRPM | HEIGHT: 62 IN | WEIGHT: 174 LBS | SYSTOLIC BLOOD PRESSURE: 110 MMHG | BODY MASS INDEX: 32.02 KG/M2 | HEART RATE: 72 BPM | DIASTOLIC BLOOD PRESSURE: 72 MMHG | OXYGEN SATURATION: 98 % | TEMPERATURE: 98 F

## 2024-10-28 DIAGNOSIS — R10.11 RIGHT UPPER QUADRANT PAIN: ICD-10-CM

## 2024-10-28 PROCEDURE — 99214 OFFICE O/P EST MOD 30 MIN: CPT

## 2024-10-28 PROCEDURE — 95708 EEG WO VID EA 12-26HR UNMNTR: CPT

## 2024-10-28 PROCEDURE — 95723 EEG PHY/QHP>60<84 HR W/O VID: CPT

## 2024-10-28 PROCEDURE — 95700 EEG CONT REC W/VID EEG TECH: CPT

## 2024-10-28 PROCEDURE — G2211 COMPLEX E/M VISIT ADD ON: CPT | Mod: NC

## 2024-10-28 RX ORDER — OMEPRAZOLE 40 MG/1
40 CAPSULE, DELAYED RELEASE ORAL DAILY
Qty: 30 | Refills: 6 | Status: ACTIVE | COMMUNITY
Start: 2024-10-24 | End: 1900-01-01

## 2024-10-31 ENCOUNTER — RESULT REVIEW (OUTPATIENT)
Age: 39
End: 2024-10-31

## 2024-10-31 ENCOUNTER — INPATIENT (INPATIENT)
Facility: HOSPITAL | Age: 39
LOS: 0 days | Discharge: ANOTHER HOSPITAL | DRG: 101 | End: 2024-11-01
Attending: STUDENT IN AN ORGANIZED HEALTH CARE EDUCATION/TRAINING PROGRAM | Admitting: STUDENT IN AN ORGANIZED HEALTH CARE EDUCATION/TRAINING PROGRAM
Payer: COMMERCIAL

## 2024-10-31 ENCOUNTER — OUTPATIENT (OUTPATIENT)
Dept: OUTPATIENT SERVICES | Facility: HOSPITAL | Age: 39
LOS: 1 days | End: 2024-10-31
Payer: COMMERCIAL

## 2024-10-31 ENCOUNTER — APPOINTMENT (OUTPATIENT)
Dept: GASTROENTEROLOGY | Facility: HOSPITAL | Age: 39
End: 2024-10-31

## 2024-10-31 VITALS
RESPIRATION RATE: 18 BRPM | OXYGEN SATURATION: 100 % | HEIGHT: 62 IN | TEMPERATURE: 98 F | DIASTOLIC BLOOD PRESSURE: 78 MMHG | HEART RATE: 77 BPM | WEIGHT: 169.76 LBS | SYSTOLIC BLOOD PRESSURE: 127 MMHG

## 2024-10-31 DIAGNOSIS — Z98.89 OTHER SPECIFIED POSTPROCEDURAL STATES: Chronic | ICD-10-CM

## 2024-10-31 DIAGNOSIS — Z41.9 ENCOUNTER FOR PROCEDURE FOR PURPOSES OTHER THAN REMEDYING HEALTH STATE, UNSPECIFIED: Chronic | ICD-10-CM

## 2024-10-31 DIAGNOSIS — R10.11 RIGHT UPPER QUADRANT PAIN: ICD-10-CM

## 2024-10-31 DIAGNOSIS — Z90.3 ACQUIRED ABSENCE OF STOMACH [PART OF]: Chronic | ICD-10-CM

## 2024-10-31 DIAGNOSIS — R56.9 UNSPECIFIED CONVULSIONS: ICD-10-CM

## 2024-10-31 LAB
ALBUMIN SERPL ELPH-MCNC: 3.8 G/DL — SIGNIFICANT CHANGE UP (ref 3.3–5)
ALP SERPL-CCNC: 85 U/L — SIGNIFICANT CHANGE UP (ref 40–120)
ALT FLD-CCNC: 28 U/L — SIGNIFICANT CHANGE UP (ref 10–45)
ANION GAP SERPL CALC-SCNC: 12 MMOL/L — SIGNIFICANT CHANGE UP (ref 5–17)
AST SERPL-CCNC: 18 U/L — SIGNIFICANT CHANGE UP (ref 10–40)
BASOPHILS # BLD AUTO: 0.03 K/UL — SIGNIFICANT CHANGE UP (ref 0–0.2)
BASOPHILS NFR BLD AUTO: 0.3 % — SIGNIFICANT CHANGE UP (ref 0–2)
BILIRUB SERPL-MCNC: 0.4 MG/DL — SIGNIFICANT CHANGE UP (ref 0.2–1.2)
BUN SERPL-MCNC: 17 MG/DL — SIGNIFICANT CHANGE UP (ref 7–23)
CALCIUM SERPL-MCNC: 8.8 MG/DL — SIGNIFICANT CHANGE UP (ref 8.4–10.5)
CHLORIDE SERPL-SCNC: 101 MMOL/L — SIGNIFICANT CHANGE UP (ref 96–108)
CK SERPL-CCNC: 51 U/L — SIGNIFICANT CHANGE UP (ref 25–170)
CO2 SERPL-SCNC: 28 MMOL/L — SIGNIFICANT CHANGE UP (ref 22–31)
CREAT SERPL-MCNC: 0.83 MG/DL — SIGNIFICANT CHANGE UP (ref 0.5–1.3)
EGFR: 93 ML/MIN/1.73M2 — SIGNIFICANT CHANGE UP
EOSINOPHIL # BLD AUTO: 0.21 K/UL — SIGNIFICANT CHANGE UP (ref 0–0.5)
EOSINOPHIL NFR BLD AUTO: 2.3 % — SIGNIFICANT CHANGE UP (ref 0–6)
GLUCOSE SERPL-MCNC: 109 MG/DL — HIGH (ref 70–99)
HCG SERPL-ACNC: <1 MIU/ML — SIGNIFICANT CHANGE UP
HCT VFR BLD CALC: 43.3 % — SIGNIFICANT CHANGE UP (ref 34.5–45)
HGB BLD-MCNC: 14.3 G/DL — SIGNIFICANT CHANGE UP (ref 11.5–15.5)
IMM GRANULOCYTES NFR BLD AUTO: 0.1 % — SIGNIFICANT CHANGE UP (ref 0–0.9)
LACTATE SERPL-SCNC: 1.1 MMOL/L — SIGNIFICANT CHANGE UP (ref 0.7–2)
LACTATE SERPL-SCNC: 5.9 MMOL/L — CRITICAL HIGH (ref 0.7–2)
LYMPHOCYTES # BLD AUTO: 3.61 K/UL — HIGH (ref 1–3.3)
LYMPHOCYTES # BLD AUTO: 39.8 % — SIGNIFICANT CHANGE UP (ref 13–44)
MAGNESIUM SERPL-MCNC: 1.9 MG/DL — SIGNIFICANT CHANGE UP (ref 1.6–2.6)
MCHC RBC-ENTMCNC: 30.4 PG — SIGNIFICANT CHANGE UP (ref 27–34)
MCHC RBC-ENTMCNC: 33 G/DL — SIGNIFICANT CHANGE UP (ref 32–36)
MCV RBC AUTO: 91.9 FL — SIGNIFICANT CHANGE UP (ref 80–100)
MONOCYTES # BLD AUTO: 0.52 K/UL — SIGNIFICANT CHANGE UP (ref 0–0.9)
MONOCYTES NFR BLD AUTO: 5.7 % — SIGNIFICANT CHANGE UP (ref 2–14)
NEUTROPHILS # BLD AUTO: 4.68 K/UL — SIGNIFICANT CHANGE UP (ref 1.8–7.4)
NEUTROPHILS NFR BLD AUTO: 51.8 % — SIGNIFICANT CHANGE UP (ref 43–77)
NRBC # BLD: 0 /100 WBCS — SIGNIFICANT CHANGE UP (ref 0–0)
PHOSPHATE SERPL-MCNC: 4.2 MG/DL — SIGNIFICANT CHANGE UP (ref 2.5–4.5)
PLATELET # BLD AUTO: 227 K/UL — SIGNIFICANT CHANGE UP (ref 150–400)
POTASSIUM SERPL-MCNC: 3 MMOL/L — LOW (ref 3.5–5.3)
POTASSIUM SERPL-SCNC: 3 MMOL/L — LOW (ref 3.5–5.3)
PROT SERPL-MCNC: 7.7 G/DL — SIGNIFICANT CHANGE UP (ref 6–8.3)
RBC # BLD: 4.71 M/UL — SIGNIFICANT CHANGE UP (ref 3.8–5.2)
RBC # FLD: 12.7 % — SIGNIFICANT CHANGE UP (ref 10.3–14.5)
SODIUM SERPL-SCNC: 141 MMOL/L — SIGNIFICANT CHANGE UP (ref 135–145)
WBC # BLD: 9.06 K/UL — SIGNIFICANT CHANGE UP (ref 3.8–10.5)
WBC # FLD AUTO: 9.06 K/UL — SIGNIFICANT CHANGE UP (ref 3.8–10.5)

## 2024-10-31 PROCEDURE — 70450 CT HEAD/BRAIN W/O DYE: CPT | Mod: 26,MC

## 2024-10-31 PROCEDURE — 43239 EGD BIOPSY SINGLE/MULTIPLE: CPT

## 2024-10-31 PROCEDURE — 88312 SPECIAL STAINS GROUP 1: CPT

## 2024-10-31 PROCEDURE — 71045 X-RAY EXAM CHEST 1 VIEW: CPT | Mod: 26

## 2024-10-31 PROCEDURE — 88305 TISSUE EXAM BY PATHOLOGIST: CPT | Mod: 26

## 2024-10-31 PROCEDURE — 99223 1ST HOSP IP/OBS HIGH 75: CPT | Mod: GC,25

## 2024-10-31 PROCEDURE — 93010 ELECTROCARDIOGRAM REPORT: CPT

## 2024-10-31 PROCEDURE — 99285 EMERGENCY DEPT VISIT HI MDM: CPT

## 2024-10-31 PROCEDURE — 99497 ADVNCD CARE PLAN 30 MIN: CPT | Mod: 25

## 2024-10-31 PROCEDURE — 88305 TISSUE EXAM BY PATHOLOGIST: CPT

## 2024-10-31 PROCEDURE — 88312 SPECIAL STAINS GROUP 1: CPT | Mod: 26

## 2024-10-31 RX ORDER — INFLUENZ VIR VAC TV P-SURF2003 15MCG/.5ML
0.5 SYRINGE (ML) INTRAMUSCULAR ONCE
Refills: 0 | Status: DISCONTINUED | OUTPATIENT
Start: 2024-10-31 | End: 2024-11-01

## 2024-10-31 RX ORDER — LEVETIRACETAM 500 MG/1
1000 TABLET, FILM COATED ORAL ONCE
Refills: 0 | Status: COMPLETED | OUTPATIENT
Start: 2024-10-31 | End: 2024-10-31

## 2024-10-31 RX ORDER — LEVETIRACETAM 500 MG/1
1000 TABLET, FILM COATED ORAL ONCE
Refills: 0 | Status: DISCONTINUED | OUTPATIENT
Start: 2024-10-31 | End: 2024-10-31

## 2024-10-31 RX ORDER — LEVETIRACETAM 500 MG/1
500 TABLET, FILM COATED ORAL
Refills: 0 | Status: DISCONTINUED | OUTPATIENT
Start: 2024-11-01 | End: 2024-11-01

## 2024-10-31 RX ORDER — SODIUM CHLORIDE 9 MG/ML
1000 INJECTION, SOLUTION INTRAMUSCULAR; INTRAVENOUS; SUBCUTANEOUS ONCE
Refills: 0 | Status: COMPLETED | OUTPATIENT
Start: 2024-10-31 | End: 2024-10-31

## 2024-10-31 RX ORDER — POTASSIUM CHLORIDE 10 MEQ
10 TABLET, EXTENDED RELEASE ORAL
Refills: 0 | Status: COMPLETED | OUTPATIENT
Start: 2024-10-31 | End: 2024-10-31

## 2024-10-31 RX ORDER — PANTOPRAZOLE SODIUM 40 MG/1
40 TABLET, DELAYED RELEASE ORAL
Refills: 0 | Status: DISCONTINUED | OUTPATIENT
Start: 2024-10-31 | End: 2024-11-01

## 2024-10-31 RX ORDER — SODIUM CHLORIDE 9 MG/ML
500 INJECTION, SOLUTION INTRAMUSCULAR; INTRAVENOUS; SUBCUTANEOUS
Refills: 0 | Status: COMPLETED | OUTPATIENT
Start: 2024-10-31 | End: 2024-10-31

## 2024-10-31 RX ORDER — LORAZEPAM 2 MG
2 TABLET ORAL ONCE
Refills: 0 | Status: DISCONTINUED | OUTPATIENT
Start: 2024-10-31 | End: 2024-11-01

## 2024-10-31 RX ADMIN — Medication 100 MILLIEQUIVALENT(S): at 16:20

## 2024-10-31 RX ADMIN — SODIUM CHLORIDE 75 MILLILITER(S): 9 INJECTION, SOLUTION INTRAMUSCULAR; INTRAVENOUS; SUBCUTANEOUS at 13:48

## 2024-10-31 RX ADMIN — SODIUM CHLORIDE 1000 MILLILITER(S): 9 INJECTION, SOLUTION INTRAMUSCULAR; INTRAVENOUS; SUBCUTANEOUS at 17:30

## 2024-10-31 RX ADMIN — Medication 100 MILLIEQUIVALENT(S): at 23:20

## 2024-10-31 RX ADMIN — Medication 100 MILLIEQUIVALENT(S): at 18:46

## 2024-10-31 RX ADMIN — SODIUM CHLORIDE 1000 MILLILITER(S): 9 INJECTION, SOLUTION INTRAMUSCULAR; INTRAVENOUS; SUBCUTANEOUS at 16:30

## 2024-10-31 RX ADMIN — LEVETIRACETAM 400 MILLIGRAM(S): 500 TABLET, FILM COATED ORAL at 17:12

## 2024-10-31 NOTE — ED ADULT NURSE NOTE - OBJECTIVE STATEMENT
Pt came from endo for  a seizure after upper endoscopy. Pt had upper endoscopy this morning for hx GERD. PMH seizures. Pt was gived 1 mg versed IV. Pt with left AC #22 IV by endo. Pt lethargic on arrival, Pt came from endo for  a seizure after upper endoscopy. Pt had upper endoscopy this morning for hx GERD. PMH seizures. Pt was given 1 mg versed IV. Pt with left AC #22 IV by endo. Pt lethargic on arrival,

## 2024-10-31 NOTE — ED PROVIDER NOTE - CLINICAL SUMMARY MEDICAL DECISION MAKING FREE TEXT BOX
Patient is a 39 year-old-female presents with concerns for seizure after endoscopy. Per Dr. Rdz (GI), patient was at endoscopy and received 100mg propofol and at the end of the procedure, started to have stiffening of BL arms and legs and received 1mg versed with termination of the seizure-like activity. Patient is not able to provide history at this time due to AMS. Per Dr. Rdz, patient has history of seizure vs pseudoseizure but not currently taking medications. Exam showed an altered female who opens eyes to name. Unclear if patient is post-ictal or s/p versed administration. Will obtain labs, CT head and ECG and CXR. Will obtain spot EEG.

## 2024-10-31 NOTE — EEG REPORT - NS EEG TEXT BOX
YAMILETH GALVAN [id number]     Study Date: 10-31-24  Duration: 20 min    --------------------------------------------------------------------------------------------------  History:  CC/ HPI Patient is a 39y old  Female who presents with a chief complaint of   MEDICATIONS  (STANDING):  levETIRAcetam  IVPB 1000 milliGRAM(s) IV Intermittent Once  potassium chloride  10 mEq/100 mL IVPB 10 milliEquivalent(s) IV Intermittent every 1 hour    --------------------------------------------------------------------------------------------------  Study Interpretation:    [[[Abbreviation Key:  PDR=alpha rhythm/posterior dominant rhythm. A-P=anterior posterior gradient.  Amplitude: ‘very low’:<20; ‘low’:20-50; ‘medium’:; ‘high’:>200uV.  Persistence for periodic/rhythmic patterns (% of epoch) ‘rare’:<1%; ‘occasional’:1-10%; ‘frequent’:10-50%; ‘abundant’:50-90%; ‘continuous’:>90%.  Persistence for sporadic discharges: ‘rare’:<1/hr; ‘occasional’:1/min-1/hr; ‘frequent’:>1/min; ‘abundant’:>1/10 sec.  GRDA=generalized rhythmic delta activity; FIRDA=frontal intermittent GRDA; LRDA=lateralized rhythmic delta activity; TIRDA=temporal intermittent rhythmic delta activity;  LPD=PLED=lateralized periodic discharges; GPD=generalized periodic discharges; BiPDs=BiPLEDs=bilateral independent periodic epileptiform discharges; SIRPID=stimulus induced rhythmic, periodic, or ictal appearing discharges; BIRDs=brief potentially ictal rhythmic discharges >4 Hz, lasting .5-10s; PFA=paroxysmal bursts of beta/gamma; LVFA=low voltage fast activity.  Modifiers: +F=with fast component; +S=with spike component; +R=with rhythmic component.  S-B=burst suppression pattern.  Max=maximal. N1-drowsy; N2-stage II sleep; N3-slow wave sleep. SSS/BETS=small sharp spikes/benign epileptiform transients of sleep. HV=hyperventilation; PS=photic stimulation]]]    FINDINGS:      Background:  Continuity: Continuous  Symmetry: Symmetric  PDR: 8 Hz activity, with amplitude to 40 uV, that attenuated to eye opening.    Reactivity: Present  Voltage: Normal (between 20-150uV)  Anterior Posterior Gradient: Present  Other background findings: None  Breach: Absent    Background Slowing:  Generalized slowing: None was present.  Focal slowing: None was present.    State Changes:   -Drowsiness noted with increased slowing of the background, attenuation of fast activity, vertex transients.  -N2 sleep transients were not recorded.    Sporadic Epileptiform Discharges:    None    Rhythmic and Periodic Patterns (RPPs):  None     Electrographic and Electroclinical seizures:  None    Other Clinical Events:  None    Activation Procedures:   -Hyperventilation was not performed.    -Photic stimulation was performed and did not elicit any abnormalities.       Artifacts:  Intermittent myogenic and movement artifacts were noted.    ECG:  The heart rate on single channel ECG was predominantly between 70 - 80 BPM.    EEG Classification / Summary:  Abnormal EEG study in the awake / drowsy states  Generalized background slowing, mild (PDR < 8.5 Hz)    -----------------------------------------------------------------------------------------------------    Clinical Impression:  Abnormal EEG study  Mild diffuse/multi-focal cerebral dysfunction, not specific as to etiology.  There were no seizures or epileptiform abnormalities recorded.      THIS IS A PRELIMINARY IMPRESSION PENDING ATTENDING ATTESTATION.    -------------------------------------------------------------------------------------------------------  EEG reading room: [phone]    After-hours epilepsy service: [phone]      Raffi Howe DO  Epilepsy Fellow   YAMILETH GALVAN [id number]     Study Date: 10-31-24  Duration: 20 min    --------------------------------------------------------------------------------------------------  History:  CC/ HPI Patient is a 39y old  Female who presents with a chief complaint of   MEDICATIONS  (STANDING):  levETIRAcetam  IVPB 1000 milliGRAM(s) IV Intermittent Once  potassium chloride  10 mEq/100 mL IVPB 10 milliEquivalent(s) IV Intermittent every 1 hour    --------------------------------------------------------------------------------------------------  Study Interpretation:    [[[Abbreviation Key:  PDR=alpha rhythm/posterior dominant rhythm. A-P=anterior posterior gradient.  Amplitude: ‘very low’:<20; ‘low’:20-50; ‘medium’:; ‘high’:>200uV.  Persistence for periodic/rhythmic patterns (% of epoch) ‘rare’:<1%; ‘occasional’:1-10%; ‘frequent’:10-50%; ‘abundant’:50-90%; ‘continuous’:>90%.  Persistence for sporadic discharges: ‘rare’:<1/hr; ‘occasional’:1/min-1/hr; ‘frequent’:>1/min; ‘abundant’:>1/10 sec.  GRDA=generalized rhythmic delta activity; FIRDA=frontal intermittent GRDA; LRDA=lateralized rhythmic delta activity; TIRDA=temporal intermittent rhythmic delta activity;  LPD=PLED=lateralized periodic discharges; GPD=generalized periodic discharges; BiPDs=BiPLEDs=bilateral independent periodic epileptiform discharges; SIRPID=stimulus induced rhythmic, periodic, or ictal appearing discharges; BIRDs=brief potentially ictal rhythmic discharges >4 Hz, lasting .5-10s; PFA=paroxysmal bursts of beta/gamma; LVFA=low voltage fast activity.  Modifiers: +F=with fast component; +S=with spike component; +R=with rhythmic component.  S-B=burst suppression pattern.  Max=maximal. N1-drowsy; N2-stage II sleep; N3-slow wave sleep. SSS/BETS=small sharp spikes/benign epileptiform transients of sleep. HV=hyperventilation; PS=photic stimulation]]]    FINDINGS:      Background:  Continuity: Continuous  Symmetry: Symmetric  PDR: 9 Hz activity, with amplitude to 40 uV, that attenuated to eye opening.    Reactivity: Present  Voltage: Normal (between 20-150uV)  Anterior Posterior Gradient: Present  Other background findings: None  Breach: Absent    Background Slowing:  Generalized slowing: None was present.  Focal slowing: None was present.    State Changes:   -Drowsiness noted with increased slowing of the background, attenuation of fast activity, vertex transients.  -N2 sleep transients were not recorded.    Sporadic Epileptiform Discharges:    None    Rhythmic and Periodic Patterns (RPPs):  None     Electrographic and Electroclinical seizures:  None    Other Clinical Events:  None    Activation Procedures:   -Hyperventilation was not performed.    -Photic stimulation was performed and did not elicit any abnormalities.       Artifacts:  Intermittent myogenic and movement artifacts were noted.    ECG:  The heart rate on single channel ECG was predominantly between 70 - 80 BPM.    EEG Classification / Summary:  Abnormal EEG study in the awake / drowsy states  Generalized background slowing, mild (PDR < 8.5 Hz)    -----------------------------------------------------------------------------------------------------    Clinical Impression:  Normal EEG study  There were no seizures or epileptiform abnormalities recorded.      -------------------------------------------------------------------------------------------------------  EEG reading room: [phone]    After-hours epilepsy service: [phone]      Raffi Howe DO  Epilepsy Fellow    Nate Duffy MD  Neurology Attending Physician

## 2024-10-31 NOTE — ED PROVIDER NOTE - PHYSICAL EXAMINATION
Vitals: I have reviewed the patients vital signs  General: Not in acute distress   HEENT: Atraumatic, normocephalic, airway patent  Eyes: EOMI, tracking appropriately, opening eyes to name   Neck: no tracheal deviation  Chest/Lungs: symmetric chest rise, on NRB  Heart: skin and extremities well perfused, regular rate and rhythm  Abdomen: soft, nontender and nondistended   Neuro: drowsy, altered, opening eyes to name, CN grossly intact  MSK: no gross deformity  Skin: no new emergent lesions

## 2024-10-31 NOTE — ED ADULT TRIAGE NOTE - MODE OF ARRIVAL
[facility]  Progress Note  7/23/2024     ASSESSMENT:  Chronic Numbness and tingling of Bilateral LE's: No improvement with second set of GRAHAM   Lumbar radiculopathy   Moderate Spinal canal stenosis L4-5 with ligamentum flavum hypertrophy and  neurogenic claudication  Degenerative disc disease  Lumbar facet arthropathy  Near complete sacralization of L5  Lumbosacral radiculopathy        PLAN:   - We discussed repeat MRI and likely referral to spine surgery  - Not a MILD candidate after Dr Orellana reviewed lumbar xrays  - At this time pt looking to go to [place] - she will be in touch with outcome        VITALS:    Visit Vitals  Ht 5' 2\" (1.575 m)   Wt 65.8 kg (145 lb)   BMI 26.52 kg/m²   Respirations 15    ALLERGIES:  ALLERGIES:   Allergen Reactions   • Tetanus Toxoid      Tetanus toxoid absorbed  High fever, nauseated       MEDICATIONS:  Outpatient Medications Marked as Taking for the 7/23/24 encounter (Office Visit) with Chary Sawyer APNP   Medication Sig Dispense Refill   • cefdinir (OMNICEF) 300 MG capsule Take 300 mg by mouth in the morning and 300 mg in the evening.     • aspirin 81 MG chewable tablet Chew 81 mg by mouth.     • levothyroxine 88 MCG tablet Take 1 tablet by mouth daily.     • oxybutynin (DITROPAN) 5 MG tablet Take 1 tablet by mouth in the morning and 1 tablet at noon and 1 tablet in the evening.     • apixaBAN (Eliquis) 2.5 MG Tab Take 1 tablet by mouth every 12 hours. 90 tablet 3   • valsartan (DIOVAN) 320 MG tablet Take 1 tablet by mouth daily. 90 tablet 3   • allopurinol (ZYLOPRIM) 100 MG tablet Take 1 tablet by mouth daily. 90 tablet 3   • ondansetron (ZOFRAN ODT) 4 MG disintegrating tablet      • estradiol (ESTRACE VAGINAL) 0.1 MG/GM vaginal cream Place a small grape size amount inside the vagina using your finger (0.5g if using the applicator) every other night before bed 42.5 g 11   • Vibegron 75 MG Tab Take 1 tablet by mouth daily. 30 tablet 3   • pantoprazole (PROTONIX)  Private Auto 40 MG tablet Take 1 tablet by mouth 2 times daily (before meals). 180 tablet 3   • dicyclomine (BENTYL) 20 MG tablet Take 1 tablet by mouth 4 times daily (before meals and nightly). 360 tablet 3   • umeclidinium-vilanterol (Anoro Ellipta) 62.5-25 MCG/ACT inhaler Inhale 1 Puff by mouth daily. 60 each 11   • flecainide (TAMBOCOR) 50 MG tablet Take 1 tablet by mouth 2 times daily. 180 tablet 3   • fluticasone (FLONASE) 50 MCG/ACT nasal spray Spray 2 sprays in each nostril as needed 48 g 3   • hydrOXYzine (ATARAX) 10 MG tablet Take 1 tablet by mouth at bedtime as needed for Anxiety. 30 tablet 5   • triamcinolone (ARISTOCORT) 0.1 % cream Apply to back twice daily for 2 weeks and then may use as needed for flares 80 g 2   • acetaminophen (TYLENOL) 500 MG tablet Take 1,000 mg by mouth every 6 hours as needed for Pain (body aches). 2 tablets=1,000mg     • Cholecalciferol (VITAMIN D-3 PO) Take 2,000 Units by mouth 2 times daily.      • Cyanocobalamin (VITAMIN B12 PO) Take 1 tablet by mouth nightly.          PAST MEDICAL HISTORY:  Past Medical History:   Diagnosis Date   • Alternating constipation and diarrhea 09/12/2018    w/ abdominal cramping - sporadic    • Arthritis    • CKD (chronic kidney disease) stage 3, GFR 30-59 ml/min  (CMD)    • Claustrophobia    • COPD (chronic obstructive pulmonary disease)  (CMD)    • Disorder of bone and cartilage, unspecified 05/04/2011   • Essential (primary) hypertension    • Fracture     Right tibia fracture (2005); right ankle fracture (2012)-both treated w/ casting    • GERD (gastroesophageal reflux disease)     10./6/14S/P EGD w/ Dil, esophagitis-Dr. Webber   • Gout     Uric acid 6.4 in 7/2014   • H/O carotid atherosclerosis     12/11/15.  Ultrasound: Mild plaque, otherwise no hemodynamic sig.lesions   • High risk medication use: FLECAINIDE 07/15/2013    Normal Stress Test January 2014    • History of dysphagia     10./6/14S/P EGD w/ Dil, esophagitis-Dr. Webber   • History of one  miscarriage     s/p D and C    • Hx A-fib, paroxysmal     Off Coumadin in 2013,Sinus on flecainide+ ASA -Dr. Salgado.  1/21/14NM Scan : EF 65%, otherwise negative   • Hx Meningioma     brain   • Hypothyroidism    • Irritable bowel syndrome    • Malignant neoplasm  (CMD) 02/2021    Right Lung CA   • Osteopenia    • Peripheral neuropathy     Undetermined etiology from ankles to below the knees.  Tingling and numbness   • Ptosis of both eyelids    • Tubular adenoma of colon     TC-->TAx2-2013- Dr. Webber   • Urinary incontinence     wears pads    • Urinary tract infection        PAST SURGICAL HISTORY:  Past Surgical History:   Procedure Laterality Date   • Appendectomy     • Carpal tunnel release Bilateral    • Colonoscopy w/ polypectomy  07/21/2013    Dr Webber    • Ct guided needle placement Right 1/7/2021    Lung Biopsy   • D and c      x1 for miscarriage    • Egd dilation of duod w/baloon  10/06/2014    Dr Webber    • Egd dilation of duod w/baloon  05/07/2013    Dr Webber    • Eye surgery Left 2012    cataract extraction w/ IOL   • Eye surgery Right 04/25/2015    cataract extraction w/ IOL - Dr Tomas    • Hysterectomy      and BSO   • Ir lung biopsy  01/07/2021   • Knee arthroscopy w/ meniscal repair Right 9/2015    Procedure done at [facility]   • Loop recorder implant  06/2018    Dr Wood [facility]    • Removal gallbladder     • Service to gastroenterology  2012    EGD  COLONOSCOPY   • Shoulder surgery     • Tonsillectomy and adenoidectomy     • Trigger finger release Left 08/09/2018    First annular pulley release of the left long finger for flexor stenosing tenosynovitis   • Vaginal delivery      x2    • Xray mammogram screening bilat  11/01/2011       FAMILY HISTORY:  Family History   Problem Relation Age of Onset   • Cancer Mother         lung    • Hypertension Mother    • Osteoarthritis Mother    • Heart disease Father    • Cancer Father         lung    • Diabetes Father     • Osteoarthritis Father    • Cancer, Colon Father    • Cancer Brother    • Patient is unaware of any medical problems Daughter    • Patient is unaware of any medical problems Daughter        SOCIAL HISTORY:  Social History     Tobacco Use   • Smoking status: Former     Current packs/day: 0.00     Average packs/day: 1 pack/day for 30.0 years (30.0 ttl pk-yrs)     Types: Cigarettes     Start date:      Quit date:      Years since quittin.5   • Smokeless tobacco: Never   Vaping Use   • Vaping status: never used   Substance Use Topics   • Alcohol use: Yes     Comment: 3-4 nights a week, 1 glass of wine   • Drug use: Never       Social History     Tobacco Use   Smoking Status Former   • Current packs/day: 0.00   • Average packs/day: 1 pack/day for 30.0 years (30.0 ttl pk-yrs)   • Types: Cigarettes   • Start date:    • Quit date:    • Years since quittin.5   Smokeless Tobacco Never       Social History     Substance and Sexual Activity   Alcohol Use Yes    Comment: 3-4 nights a week, 1 glass of wine         HISTORY OF PRESENT ILLNESS:  Carlotta is a 88 year old female who follow up of numbness and tingling of bilateral lower extremities. She reports no improvement with second set of ESIs. She is now reporting weakness in her bilateral LEs. The pain limits her ability to do her ADLs and needs a cane to walk.       Numbness and Tingling BLE's:  Numbness and tingling bilateral lower extremities. Weakness in the LE. No low back pain. No cramping in the LEs.     REVIEW OF SYSTEMS:  All systems are negative except as noted in the history of present illness.    PHYSICAL EXAMINATION:  Constitutional  General Appearance - No discomfort at rest.  Cardiovascular (peripheral vascular system)  Observation:  Lower limb varicosities (Clinical Severity Grading):  No evidence of venous disease.  Palpation:  Lower-limb edema:  Normal; Temperature:  Normal.  Respiratory  Auscultation:  Clear bilateral  lungs.  Cyanosis:  Lower limbs:  None.  Lymphatic  Palpation for lymph nodes in the groin and the popliteal regions:  Negative.  Skin:  No rashes or lesions.   Musculoskeletal and Neurological/Psychiatric  Orientation to time, place, person:  Normal - alert and oriented x3.  Mood and Affect:  Normal.      Lumbar Spine Exam:  Lumbar Inspection:  Seated position  Alignment:   midline   Lumbar Lordosis:  normal   Scoliosis:  Absent  Overlying Skin:  normal        Strength        Rt Strength Right Left   L2 Hip Flexion 5-/5 5/5   L3 Knee Ext 5-/5 5/5   L4 Ank D Flexion 5-/5 5/5   L5 Big Toe Ext 5-/5 5/5   S1 Ank P Flexion 5-/5 5/5       Light touch Sensory Exam:   Rt Sensory Right Left   L2 Anterior thigh normal normal   L3 Medial knee normal normal   L4 Medial malleolus normal normal   L5 Dorsum of foot normal normal   S1 Lateral heel normal normal         DATA REVIEWED:  X ray of Lumbar Spine 05/24/2022  EMG Bilateral LE 12/10/2016  MRI of thoracic and lumbar spine on 7/1/22    Walk in

## 2024-10-31 NOTE — ED ADULT NURSE NOTE - NSFALLHARMRISKINTERV_ED_ALL_ED
Assistance OOB with selected safe patient handling equipment if applicable/Communicate risk of Fall with Harm to all staff, patient, and family/Provide patient with walking aids/Provide visual cue: red socks, yellow wristband, yellow gown, etc/Reinforce activity limits and safety measures with patient and family/Bed in lowest position, wheels locked, appropriate side rails in place/Call bell, personal items and telephone in reach/Instruct patient to call for assistance before getting out of bed/chair/stretcher/Non-slip footwear applied when patient is off stretcher/[place] to call system/Physically safe environment - no spills, clutter or unnecessary equipment/Purposeful Proactive Rounding/Room/bathroom lighting operational, light cord in reach

## 2024-10-31 NOTE — ED PROVIDER NOTE - CRITICAL CARE ATTENDING CONTRIBUTION TO CARE
Upon my evaluation, this patient had a high probability of imminent or life-threatening deterioration due to seizure, which required my direct attention, intervention, and personal management. The patient has a medical condition that impairs one or more vital organ systems. Frequent personal assessment and adjustment of medical interventions was performed.    I have personally provided 45 minutes of critical care time exclusive of time spent on separately billable procedures. Time includes review of laboratory data, radiology results, discussion with consultants, patient and family; monitoring for potential decompensation, as well as time spent retrieving data and reviewing the chart and documenting the visit. Interventions were performed as documented above.

## 2024-10-31 NOTE — H&P ADULT - ATTENDING COMMENTS
Please see resident note for full details regarding the service.    PE: A+Ox3, no murmurs, lungs CTA b/l, abd NT/ND, no lower extremity swelling    Assessment:  - Seizure vs. pseudoseizure?   - Hypokalemia   - Elevated lactic acid on admission   - History of ?seizures (last 10 years ago, not on AEDs), HLD, PCOS, GERD, Asthma,  gastric sleeve surgery, prediabetes, epilepsy, recent tooth extraction 4/30    Plan:  - Prior admission (5/4/24): per neurology pt has psychogenic nonepileptiform seizures and shaking episodes lack correlating epileptiform abnormalities on EEG, further AEDs and benzos, psychiatry recommended PN[facility] specialist and therapy   - CT head: No acute intracranial hemorrhage or acute territorial infarct.  If   symptoms persist, follow-up MRI exam recommended.  - K+ 3.0, replete and f/u in AM     - s/p Keppra 1000 mg x 1 dose in the ER; spoke to neurology will continue Keppra 500 mg BID for now   - EEG and CXR pending   - Aspiration precautions   - Seizure precautions   - Fall precautions   - Ativan 2 mg PRN for seizure activity   - Neurology consult   - DVT ppx: SCDs   - Code status:  - Dispo: Active. Neurology consult. EEG pending. If EEG negative, will d/c tomorrow.     I spent a total of 50 minutes on the date of this encounter coordinating the patient's care, excluding resident teaching time. This includes reviewing results/imaging and discussions with specialists, nursing, case management/social work. Further tests, medications, and procedures have been ordered as indicated. Results and the plan of care were communicated to the patient and/or their family member. Supporting documentation was completed and added to the patient's chart. Please see resident note for full details regarding the service.    PE: A+Ox3, no murmurs, lungs CTA b/l, abd NT/ND, no lower extremity swelling    Assessment:  - Seizure vs. pseudoseizure?   - Hypokalemia   - Elevated lactic acid on admission   - History of ?seizures (last 10 years ago, not on AEDs), HLD, PCOS, GERD, Asthma,  gastric sleeve surgery, prediabetes, epilepsy, recent tooth extraction 4/30    Plan:  - Prior admission (5/4/24): per neurology pt has psychogenic nonepileptiform seizures and shaking episodes lack correlating epileptiform abnormalities on EEG, further AEDs and benzos, psychiatry recommended [facility] specialist and therapy   - CT head: No acute intracranial hemorrhage or acute territorial infarct.  If   symptoms persist, follow-up MRI exam recommended.  - K+ 3.0, replete and f/u in AM     - s/p Keppra 1000 mg x 1 dose in the ER; spoke to neurology will continue Keppra 500 mg BID for now   - EEG and CXR pending   - Aspiration precautions   - Seizure precautions   - Fall precautions   - Ativan 2 mg PRN for seizure activity   - Neurology consult   - DVT ppx: SCDs   - Code status: Full code   - Dispo: Active. Neurology consult. EEG pending. If EEG negative, will d/c tomorrow.     I spent a total of 50 minutes on the date of this encounter coordinating the patient's care, excluding resident teaching time. This includes reviewing results/imaging and discussions with specialists, nursing, case management/social work. Further tests, medications, and procedures have been ordered as indicated. Results and the plan of care were communicated to the patient and/or their family member. Supporting documentation was completed and added to the patient's chart.

## 2024-10-31 NOTE — ED ADULT TRIAGE NOTE - CHIEF COMPLAINT QUOTE
Patient complaint of seizure after endoscopy. Patient was given Versed 1 mg Patient complaint of seizure after endoscopy. Patient was given Versed 1 mg. PMH of seizures

## 2024-10-31 NOTE — H&P ADULT - NSHPPHYSICALEXAM_GEN_ALL_CORE
Vitals  T(F): 97.7 (10-31-24 @ 14:52), Max: 97.7 (10-31-24 @ 14:52)  HR: 53 (10-31-24 @ 19:05) (53 - 77)  BP: 140/97 (10-31-24 @ 19:05) (127/78 - 140/97)  RR: 18 (10-31-24 @ 19:05) (18 - 18)  SpO2: 100% (10-31-24 @ 19:05) (100% - 100%)    PHYSICAL EXAM   Gen: NAD, comfortable, drowsy  HEENT: head atraumatic and normocephalic, PEERLA, EOMI, no oral lesions, neck supple  CVS: +s1, s2, regular rate and rhythm, no murmurs  Pulmonary: normal respiratory effort, clear to auscultation b/l, no wheezes/crackles/rhonchi  Abdomen: soft, RUQ tenderness, non-distended, +bowel sounds in all 4 quadrants  Extremities: no pedal edema  Skin: warm and dry, no wounds   Neuro: AA&Ox3, answering questions appropriately, face symmetric, sensation equal bilaterally in face, tongue midline, no dysarthria, 4/5 strength in upper extremities, 5/5 lower extremities bilaterally

## 2024-10-31 NOTE — ED PROVIDER NOTE - OBJECTIVE STATEMENT
Patient is a 39 year-old-female presents with concerns for seizure after endoscopy. Per Dr. Rdz (GI), patient was at endoscopy and received 100mg propofol and at the end of the procedure, started to have stiffening of BL arms and legs and received 1mg versed with termination of the seizure-like activity. Patient is not able to provide history at this time due to AMS. Per Dr. Rdz, patient has history of seizure vs pseudoseizure but not currently taking medications.

## 2024-10-31 NOTE — ED PROVIDER NOTE - NS ED ROS FT
See open tel/enc     Called [phone]   Message states the number you are trying to reach does not have a v/m box that has been set up yet.    Called [phone]   L/M TCB on v/m to callback Dr. Tan's office Nephrology   [facility] [address] -  [phone]      Script for  Vitamin D, Ergocalciferol, 1.25 mg (50,000 units) capsule   Take 1 capsule by mouth 1 day a week for 24 doses.  was sent to pt's preferred pharmacy on file.  [facility]/pharmacy #[id number] - [place], [place] - [address] [address]  Phone: [phone]  Fax: [phone]      Unable to obtain due to AMS

## 2024-10-31 NOTE — ED PROVIDER NOTE - PROGRESS NOTE DETAILS
Uzair Attending Physician   Patient had an episode of BL arm/leg stiffening which lasted about 30 seconds and resolved without intervention. Will order EEG. Dunne Attending Physician   Patient reassessed and is now opening eyes to name and able to squeeze hands with command. Dunne Attending Physician   Patient had an episode of BL arm/leg stiffening with extension of neck/trunk and turning of head which lasted less than one minute and resolved without intervention. Uzair Attending Physician  Spot EEG is completed; awaiting report. Patient is now awake and alert and able to say her name. Dunne Attending Physician   Lactate elevated to 5.9, suggesting that patient had a seizure episode. Patient is not currently afebrile to suggest infectious etiology. Will give keppra. Patient reassessed and continues to be awake and alert. Dunne Attending Physician   Patient had an episode of BL arm/leg stiffening with extension of neck/trunk and turning of head which lasted less than one minute and resolved without intervention. Patient appears post-ictal but is still protecting her airway.

## 2024-10-31 NOTE — ED PROVIDER NOTE - CONSIDERATION OF ADMISSION OBSERVATION
Admission for further workup of seizure vs transfer to EMU for seizure monitoring Consideration of Admission/Observation

## 2024-10-31 NOTE — H&P ADULT - HISTORY OF PRESENT ILLNESS
38 y/o female  with PMHx of seizures (last 10 years ago, not on antiepileptic meds), HLD, PCOS, GERD, Asthma, gastric sleeve surgery, prediabetes presents to ED after concerns for seizure after endoscopy at [facility]. Per Dr. Rdz (GI), patient received 100mg propofol and at the end of the procedure, started to have stiffening of BL arms and legs and received 1mg versed with termination of the seizure-like activity. EGD was done due to hx of severe RUQ pain that started 2 weeks ago. Per chart review, pt was admitted May 2024 to [facility] for seizures that were deemed to be psychogenic in nature.    Pt was last seen by neuro Dr. Mcneill on 9/10/24 who recommended ambulatory EEG for 72hrs and follow up appt 11/18/24. Neurologist mentioned that if seizure activity was observed on ambulatory EEG, lacosamide would be started.    ED course: VS 97.7*F, HR 77, /78, SpO2 100%  s/p potassium chloride 10mEq IV x1, NaCl 1000mL bolus, keppra 1000mg    EKG:   EEG: Mild diffuse/multi-focal cerebral dysfunction, not specific as to etiology.  There were no seizures or epileptiform abnormalities recorded.      40 y/o female  with PMHx of seizures (last 10 years ago, not on antiepileptic meds), HLD, PCOS, GERD, Asthma, gastric sleeve surgery, prediabetes presents to ED after concerns for seizure after endoscopy at [facility]. Per Dr. Rdz (GI), patient received 100mg propofol and at the end of the procedure, started to have stiffening of bilateral arms and legs and received 1mg versed with termination of the seizure-like activity. EGD was done due to hx of severe RUQ pain that started 2 weeks ago. Pt endorses fogginess, headache, eye fatigue, RUQ pain, and does not recall what happened after endoscopy. Last seizure was in May 2024 and before that she had not had a seizure for 10 years. Pt used to take dilantin many years ago and discontinued. Per chart review, pt was admitted May 2024 to [facility] for seizures that were deemed to be psychogenic in nature. Pt was last seen by neuro Dr. Mcneill on 9/10/24 who recommended ambulatory EEG for 72hrs and follow up appt 11/18/24. Neurologist mentioned that if seizure activity was observed on ambulatory EEG, lacosamide would be started. Per pt, she completed 72 hr EEG on Monday and hasn't heard back of results     ED course: VS 97.7*F, HR 77, /78, SpO2 100%  s/p potassium chloride 10mEq IV x1, NaCl 1000mL bolus, keppra 1000mg    EEG: Mild diffuse/multi-focal cerebral dysfunction, not specific as to etiology. There were no seizures or epileptiform abnormalities recorded.

## 2024-10-31 NOTE — H&P ADULT - ASSESSMENT
#seizure s/p endoscopy  - hx of seizures, last seizure May 2024  - remarkable labs: K 3, lactate 5.9  - s/p potassium chloride 10mEq IV x1, NaCl 1000mL bolus, keppra 1000mg  - follow up repeat lactate   - CT head: There is no acute intracranial hemorrhage, parenchymal mass, mass effect or midline shift. There is no acute territorial infarct. There is no hydrocephalus. Partial empty sella noted.  EEG: Mild diffuse/multi-focal cerebral dysfunction, not specific as to etiology. There were no seizures or epileptiform abnormalities recorded.   - neuro consult    #DVT ppx  - lovenox    #GOC  - full code    Case discussed with Dr. Byrd 38 y/o female from [facility] with PMHx of seizures (last 10 years ago, not on antiepileptic meds), HLD, PCOS, GERD, Asthma, gastric sleeve surgery, prediabetes presents to ED after concerns for seizure after endoscopy at [facility]. Per Dr. Rdz (GI), patient received 100mg propofol and at the end of the procedure, started to have stiffening of bilateral arms and legs and received 1mg versed with termination of the seizure-like activity. Pt was then taken to ED and had another seizure where pt was hyperventilating and was stiff all over lasting 1-2min. EGD was done due to hx of severe RUQ pain that started 2 weeks ago. Pt endorses fogginess, headache, RUQ pain, and does not recall what happened after endoscopy. Last seizure was in May 2024 and before that she had not had a seizure for 10 years. Pt used to take dilantin many years ago and discontinued. Per chart review, pt was admitted May 2024 to [facility] for seizures that were deemed to be psychogenic in nature. Psych evaluated recommending outpatient f/u with PNES specialist  Rachelle Frausto. Pt was last seen by neuro Dr. Mcneill on 9/10/24 who recommended ambulatory EEG for 72hrs and follow up appt 11/18/24. Neurologist mentioned that if seizure activity was observed on ambulatory EEG, lacosamide would be started. Per pt, she completed 72 hr EEG on Monday and hasn't heard back of results.  Pt is admitted for:      #seizure s/p endoscopy  - pt received 100mg propofol for EGD and at the end of the procedure, started to have stiffening of bilateral arms and legs and received 1mg versed with termination of the seizure-like activity.   - hx of seizures, last seizure May 2024. Before May, last seizure was 10 years ago.  - remarkable labs: K 3, lactate 5.9  - s/p potassium chloride 10mEq IV x1, NaCl 1000mL bolus, keppra 1000mg  - follow up repeat lactate   - CT head: There is no acute intracranial hemorrhage, parenchymal mass, mass effect or midline shift. There is no acute territorial infarct. There is no hydrocephalus. Partial empty sella noted.  - EEG: Mild diffuse/multi-focal cerebral dysfunction, not specific as to etiology. There were no seizures or epileptiform abnormalities recorded.   - completed 72 hr EEG on Monday and pt hasn't heard back of results.   - ordered prolactin, CK  - AM labs  - start keppra 500mg BID  - lorazepam 2mg PRN for breakthrough seizure  - pt has been taking gabapentin 600mg BID for about 2 weeks. will hold for now.  - neuro consult- Yves aware    #hypokalemia  - K 3 on admission  - repleted  - continue to monitor    #RUQ pain  - severe RUQ pain for the past 2 weeks  - pain radiates to back sometimes and pt saw GYN at [facility] and was prescribed gabapentin 600mg BID  - s/p EGD 10/31/24 with Dr. Rdz  - EGD: Normal mucosa in the whole esophagus. Gastric previous surgery. (Biopsy). Normal mucosa in the duodenal bulb and second part of the duodenum. (Biopsy).  - Avoid NSAIDs for 10 days per GI  - continue PPI    #DVT ppx  - lovenox    #GOC  - full code    - daughter Tonia updated at bedside about plan  Case discussed with Dr. Byrd 38 y/o female from [facility] with PMHx of seizures (last 10 years ago, not on antiepileptic meds), HLD, PCOS, GERD, Asthma, gastric sleeve surgery, prediabetes presents to ED for seizure after endoscopy at [facility]. Pt is admitted for:    #seizure s/p endoscopy  - pt received 100mg propofol for EGD and at the end of the procedure, started to have stiffening of bilateral arms and legs and received 1mg versed with termination of the seizure-like activity.   - hx of seizures, last seizure May 2024. Before May, last seizure was 10 years ago.  - remarkable labs: K 3, lactate 5.9  - s/p potassium chloride 10mEq IV x1, NaCl 1000mL bolus, keppra 1000mg  - follow up repeat lactate   - CT head: There is no acute intracranial hemorrhage, parenchymal mass, mass effect or midline shift. There is no acute territorial infarct. There is no hydrocephalus. Partial empty sella noted.  - EEG: Mild diffuse/multi-focal cerebral dysfunction, not specific as to etiology. There were no seizures or epileptiform abnormalities recorded.   - completed 72 hr EEG on Monday and pt hasn't heard back of results.   - ordered prolactin, CK  - AM labs  - start keppra 500mg BID  - lorazepam 2mg PRN for breakthrough seizure  - pt has been taking gabapentin 600mg BID for about 2 weeks. will hold for now.  - neuro consult- Yves aware    #elevated lactate  -  lactate 5.9  - IVFs  - continue to trend    #hypokalemia  - K 3 on admission  - repleted  - continue to monitor    #RUQ pain  - severe RUQ pain for the past 2 weeks  - pain radiates to back sometimes and pt saw GYN at [facility] and was prescribed gabapentin 600mg BID  - s/p EGD 10/31/24 with Dr. Rdz  - EGD: Normal mucosa in the whole esophagus. Gastric previous surgery. (Biopsy). Normal mucosa in the duodenal bulb and second part of the duodenum. (Biopsy).  - Avoid NSAIDs for 10 days per GI  - continue PPI    #DVT ppx  - SCDs    #GOC  - full code    - daughter Tonia updated at bedside about plan  Case discussed with Dr. Byrd

## 2024-11-01 ENCOUNTER — TRANSCRIPTION ENCOUNTER (OUTPATIENT)
Age: 39
End: 2024-11-01

## 2024-11-01 ENCOUNTER — INPATIENT (INPATIENT)
Facility: HOSPITAL | Age: 39
LOS: 1 days | Discharge: HOME | DRG: 880 | End: 2024-11-03
Attending: PSYCHIATRY & NEUROLOGY | Admitting: PSYCHIATRY & NEUROLOGY
Payer: COMMERCIAL

## 2024-11-01 ENCOUNTER — NON-APPOINTMENT (OUTPATIENT)
Age: 39
End: 2024-11-01

## 2024-11-01 VITALS
HEART RATE: 88 BPM | OXYGEN SATURATION: 99 % | DIASTOLIC BLOOD PRESSURE: 88 MMHG | SYSTOLIC BLOOD PRESSURE: 134 MMHG | RESPIRATION RATE: 16 BRPM

## 2024-11-01 VITALS
SYSTOLIC BLOOD PRESSURE: 98 MMHG | DIASTOLIC BLOOD PRESSURE: 64 MMHG | TEMPERATURE: 98 F | OXYGEN SATURATION: 99 % | HEART RATE: 66 BPM | RESPIRATION RATE: 18 BRPM

## 2024-11-01 DIAGNOSIS — Z41.9 ENCOUNTER FOR PROCEDURE FOR PURPOSES OTHER THAN REMEDYING HEALTH STATE, UNSPECIFIED: Chronic | ICD-10-CM

## 2024-11-01 DIAGNOSIS — Z90.3 ACQUIRED ABSENCE OF STOMACH [PART OF]: Chronic | ICD-10-CM

## 2024-11-01 DIAGNOSIS — Z98.89 OTHER SPECIFIED POSTPROCEDURAL STATES: Chronic | ICD-10-CM

## 2024-11-01 DIAGNOSIS — G40.909 EPILEPSY, UNSPECIFIED, NOT INTRACTABLE, WITHOUT STATUS EPILEPTICUS: ICD-10-CM

## 2024-11-01 LAB
ALBUMIN SERPL ELPH-MCNC: 3 G/DL — LOW (ref 3.3–5)
ALP SERPL-CCNC: 74 U/L — SIGNIFICANT CHANGE UP (ref 40–120)
ALT FLD-CCNC: 22 U/L — SIGNIFICANT CHANGE UP (ref 10–45)
ANION GAP SERPL CALC-SCNC: 7 MMOL/L — SIGNIFICANT CHANGE UP (ref 5–17)
AST SERPL-CCNC: 16 U/L — SIGNIFICANT CHANGE UP (ref 10–40)
BILIRUB SERPL-MCNC: 0.5 MG/DL — SIGNIFICANT CHANGE UP (ref 0.2–1.2)
BUN SERPL-MCNC: 14 MG/DL — SIGNIFICANT CHANGE UP (ref 7–23)
CALCIUM SERPL-MCNC: 8.8 MG/DL — SIGNIFICANT CHANGE UP (ref 8.4–10.5)
CHLORIDE SERPL-SCNC: 106 MMOL/L — SIGNIFICANT CHANGE UP (ref 96–108)
CO2 SERPL-SCNC: 27 MMOL/L — SIGNIFICANT CHANGE UP (ref 22–31)
CREAT SERPL-MCNC: 0.44 MG/DL — LOW (ref 0.5–1.3)
EGFR: 127 ML/MIN/1.73M2 — SIGNIFICANT CHANGE UP
GLUCOSE BLDC GLUCOMTR-MCNC: 116 MG/DL — HIGH (ref 70–99)
GLUCOSE BLDC GLUCOMTR-MCNC: 124 MG/DL — HIGH (ref 70–99)
GLUCOSE SERPL-MCNC: 97 MG/DL — SIGNIFICANT CHANGE UP (ref 70–99)
HCT VFR BLD CALC: 39 % — SIGNIFICANT CHANGE UP (ref 34.5–45)
HGB BLD-MCNC: 13 G/DL — SIGNIFICANT CHANGE UP (ref 11.5–15.5)
LACTATE SERPL-SCNC: 0.5 MMOL/L — LOW (ref 0.7–2)
MAGNESIUM SERPL-MCNC: 2.2 MG/DL — SIGNIFICANT CHANGE UP (ref 1.6–2.6)
MCHC RBC-ENTMCNC: 30.4 PG — SIGNIFICANT CHANGE UP (ref 27–34)
MCHC RBC-ENTMCNC: 33.3 G/DL — SIGNIFICANT CHANGE UP (ref 32–36)
MCV RBC AUTO: 91.3 FL — SIGNIFICANT CHANGE UP (ref 80–100)
NRBC # BLD: 0 /100 WBCS — SIGNIFICANT CHANGE UP (ref 0–0)
PHOSPHATE SERPL-MCNC: 4.4 MG/DL — SIGNIFICANT CHANGE UP (ref 2.5–4.5)
PLATELET # BLD AUTO: 202 K/UL — SIGNIFICANT CHANGE UP (ref 150–400)
POTASSIUM SERPL-MCNC: 3.9 MMOL/L — SIGNIFICANT CHANGE UP (ref 3.5–5.3)
POTASSIUM SERPL-SCNC: 3.9 MMOL/L — SIGNIFICANT CHANGE UP (ref 3.5–5.3)
PROLACTIN SERPL-MCNC: 10.4 NG/ML — SIGNIFICANT CHANGE UP (ref 3.4–24.1)
PROT SERPL-MCNC: 7 G/DL — SIGNIFICANT CHANGE UP (ref 6–8.3)
RBC # BLD: 4.27 M/UL — SIGNIFICANT CHANGE UP (ref 3.8–5.2)
RBC # FLD: 13 % — SIGNIFICANT CHANGE UP (ref 10.3–14.5)
SODIUM SERPL-SCNC: 140 MMOL/L — SIGNIFICANT CHANGE UP (ref 135–145)
WBC # BLD: 7.45 K/UL — SIGNIFICANT CHANGE UP (ref 3.8–10.5)
WBC # FLD AUTO: 7.45 K/UL — SIGNIFICANT CHANGE UP (ref 3.8–10.5)

## 2024-11-01 PROCEDURE — 85025 COMPLETE CBC W/AUTO DIFF WBC: CPT

## 2024-11-01 PROCEDURE — 80053 COMPREHEN METABOLIC PANEL: CPT

## 2024-11-01 PROCEDURE — 83605 ASSAY OF LACTIC ACID: CPT

## 2024-11-01 PROCEDURE — 36415 COLL VENOUS BLD VENIPUNCTURE: CPT

## 2024-11-01 PROCEDURE — 96374 THER/PROPH/DIAG INJ IV PUSH: CPT

## 2024-11-01 PROCEDURE — 84146 ASSAY OF PROLACTIN: CPT

## 2024-11-01 PROCEDURE — 99285 EMERGENCY DEPT VISIT HI MDM: CPT | Mod: 25

## 2024-11-01 PROCEDURE — 85027 COMPLETE CBC AUTOMATED: CPT

## 2024-11-01 PROCEDURE — 83735 ASSAY OF MAGNESIUM: CPT

## 2024-11-01 PROCEDURE — 95812 EEG 41-60 MINUTES: CPT

## 2024-11-01 PROCEDURE — 93005 ELECTROCARDIOGRAM TRACING: CPT

## 2024-11-01 PROCEDURE — 70450 CT HEAD/BRAIN W/O DYE: CPT | Mod: MC

## 2024-11-01 PROCEDURE — 82962 GLUCOSE BLOOD TEST: CPT

## 2024-11-01 PROCEDURE — 82550 ASSAY OF CK (CPK): CPT

## 2024-11-01 PROCEDURE — 99222 1ST HOSP IP/OBS MODERATE 55: CPT

## 2024-11-01 PROCEDURE — 84702 CHORIONIC GONADOTROPIN TEST: CPT

## 2024-11-01 PROCEDURE — 84100 ASSAY OF PHOSPHORUS: CPT

## 2024-11-01 PROCEDURE — 71045 X-RAY EXAM CHEST 1 VIEW: CPT

## 2024-11-01 PROCEDURE — 99233 SBSQ HOSP IP/OBS HIGH 50: CPT | Mod: GC

## 2024-11-01 RX ORDER — GABAPENTIN 300 MG/1
1 CAPSULE ORAL
Qty: 0 | Refills: 0 | DISCHARGE
Start: 2024-11-01

## 2024-11-01 RX ORDER — OMEPRAZOLE 10 MG
1 CAPSULE,DELAYED RELEASE (ENTERIC COATED) ORAL
Refills: 0 | DISCHARGE

## 2024-11-01 RX ORDER — LEVETIRACETAM 500 MG/1
1 TABLET, FILM COATED ORAL
Qty: 0 | Refills: 0 | DISCHARGE
Start: 2024-11-01

## 2024-11-01 RX ORDER — GABAPENTIN 300 MG/1
1 CAPSULE ORAL
Refills: 0 | DISCHARGE

## 2024-11-01 RX ORDER — LORAZEPAM 4 MG/ML
1 VIAL (ML) INJECTION ONCE
Refills: 0 | Status: DISCONTINUED | OUTPATIENT
Start: 2024-11-01 | End: 2024-11-02

## 2024-11-01 RX ORDER — ACETAMINOPHEN 500 MG/5ML
1000 LIQUID (ML) ORAL ONCE
Refills: 0 | Status: COMPLETED | OUTPATIENT
Start: 2024-11-01 | End: 2024-11-01

## 2024-11-01 RX ORDER — LORAZEPAM 2 MG
1 TABLET ORAL ONCE
Refills: 0 | Status: DISCONTINUED | OUTPATIENT
Start: 2024-11-01 | End: 2024-11-01

## 2024-11-01 RX ORDER — ACETAMINOPHEN 500 MG
650 TABLET ORAL ONCE
Refills: 0 | Status: DISCONTINUED | OUTPATIENT
Start: 2024-11-01 | End: 2024-11-01

## 2024-11-01 RX ORDER — GABAPENTIN 300 MG/1
300 CAPSULE ORAL
Refills: 0 | Status: DISCONTINUED | OUTPATIENT
Start: 2024-11-01 | End: 2024-11-01

## 2024-11-01 RX ORDER — GABAPENTIN 400 MG/1
300 CAPSULE ORAL THREE TIMES A DAY
Refills: 0 | Status: DISCONTINUED | OUTPATIENT
Start: 2024-11-02 | End: 2024-11-03

## 2024-11-01 RX ORDER — GABAPENTIN 400 MG/1
1 CAPSULE ORAL
Refills: 0
Start: 2024-11-01

## 2024-11-01 RX ORDER — ACETAMINOPHEN 500 MG
650 TABLET ORAL EVERY 6 HOURS
Refills: 0 | Status: DISCONTINUED | OUTPATIENT
Start: 2024-11-01 | End: 2024-11-01

## 2024-11-01 RX ORDER — PANTOPRAZOLE SODIUM 40 MG/1
1 TABLET, DELAYED RELEASE ORAL
Qty: 0 | Refills: 0 | DISCHARGE
Start: 2024-11-01

## 2024-11-01 RX ADMIN — Medication 2 MILLIGRAM(S): at 10:59

## 2024-11-01 RX ADMIN — Medication 1 MILLIGRAM(S): at 14:32

## 2024-11-01 RX ADMIN — Medication 650 MILLIGRAM(S): at 06:14

## 2024-11-01 RX ADMIN — PANTOPRAZOLE SODIUM 40 MILLIGRAM(S): 40 TABLET, DELAYED RELEASE ORAL at 06:15

## 2024-11-01 RX ADMIN — LEVETIRACETAM 500 MILLIGRAM(S): 500 TABLET, FILM COATED ORAL at 18:10

## 2024-11-01 RX ADMIN — GABAPENTIN 300 MILLIGRAM(S): 300 CAPSULE ORAL at 18:09

## 2024-11-01 RX ADMIN — Medication 650 MILLIGRAM(S): at 07:34

## 2024-11-01 RX ADMIN — LEVETIRACETAM 500 MILLIGRAM(S): 500 TABLET, FILM COATED ORAL at 06:15

## 2024-11-01 NOTE — DISCHARGE NOTE NURSING/CASE MANAGEMENT/SOCIAL WORK - FINANCIAL ASSISTANCE
[facility] provides services at a reduced cost to those who are determined to be eligible through [facility]’s financial assistance program. Information regarding [facility]’s financial assistance program can be found by going to [email or web address] or by calling [phone].

## 2024-11-01 NOTE — CONSULT NOTE ADULT - ASSESSMENT
40 y/o female  with PMHx of seizures (last 10 years ago, not on antiepileptic meds), HLD, PCOS, GERD, Asthma, gastric sleeve surgery, prediabetes presents to ED after concerns for seizure after endoscopy at [facility]. Per Dr. Rdz (GI), patient received 100mg propofol and at the end of the procedure, started to have stiffening of bilateral arms and legs and received 1mg versed with termination of the seizure-like activity. Patient has had a total of 3 seizure events since yesterday. All prior EEG show nonepileptic events. I contacted Dr Mcneill who has seen the patient in the outpatient office. He is covering at [facility] EMU at the moment and thinks that she would be best served there in the EMU for continuous EEG monitoring.       Plan:  Transfer to [facility] EMU  Continue Keppra 500mg PO BID- will likely be held upon arrival to [facility]  Strict Bed rest for risk of fall if seizure occurs    I reviewed patient’s lab work listed above, CT and MRI of Brain Images and the reports that were performed on this admission. I reviewed all EEG reports performed on this admission.   I spent 55 minutes reviewing labs, pertinent Neuroimaging, reviewing patient’s chart, examining patient and discussing clinical history and findings with the patient, their family, and the Primary Physician Team caring for the patient

## 2024-11-01 NOTE — DISCHARGE NOTE PROVIDER - NSDCMRMEDTOKEN_GEN_ALL_CORE_FT
gabapentin 600 mg oral tablet: 1 tab(s) orally 2 times a day  omeprazole 40 mg oral delayed release capsule: 1 cap(s) orally once a day   gabapentin 300 mg oral capsule: 1 cap(s) orally 2 times a day  levETIRAcetam 500 mg oral tablet: 1 tab(s) orally 2 times a day  pantoprazole 40 mg oral delayed release tablet: 1 tab(s) orally once a day (before a meal)

## 2024-11-01 NOTE — RAPID RESPONSE TEAM SUMMARY - NSSITUATIONBACKGROUNDRRT_GEN_ALL_CORE
38 y/o female from [facility] with PMHx of seizures (last 10 years ago, not on antiepileptic meds), HLD, PCOS, GERD, Asthma, gastric sleeve surgery, prediabetes presents to ED for seizure activity. The patient was given Keppra loading dose in the ER and continued on 500 mg BID after discussion with neurology. EEG yesterday was negative for epileptiform activity. Pt had previous seizure like activity at 10am. Pt was lying in bed with daughter at bedside when she began posturing, clenching wrists tightly, closing eyelids tightly. Patient was given 1 mg IVP Ativan.   VS: T 98*F, /49, HR 53, RR 14, SpO2 95% on 2L,   Pt will be transferred to [facility] EMU.

## 2024-11-01 NOTE — PROGRESS NOTE ADULT - ASSESSMENT
38 y/o female from [facility] with PMHx of seizures (last 10 years ago, not on antiepileptic meds), HLD, PCOS, GERD, Asthma, gastric sleeve surgery, prediabetes presents to ED for seizure after endoscopy at [facility]. Pt is admitted for:    #seizure s/p endoscopy  - pt received 100mg propofol for EGD and at the end of the procedure, started to have stiffening of bilateral arms and legs and received 1mg versed with termination of the seizure-like activity.   - hx of seizures, last seizure May 2024. Before May, last seizure was 10 years ago.  - remarkable labs: K 3, lactate 5.9  - s/p potassium chloride 10mEq IV x1, NaCl 1000mL bolus, keppra 1000mg  - follow up repeat lactate   - CT head: There is no acute intracranial hemorrhage, parenchymal mass, mass effect or midline shift. There is no acute territorial infarct. There is no hydrocephalus. Partial empty sella noted.  - EEG: Mild diffuse/multi-focal cerebral dysfunction, not specific as to etiology. There were no seizures or epileptiform abnormalities recorded.   - completed 72 hr EEG on Monday and pt hasn't heard back of results.   - ordered prolactin, CK  - AM labs  - c/w keppra 500mg BID  - lorazepam 2mg PRN for breakthrough seizure  - pt has been taking gabapentin 600mg BID for about 2 weeks. will order gabapentin 300mg BID for now as abrupt stop could provoke seizure  - neuro consult-     #elevated lactate, resolved  -  lactate 5.9>  - IVFs  - continue to trend    #hypokalemia  - K 3 on admission  - repleted  - continue to monitor    #RUQ pain  - severe RUQ pain for the past 2 weeks  - pain radiates to back sometimes and pt saw GYN at [facility] and was prescribed gabapentin 600mg BID  - s/p EGD 10/31/24 with Dr. Rdz  - EGD: Normal mucosa in the whole esophagus. Gastric previous surgery. (Biopsy). Normal mucosa in the duodenal bulb and second part of the duodenum. (Biopsy).  - Avoid NSAIDs for 10 days per GI  - continue PPI    #DVT ppx  - SCDs    #GOC  - full code    - daughter Tonia updated at bedside about plan  Case discussed with Dr. Byrd 40 y/o female from [facility] with PMHx of seizures (last 10 years ago, not on antiepileptic meds), HLD, PCOS, GERD, Asthma, gastric sleeve surgery, prediabetes presents to ED for seizure after endoscopy at [facility]. Pt is admitted for:    #seizure s/p endoscopy  - pt received 100mg propofol for EGD and at the end of the procedure, started to have stiffening of bilateral arms and legs and received 1mg versed with termination of the seizure-like activity.   - hx of seizures, last seizure May 2024. Before May, last seizure was 10 years ago.  - remarkable labs: K 3, lactate 5.9  - s/p potassium chloride 10mEq IV x1, NaCl 1000mL bolus, keppra 1000mg  - CT head: There is no acute intracranial hemorrhage, parenchymal mass, mass effect or midline shift. There is no acute territorial infarct. There is no hydrocephalus. Partial empty sella noted.  - EEG: Mild diffuse/multi-focal cerebral dysfunction, not specific as to etiology. There were no seizures or epileptiform abnormalities recorded.   - completed 72 hr EEG on Monday and per neuro it was negative  - prolactin 10.4 wnl, CK 51 wnl  - c/w keppra 500mg BID  - lorazepam 2mg PRN for breakthrough seizure  - pt has been taking gabapentin 600mg BID for about 2 weeks. will order gabapentin 300mg BID for now as abrupt stop could provoke seizure  - neuro recommendations appreciated- will transfer pt to [facility] EMU     #elevated lactate, resolved  -  lactate 5.9> 1.1> 0.5  - IVFs    #hypokalemia- resolved  - K 3 on admission  - K 3.9 today    #RUQ pain  - severe RUQ pain for the past 2 weeks  - pain radiates to back sometimes and pt saw GYN at [facility] and was prescribed gabapentin 600mg BID. will give gabapentin 300mg BID while in the hospital.  - s/p EGD 10/31/24 with Dr. Rdz  - EGD: Normal mucosa in the whole esophagus. Gastric previous surgery. (Biopsy). Normal mucosa in the duodenal bulb and second part of the duodenum. (Biopsy).  - Avoid NSAIDs for 10 days per GI  - continue PPI    #DVT ppx  - SCDs    #GOC  - full code    - daughter Tonia updated at bedside about plan and signed transfer paperwork    Case discussed with Dr. Byrd

## 2024-11-01 NOTE — DISCHARGE NOTE PROVIDER - CARE PROVIDER_API CALL
Bryan Mcneill.  Neurology  [address], [address]  [place] [address]  Phone: [phone]  Fax: [phone]  Follow Up Time:

## 2024-11-01 NOTE — DISCHARGE NOTE PROVIDER - NSFOLLOWUPCLINICS_GEN_ALL_ED_FT
[facility]  Family Medicine  [address] [place] [address]  Phone: [phone]  Fax:

## 2024-11-01 NOTE — DISCHARGE NOTE PROVIDER - NSDCCPCAREPLAN_GEN_ALL_CORE_FT
PRINCIPAL DISCHARGE DIAGNOSIS  Diagnosis: Seizure  Assessment and Plan of Treatment:       SECONDARY DISCHARGE DIAGNOSES  Diagnosis: AMS (altered mental status)  Assessment and Plan of Treatment:      PRINCIPAL DISCHARGE DIAGNOSIS  Diagnosis: Seizure  Assessment and Plan of Treatment: You were admitted after you had a seizure following your EGD. You then had 2 other episodes of seizures in the hospital. You were started on a medication called keppra and were given ativan PRN for breakthrough seizures. EEG was done and showed mild diffuse/multi-focal cerebral dysfunction, not specific as to etiology. There were no seizures or epileptiform abnormalities recorded. Neurology evaluated you and recommended transfer to [facility] for Epileptic Monitoring Unit under the care of Dr. Mcneill.      SECONDARY DISCHARGE DIAGNOSES  Diagnosis: HLD (hyperlipidemia)  Assessment and Plan of Treatment:     Diagnosis: GERD (gastroesophageal reflux disease)  Assessment and Plan of Treatment:     Diagnosis: Asthma  Assessment and Plan of Treatment:     Diagnosis: Prediabetes  Assessment and Plan of Treatment:     Diagnosis: H/O gastric sleeve  Assessment and Plan of Treatment:

## 2024-11-01 NOTE — CONSULT NOTE ADULT - SUBJECTIVE AND OBJECTIVE BOX
HPI:  40 y/o female  with PMHx of seizures (last 10 years ago, not on antiepileptic meds), HLD, PCOS, GERD, Asthma, gastric sleeve surgery, prediabetes presents to ED after concerns for seizure after endoscopy at [facility]. Per Dr. Rdz (GI), patient received 100mg propofol and at the end of the procedure, started to have stiffening of bilateral arms and legs and received 1mg versed with termination of the seizure-like activity. EGD was done due to hx of severe RUQ pain that started 2 weeks ago. Pt endorses fogginess, headache, eye fatigue, RUQ pain, and does not recall what happened after endoscopy. Last seizure was in May 2024 and before that she had not had a seizure for 10 years. Pt used to take dilantin many years ago and discontinued. Per chart review, pt was admitted May 2024 to [facility] for seizures that were deemed to be psychogenic in nature. Pt was last seen by neuro Dr. Mcneill on 9/10/24 who recommended ambulatory EEG for 72hrs and follow up appt 24. Neurologist mentioned that if seizure activity was observed on ambulatory EEG, lacosamide would be started. Per pt, she completed 72 hr EEG on Monday and hasn't heard back of results     ED course: VS 97.7*F, HR 77, /78, SpO2 100%  s/p potassium chloride 10mEq IV x1, NaCl 1000mL bolus, keppra 1000mg    EEG: Mild diffuse/multi-focal cerebral dysfunction, not specific as to etiology. There were no seizures or epileptiform abnormalities recorded.    (31 Oct 2024 17:16)       Subjective: Patient prefers her daughter to translate. Explains that the patient has recently had 72 hr EEG at home which was normal study. Doesnt recall if any events were captured on the EEG. Patient was loaded on Keppra IV then started on 500mg PO BID after another event in the ER last night. She had an additional event during admission at 10AM today.  IV Ativan administered. No complaints. Patient does not know why her seizures have started again.      PAST MEDICAL & SURGICAL HISTORY:  Endometriosis  PCOS (polycystic ovarian syndrome)  Obesity (BMI 30-39.9)  Asthma  Albuterol Inhaler as needed- last use was over a year ago  Torsion of ovary, ovarian pedicle and fallopian tube  Other ovarian cyst, right side  Pelvic and perineal pain  History of prediabetes  Hyperlipidemia  No medications at this time  Seizure  S/P   , - tubal ligation also  S/P appendectomy  S/P hemorrhoidectomy  Elective surgery  Diagnostic Laparoscopy with FABRICIO and Cystoscopy   H/O gastric sleeve      FAMILY HISTORY:  FH: type 2 diabetes (Father)    MEDICATIONS  (STANDING):  gabapentin 300 milliGRAM(s) Oral two times a day  influenza   Vaccine 0.5 milliLiter(s) IntraMuscular once  levETIRAcetam 500 milliGRAM(s) Oral two times a day  pantoprazole    Tablet 40 milliGRAM(s) Oral before breakfast       Allergies  No Known Allergies    ROS: Pertinent positives in HPI, all other ROS were reviewed and are negative.      Vital Signs Last 24 Hrs  T(C): 36.8 (2024 11:32), Max: 36.9 (31 Oct 2024 21:10)  T(F): 98.3 (2024 11:32), Max: 98.5 (31 Oct 2024 21:10)  HR: 66 (2024 11:32) (53 - 77)  BP: 98/64 (2024 11:32) (98/64 - 140/97)  RR: 18 (2024 11:32) (17 - 18)  SpO2: 99% (2024 11:32) (97% - 100%)    Physical Exam:  Neurological exam:  HF: A x O x 3. Appropriately interactive, normal affect. Speech fluent, No Aphasia or paraphasic errors. Naming /repetition intact   CN: WINSOME, EOMI, VFF, facial sensation normal, no NLFD, tongue midline  Motor: No pronator drift, Strength 5/5 in all 4 ext, normal bulk and tone, no tremor, rigidity or bradykinesia.    Sens: Intact to light touch / PP/ VS/ JS    Reflexes: Symmetric and normal . BJ 2+, BR 2+, KJ 2+, AJ 2+, downgoing toes b/l  Coord:  No FNFA, dysmetria, DAYAN intact   Gait/Balance: Normal    Labs:       140  |  106  |  14  ----------------------------<  97  3.9   |  27  |  0.44[L]    Ca    8.8      2024 06:36  Phos  4.4       Mg     2.2         TPro  7.0  /  Alb  3.0[L]  /  TBili  0.5  /  DBili  x   /  AST  16  /  ALT  22  /  AlkPhos  74                                13.0   7.45  )-----------( 202      ( 2024 06:36 )             39.0       Radiology:  CT Head No Cont (10.31.24 @ 15:36)  No acute intracranial hemorrhage or acute territorial infarct.  If   symptoms persist, follow-up MRI exam recommended.    EEG 10/31/24  EEG Classification / Summary:  Abnormal EEG study in the awake / drowsy states  Generalized background slowing, mild (PDR < 8.5 Hz)  -----------------------------------------------------------------------------------------------------  Clinical Impression:  Normal EEG study  There were no seizures or epileptiform abnormalities recorded.

## 2024-11-01 NOTE — PROGRESS NOTE ADULT - ATTENDING COMMENTS
Please see resident note for full details regarding the service.    PE: A+Ox3, no murmurs, lungs CTA b/l, abd NT/ND, no lower extremity swelling    Assessment:  - Seizure vs. pseudoseizure?   - Hypokalemia   - Elevated lactic acid on admission   - Hypoalbuminemia   - History of ?seizures (last 10 years ago, not on AEDs), HLD, PCOS, GERD, Asthma,  gastric sleeve surgery, prediabetes, epilepsy, recent tooth extraction 4/30    Plan:  - Prior admission (5/4/24): per neurology pt has psychogenic nonepileptiform seizures and shaking episodes lack correlating epileptiform abnormalities on EEG, further AEDs and benzos, psychiatry recommended PNES specialist and therapy  - RRT called today for seizure, pt given 2 mg IVP of Ativan. I spoke to neurology PA who spoke to Dr. Mcneill who would like the patient transferred to EMU for extended EEG.   - K+ 3.9 today s/p repletion   - Continue Keppra 500 mg BID for now   - EEG: Normal EEG study. There were no seizures or epileptiform abnormalities recorded.    - CXR: no consolidation, no effusion, no pneumothorax (personally reviewed).   - Aspiration precautions   - Seizure precautions   - Fall precautions   - Ativan 2 mg PRN for seizure activity   - DVT ppx: SCDs   - Code status: Full code   - Dispo: transfer to [facility]     I spent a total of 50 minutes on the date of this encounter coordinating the patient's care, excluding resident teaching time. This includes reviewing results/imaging and discussions with specialists, nursing, case management/social work. Further tests, medications, and procedures have been ordered as indicated. Results and the plan of care were communicated to the patient and/or their family member. Supporting documentation was completed and added to the patient's chart.

## 2024-11-01 NOTE — DISCHARGE NOTE PROVIDER - HOSPITAL COURSE
Hospital Course  HPI:  40 y/o female  with PMHx of seizures (last 10 years ago, not on antiepileptic meds), HLD, PCOS, GERD, Asthma, gastric sleeve surgery, prediabetes presents to ED after concerns for seizure after endoscopy at [facility]. Per Dr. Rdz (GI), patient received 100mg propofol and at the end of the procedure, started to have stiffening of bilateral arms and legs and received 1mg versed with termination of the seizure-like activity. Per chart review, pt was admitted May 2024 to [facility] for seizures that were deemed to be psychogenic in nature. Pt was last seen by neuro Dr. Mcneill on 9/10/24 completed an ambulatory EEG for 72hrs and has follow up appt on 11/18/24.     In the ED course: VSS 97.7F, HR 77, /78, SpO2 100% She was given potassium chloride 10mEq IV x1, NaCl 1000mL bolus, keppra 1000mg.   EEG with Mild diffuse/multi-focal cerebral dysfunction, not specific as to etiology. There were no seizures or epileptiform abnormalities recorded.   Labs were unremarkable and within normal limits.   Patient was seen by neurology who recommended       You were admitted for   You were diagnosed with   You were treated with   You were prescribed the following new medications:    You will need to follow up with your primary care physician.    Source of Infection:  Antibiotic / Last Day:    Palliative Care / Advanced Care Planning  Code Status:  Patient/Family agreeable to Hospice/Palliative (Y/N)?  Summary of Goals of Care Conversation:    Discharging Provider:  Jordyn Cartwright DO  Contact Info: Cell [phone]- Please call with any questions or concerns.     Outpatient Provider:    Signout given to  SNF Provider:   38 y/o female  with PMHx of seizures (last 10 years ago, not on antiepileptic meds), HLD, PCOS, GERD, Asthma, gastric sleeve surgery, prediabetes presents to ED after concerns for seizure after endoscopy at [facility]. Per Dr. Rdz (GI), patient received 100mg propofol and at the end of the procedure, started to have stiffening of bilateral arms and legs and received 1mg versed with termination of the seizure-like activity. EGD was done due to hx of severe RUQ pain that started 2 weeks ago. Pt endorses fogginess, headache, eye fatigue, RUQ pain, and does not recall what happened after endoscopy. Last seizure was in May 2024 and before that she had not had a seizure for 10 years. Pt used to take dilantin many years ago and discontinued. Per chart review, pt was admitted May 2024 to [facility] for seizures that were deemed to be psychogenic in nature. Pt was last seen by neuro Dr. Mcneill on 9/10/24 who recommended ambulatory EEG for 72hrs and follow up appt 11/18/24. Neurologist mentioned that if seizure activity was observed on ambulatory EEG, lacosamide would be started. Per pt, she completed 72 hr EEG on Monday and hasn't heard back of results. In the ED, pt was given IVFs, potassium was repleted and was loaded with keppra 1000mg. She was started on keprra 500mg BID.  EEG with Mild diffuse/multi-focal cerebral dysfunction, not specific as to etiology. There were no seizures or epileptiform abnormalities recorded. Pt had been taking gabapentin 600mg BID prescribed by GYN for abdominal pain that radiated to back for the past 1-2 weeks. Gabapentin dose was reduced to 300mg BID while inpatient.  Today 11/1/24, pt had another seizure episode that lasted around 4 minutes and was given lorazepam 2mg. Labs were unremarkable. Patient was evaluated by neurology who recommended pt be transferred to EMU at [facility].    Pt seen and examined today at bedside found stable, post-ictal, reports minor headache. Pt is stable to be transferred to EMU at [facility]. You will need to follow up with your primary care physician.      PHYSICAL EXAM:  Gen: NAD, comfortable, post-ictal  HEENT: head atraumatic and normocephalic, PEERLA, EOMI, no oral lesions, neck supple  CVS: +s1, s2, regular rate and rhythm, no murmurs  Pulmonary: normal respiratory effort, clear to auscultation b/l, no wheezes/crackles/rhonchi  Abdomen: soft, RUQ tenderness, non-distended, +bowel sounds in all 4 quadrants  Extremities: no pedal edema  Skin: warm and dry, no wounds   Neuro: AA&Ox3, answering questions appropriately, face symmetric, sensation equal bilaterally in face, tongue midline, no dysarthria, 4/5 strength in upper extremities, 5/5 lower extremities bilaterally    VITALS:  ICU Vital Signs Last 24 Hrs  T(C): 36.8 (01 Nov 2024 11:32), Max: 36.9 (31 Oct 2024 21:10)  T(F): 98.3 (01 Nov 2024 11:32), Max: 98.5 (31 Oct 2024 21:10)  HR: 66 (01 Nov 2024 11:32) (53 - 77)  BP: 98/64 (01 Nov 2024 11:32) (98/64 - 140/97)  BP(mean): --  ABP: --  ABP(mean): --  RR: 18 (01 Nov 2024 11:32) (17 - 18)  SpO2: 99% (01 Nov 2024 11:32) (97% - 100%)    O2 Parameters below as of 01 Nov 2024 11:32  Patient On (Oxygen Delivery Method): room air      Discharging Provider: Dr. Collazo, Dr. Bydr  Contact Info: Cell [phone]- Please call with any questions or concerns.     Outpatient Provider: AMY                   40 y/o female  with PMHx of seizures (last 10 years ago, not on antiepileptic meds), HLD, PCOS, GERD, Asthma, gastric sleeve surgery, prediabetes presents to ED after concerns for seizure after endoscopy at [facility]. Per Dr. Rdz (GI), patient received 100mg propofol and at the end of the procedure, started to have stiffening of bilateral arms and legs and received 1mg versed with termination of the seizure-like activity. EGD was done due to hx of severe RUQ pain that started 2 weeks ago. Pt endorses fogginess, headache, eye fatigue, RUQ pain, and does not recall what happened after endoscopy. Last seizure was in May 2024 and before that she had not had a seizure for 10 years. Pt used to take dilantin many years ago and discontinued. Per chart review, pt was admitted May 2024 to [facility] for seizures that were deemed to be psychogenic in nature. Pt was last seen by neuro Dr. Mcneill on 9/10/24 who recommended ambulatory EEG for 72hrs and follow up appt 11/18/24. Neurologist mentioned that if seizure activity was observed on ambulatory EEG, lacosamide would be started. Per pt, she completed 72 hr EEG on Monday and hasn't heard back of results. In the ED, pt was given IVFs, potassium was repleted and was loaded with keppra 1000mg. She was started on keppra 500mg BID.  EEG with Mild diffuse/multi-focal cerebral dysfunction, not specific as to etiology. There were no seizures or epileptiform abnormalities recorded. Pt had been taking gabapentin 600mg BID prescribed by GYN for abdominal pain that radiated to back for the past 1-2 weeks. Gabapentin dose was reduced to 300mg BID while inpatient.    Today 11/1/24, pt had another seizure episode that lasted around 4 minutes and was given lorazepam 2mg. Labs were unremarkable. Patient was evaluated by neurology who recommended pt be transferred to EMU at [facility].    Pt seen and examined today at bedside found stable, post-ictal, reports minor headache. Pt is stable to be transferred to EMU at [facility]. You will need to follow up with your primary care physician.      PHYSICAL EXAM:  Gen: NAD, comfortable, post-ictal  HEENT: head atraumatic and normocephalic, PEERLA, EOMI, no oral lesions, neck supple  CVS: +s1, s2, regular rate and rhythm, no murmurs  Pulmonary: normal respiratory effort, clear to auscultation b/l, no wheezes/crackles/rhonchi  Abdomen: soft, RUQ tenderness, non-distended, +bowel sounds in all 4 quadrants  Extremities: no pedal edema  Skin: warm and dry, no wounds   Neuro: AA&Ox3, answering questions appropriately, face symmetric, sensation equal bilaterally in face, tongue midline, no dysarthria, 4/5 strength in upper extremities, 5/5 lower extremities bilaterally    VITALS:  ICU Vital Signs Last 24 Hrs  T(C): 36.8 (01 Nov 2024 11:32), Max: 36.9 (31 Oct 2024 21:10)  T(F): 98.3 (01 Nov 2024 11:32), Max: 98.5 (31 Oct 2024 21:10)  HR: 66 (01 Nov 2024 11:32) (53 - 77)  BP: 98/64 (01 Nov 2024 11:32) (98/64 - 140/97)  BP(mean): --  ABP: --  ABP(mean): --  RR: 18 (01 Nov 2024 11:32) (17 - 18)  SpO2: 99% (01 Nov 2024 11:32) (97% - 100%)    O2 Parameters below as of 01 Nov 2024 11:32  Patient On (Oxygen Delivery Method): room air      Discharging Provider: Dr. Collazo, Dr. Byrd  Contact Info: Cell [phone]- Please call with any questions or concerns.     Outpatient Provider: AMY                   40 y/o female  with PMHx of seizures (last 10 years ago, not on antiepileptic meds), HLD, PCOS, GERD, Asthma, gastric sleeve surgery, prediabetes presents to ED [facility] Per Dr. Rdz (GI), patient received 100mg propofol and at the end of the procedure, started to have stiffening of bilateral arms and legs and received 1mg versed with termination of the seizure-like activity. EGD was done due to hx of severe RUQ pain that started 2 weeks ago. Pt endorses fogginess, headache, eye fatigue, RUQ pain, and does not recall what happened after endoscopy. Last seizure was in May 2024 and before that she had not had a seizure for 10 years. Pt used to take dilantin many years ago and discontinued. Per chart review, pt was admitted May 2024 to [facility] for seizures that were deemed to be psychogenic in nature. Pt was last seen by neuro Dr. Mcneill on 9/10/24 who recommended ambulatory EEG for 72hrs and follow up appt 11/18/24. Neurologist mentioned that if seizure activity was observed on ambulatory EEG, lacosamide would be started. Per pt, she completed 72 hr EEG on Monday and hasn't heard back of results. In the ED, pt was given IVFs, potassium was repleted and was loaded with keppra 1000mg. She was started on keppra 500mg BID.  EEG with Mild diffuse/multi-focal cerebral dysfunction, not specific as to etiology. There were no seizures or epileptiform abnormalities recorded. Pt had been taking gabapentin 600mg BID prescribed by GYN for abdominal pain that radiated to back for the past 1-2 weeks. Gabapentin dose was reduced to 300mg BID while inpatient.    Today 11/1/24, pt had another seizure episode that lasted around 4 minutes and was given lorazepam 2mg. Labs were unremarkable. Patient was evaluated by neurology who recommended pt be transferred to EMU at [facility]. Accepting physician is Dr. Sameer Mcneill.    Pt seen and examined today at bedside found stable, post-ictal, reports minor headache. Pt is stable to be transferred to EMU at [facility]. You will need to follow up with your primary care physician.      PHYSICAL EXAM:  Gen: NAD, comfortable, post-ictal  HEENT: head atraumatic and normocephalic, PEERLA, EOMI, no oral lesions, neck supple  CVS: +s1, s2, regular rate and rhythm, no murmurs  Pulmonary: normal respiratory effort, clear to auscultation b/l, no wheezes/crackles/rhonchi  Abdomen: soft, RUQ tenderness, non-distended, +bowel sounds in all 4 quadrants  Extremities: no pedal edema  Skin: warm and dry, no wounds   Neuro: AA&Ox3, answering questions appropriately, face symmetric, sensation equal bilaterally in face, tongue midline, no dysarthria, 4/5 strength in upper extremities, 5/5 lower extremities bilaterally    VITALS:  ICU Vital Signs Last 24 Hrs  T(C): 36.8 (01 Nov 2024 11:32), Max: 36.9 (31 Oct 2024 21:10)  T(F): 98.3 (01 Nov 2024 11:32), Max: 98.5 (31 Oct 2024 21:10)  HR: 66 (01 Nov 2024 11:32) (53 - 77)  BP: 98/64 (01 Nov 2024 11:32) (98/64 - 140/97)    RR: 18 (01 Nov 2024 11:32) (17 - 18)  SpO2: 99% (01 Nov 2024 11:32) (97% - 100%)    O2 Parameters below as of 01 Nov 2024 11:32  Patient On (Oxygen Delivery Method): room air      Discharging Provider: Dr. Collazo, Dr. Byrd  Contact Info: Cell [phone]- Please call with any questions or concerns.     Outpatient Provider: AMY

## 2024-11-01 NOTE — PROGRESS NOTE ADULT - SUBJECTIVE AND OBJECTIVE BOX
Patient is a 39y old  Female who presents with a chief complaint of seizure (01 Nov 2024 12:40)      INTERVAL HPI/ OVERNIGHT EVENTS: Patient seen and examined at bedside. No overnight events.   - RRT was called at 10am for seizure activity and was given 2mg lorazepam.    MEDICATIONS  (STANDING):  gabapentin 300 milliGRAM(s) Oral two times a day  influenza   Vaccine 0.5 milliLiter(s) IntraMuscular once  levETIRAcetam 500 milliGRAM(s) Oral two times a day  pantoprazole    Tablet 40 milliGRAM(s) Oral before breakfast    MEDICATIONS  (PRN):  acetaminophen     Tablet .. 650 milliGRAM(s) Oral every 6 hours PRN Mild Pain (1 - 3)      Allergies    No Known Allergies    Intolerances        REVIEW OF SYSTEMS:  CONSTITUTIONAL: No fever or chills  HEENT:  No headache, no sore throat  RESPIRATORY: No cough, wheezing, or shortness of breath  CARDIOVASCULAR: No chest pain, palpitations  GASTROINTESTINAL: No abd pain, nausea, vomiting, or diarrhea  GENITOURINARY: No dysuria, frequency, or hematuria  NEUROLOGICAL: no focal weakness or dizziness  MUSCULOSKELETAL: no myalgias     Vital Signs Last 24 Hrs  T(C): 36.8 (01 Nov 2024 11:32), Max: 36.9 (31 Oct 2024 21:10)  T(F): 98.3 (01 Nov 2024 11:32), Max: 98.5 (31 Oct 2024 21:10)  HR: 66 (01 Nov 2024 11:32) (53 - 77)  BP: 98/64 (01 Nov 2024 11:32) (98/64 - 140/97)  BP(mean): --  RR: 18 (01 Nov 2024 11:32) (17 - 18)  SpO2: 99% (01 Nov 2024 11:32) (97% - 100%)    Parameters below as of 01 Nov 2024 11:32  Patient On (Oxygen Delivery Method): room air      I&O's Summary    BMI (kg/m2): 30.3 (10-31-24 @ 21:10)    PHYSICAL EXAM:  Gen: NAD, comfortable, post-ictal  HEENT: head atraumatic and normocephalic, PEERLA, EOMI, no oral lesions, neck supple  CVS: +s1, s2, regular rate and rhythm, no murmurs  Pulmonary: normal respiratory effort, clear to auscultation b/l, no wheezes/crackles/rhonchi  Abdomen: soft, RUQ tenderness, non-distended, +bowel sounds in all 4 quadrants  Extremities: no pedal edema  Skin: warm and dry, no wounds   Neuro: AA&Ox3, answering questions appropriately, face symmetric, sensation equal bilaterally in face, tongue midline, no dysarthria, 4/5 strength in upper extremities, 5/5 lower extremities bilaterally      LABS: Personally reviewed  CBC                        13.0   7.45  )-----------( 202      ( 01 Nov 2024 06:36 )             39.0     CMP  11-01    140  |  106  |  14  ----------------------------<  97  3.9   |  27  |  0.44    Ca    8.8      01 Nov 2024 06:36  Phos  4.4     11-01  Mg     2.2     11-01    TPro  7.0  /  Alb  3.0  /  TBili  0.5  /  DBili  x   /  AST  16  /  ALT  22  /  AlkPhos  74  11-01            Lactate, Blood: 0.5 mmol/L (11-01 @ 06:36)  Lactate, Blood: 1.1 mmol/L (10-31 @ 18:36)  Lactate, Blood: 5.9 mmol/L (10-31 @ 15:12)                        POCT Blood Glucose.: 124 mg/dL (01 Nov 2024 13:42)  POCT Blood Glucose.: 116 mg/dL (01 Nov 2024 10:03)  POCT Blood Glucose.: 102 mg/dL (31 Oct 2024 14:53)      Urinalysis Basic - ( 01 Nov 2024 06:36 )    Color: x / Appearance: x / SG: x / pH: x  Gluc: 97 mg/dL / Ketone: x  / Bili: x / Urobili: x   Blood: x / Protein: x / Nitrite: x   Leuk Esterase: x / RBC: x / WBC x   Sq Epi: x / Non Sq Epi: x / Bacteria: x                RADIOLOGY & ADDITIONAL TESTS: Personally reviewed.     Consultant(s) Notes Reviewed:  [x] YES  [ ] NO   Discussed with SW/LINDSAY, RN     Irritable

## 2024-11-01 NOTE — DISCHARGE NOTE PROVIDER - NSDCFUSCHEDAPPT_GEN_ALL_CORE_FT
Bryan Mcneill  [facility] Physician [facility]  NEUROLOGY [address]  Scheduled Appointment: 11/18/2024    César Rdz  [facility] [address] [facility]  Scheduled Appointment: 11/25/2024

## 2024-11-01 NOTE — RAPID RESPONSE TEAM SUMMARY - NSSITUATIONBACKGROUNDRRT_GEN_ALL_CORE
40 y/o female from [facility] with PMHx of seizures (last 10 years ago, not on antiepileptic meds), HLD, PCOS, GERD, Asthma, gastric sleeve surgery, prediabetes presents to ED for seizure activity. The patient was given Keppra loading dose in the ER and continued on 500 mg BID after discussion with neurology. EEG was negative for epileptiform activity. Today, the team was in IDRs and the RN called a rapid response for seizure activity. The patient was sitting in the chair at the time. She began posturing and closing her eyelids tightly. Patient was given 2 mg IVP Ativan. She was post-ictal thereafter. She was transferred to the bed. I ensured seizure, aspiration, and fall precautions. I called neurology and made them aware as well.  38 y/o female from [facility] with PMHx of seizures (last 10 years ago, not on antiepileptic meds), HLD, PCOS, GERD, Asthma, gastric sleeve surgery, prediabetes presents to ED for seizure activity. The patient was given Keppra loading dose in the ER and continued on 500 mg BID after discussion with neurology. EEG was negative for epileptiform activity. Today, the team was in IDRs and the RN called a rapid response for seizure activity. The patient was sitting in the chair at the time. She began posturing and closing her eyelids tightly. Patient was given 2 mg IVP Ativan. She was post-ictal thereafter. She was transferred to the bed. I ensured seizure, aspiration, and fall precautions. I called neurology and made them aware as well.     VS: /78, HR 81, RR20, SpO2 98%,

## 2024-11-01 NOTE — DISCHARGE NOTE NURSING/CASE MANAGEMENT/SOCIAL WORK - PATIENT PORTAL LINK FT
You can access the FollowMyHealth Patient Portal offered by [facility] by registering at the following website: [email or web address]. By joining [facility]’s FollowMyHealth portal, you will also be able to view your health information using other applications (apps) compatible with our system.

## 2024-11-02 LAB
ALBUMIN SERPL ELPH-MCNC: 3.7 G/DL — SIGNIFICANT CHANGE UP (ref 3.3–5)
ALP SERPL-CCNC: 76 U/L — SIGNIFICANT CHANGE UP (ref 40–120)
ALT FLD-CCNC: 21 U/L — SIGNIFICANT CHANGE UP (ref 10–45)
ANION GAP SERPL CALC-SCNC: 11 MMOL/L — SIGNIFICANT CHANGE UP (ref 5–17)
APPEARANCE UR: CLEAR — SIGNIFICANT CHANGE UP
AST SERPL-CCNC: 21 U/L — SIGNIFICANT CHANGE UP (ref 10–40)
BILIRUB SERPL-MCNC: 0.6 MG/DL — SIGNIFICANT CHANGE UP (ref 0.2–1.2)
BILIRUB UR-MCNC: NEGATIVE — SIGNIFICANT CHANGE UP
BUN SERPL-MCNC: 13 MG/DL — SIGNIFICANT CHANGE UP (ref 7–23)
CALCIUM SERPL-MCNC: 8.8 MG/DL — SIGNIFICANT CHANGE UP (ref 8.4–10.5)
CHLORIDE SERPL-SCNC: 104 MMOL/L — SIGNIFICANT CHANGE UP (ref 96–108)
CK SERPL-CCNC: 53 U/L — SIGNIFICANT CHANGE UP (ref 25–170)
CO2 SERPL-SCNC: 24 MMOL/L — SIGNIFICANT CHANGE UP (ref 22–31)
COLOR SPEC: YELLOW — SIGNIFICANT CHANGE UP
CREAT SERPL-MCNC: 0.52 MG/DL — SIGNIFICANT CHANGE UP (ref 0.5–1.3)
DIFF PNL FLD: NEGATIVE — SIGNIFICANT CHANGE UP
EGFR: 122 ML/MIN/1.73M2 — SIGNIFICANT CHANGE UP
EGFR: 122 ML/MIN/1.73M2 — SIGNIFICANT CHANGE UP
GGT SERPL-CCNC: 9 U/L — SIGNIFICANT CHANGE UP (ref 8–40)
GLUCOSE SERPL-MCNC: 103 MG/DL — HIGH (ref 70–99)
GLUCOSE UR QL: NEGATIVE MG/DL — SIGNIFICANT CHANGE UP
HCT VFR BLD CALC: 38.5 % — SIGNIFICANT CHANGE UP (ref 34.5–45)
HGB BLD-MCNC: 12.6 G/DL — SIGNIFICANT CHANGE UP (ref 11.5–15.5)
KETONES UR-MCNC: NEGATIVE MG/DL — SIGNIFICANT CHANGE UP
LACTATE SERPL-SCNC: 0.8 MMOL/L — SIGNIFICANT CHANGE UP (ref 0.5–2)
LEUKOCYTE ESTERASE UR-ACNC: NEGATIVE — SIGNIFICANT CHANGE UP
MAGNESIUM SERPL-MCNC: 2 MG/DL — SIGNIFICANT CHANGE UP (ref 1.6–2.6)
MCHC RBC-ENTMCNC: 29.8 PG — SIGNIFICANT CHANGE UP (ref 27–34)
MCHC RBC-ENTMCNC: 32.7 G/DL — SIGNIFICANT CHANGE UP (ref 32–36)
MCV RBC AUTO: 91 FL — SIGNIFICANT CHANGE UP (ref 80–100)
NITRITE UR-MCNC: NEGATIVE — SIGNIFICANT CHANGE UP
NRBC # BLD: 0 /100 WBCS — SIGNIFICANT CHANGE UP (ref 0–0)
NRBC BLD-RTO: 0 /100 WBCS — SIGNIFICANT CHANGE UP (ref 0–0)
PH UR: 6 — SIGNIFICANT CHANGE UP (ref 5–8)
PHOSPHATE SERPL-MCNC: 4.6 MG/DL — HIGH (ref 2.5–4.5)
PLATELET # BLD AUTO: 219 K/UL — SIGNIFICANT CHANGE UP (ref 150–400)
POTASSIUM SERPL-MCNC: 4.1 MMOL/L — SIGNIFICANT CHANGE UP (ref 3.5–5.3)
POTASSIUM SERPL-SCNC: 4.1 MMOL/L — SIGNIFICANT CHANGE UP (ref 3.5–5.3)
PROT SERPL-MCNC: 6.7 G/DL — SIGNIFICANT CHANGE UP (ref 6–8.3)
PROT UR-MCNC: NEGATIVE MG/DL — SIGNIFICANT CHANGE UP
RBC # BLD: 4.23 M/UL — SIGNIFICANT CHANGE UP (ref 3.8–5.2)
RBC # FLD: 12.6 % — SIGNIFICANT CHANGE UP (ref 10.3–14.5)
SODIUM SERPL-SCNC: 139 MMOL/L — SIGNIFICANT CHANGE UP (ref 135–145)
SP GR SPEC: 1.01 — SIGNIFICANT CHANGE UP (ref 1–1.03)
UROBILINOGEN FLD QL: 0.2 MG/DL — SIGNIFICANT CHANGE UP (ref 0.2–1)
WBC # BLD: 7.33 K/UL — SIGNIFICANT CHANGE UP (ref 3.8–10.5)
WBC # FLD AUTO: 7.33 K/UL — SIGNIFICANT CHANGE UP (ref 3.8–10.5)

## 2024-11-02 PROCEDURE — 99222 1ST HOSP IP/OBS MODERATE 55: CPT

## 2024-11-02 PROCEDURE — 95720 EEG PHY/QHP EA INCR W/VEEG: CPT

## 2024-11-02 RX ORDER — ENOXAPARIN SODIUM 100 MG/ML
40 INJECTION SUBCUTANEOUS EVERY 24 HOURS
Refills: 0 | Status: DISCONTINUED | OUTPATIENT
Start: 2024-11-02 | End: 2024-11-03

## 2024-11-02 RX ORDER — INFLUENZA A VIRUS A/IDAHO/07/2018 (H1N1) ANTIGEN (MDCK CELL DERIVED, PROPIOLACTONE INACTIVATED, INFLUENZA A VIRUS A/INDIANA/08/2018 (H3N2) ANTIGEN (MDCK CELL DERIVED, PROPIOLACTONE INACTIVATED), INFLUENZA B VIRUS B/SINGAPORE/INFTT-16-0610/2016 ANTIGEN (MDCK CELL DERIVED, PROPIOLACTONE INACTIVATED), INFLUENZA B VIRUS B/IOWA/06/2017 ANTIGEN (MDCK CELL DERIVED, PROPIOLACTONE INACTIVATED) 15; 15; 15; 15 UG/.5ML; UG/.5ML; UG/.5ML; UG/.5ML
0.5 INJECTION, SUSPENSION INTRAMUSCULAR ONCE
Refills: 0 | Status: DISCONTINUED | OUTPATIENT
Start: 2024-11-02 | End: 2024-11-03

## 2024-11-02 RX ORDER — LORAZEPAM 4 MG/ML
2 VIAL (ML) INJECTION ONCE
Refills: 0 | Status: DISCONTINUED | OUTPATIENT
Start: 2024-11-02 | End: 2024-11-03

## 2024-11-02 RX ORDER — ACETAMINOPHEN 500 MG/5ML
650 LIQUID (ML) ORAL EVERY 6 HOURS
Refills: 0 | Status: DISCONTINUED | OUTPATIENT
Start: 2024-11-02 | End: 2024-11-03

## 2024-11-02 RX ADMIN — Medication 650 MILLIGRAM(S): at 21:27

## 2024-11-02 RX ADMIN — Medication 400 MILLIGRAM(S): at 00:07

## 2024-11-02 RX ADMIN — Medication 650 MILLIGRAM(S): at 14:00

## 2024-11-02 RX ADMIN — Medication 650 MILLIGRAM(S): at 13:10

## 2024-11-02 RX ADMIN — Medication 1000 MILLIGRAM(S): at 00:45

## 2024-11-02 RX ADMIN — Medication 40 MILLIGRAM(S): at 06:01

## 2024-11-02 RX ADMIN — GABAPENTIN 300 MILLIGRAM(S): 400 CAPSULE ORAL at 06:57

## 2024-11-02 RX ADMIN — GABAPENTIN 300 MILLIGRAM(S): 400 CAPSULE ORAL at 13:10

## 2024-11-03 VITALS
HEART RATE: 68 BPM | SYSTOLIC BLOOD PRESSURE: 110 MMHG | DIASTOLIC BLOOD PRESSURE: 66 MMHG | TEMPERATURE: 98 F | RESPIRATION RATE: 18 BRPM | OXYGEN SATURATION: 98 %

## 2024-11-03 LAB
ALBUMIN SERPL ELPH-MCNC: 3.8 G/DL — SIGNIFICANT CHANGE UP (ref 3.3–5)
ALP SERPL-CCNC: 79 U/L — SIGNIFICANT CHANGE UP (ref 40–120)
ALT FLD-CCNC: 24 U/L — SIGNIFICANT CHANGE UP (ref 10–45)
ANION GAP SERPL CALC-SCNC: 11 MMOL/L — SIGNIFICANT CHANGE UP (ref 5–17)
AST SERPL-CCNC: 19 U/L — SIGNIFICANT CHANGE UP (ref 10–40)
BILIRUB SERPL-MCNC: 0.4 MG/DL — SIGNIFICANT CHANGE UP (ref 0.2–1.2)
BUN SERPL-MCNC: 16 MG/DL — SIGNIFICANT CHANGE UP (ref 7–23)
CALCIUM SERPL-MCNC: 9 MG/DL — SIGNIFICANT CHANGE UP (ref 8.4–10.5)
CHLORIDE SERPL-SCNC: 104 MMOL/L — SIGNIFICANT CHANGE UP (ref 96–108)
CK SERPL-CCNC: 45 U/L — SIGNIFICANT CHANGE UP (ref 25–170)
CO2 SERPL-SCNC: 24 MMOL/L — SIGNIFICANT CHANGE UP (ref 22–31)
CREAT SERPL-MCNC: 0.52 MG/DL — SIGNIFICANT CHANGE UP (ref 0.5–1.3)
EGFR: 122 ML/MIN/1.73M2 — SIGNIFICANT CHANGE UP
EGFR: 122 ML/MIN/1.73M2 — SIGNIFICANT CHANGE UP
GLUCOSE SERPL-MCNC: 99 MG/DL — SIGNIFICANT CHANGE UP (ref 70–99)
HCT VFR BLD CALC: 41.2 % — SIGNIFICANT CHANGE UP (ref 34.5–45)
HGB BLD-MCNC: 13.7 G/DL — SIGNIFICANT CHANGE UP (ref 11.5–15.5)
MAGNESIUM SERPL-MCNC: 2 MG/DL — SIGNIFICANT CHANGE UP (ref 1.6–2.6)
MCHC RBC-ENTMCNC: 30.1 PG — SIGNIFICANT CHANGE UP (ref 27–34)
MCHC RBC-ENTMCNC: 33.3 G/DL — SIGNIFICANT CHANGE UP (ref 32–36)
MCV RBC AUTO: 90.5 FL — SIGNIFICANT CHANGE UP (ref 80–100)
NRBC # BLD: 0 /100 WBCS — SIGNIFICANT CHANGE UP (ref 0–0)
NRBC BLD-RTO: 0 /100 WBCS — SIGNIFICANT CHANGE UP (ref 0–0)
PHOSPHATE SERPL-MCNC: 3.6 MG/DL — SIGNIFICANT CHANGE UP (ref 2.5–4.5)
PLATELET # BLD AUTO: 216 K/UL — SIGNIFICANT CHANGE UP (ref 150–400)
POTASSIUM SERPL-MCNC: 3.7 MMOL/L — SIGNIFICANT CHANGE UP (ref 3.5–5.3)
POTASSIUM SERPL-SCNC: 3.7 MMOL/L — SIGNIFICANT CHANGE UP (ref 3.5–5.3)
PROT SERPL-MCNC: 6.8 G/DL — SIGNIFICANT CHANGE UP (ref 6–8.3)
RBC # BLD: 4.55 M/UL — SIGNIFICANT CHANGE UP (ref 3.8–5.2)
RBC # FLD: 12.6 % — SIGNIFICANT CHANGE UP (ref 10.3–14.5)
SODIUM SERPL-SCNC: 139 MMOL/L — SIGNIFICANT CHANGE UP (ref 135–145)
WBC # BLD: 7.65 K/UL — SIGNIFICANT CHANGE UP (ref 3.8–10.5)
WBC # FLD AUTO: 7.65 K/UL — SIGNIFICANT CHANGE UP (ref 3.8–10.5)

## 2024-11-03 PROCEDURE — 36415 COLL VENOUS BLD VENIPUNCTURE: CPT

## 2024-11-03 PROCEDURE — 81003 URINALYSIS AUTO W/O SCOPE: CPT

## 2024-11-03 PROCEDURE — 83735 ASSAY OF MAGNESIUM: CPT

## 2024-11-03 PROCEDURE — 95716 VEEG EA 12-26HR CONT MNTR: CPT

## 2024-11-03 PROCEDURE — 82550 ASSAY OF CK (CPK): CPT

## 2024-11-03 PROCEDURE — 85027 COMPLETE CBC AUTOMATED: CPT

## 2024-11-03 PROCEDURE — 83605 ASSAY OF LACTIC ACID: CPT

## 2024-11-03 PROCEDURE — 95718 EEG PHYS/QHP 2-12 HR W/VEEG: CPT

## 2024-11-03 PROCEDURE — 80307 DRUG TEST PRSMV CHEM ANLYZR: CPT

## 2024-11-03 PROCEDURE — 95700 EEG CONT REC W/VID EEG TECH: CPT

## 2024-11-03 PROCEDURE — 82977 ASSAY OF GGT: CPT

## 2024-11-03 PROCEDURE — 99238 HOSP IP/OBS DSCHRG MGMT 30/<: CPT

## 2024-11-03 PROCEDURE — 84100 ASSAY OF PHOSPHORUS: CPT

## 2024-11-03 PROCEDURE — 95713 VEEG 2-12 HR CONT MNTR: CPT

## 2024-11-03 PROCEDURE — 80053 COMPREHEN METABOLIC PANEL: CPT

## 2024-11-03 RX ADMIN — ENOXAPARIN SODIUM 40 MILLIGRAM(S): 100 INJECTION SUBCUTANEOUS at 05:35

## 2024-11-03 RX ADMIN — GABAPENTIN 300 MILLIGRAM(S): 400 CAPSULE ORAL at 05:35

## 2024-11-03 RX ADMIN — Medication 650 MILLIGRAM(S): at 10:40

## 2024-11-03 RX ADMIN — Medication 40 MILLIGRAM(S): at 05:35

## 2024-11-03 RX ADMIN — Medication 650 MILLIGRAM(S): at 10:10

## 2024-11-03 RX ADMIN — GABAPENTIN 300 MILLIGRAM(S): 400 CAPSULE ORAL at 02:01

## 2024-11-04 LAB
AMPHET UR-MCNC: NEGATIVE NG/ML — SIGNIFICANT CHANGE UP
BARBITURATES UR QL SCN: NEGATIVE NG/ML — SIGNIFICANT CHANGE UP
BARBITURATES UR-MCNC: NEGATIVE NG/ML — SIGNIFICANT CHANGE UP
BENZODIAZ UR-MCNC: NEGATIVE NG/ML — SIGNIFICANT CHANGE UP
COCAINE METAB.OTHER UR-MCNC: NEGATIVE NG/ML — SIGNIFICANT CHANGE UP
CREATININE, URINE THERAPEUTIC: 47.8 MG/DL — SIGNIFICANT CHANGE UP (ref 20–300)
FENTANYL UR QL SCN: NEGATIVE NG/ML — SIGNIFICANT CHANGE UP
METHADONE UR QL SCN: NEGATIVE NG/ML — SIGNIFICANT CHANGE UP
OPIATES UR-MCNC: NEGATIVE NG/ML — SIGNIFICANT CHANGE UP
OXYCODONE UR QL SCN: NEGATIVE NG/ML — SIGNIFICANT CHANGE UP
PCP UR-MCNC: NEGATIVE NG/ML — SIGNIFICANT CHANGE UP
PH, URINE RESULT: 5.7 — SIGNIFICANT CHANGE UP (ref 4.5–8.9)
SURGICAL PATHOLOGY STUDY: SIGNIFICANT CHANGE UP
THC UR QL: NEGATIVE NG/ML — SIGNIFICANT CHANGE UP

## 2024-11-04 RX ORDER — OMEPRAZOLE 20 MG/1
1 CAPSULE, DELAYED RELEASE ORAL
Refills: 0 | DISCHARGE

## 2024-11-04 RX ORDER — NAPROXEN SODIUM 275 MG
1 TABLET ORAL
Refills: 0 | DISCHARGE

## 2024-11-04 RX ORDER — GABAPENTIN 400 MG/1
1 CAPSULE ORAL
Refills: 0 | DISCHARGE

## 2024-11-06 ENCOUNTER — NON-APPOINTMENT (OUTPATIENT)
Age: 39
End: 2024-11-06

## 2024-11-18 ENCOUNTER — APPOINTMENT (OUTPATIENT)
Dept: NEUROLOGY | Facility: CLINIC | Age: 39
End: 2024-11-18
Payer: COMMERCIAL

## 2024-11-18 DIAGNOSIS — F44.5 CONVERSION DISORDER WITH SEIZURES OR CONVULSIONS: ICD-10-CM

## 2024-11-18 PROCEDURE — 99213 OFFICE O/P EST LOW 20 MIN: CPT | Mod: 95

## 2024-11-26 ENCOUNTER — APPOINTMENT (OUTPATIENT)
Dept: GASTROENTEROLOGY | Facility: CLINIC | Age: 39
End: 2024-11-26
Payer: COMMERCIAL

## 2024-11-26 VITALS
HEART RATE: 75 BPM | BODY MASS INDEX: 31.28 KG/M2 | SYSTOLIC BLOOD PRESSURE: 101 MMHG | OXYGEN SATURATION: 98 % | DIASTOLIC BLOOD PRESSURE: 75 MMHG | HEIGHT: 62 IN | WEIGHT: 170 LBS | RESPIRATION RATE: 16 BRPM | TEMPERATURE: 98 F

## 2024-11-26 DIAGNOSIS — R10.11 RIGHT UPPER QUADRANT PAIN: ICD-10-CM

## 2024-11-26 DIAGNOSIS — K76.0 FATTY (CHANGE OF) LIVER, NOT ELSEWHERE CLASSIFIED: ICD-10-CM

## 2024-11-26 PROBLEM — E78.5 HYPERLIPIDEMIA, UNSPECIFIED: Chronic | Status: ACTIVE | Noted: 2024-11-02

## 2024-11-26 PROBLEM — I10 ESSENTIAL (PRIMARY) HYPERTENSION: Chronic | Status: ACTIVE | Noted: 2024-11-02

## 2024-11-26 PROBLEM — J45.909 UNSPECIFIED ASTHMA, UNCOMPLICATED: Chronic | Status: ACTIVE | Noted: 2024-11-02

## 2024-11-26 PROCEDURE — 99213 OFFICE O/P EST LOW 20 MIN: CPT

## 2024-12-02 NOTE — ED PROVIDER NOTE - OBJECTIVE STATEMENT
PROCEDURES:  Endovascular repair of aortoiliac aneurysm 02-Dec-2024 14:30:00  Ricky Marley  
37 y/o F with PMH of preDM on metformin presents to the ED with c/o epigastric pain radiating to the RUQ x last night a/w nausea. Pain is worse with food. last PO intake was yesterday. She denies f/c, urinary sympts, CP, SOB, vomiting, URI sympts or all other complaints. Reports normal BMs

## 2024-12-11 ENCOUNTER — APPOINTMENT (OUTPATIENT)
Dept: ULTRASOUND IMAGING | Facility: HOSPITAL | Age: 39
End: 2024-12-11
Payer: COMMERCIAL

## 2024-12-11 ENCOUNTER — OUTPATIENT (OUTPATIENT)
Dept: OUTPATIENT SERVICES | Facility: HOSPITAL | Age: 39
LOS: 1 days | End: 2024-12-11
Payer: COMMERCIAL

## 2024-12-11 DIAGNOSIS — K76.0 FATTY (CHANGE OF) LIVER, NOT ELSEWHERE CLASSIFIED: ICD-10-CM

## 2024-12-11 DIAGNOSIS — Z98.89 OTHER SPECIFIED POSTPROCEDURAL STATES: Chronic | ICD-10-CM

## 2024-12-11 DIAGNOSIS — Z41.9 ENCOUNTER FOR PROCEDURE FOR PURPOSES OTHER THAN REMEDYING HEALTH STATE, UNSPECIFIED: Chronic | ICD-10-CM

## 2024-12-11 PROCEDURE — 76705 ECHO EXAM OF ABDOMEN: CPT | Mod: 26

## 2024-12-11 PROCEDURE — 76705 ECHO EXAM OF ABDOMEN: CPT

## 2025-01-24 NOTE — ED ADULT NURSE NOTE - GASTROINTESTINAL ASSESSMENT
WDL [NS_DeliveryAttending1_OBGYN_ALL_OB_FT:[id number]],[NS_DeliveryAssist1_OBGYN_ALL_OB_FT:[id number]=],[NS_DeliveryRN_OBGYN_ALL_OB_FT:[id number]=]

## 2025-02-13 NOTE — PATIENT PROFILE ADULT - FALL HARM RISK - ATTEMPT OOB
Quality 130: Documentation Of Current Medications In The Medical Record: Current Medications Documented
Detail Level: Detailed
Quality 431: Preventive Care And Screening: Unhealthy Alcohol Use - Screening: Patient not identified as an unhealthy alcohol user when screened for unhealthy alcohol use using a systematic screening method
Quality 47: Advance Care Plan: Advance Care Planning discussed and documented in the medical record; patient did not wish or was not able to name a surrogate decision maker or provide an advance care plan.
Quality 226: Preventive Care And Screening: Tobacco Use: Screening And Cessation Intervention: Patient screened for tobacco use and is an ex/non-smoker
No

## 2025-02-25 NOTE — ED PROVIDER NOTE - CPE EDP SKIN NORM
Per patient calling stating she cannot do 2:30 for her appointment tomorrow. Please advise.   
Per patient returning a call. Please advise  
normal...

## 2025-03-06 NOTE — H&P PST ADULT - NSICDXPROBLEM_GEN_ALL_CORE_FT
H&P reviewed. The patient was examined and there are no changes to the H&P.   PROBLEM DIAGNOSES  Problem: Torsion of ovary, ovarian pedicle and fallopian tube  Assessment and Plan: PST Labs; CBC, BMP, HcG, Type & Screen, HgB A1C, COVID-19 PCR Swab. No medical clearance needed. Pt instructed to stop any NSAIDS/Herbal Supplements between now and procedure.     Problem: Other ovarian cyst, right side  Assessment and Plan: See Above Assessment and Plan (#1)    Problem: Pelvic and perineal pain  Assessment and Plan: See Above Assessment and Plan (#1)       PROBLEM DIAGNOSES  Problem: Torsion of ovary, ovarian pedicle and fallopian tube  Assessment and Plan: PST Labs; CBC, BMP, HcG, Type & Screen, HgB A1C, COVID-19 PCR Swab. No medical clearance needed. Pt instructed to stop any NSAIDS/Herbal Supplements/Centrum between now and procedure. May take Tylenol OR she may take  Oxycodone/Acetiminophen if needed for pain between now and procedure. No medications needed morning of procedure. Pre-op instructions as well as pre-op wash instructions given to pt with understanding verbalized.  All instructions given via Language Line [employer] - Tiffanie ID # [id number]. Pt verbalizes understanding of all instructions discussed today in PST. All questions addressed with patient prior to her leaving the PST department.     Problem: Other ovarian cyst, right side  Assessment and Plan: See Above Assessment and Plan (#1)    Problem: Pelvic and perineal pain  Assessment and Plan: See Above Assessment and Plan (#1)

## 2025-03-14 NOTE — ED ADULT NURSE NOTE - RADIATION
It is my impression that she has vasovagal syncope as evidenced by her prodromal symptoms of dizziness, blurry vision, and feeling hot prior to each event.  She also frequently feels dizzy at baseline.  We discussed that she should drink at least 64 ounces of water daily and increase the salt intake in her diet.  If she feels presyncopal again, she should sit down to avoid injuring herself.  It is reassuring that her prior echocardiogram was normal and her EKG is normal today as well as her cardiac examination.  While her syncope is likely noncardiac in origin, if she continues to have episodes of syncope, especially during soccer practice, she can return for a cardiac stress test.  These recommendations were conveyed to the patient and her mother, who are agreeable to this plan.   flank

## 2025-04-21 ENCOUNTER — APPOINTMENT (OUTPATIENT)
Dept: FAMILY MEDICINE | Facility: CLINIC | Age: 40
End: 2025-04-21

## 2025-04-24 NOTE — ED ADULT NURSE NOTE - NS PRO AD NO ADVANCE DIRECTIVE
Thank you for coming to see us today!     We are going to give you a referral for an EMG of the right upper extremity.  We are also going to give you a referral for physical therapy for your low back.   Please call central scheduling to make this appointment.     Please follow up with us after you have the EMG completed.         No

## 2025-08-16 ENCOUNTER — EMERGENCY (EMERGENCY)
Facility: HOSPITAL | Age: 40
LOS: 1 days | End: 2025-08-16
Attending: INTERNAL MEDICINE | Admitting: INTERNAL MEDICINE
Payer: COMMERCIAL

## 2025-08-16 VITALS
HEIGHT: 62 IN | OXYGEN SATURATION: 99 % | HEART RATE: 89 BPM | RESPIRATION RATE: 18 BRPM | TEMPERATURE: 99 F | SYSTOLIC BLOOD PRESSURE: 108 MMHG | DIASTOLIC BLOOD PRESSURE: 74 MMHG | WEIGHT: 169.98 LBS

## 2025-08-16 DIAGNOSIS — Z98.89 OTHER SPECIFIED POSTPROCEDURAL STATES: Chronic | ICD-10-CM

## 2025-08-16 DIAGNOSIS — Z90.3 ACQUIRED ABSENCE OF STOMACH [PART OF]: Chronic | ICD-10-CM

## 2025-08-16 DIAGNOSIS — Z41.9 ENCOUNTER FOR PROCEDURE FOR PURPOSES OTHER THAN REMEDYING HEALTH STATE, UNSPECIFIED: Chronic | ICD-10-CM

## 2025-08-16 LAB
ALBUMIN SERPL ELPH-MCNC: 3.5 G/DL — SIGNIFICANT CHANGE UP (ref 3.3–5)
ALP SERPL-CCNC: 81 U/L — SIGNIFICANT CHANGE UP (ref 40–120)
ALT FLD-CCNC: 19 U/L — SIGNIFICANT CHANGE UP (ref 10–45)
ANION GAP SERPL CALC-SCNC: 7 MMOL/L — SIGNIFICANT CHANGE UP (ref 5–17)
APPEARANCE UR: CLEAR — SIGNIFICANT CHANGE UP
AST SERPL-CCNC: 34 U/L — SIGNIFICANT CHANGE UP (ref 10–40)
BASOPHILS # BLD AUTO: 0.02 K/UL — SIGNIFICANT CHANGE UP (ref 0–0.2)
BASOPHILS NFR BLD AUTO: 0.2 % — SIGNIFICANT CHANGE UP (ref 0–2)
BILIRUB SERPL-MCNC: 0.1 MG/DL — LOW (ref 0.2–1.2)
BILIRUB UR-MCNC: NEGATIVE — SIGNIFICANT CHANGE UP
BUN SERPL-MCNC: 7 MG/DL — SIGNIFICANT CHANGE UP (ref 7–23)
CALCIUM SERPL-MCNC: 8.7 MG/DL — SIGNIFICANT CHANGE UP (ref 8.4–10.5)
CHLORIDE SERPL-SCNC: 104 MMOL/L — SIGNIFICANT CHANGE UP (ref 96–108)
CO2 SERPL-SCNC: 26 MMOL/L — SIGNIFICANT CHANGE UP (ref 22–31)
COLOR SPEC: YELLOW — SIGNIFICANT CHANGE UP
CREAT SERPL-MCNC: 0.6 MG/DL — SIGNIFICANT CHANGE UP (ref 0.5–1.3)
DIFF PNL FLD: NEGATIVE — SIGNIFICANT CHANGE UP
EGFR: 117 ML/MIN/1.73M2 — SIGNIFICANT CHANGE UP
EGFR: 117 ML/MIN/1.73M2 — SIGNIFICANT CHANGE UP
EOSINOPHIL # BLD AUTO: 0.28 K/UL — SIGNIFICANT CHANGE UP (ref 0–0.5)
EOSINOPHIL NFR BLD AUTO: 2.9 % — SIGNIFICANT CHANGE UP (ref 0–6)
GLUCOSE SERPL-MCNC: 94 MG/DL — SIGNIFICANT CHANGE UP (ref 70–99)
GLUCOSE UR QL: NEGATIVE MG/DL — SIGNIFICANT CHANGE UP
HCT VFR BLD CALC: 37.7 % — SIGNIFICANT CHANGE UP (ref 34.5–45)
HGB BLD-MCNC: 12.7 G/DL — SIGNIFICANT CHANGE UP (ref 11.5–15.5)
IMM GRANULOCYTES NFR BLD AUTO: 0.1 % — SIGNIFICANT CHANGE UP (ref 0–0.9)
KETONES UR QL: NEGATIVE MG/DL — SIGNIFICANT CHANGE UP
LACTATE SERPL-SCNC: 0.9 MMOL/L — SIGNIFICANT CHANGE UP (ref 0.7–2)
LEUKOCYTE ESTERASE UR-ACNC: NEGATIVE — SIGNIFICANT CHANGE UP
LIDOCAIN IGE QN: 46 U/L — SIGNIFICANT CHANGE UP (ref 16–77)
LYMPHOCYTES # BLD AUTO: 3.08 K/UL — SIGNIFICANT CHANGE UP (ref 1–3.3)
LYMPHOCYTES # BLD AUTO: 32.3 % — SIGNIFICANT CHANGE UP (ref 13–44)
MCHC RBC-ENTMCNC: 30.4 PG — SIGNIFICANT CHANGE UP (ref 27–34)
MCHC RBC-ENTMCNC: 33.7 G/DL — SIGNIFICANT CHANGE UP (ref 32–36)
MCV RBC AUTO: 90.2 FL — SIGNIFICANT CHANGE UP (ref 80–100)
MONOCYTES # BLD AUTO: 0.74 K/UL — SIGNIFICANT CHANGE UP (ref 0–0.9)
MONOCYTES NFR BLD AUTO: 7.7 % — SIGNIFICANT CHANGE UP (ref 2–14)
NEUTROPHILS # BLD AUTO: 5.42 K/UL — SIGNIFICANT CHANGE UP (ref 1.8–7.4)
NEUTROPHILS NFR BLD AUTO: 56.8 % — SIGNIFICANT CHANGE UP (ref 43–77)
NITRITE UR-MCNC: NEGATIVE — SIGNIFICANT CHANGE UP
NRBC BLD AUTO-RTO: 0 /100 WBCS — SIGNIFICANT CHANGE UP (ref 0–0)
PH UR: 7.5 — SIGNIFICANT CHANGE UP (ref 5–8)
PLATELET # BLD AUTO: 200 K/UL — SIGNIFICANT CHANGE UP (ref 150–400)
POTASSIUM SERPL-MCNC: 3.9 MMOL/L — SIGNIFICANT CHANGE UP (ref 3.5–5.3)
POTASSIUM SERPL-SCNC: 3.9 MMOL/L — SIGNIFICANT CHANGE UP (ref 3.5–5.3)
PROT SERPL-MCNC: 7 G/DL — SIGNIFICANT CHANGE UP (ref 6–8.3)
PROT UR-MCNC: NEGATIVE MG/DL — SIGNIFICANT CHANGE UP
RBC # BLD: 4.18 M/UL — SIGNIFICANT CHANGE UP (ref 3.8–5.2)
RBC # FLD: 12.3 % — SIGNIFICANT CHANGE UP (ref 10.3–14.5)
SODIUM SERPL-SCNC: 137 MMOL/L — SIGNIFICANT CHANGE UP (ref 135–145)
SP GR SPEC: 1.01 — SIGNIFICANT CHANGE UP (ref 1–1.03)
UROBILINOGEN FLD QL: 0.2 MG/DL — SIGNIFICANT CHANGE UP (ref 0.2–1)
WBC # BLD: 9.55 K/UL — SIGNIFICANT CHANGE UP (ref 3.8–10.5)
WBC # FLD AUTO: 9.55 K/UL — SIGNIFICANT CHANGE UP (ref 3.8–10.5)

## 2025-08-16 PROCEDURE — 81003 URINALYSIS AUTO W/O SCOPE: CPT

## 2025-08-16 PROCEDURE — 96368 THER/DIAG CONCURRENT INF: CPT

## 2025-08-16 PROCEDURE — 96365 THER/PROPH/DIAG IV INF INIT: CPT | Mod: XU

## 2025-08-16 PROCEDURE — 74177 CT ABD & PELVIS W/CONTRAST: CPT

## 2025-08-16 PROCEDURE — 83690 ASSAY OF LIPASE: CPT

## 2025-08-16 PROCEDURE — 36415 COLL VENOUS BLD VENIPUNCTURE: CPT

## 2025-08-16 PROCEDURE — 76705 ECHO EXAM OF ABDOMEN: CPT | Mod: 26

## 2025-08-16 PROCEDURE — 76705 ECHO EXAM OF ABDOMEN: CPT

## 2025-08-16 PROCEDURE — 99284 EMERGENCY DEPT VISIT MOD MDM: CPT | Mod: 25

## 2025-08-16 PROCEDURE — 74177 CT ABD & PELVIS W/CONTRAST: CPT | Mod: 26

## 2025-08-16 PROCEDURE — 96375 TX/PRO/DX INJ NEW DRUG ADDON: CPT

## 2025-08-16 PROCEDURE — 83605 ASSAY OF LACTIC ACID: CPT

## 2025-08-16 PROCEDURE — 96376 TX/PRO/DX INJ SAME DRUG ADON: CPT

## 2025-08-16 PROCEDURE — 85025 COMPLETE CBC W/AUTO DIFF WBC: CPT

## 2025-08-16 PROCEDURE — 99285 EMERGENCY DEPT VISIT HI MDM: CPT

## 2025-08-16 PROCEDURE — 80053 COMPREHEN METABOLIC PANEL: CPT

## 2025-08-16 RX ORDER — ONDANSETRON HCL/PF 4 MG/2 ML
1 VIAL (ML) INJECTION
Qty: 3 | Refills: 0
Start: 2025-08-16 | End: 2025-08-25

## 2025-08-16 RX ORDER — ACETAMINOPHEN 500 MG/5ML
1000 LIQUID (ML) ORAL ONCE
Refills: 0 | Status: COMPLETED | OUTPATIENT
Start: 2025-08-16 | End: 2025-08-16

## 2025-08-16 RX ORDER — ONDANSETRON HCL/PF 4 MG/2 ML
4 VIAL (ML) INJECTION ONCE
Refills: 0 | Status: COMPLETED | OUTPATIENT
Start: 2025-08-16 | End: 2025-08-16

## 2025-08-16 RX ADMIN — Medication 4 MILLIGRAM(S): at 19:25

## 2025-08-16 RX ADMIN — Medication 4 MILLIGRAM(S): at 19:23

## 2025-08-16 RX ADMIN — Medication 1000 MILLILITER(S): at 17:25

## 2025-08-16 RX ADMIN — Medication 20 MILLIGRAM(S): at 17:56

## 2025-08-16 RX ADMIN — Medication 100 MILLIGRAM(S): at 17:26

## 2025-08-16 RX ADMIN — Medication 1000 MILLILITER(S): at 18:25

## 2025-08-16 RX ADMIN — Medication 1000 MILLIGRAM(S): at 18:23

## 2025-08-16 RX ADMIN — Medication 4 MILLIGRAM(S): at 19:40

## 2025-08-16 RX ADMIN — Medication 4 MILLIGRAM(S): at 17:25

## 2025-08-16 RX ADMIN — Medication 400 MILLIGRAM(S): at 17:47

## 2025-08-19 ENCOUNTER — APPOINTMENT (OUTPATIENT)
Dept: GASTROENTEROLOGY | Facility: CLINIC | Age: 40
End: 2025-08-19
Payer: COMMERCIAL

## 2025-08-19 VITALS
OXYGEN SATURATION: 98 % | DIASTOLIC BLOOD PRESSURE: 69 MMHG | TEMPERATURE: 98.5 F | HEART RATE: 92 BPM | SYSTOLIC BLOOD PRESSURE: 96 MMHG | HEIGHT: 62 IN | WEIGHT: 178 LBS | RESPIRATION RATE: 16 BRPM | BODY MASS INDEX: 32.76 KG/M2

## 2025-08-19 DIAGNOSIS — Z87.19 PERSONAL HISTORY OF OTHER DISEASES OF THE DIGESTIVE SYSTEM: ICD-10-CM

## 2025-08-19 DIAGNOSIS — R10.11 RIGHT UPPER QUADRANT PAIN: ICD-10-CM

## 2025-08-19 DIAGNOSIS — K76.0 FATTY (CHANGE OF) LIVER, NOT ELSEWHERE CLASSIFIED: ICD-10-CM

## 2025-08-19 DIAGNOSIS — Z87.898 PERSONAL HISTORY OF OTHER SPECIFIED CONDITIONS: ICD-10-CM

## 2025-08-19 DIAGNOSIS — R10.9 UNSPECIFIED ABDOMINAL PAIN: ICD-10-CM

## 2025-08-19 DIAGNOSIS — K59.09 OTHER CONSTIPATION: ICD-10-CM

## 2025-08-19 PROCEDURE — 99215 OFFICE O/P EST HI 40 MIN: CPT

## 2025-08-19 RX ORDER — FAMOTIDINE 20 MG/1
20 TABLET, FILM COATED ORAL
Refills: 0 | Status: DISCONTINUED | COMMUNITY

## 2025-08-19 RX ORDER — ONDANSETRON 4 MG/1
4 TABLET, ORALLY DISINTEGRATING ORAL
Refills: 0 | Status: DISCONTINUED | COMMUNITY

## (undated) DEVICE — TUBING IV EXTENSION 30"

## (undated) DEVICE — SUT POLYSORB 0 30" GS-23

## (undated) DEVICE — DRAPE TOWEL BLUE 17" X 24"

## (undated) DEVICE — INSUFFLATION NDL COVIDIEN STEP 14G FOR STEP/VERSASTEP

## (undated) DEVICE — FORCEP RADIAL JAW 4 W NDL 2.4MM 2.8MM 240CM ORANGE DISP

## (undated) DEVICE — POSITIONER STRAP ARMBOARD VELCRO TS-30

## (undated) DEVICE — SYR LUER LOK 10CC

## (undated) DEVICE — GLV 6.5 PROTEXIS

## (undated) DEVICE — TROCAR ETHICON ENDOPATH XCEL BLADELESS 5MM-100MM

## (undated) DEVICE — TROCAR COVIDIEN VERSASTEP PLUS 11MM STANDARD

## (undated) DEVICE — GLV 8 PROTEXIS (WHITE)

## (undated) DEVICE — BLADE SAFETY LOCK #15

## (undated) DEVICE — TROCAR ETHICON 15MM XCEL BLADELESS

## (undated) DEVICE — DRSG STERISTRIPS 0.5 X 4"

## (undated) DEVICE — TUBING HYDRO-SURG PLUS IRRIGATOR W SMOKEVAC & PROBE

## (undated) DEVICE — ELCTR EDGE BOVIE INSULATED BLADE TIP 2.75"

## (undated) DEVICE — CATH ELECHMSTAT  INJ 7FR 210CM

## (undated) DEVICE — LAP PAD 18 X 18"

## (undated) DEVICE — SOL IRR POUR H2O 1000ML

## (undated) DEVICE — GLV COTTON DEROYAL XL

## (undated) DEVICE — PROBE FIAPC DIA 2.3MM/7FR LNTH 220CM/7.2FT

## (undated) DEVICE — SHEARS HARMONIC ACE 5MM X 36CM CURVED TIP

## (undated) DEVICE — SOLIDIFIER CANN EXPRESS 3K

## (undated) DEVICE — SUT POLYSORB 0 30" GU-46

## (undated) DEVICE — ENDOCATCH II 15MM

## (undated) DEVICE — TUBING CAP SET ERBEFLO CLEVERCAP HYBRID CO2 FOR OLYMPUS SCOPES AND UCR

## (undated) DEVICE — CONTAINER FORMALIN BUFF 10% 60ML

## (undated) DEVICE — TUBING STRYKER PNEUMOSURE HI FLOW RTP

## (undated) DEVICE — STAPLER COVIDIEN ENDO GIA XL HANDLE

## (undated) DEVICE — PACK MINOR

## (undated) DEVICE — TUBING W FILTER STERILE FOR INSUFFLATION

## (undated) DEVICE — DRAPE LIGHT HANDLE COVER (GREEN)

## (undated) DEVICE — SOL IRR POUR NS 0.9% 500ML

## (undated) DEVICE — GLV 8.5 PROTEXIS (WHITE)

## (undated) DEVICE — KIT ENDO PROCEDURE CUST W/VLV

## (undated) DEVICE — ENDOCATCH 10MM SPECIMEN POUCH

## (undated) DEVICE — PREP CHLORAPREP HI-LITE ORANGE 26ML

## (undated) DEVICE — VENODYNE/SCD SLEEVE CALF MEDIUM

## (undated) DEVICE — SOL IRR BAG NS 0.9% 3000ML

## (undated) DEVICE — SUT POLYSORB 3-0 30" V-20

## (undated) DEVICE — DISSECTOR ENDO PEANUT 5MM

## (undated) DEVICE — SUT SOFSILK 0 30" V-20

## (undated) DEVICE — GLV 6.5 PROTEXIS (WHITE)

## (undated) DEVICE — POSITIONER FOAM HEAD CRADLE (PINK)

## (undated) DEVICE — PRESSURE INFUSOR BAG 1000ML

## (undated) DEVICE — CANISTER SUCTION LID GUARD 3000CC

## (undated) DEVICE — Device

## (undated) DEVICE — TUBING INSUFLATOR VIDEO TOWER NON HEATED

## (undated) DEVICE — DRAPE 3/4 SHEET 52X76"

## (undated) DEVICE — ENDO SHEARS COVIDIEN 5MM LONG W MONOPOLAR CAUTERY

## (undated) DEVICE — BLANKET WARMER UPPER ADULT

## (undated) DEVICE — TROCAR COVIDIEN VERSASTEP 5MM STANDARD

## (undated) DEVICE — SOL IRR POUR H2O 1500ML

## (undated) DEVICE — SUT MAXON 4-0 30" P-12

## (undated) DEVICE — NDL HYPO REGULAR BEVEL 22G X 1.5"

## (undated) DEVICE — FOLEY TRAY 14FR 5CC LTX BAG CLOSED

## (undated) DEVICE — WARMING BLANKET UPPER ADULT

## (undated) DEVICE — D HELP - CLEARVIEW CLEARIFY SYSTEM

## (undated) DEVICE — SCOPE WARMER SEAL DISP

## (undated) DEVICE — LIGASURE MARYLAND JAW LAPAROSCOPIC SEALER 37CM

## (undated) DEVICE — SPECIMEN CONTAINER PET

## (undated) DEVICE — TROCAR COVIDIEN VERSAONE BLADED FIXATION 11MM STANDARD

## (undated) DEVICE — WRAP COMPRESSION CALF MED

## (undated) DEVICE — TROCAR COVIDIEN VISIPORT PLUS 5-12MM

## (undated) DEVICE — ENDO SHEARS COVIDIEN 5MM W UNIPOLAR CAUTERY

## (undated) DEVICE — TUBING PLUME AWAY 4.0

## (undated) DEVICE — GLV 7.5 PROTEXIS (WHITE)

## (undated) DEVICE — TUBING STRYKEFLOW II SUCTION / IRRIGATOR

## (undated) DEVICE — TROCAR COVIDIEN ENDO CLOSE SUTURING DEVICE

## (undated) DEVICE — PACK GENERAL LAPAROSCOPY

## (undated) DEVICE — GOWN XL

## (undated) DEVICE — ELCTR FOOT CONTROL L WIRE LAPAROSCOPIC